# Patient Record
Sex: MALE | Race: BLACK OR AFRICAN AMERICAN | NOT HISPANIC OR LATINO | Employment: FULL TIME | URBAN - METROPOLITAN AREA
[De-identification: names, ages, dates, MRNs, and addresses within clinical notes are randomized per-mention and may not be internally consistent; named-entity substitution may affect disease eponyms.]

---

## 2017-01-25 DIAGNOSIS — E78.5 HLD (HYPERLIPIDEMIA): ICD-10-CM

## 2017-01-25 DIAGNOSIS — M54.9 LEFT-SIDED BACK PAIN: ICD-10-CM

## 2017-01-25 RX ORDER — PREGABALIN 100 MG/1
CAPSULE ORAL
Qty: 90 CAPSULE | Refills: 5 | Status: SHIPPED | OUTPATIENT
Start: 2017-01-25 | End: 2017-08-01 | Stop reason: SDUPTHER

## 2017-01-25 RX ORDER — ROSUVASTATIN CALCIUM 10 MG/1
TABLET, FILM COATED ORAL
Qty: 90 TABLET | Refills: 1 | Status: SHIPPED | OUTPATIENT
Start: 2017-01-25 | End: 2018-03-31 | Stop reason: SDUPTHER

## 2017-03-02 RX ORDER — ETODOLAC 400 MG/1
TABLET, FILM COATED ORAL
Qty: 180 TABLET | Refills: 0 | Status: SHIPPED | OUTPATIENT
Start: 2017-03-02 | End: 2017-06-19 | Stop reason: SDUPTHER

## 2017-03-31 ENCOUNTER — OFFICE VISIT (OUTPATIENT)
Dept: FAMILY MEDICINE | Facility: CLINIC | Age: 67
End: 2017-03-31
Payer: COMMERCIAL

## 2017-03-31 VITALS
TEMPERATURE: 98 F | HEART RATE: 66 BPM | SYSTOLIC BLOOD PRESSURE: 122 MMHG | HEIGHT: 69 IN | WEIGHT: 192.56 LBS | OXYGEN SATURATION: 98 % | BODY MASS INDEX: 28.52 KG/M2 | DIASTOLIC BLOOD PRESSURE: 78 MMHG

## 2017-03-31 DIAGNOSIS — B96.89 ACUTE BACTERIAL SINUSITIS: Primary | ICD-10-CM

## 2017-03-31 DIAGNOSIS — J01.90 ACUTE BACTERIAL SINUSITIS: Primary | ICD-10-CM

## 2017-03-31 PROCEDURE — 99999 PR PBB SHADOW E&M-EST. PATIENT-LVL III: CPT | Mod: PBBFAC,,, | Performed by: INTERNAL MEDICINE

## 2017-03-31 PROCEDURE — 99213 OFFICE O/P EST LOW 20 MIN: CPT | Mod: S$GLB,,, | Performed by: INTERNAL MEDICINE

## 2017-03-31 PROCEDURE — 1160F RVW MEDS BY RX/DR IN RCRD: CPT | Mod: S$GLB,,, | Performed by: INTERNAL MEDICINE

## 2017-03-31 RX ORDER — IPRATROPIUM BROMIDE 42 UG/1
2 SPRAY, METERED NASAL 3 TIMES DAILY
Qty: 30 ML | Refills: 0 | Status: SHIPPED | OUTPATIENT
Start: 2017-03-31 | End: 2017-04-07

## 2017-03-31 RX ORDER — AMOXICILLIN AND CLAVULANATE POTASSIUM 875; 125 MG/1; MG/1
1 TABLET, FILM COATED ORAL 2 TIMES DAILY
Qty: 20 TABLET | Refills: 0 | Status: SHIPPED | OUTPATIENT
Start: 2017-03-31 | End: 2017-04-10

## 2017-03-31 NOTE — MR AVS SNAPSHOT
[facility]  [address]  [place] [address]  Phone: [phone]  Fax: [phone]                  John Lemos   3/31/2017 3:00 PM   Office Visit    Description:  Male : 1950   Provider:  Rahul Rebollar MD   Department:  [facility]           Reason for Visit     Sinus Problem           Diagnoses this Visit        Comments    Acute bacterial sinusitis    -  Primary            To Do List           Future Appointments        Provider Department Dept Phone    2017 7:00 AM Xiomy Ventura MD [facility] [phone]      Goals (5 Years of Data)     None       These Medications        Disp Refills Start End    amoxicillin-clavulanate 875-125mg (AUGMENTIN) 875-125 mg per tablet 20 tablet 0 3/31/2017 4/10/2017    Take 1 tablet by mouth 2 (two) times daily. - Oral    Pharmacy: [facility] ([place], [place] - [address] [facility] Ph #: [phone]       ipratropium (ATROVENT) 0.06 % nasal spray 30 mL 0 3/31/2017 2017    2 sprays by Nasal route 3 (three) times daily. AS NEEDED FOR NASAL CONGESTION AND DRIP - Nasal    Pharmacy: [facility] [address] [facility] ([place], [place] - [address] [facility] Ph #: [phone]         [facility]     [facility] Care [facility] - 24/ Assistance  Unless otherwise directed by your provider, please contact [facility], our nurse care line that is available for 24/ assistance.     Registered nurses in the [facility] provide: appointment scheduling, clinical advisement, health education, and other advisory services.  Call: [phone] (toll free)               Medications           Message regarding Medications     Verify the changes and/or additions to your medication regime listed below are the same as discussed with your clinician today.  If any of these changes or additions are incorrect, please notify your healthcare provider.        START taking these NEW  "medications        Refills    ipratropium (ATROVENT) 0.06 % nasal spray 0    Si sprays by Nasal route 3 (three) times daily. AS NEEDED FOR NASAL CONGESTION AND DRIP    Class: Normal    Route: Nasal      STOP taking these medications     fluticasone (FLONASE) 50 mcg/actuation nasal spray 1 spray by Each Nare route once daily.           Verify that the below list of medications is an accurate representation of the medications you are currently taking.  If none reported, the list may be blank. If incorrect, please contact your healthcare provider. Carry this list with you in case of emergency.           Current Medications     CRESTOR 10 mg tablet TAKE ONE TABLET BY MOUTH ONCE DAILY IN THE EVENING    LYRICA 100 mg capsule TAKE ONE CAPSULE BY MOUTH THREE TIMES DAILY    metformin (GLUCOPHAGE) 500 MG tablet Take 2 tablets with breakfast and 1 tablet with dinner    omeprazole (PRILOSEC) 20 MG capsule Take 20 mg by mouth. 1 Capsule, Delayed Release(E.C.) Oral Every morning    amoxicillin-clavulanate 875-125mg (AUGMENTIN) 875-125 mg per tablet Take 1 tablet by mouth 2 (two) times daily.    blood sugar diagnostic (FREESTYLE LITE STRIPS) Strp Inject 1 each as directed once daily.    clotrimazole-betamethasone 1-0.05% (LOTRISONE) cream Apply topically 2 (two) times daily.    etodolac (LODINE) 400 MG tablet TAKE ONE TABLET BY MOUTH TWICE DAILY    ipratropium (ATROVENT) 0.06 % nasal spray 2 sprays by Nasal route 3 (three) times daily. AS NEEDED FOR NASAL CONGESTION AND DRIP    lancets (FREESTYLE LANCETS) 28 gauge Misc Inject 1 lancet as directed once daily.    loratadine (CLARITIN) 10 mg tablet Take 1 tablet (10 mg total) by mouth once daily.           Clinical Reference Information           Your Vitals Were     BP Pulse Temp Height Weight SpO2    122/78 (BP Location: Right arm, Patient Position: Sitting) 66 97.7 °F (36.5 °C) 5' 9" (1.753 m) 87.4 kg (192 lb 9.2 oz) 98%    BMI                28.44 kg/m2          Blood " Pressure          Most Recent Value    BP  122/78      Allergies as of 3/31/2017     Sulfa (Sulfonamide Antibiotics)      Immunizations Administered on Date of Encounter - 3/31/2017     None      Language Assistance Services     ATTENTION: Language assistance services are available, free of charge. Please call [phone].      ATENCIÓN: Si habla ernestina, tiene a mcbride disposición servicios gratuitos de asistencia lingüística. Llame al [phone].     CHÚ Ý: N?u b?n nói Ti?ng Vi?t, có các d?ch v? h? tr? ngôn ng? mi?n phí dành cho b?n. G?i s? [phone].         [facility] complies with applicable Federal civil rights laws and does not discriminate on the basis of race, color, national origin, age, disability, or sex.

## 2017-03-31 NOTE — PROGRESS NOTES
Assessment & Plan  Acute bacterial sinusitis - x 2 weeks, failing conservative mgmt.  augmentin x 10 days; atrovent nasal spray.  -     amoxicillin-clavulanate 875-125mg (AUGMENTIN) 875-125 mg per tablet; Take 1 tablet by mouth 2 (two) times daily.  Dispense: 20 tablet; Refill: 0  -     ipratropium (ATROVENT) 0.06 % nasal spray; 2 sprays by Nasal route 3 (three) times daily. AS NEEDED FOR NASAL CONGESTION AND DRIP  Dispense: 30 mL; Refill: 0      There are no discontinued medications.    Follow-up: No Follow-up on file.follow up June fasting with Dr. 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      =================================================================      Chief Complaint   Patient presents with    Sinus Problem       ALEXANDR Lopez is a 66 y.o. male, last appointment with this clinic was 10/24/2016.    Diabetes with peripheral neuropathy.  11/2016, increased metformin dose.  Lyrica.  Last A1c in November, 7.4. Last lipid profile LDL at 71.  Lumbar degenerative disc disease.  Lyrica.  Hyperlipidemia on crestor.    Notes frequent sinus infection; maybe yearly.  Around this time of year.  Denies allergies.  No issues with cutting grass.  This time it's been 2 weeks of sinus pain and pressure, no drainage.  Dizziness. No ear congestion but maybe some pressure.  No fever no chills.  No chest congestion.  Allegra D OTC with modest improvement.  Hx of Flonase to take PRN infection; did not use it for this episode.  Sock contacts none.      Patient Care Team:  Xiomy Ventura MD as PCP - General (Family Medicine)    Patient Active Problem List    Diagnosis Date Noted    ED (erectile dysfunction) 01/08/2013    HLD (hyperlipidemia) 01/08/2013    Neuropathy 01/08/2013    Diabetes mellitus type 2, controlled 09/08/2012    GERD (gastroesophageal reflux disease) 09/08/2012    DDD (degenerative disc disease), lumbar 09/08/2012       PAST MEDICAL HISTORY:  Past Medical History:   Diagnosis Date     Arthritis     Cataract     ou    Diabetes mellitus 7 yrs    lbs: 118 1 week ago    Eye injury     fb    Hyperlipidemia        PAST SURGICAL HISTORY:  History reviewed. No pertinent surgical history.    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  Review of patient's allergies indicates:   Allergen Reactions    Sulfa (sulfonamide antibiotics) Swelling     Current Outpatient Prescriptions   Medication Sig Dispense Refill    CRESTOR 10 mg tablet TAKE ONE TABLET BY MOUTH ONCE DAILY IN THE EVENING 90 tablet 1    fluticasone (FLONASE) 50 mcg/actuation nasal spray 1 spray by Each Nare route once daily. 1 Bottle 3    LYRICA 100 mg capsule TAKE ONE CAPSULE BY MOUTH THREE TIMES DAILY 90 capsule 5    metformin (GLUCOPHAGE) 500 MG tablet Take 2 tablets with breakfast and 1 tablet with dinner 270 tablet 0    omeprazole (PRILOSEC) 20 MG capsule Take 20 mg by mouth. 1 Capsule, Delayed Release(E.C.) Oral Every morning      blood sugar diagnostic (FREESTYLE LITE STRIPS) Strp Inject 1 each as directed once daily. 100 each 3    clotrimazole-betamethasone 1-0.05% (LOTRISONE) cream Apply topically 2 (two) times daily. 45 g 2    etodolac (LODINE) 400 MG tablet TAKE ONE TABLET BY MOUTH TWICE DAILY 180 tablet 0    lancets (FREESTYLE LANCETS) 28 gauge Misc Inject 1 lancet as directed once daily. 100 each 3    loratadine (CLARITIN) 10 mg tablet Take 1 tablet (10 mg total) by mouth once daily. 30 tablet 11     No  "current facility-administered medications for this visit.        Review of Systems   Constitutional: Negative for chills, fever and malaise/fatigue.   HENT: Positive for congestion and ear pain. Negative for hearing loss and sore throat.    Respiratory: Negative for cough, sputum production and shortness of breath.    Cardiovascular: Negative for chest pain.   Musculoskeletal: Negative for myalgias and neck pain.   Skin: Negative for rash.   Neurological: Negative for headaches.       Physical Exam   Vitals:    03/31/17 1454 03/31/17 1507   BP: (!) 146/86 122/78   BP Location:  Right arm   Patient Position:  Sitting   Pulse: 66    Temp: 97.7 °F (36.5 °C)    SpO2: 98%    Weight: 87.4 kg (192 lb 9.2 oz)    Height: 5' 9" (1.753 m)     Body mass index is 28.44 kg/(m^2).  Weight: 87.4 kg (192 lb 9.2 oz)   Height: 5' 9" (175.3 cm)     Physical Exam   Constitutional: He is oriented to person, place, and time. He appears well-developed and well-nourished.   HENT:   Right Ear: Tympanic membrane and ear canal normal.   Left Ear: Tympanic membrane and ear canal normal.   Mouth/Throat: No oral lesions. No oropharyngeal exudate or posterior oropharyngeal erythema.   Eyes: EOM are normal.   Neck: Neck supple.   Cardiovascular: Normal rate, regular rhythm and normal heart sounds.    Pulmonary/Chest: Effort normal and breath sounds normal. He has no wheezes.   Lymphadenopathy:     He has no cervical adenopathy.   Neurological: He is alert and oriented to person, place, and time.   Skin: Skin is warm and dry.   Psychiatric: He has a normal mood and affect. His behavior is normal.     "

## 2017-04-08 ENCOUNTER — OFFICE VISIT (OUTPATIENT)
Dept: FAMILY MEDICINE | Facility: CLINIC | Age: 67
End: 2017-04-08
Payer: COMMERCIAL

## 2017-04-08 VITALS
WEIGHT: 185.75 LBS | OXYGEN SATURATION: 98 % | BODY MASS INDEX: 28.15 KG/M2 | HEIGHT: 68 IN | TEMPERATURE: 98 F | DIASTOLIC BLOOD PRESSURE: 73 MMHG | SYSTOLIC BLOOD PRESSURE: 132 MMHG | HEART RATE: 63 BPM

## 2017-04-08 DIAGNOSIS — J06.9 VIRAL URI: Primary | ICD-10-CM

## 2017-04-08 DIAGNOSIS — H69.92 ETD (EUSTACHIAN TUBE DYSFUNCTION), LEFT: ICD-10-CM

## 2017-04-08 PROCEDURE — 99999 PR PBB SHADOW E&M-EST. PATIENT-LVL IV: CPT | Mod: PBBFAC,,, | Performed by: NURSE PRACTITIONER

## 2017-04-08 PROCEDURE — 99213 OFFICE O/P EST LOW 20 MIN: CPT | Mod: S$GLB,,, | Performed by: NURSE PRACTITIONER

## 2017-04-08 PROCEDURE — 1160F RVW MEDS BY RX/DR IN RCRD: CPT | Mod: S$GLB,,, | Performed by: NURSE PRACTITIONER

## 2017-04-08 RX ORDER — MONTELUKAST SODIUM 10 MG/1
10 TABLET ORAL NIGHTLY
Qty: 30 TABLET | Refills: 0 | Status: SHIPPED | OUTPATIENT
Start: 2017-04-08 | End: 2017-05-08

## 2017-04-08 RX ORDER — FLUTICASONE PROPIONATE 50 MCG
2 SPRAY, SUSPENSION (ML) NASAL DAILY
Qty: 1 BOTTLE | Refills: 0 | Status: SHIPPED | OUTPATIENT
Start: 2017-04-08 | End: 2017-05-08

## 2017-04-08 NOTE — MR AVS SNAPSHOT
[facility]  [address]  [place] [address]  Phone: [phone]  Fax: [phone]                  John Lemos   2017 12:45 PM   Office Visit    Description:  Male : 1950   Provider:  LAPAL[facility], WALK IN   Department:  [facility] Medicine           Reason for Visit     Sinus Problem           Diagnoses this Visit        Comments    Viral URI    -  Primary     ETD (eustachian tube dysfunction), left                To Do List           Future Appointments        Provider Department Dept Phone    2017 12:45 PM TEO LIVE IN [facility] [phone]    2017 7:00 AM Xiomy Ventura MD [facility] [phone]      Goals (5 Years of Data)     None       These Medications        Disp Refills Start End    montelukast (SINGULAIR) 10 mg tablet 30 tablet 0 2017    Take 1 tablet (10 mg total) by mouth every evening. - Oral    Pharmacy: [facility] [address] [place] [address] [address] [facility] Ph #: [phone]       fluticasone (FLONASE) 50 mcg/actuation nasal spray 1 Bottle 0 2017    2 sprays by Each Nare route once daily. - Each Nare    Pharmacy: [facility] [address] [place] ([place], [place] - [address] [facility] Ph #: [phone]         [facility]     [facility] Nurse Care Line -  Assistance  Unless otherwise directed by your provider, please contact [facility], our nurse care line that is available for 24/ assistance.     Registered nurses in the [facility] provide: appointment scheduling, clinical advisement, health education, and other advisory services.  Call: [phone] (toll free)               Medications           Message regarding Medications     Verify the changes and/or additions to your medication regime listed below are the same as discussed with your clinician today.  If any of these changes or additions are incorrect, please notify your  "healthcare provider.        START taking these NEW medications        Refills    montelukast (SINGULAIR) 10 mg tablet 0    Sig: Take 1 tablet (10 mg total) by mouth every evening.    Class: Normal    Route: Oral    fluticasone (FLONASE) 50 mcg/actuation nasal spray 0    Si sprays by Each Nare route once daily.    Class: Normal    Route: Each Nare           Verify that the below list of medications is an accurate representation of the medications you are currently taking.  If none reported, the list may be blank. If incorrect, please contact your healthcare provider. Carry this list with you in case of emergency.           Current Medications     amoxicillin-clavulanate 875-125mg (AUGMENTIN) 875-125 mg per tablet Take 1 tablet by mouth 2 (two) times daily.    blood sugar diagnostic (FREESTYLE LITE STRIPS) Strp Inject 1 each as directed once daily.    clotrimazole-betamethasone 1-0.05% (LOTRISONE) cream Apply topically 2 (two) times daily.    CRESTOR 10 mg tablet TAKE ONE TABLET BY MOUTH ONCE DAILY IN THE EVENING    etodolac (LODINE) 400 MG tablet TAKE ONE TABLET BY MOUTH TWICE DAILY    lancets (FREESTYLE LANCETS) 28 gauge Misc Inject 1 lancet as directed once daily.    LYRICA 100 mg capsule TAKE ONE CAPSULE BY MOUTH THREE TIMES DAILY    metformin (GLUCOPHAGE) 500 MG tablet Take 2 tablets with breakfast and 1 tablet with dinner    omeprazole (PRILOSEC) 20 MG capsule Take 20 mg by mouth. 1 Capsule, Delayed Release(E.C.) Oral Every morning    fluticasone (FLONASE) 50 mcg/actuation nasal spray 2 sprays by Each Nare route once daily.    loratadine (CLARITIN) 10 mg tablet Take 1 tablet (10 mg total) by mouth once daily.    montelukast (SINGULAIR) 10 mg tablet Take 1 tablet (10 mg total) by mouth every evening.           Clinical Reference Information           Your Vitals Were     BP Pulse Temp Height Weight SpO2    132/73 (BP Location: Right arm, Patient Position: Sitting) 63 98.1 °F (36.7 °C) (Oral) 5' 8" (1.727 m) " 84.2 kg (185 lb 11.8 oz) 98%    BMI                28.24 kg/m2          Blood Pressure          Most Recent Value    BP  132/73      Allergies as of 4/8/2017     Sulfa (Sulfonamide Antibiotics)      Immunizations Administered on Date of Encounter - 4/8/2017     None      Instructions    Follow up if not improved  Go to ER for new worse or concerning symptoms    Viral Upper Respiratory Illness (Adult)  You have a viral upper respiratory illness (URI), which is another term for the common cold. This illness is contagious during the first few days. It is spread through the air by coughing and sneezing. It may also be spread by direct contact (touching the sick person and then touching your own eyes, nose, or mouth). Frequent handwashing will decrease risk of spread. Most viral illnesses go away within 7 to 10 days with rest and simple home remedies. Sometimes the illness may last for several weeks. Antibiotics will not kill a virus, and they are generally not prescribed for this condition.    Home care  · If symptoms are severe, rest at home for the first 2 to 3 days. When you resume activity, don't let yourself get too tired.  · Avoid being exposed to cigarette smoke (yours or others).  · You may use acetaminophen or ibuprofen to control pain and fever, unless another medicine was prescribed. (Note: If you have chronic liver or kidney disease, have ever had a stomach ulcer or gastrointestinal bleeding, or are taking blood-thinning medicines, talk with your healthcare provider before using these medicines.) Aspirin should never be given to anyone under 18 years of age who is ill with a viral infection or fever. It may cause severe liver or brain damage.  · Your appetite may be poor, so a light diet is fine. Avoid dehydration by drinking 6 to 8 glasses of fluids per day (water, soft drinks, juices, tea, or soup). Extra fluids will help loosen secretions in the nose and lungs.  · Over-the-counter cold medicines will not  shorten the length of time youre sick, but they may be helpful for the following symptoms: cough, sore throat, and nasal and sinus congestion. (Note: Do not use decongestants if you have high blood pressure.)  Follow-up care  Follow up with your healthcare provider, or as advised.  When to seek medical advice  Call your healthcare provider right away if any of these occur:  · Cough with lots of colored sputum (mucus)  · Severe headache; face, neck, or ear pain  · Difficulty swallowing due to throat pain  · Fever of 100.4°F (38°C)  Call 911, or get immediate medical care  Call emergency services right away if any of these occur:  · Chest pain, shortness of breath, wheezing, or difficulty breathing  · Coughing up blood  · Inability to swallow due to throat pain  Date Last Reviewed: 9/13/2015  © 9401-7160 [employer]. [address] [place] [address]. All rights reserved. This information is not intended as a substitute for professional medical care. Always follow your healthcare professional's instructions.             Language Assistance Services     ATTENTION: Language assistance services are available, free of charge. Please call [phone].      ATENCIÓN: Si habla español, tiene a mcbride disposición servicios gratuitos de asistencia lingüística. Llame al [phone].     CHÚ Ý: N?u b?n nói Ti?ng Vi?t, có các d?ch v? h? tr? ngôn ng? mi?n phí dành cho b?n. G?i s? [phone].         [facility] complies with applicable Federal civil rights laws and does not discriminate on the basis of race, color, national origin, age, disability, or sex.

## 2017-04-08 NOTE — PATIENT INSTRUCTIONS
Follow up if not improved  Go to ER for new worse or concerning symptoms    Viral Upper Respiratory Illness (Adult)  You have a viral upper respiratory illness (URI), which is another term for the common cold. This illness is contagious during the first few days. It is spread through the air by coughing and sneezing. It may also be spread by direct contact (touching the sick person and then touching your own eyes, nose, or mouth). Frequent handwashing will decrease risk of spread. Most viral illnesses go away within 7 to 10 days with rest and simple home remedies. Sometimes the illness may last for several weeks. Antibiotics will not kill a virus, and they are generally not prescribed for this condition.    Home care  · If symptoms are severe, rest at home for the first 2 to 3 days. When you resume activity, don't let yourself get too tired.  · Avoid being exposed to cigarette smoke (yours or others).  · You may use acetaminophen or ibuprofen to control pain and fever, unless another medicine was prescribed. (Note: If you have chronic liver or kidney disease, have ever had a stomach ulcer or gastrointestinal bleeding, or are taking blood-thinning medicines, talk with your healthcare provider before using these medicines.) Aspirin should never be given to anyone under 18 years of age who is ill with a viral infection or fever. It may cause severe liver or brain damage.  · Your appetite may be poor, so a light diet is fine. Avoid dehydration by drinking 6 to 8 glasses of fluids per day (water, soft drinks, juices, tea, or soup). Extra fluids will help loosen secretions in the nose and lungs.  · Over-the-counter cold medicines will not shorten the length of time youre sick, but they may be helpful for the following symptoms: cough, sore throat, and nasal and sinus congestion. (Note: Do not use decongestants if you have high blood pressure.)  Follow-up care  Follow up with your healthcare provider, or as  advised.  When to seek medical advice  Call your healthcare provider right away if any of these occur:  · Cough with lots of colored sputum (mucus)  · Severe headache; face, neck, or ear pain  · Difficulty swallowing due to throat pain  · Fever of 100.4°F (38°C)  Call 911, or get immediate medical care  Call emergency services right away if any of these occur:  · Chest pain, shortness of breath, wheezing, or difficulty breathing  · Coughing up blood  · Inability to swallow due to throat pain  Date Last Reviewed: 9/13/2015  © 0166-4824 [employer]. [address] [place] [address]. All rights reserved. This information is not intended as a substitute for professional medical care. Always follow your healthcare professional's instructions.

## 2017-04-08 NOTE — PROGRESS NOTES
Subjective:       Patient ID: John Lemos is a 66 y.o. male.    Chief Complaint: Sinus Problem    HPI Ms Lemos is here fo rsome L ear pressure and facial pain.  He was seen and treated 2 weeks ago, he is still on abx.    Review of Systems   Constitutional: Negative for fever.   HENT: Positive for sinus pressure.    Respiratory: Negative.    Cardiovascular: Negative.        Objective:      Physical Exam   Constitutional: He is oriented to person, place, and time. He appears well-developed and well-nourished. He does not appear ill. No distress.   HENT:   Head: Normocephalic and atraumatic.   Right Ear: A middle ear effusion is present.   Left Ear: A middle ear effusion is present.   Nose: Mucosal edema and rhinorrhea present. Right sinus exhibits no maxillary sinus tenderness and no frontal sinus tenderness. Left sinus exhibits no maxillary sinus tenderness and no frontal sinus tenderness.   Mouth/Throat: Uvula is midline, oropharynx is clear and moist and mucous membranes are normal.   Cardiovascular: Normal rate, regular rhythm and normal heart sounds.  Exam reveals no friction rub.    No murmur heard.  Pulmonary/Chest: Effort normal and breath sounds normal. No respiratory distress. He has no decreased breath sounds. He has no wheezes. He has no rhonchi. He has no rales.   Musculoskeletal: Normal range of motion. He exhibits no edema.   Lymphadenopathy:        Head (right side): No submental, no submandibular and no tonsillar adenopathy present.        Head (left side): No submental, no submandibular and no tonsillar adenopathy present.     He has no cervical adenopathy.   Neurological: He is alert and oriented to person, place, and time.   Skin: Skin is warm and dry. No erythema.   Psychiatric: He has a normal mood and affect. His behavior is normal.   Vitals reviewed.      Assessment:       1. Viral URI    2. ETD (eustachian tube dysfunction), left        Plan:       Viral URI  -     montelukast  (SINGULAIR) 10 mg tablet; Take 1 tablet (10 mg total) by mouth every evening.  Dispense: 30 tablet; Refill: 0  -     fluticasone (FLONASE) 50 mcg/actuation nasal spray; 2 sprays by Each Nare route once daily.  Dispense: 1 Bottle; Refill: 0    ETD (eustachian tube dysfunction), left    Continue abx, add singulair and flonase.   F/u if not improved, ER for new worse or concerning symptoms    Verbalized understanding.

## 2017-06-07 ENCOUNTER — HOSPITAL ENCOUNTER (OUTPATIENT)
Dept: RADIOLOGY | Facility: HOSPITAL | Age: 67
Discharge: HOME | End: 2017-06-07
Attending: FAMILY MEDICINE
Payer: COMMERCIAL

## 2017-06-07 ENCOUNTER — OFFICE VISIT (OUTPATIENT)
Dept: FAMILY MEDICINE | Facility: CLINIC | Age: 67
End: 2017-06-07
Payer: COMMERCIAL

## 2017-06-07 VITALS
WEIGHT: 187.63 LBS | RESPIRATION RATE: 16 BRPM | OXYGEN SATURATION: 98 % | DIASTOLIC BLOOD PRESSURE: 68 MMHG | HEART RATE: 68 BPM | BODY MASS INDEX: 28.44 KG/M2 | SYSTOLIC BLOOD PRESSURE: 136 MMHG | TEMPERATURE: 99 F | HEIGHT: 68 IN

## 2017-06-07 DIAGNOSIS — M25.561 CHRONIC PAIN OF RIGHT KNEE: ICD-10-CM

## 2017-06-07 DIAGNOSIS — R30.0 DYSURIA: Primary | ICD-10-CM

## 2017-06-07 DIAGNOSIS — G89.29 CHRONIC PAIN OF RIGHT KNEE: ICD-10-CM

## 2017-06-07 LAB
BILIRUB SERPL-MCNC: NORMAL MG/DL
BLOOD URINE, POC: NORMAL
COLOR, POC UA: YELLOW
CREAT UR-MCNC: 127 MG/DL
GLUCOSE UR QL STRIP: NORMAL
KETONES UR QL STRIP: NORMAL
LEUKOCYTE ESTERASE URINE, POC: NORMAL
MICROALBUMIN UR DL<=1MG/L-MCNC: 6 UG/ML
MICROALBUMIN/CREATININE RATIO: 4.7 UG/MG
NITRITE, POC UA: NORMAL
PH, POC UA: 5
PROTEIN, POC: NORMAL
SPECIFIC GRAVITY, POC UA: 1.01
UROBILINOGEN, POC UA: NORMAL

## 2017-06-07 PROCEDURE — 99999 PR PBB SHADOW E&M-EST. PATIENT-LVL IV: CPT | Mod: PBBFAC,,, | Performed by: FAMILY MEDICINE

## 2017-06-07 PROCEDURE — 87086 URINE CULTURE/COLONY COUNT: CPT

## 2017-06-07 PROCEDURE — 99214 OFFICE O/P EST MOD 30 MIN: CPT | Mod: 25,S$GLB,, | Performed by: FAMILY MEDICINE

## 2017-06-07 PROCEDURE — 82570 ASSAY OF URINE CREATININE: CPT

## 2017-06-07 PROCEDURE — 81002 URINALYSIS NONAUTO W/O SCOPE: CPT | Mod: S$GLB,,, | Performed by: FAMILY MEDICINE

## 2017-06-07 PROCEDURE — 3045F PR MOST RECENT HEMOGLOBIN A1C LEVEL 7.0-9.0%: CPT | Mod: S$GLB,,, | Performed by: FAMILY MEDICINE

## 2017-06-07 PROCEDURE — 73560 X-RAY EXAM OF KNEE 1 OR 2: CPT | Mod: 26,RT,, | Performed by: RADIOLOGY

## 2017-06-07 PROCEDURE — 73560 X-RAY EXAM OF KNEE 1 OR 2: CPT | Mod: TC,PO,RT

## 2017-06-07 PROCEDURE — 4010F ACE/ARB THERAPY RXD/TAKEN: CPT | Mod: S$GLB,,, | Performed by: FAMILY MEDICINE

## 2017-06-07 RX ORDER — METFORMIN HYDROCHLORIDE 500 MG/1
TABLET ORAL
Qty: 270 TABLET | Refills: 0 | Status: SHIPPED | OUTPATIENT
Start: 2017-06-07 | End: 2017-10-02 | Stop reason: SDUPTHER

## 2017-06-07 RX ORDER — LOSARTAN POTASSIUM 50 MG/1
50 TABLET ORAL DAILY
Qty: 30 TABLET | Refills: 11 | Status: SHIPPED | OUTPATIENT
Start: 2017-06-07 | End: 2017-12-19 | Stop reason: SDUPTHER

## 2017-06-07 NOTE — PATIENT INSTRUCTIONS
Quad Set for Leg and Knee    This exercise is designed to stretch and strengthen your knee. Before beginning, read through all the instructions. While exercising, breathe normally and use smooth movements. If you feel any pain, stop the exercise. If pain persists, call your healthcare provider.  1.  Sit on the floor with one leg straight, the other bent.  2.  Flex the foot of your straight leg by pointing your toes toward you. Press the back of your knee into the floor while tightening the muscle on the top of your thigh. Hold for ______ seconds. Then relax.  3.  Repeat ______ times. Do ______ sets a day.  Caution  · Dont arch your back.  · Dont hunch your shoulders.  Date Last Reviewed: 9/20/2015  © 8851-1178 [employer]. [address] [place] [address]. All rights reserved. This information is not intended as a substitute for professional medical care. Always follow your healthcare professional's instructions.        Leg and Knee Exercises: Leg Lunge     This exercise is designed to stretch and strengthen your knee. Before beginning, read through all the instructions. Start with a warm-up, which can help prevent muscle soreness and improve coordination so you do not fall. While exercising, breathe normally and use smooth movements. If you feel any pain, stop the exercise. If pain persists, call your healthcare provider.  1. After a brief warm-up, such as brisk walking for a few minutes, stand with your feet shoulder-width apart.  2. With your _______ foot, step out and lower yourself into a comfortable position. Keep your back straight and your feet pointing straight ahead. As you step, the heel of the other foot lifts off the floor.  3. Return smoothly to your starting position.  4. Repeat ______ times. Do ______ sets a day.  Caution  · Dont let your forward knee go past your toes.  · Dont lunge so far that your rear knee touches the floor.  · Keep knees in line with the second toe.    Date Last Reviewed: 9/3/2015  © 4341-5729 [employer]. [address] [place] [address], [place] [address]. All rights reserved. This information is not intended as a substitute for professional medical care. Always follow your healthcare professional's instructions.

## 2017-06-07 NOTE — PROGRESS NOTES
Subjective:       Patient ID: John Lemos is a 66 y.o. male.    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  Patient with chronic back pain aggravated with prolonged sitting for > 30 minutes.  Has chronic burning sensation in the feet.  Diabetes fairly stable.  Also reports dysuria x 3 weeks.  Reports severe pain and clicking in the right kneeover past 2 months.  Review of Systems   Constitutional: Negative for chills.   Gastrointestinal: Negative for constipation, nausea and vomiting.   Genitourinary: Positive for dysuria and flank pain. Negative for frequency, hematuria and urgency.   Skin: Negative for rash.       Objective:      Physical Exam   Constitutional: He is oriented to person, place, and time. He appears well-developed and well-nourished.   Neck: Normal range of motion. Neck supple. No thyromegaly present.   Cardiovascular: Normal rate, regular rhythm and normal heart sounds.    No murmur heard.  Pulmonary/Chest: Effort normal and breath sounds normal. No respiratory distress. He has no wheezes.   Abdominal: Soft. Bowel sounds are normal. He exhibits no mass. There is no tenderness.   Musculoskeletal: He exhibits no edema.        Right knee: He exhibits deformity and bony tenderness. He exhibits normal range of motion, no swelling and no effusion.        Legs:  Neurological: He is alert and oriented to person, place, and time.   Skin: Skin is warm and dry.   Psychiatric: He has a normal mood and affect.       X-ray left knee:There is loss of joint space in the medial compartment and spurring of the medial intra-articular eminence plus mild spurring at the medial margin of the tibial plateau    Results for orders placed or performed in visit on 06/07/17   POCT URINE DIPSTICK WITHOUT MICROSCOPE    Result Value Ref Range    Color, UA yellow     Spec Grav UA 1.015     pH, UA 5     WBC, UA neg     Nitrite, UA neg     Protein neg     Glucose, UA normal     Ketones, UA neg     Urobilinogen, UA normal     Bilirubin +++     Blood, UA trace      Assessment:       1. Dysuria    2. Uncontrolled type 2 diabetes mellitus with stage 2 chronic kidney disease, without long-term current use of insulin    3. Chronic pain of right knee        Plan:       Dysuria  -     POCT URINE DIPSTICK WITHOUT MICROSCOPE  -     Urine culture    Uncontrolled type 2 diabetes mellitus with stage 2 chronic kidney disease, without long-term current use of insulin  -     Hemoglobin A1c; Future; Expected date: 06/07/2017  -     Microalbumin/creatinine urine ratio  -     metformin (GLUCOPHAGE) 500 MG tablet; Take 2 tablets with breakfast and 1 tablet with dinner  Dispense: 270 tablet; Refill: 0  -     losartan (COZAAR) 50 MG tablet; Take 1 tablet (50 mg total) by mouth once daily.  Dispense: 30 tablet; Refill: 11    Chronic pain of right knee  Continue NSAIDS.  Recommend knee strengthening exercises      Return in about 6 months (around 12/7/2017).

## 2017-06-08 LAB — BACTERIA UR CULT: NORMAL

## 2017-06-19 RX ORDER — ETODOLAC 400 MG/1
TABLET, FILM COATED ORAL
Qty: 180 TABLET | Refills: 0 | Status: SHIPPED | OUTPATIENT
Start: 2017-06-19 | End: 2017-10-02 | Stop reason: SDUPTHER

## 2017-07-25 ENCOUNTER — OFFICE VISIT (OUTPATIENT)
Dept: FAMILY MEDICINE | Facility: CLINIC | Age: 67
End: 2017-07-25
Payer: COMMERCIAL

## 2017-07-25 VITALS
WEIGHT: 188.19 LBS | OXYGEN SATURATION: 98 % | HEART RATE: 72 BPM | BODY MASS INDEX: 28.52 KG/M2 | DIASTOLIC BLOOD PRESSURE: 62 MMHG | TEMPERATURE: 98 F | HEIGHT: 68 IN | SYSTOLIC BLOOD PRESSURE: 116 MMHG

## 2017-07-25 DIAGNOSIS — N39.0 URINARY TRACT INFECTION WITHOUT HEMATURIA, SITE UNSPECIFIED: Primary | ICD-10-CM

## 2017-07-25 LAB
BILIRUB SERPL-MCNC: ABNORMAL MG/DL
BLOOD URINE, POC: 250
COLOR, POC UA: YELLOW
GLUCOSE UR QL STRIP: 500
KETONES UR QL STRIP: ABNORMAL
LEUKOCYTE ESTERASE URINE, POC: ABNORMAL
NITRITE, POC UA: ABNORMAL
PH, POC UA: 5
PROTEIN, POC: ABNORMAL
SPECIFIC GRAVITY, POC UA: 1.02
UROBILINOGEN, POC UA: NORMAL

## 2017-07-25 PROCEDURE — 99214 OFFICE O/P EST MOD 30 MIN: CPT | Mod: 25,S$GLB,, | Performed by: FAMILY MEDICINE

## 2017-07-25 PROCEDURE — 81001 URINALYSIS AUTO W/SCOPE: CPT | Mod: S$GLB,,, | Performed by: FAMILY MEDICINE

## 2017-07-25 PROCEDURE — 99999 PR PBB SHADOW E&M-EST. PATIENT-LVL III: CPT | Mod: PBBFAC,,, | Performed by: FAMILY MEDICINE

## 2017-07-25 RX ORDER — CIPROFLOXACIN 500 MG/1
500 TABLET ORAL 2 TIMES DAILY
Qty: 6 TABLET | Refills: 0 | Status: SHIPPED | OUTPATIENT
Start: 2017-07-25 | End: 2017-07-28 | Stop reason: SDUPTHER

## 2017-07-25 NOTE — PROGRESS NOTES
[facility]  Progress Note    SUBJECTIVE:     Chief Complaint   Patient presents with    Urinary Tract Infection       HPI   John Lemos  is a 66 y.o. male here for c/o dysuria, increased urinary frequency, and incomplete emptying of bladder for the past few days. Denies any fevers, chills. It is very discomforting. Getting worst.     Review of patient's allergies indicates:   Allergen Reactions    Sulfa (sulfonamide antibiotics) Swelling       Past Medical History:   Diagnosis Date    Arthritis     Cataract     ou    Diabetes mellitus 7 yrs    lbs: 118 1 week ago    Eye injury     fb    Hyperlipidemia      History reviewed. No pertinent surgical history.  Family History   Problem Relation Age of Onset    Diabetes Mother     Cancer Mother     Hypertension Mother     Diabetes Father     Cancer Father     No Known Problems Sister     No Known Problems Brother     No Known Problems Maternal Aunt     No Known Problems Maternal Uncle     No Known Problems Paternal Aunt     No Known Problems Paternal Uncle     No Known Problems Maternal Grandmother     No Known Problems Maternal Grandfather     No Known Problems Paternal Grandmother     No Known Problems Paternal Grandfather     Amblyopia Neg Hx     Blindness Neg Hx     Cataracts Neg Hx     Glaucoma Neg Hx     Macular degeneration Neg Hx     Retinal detachment Neg Hx     Strabismus Neg Hx     Stroke Neg Hx     Thyroid disease Neg Hx      Social History   Substance Use Topics    Smoking status: Never Smoker    Smokeless tobacco: Never Used    Alcohol use No        Review of Systems   Constitutional: Negative for chills and weight loss.   Gastrointestinal: Negative for constipation, nausea and vomiting.   Genitourinary: Positive for dysuria, flank pain, frequency and urgency. Negative for hematuria.   Skin: Negative for rash.     OBJECTIVE:     Vitals:    07/25/17 1025   BP: 116/62   Pulse: 72   Temp: 98 °F (36.7 °C)     Body  mass index is 28.61 kg/m².    Physical Exam   Constitutional: He is oriented to person, place, and time. He appears distressed (mild).   HENT:   Head: Normocephalic and atraumatic.   Eyes: Conjunctivae and EOM are normal.   Cardiovascular: Normal rate, regular rhythm and normal heart sounds.  Exam reveals no gallop and no friction rub.    No murmur heard.  Pulmonary/Chest: Effort normal and breath sounds normal. No respiratory distress. He has no wheezes. He has no rales.   Abdominal: Soft. Bowel sounds are normal. He exhibits no distension. There is no tenderness.   Neurological: He is alert and oriented to person, place, and time.   Skin: Skin is warm. He is not diaphoretic.       Old records were reviewed. Labs and/or images were independently reviewed.    ASSESSMENT     1. Urinary tract infection without hematuria, site unspecified        PLAN:     Urinary tract infection without hematuria, site unspecified  -     POCT urinalysis, dipstick or tablet reag  -     Start ciprofloxacin HCl (CIPRO) 500 MG tablet; Take 1 tablet (500 mg total) by mouth 2 (two) times daily.  Dispense: 6 tablet; Refill: 0  -     Advised patient to drink plenty of fluids and to take medications as prescribed. Instructed patient to call back or RTC if symptoms persist or worsen.      RTC PRCARLOS ALBERTO Limon MD  07/25/2017 10:44 AM

## 2017-07-28 DIAGNOSIS — N39.0 URINARY TRACT INFECTION WITHOUT HEMATURIA, SITE UNSPECIFIED: ICD-10-CM

## 2017-07-28 RX ORDER — CIPROFLOXACIN 500 MG/1
500 TABLET ORAL 2 TIMES DAILY
Qty: 6 TABLET | Refills: 0 | Status: SHIPPED | OUTPATIENT
Start: 2017-07-28 | End: 2017-07-31

## 2017-07-28 NOTE — TELEPHONE ENCOUNTER
Refill request. Patient states he is feeling better, but continues to experience burning upon urination.

## 2017-08-01 ENCOUNTER — TELEPHONE (OUTPATIENT)
Dept: FAMILY MEDICINE | Facility: CLINIC | Age: 67
End: 2017-08-01

## 2017-08-01 DIAGNOSIS — M54.9 LEFT-SIDED BACK PAIN: ICD-10-CM

## 2017-08-01 RX ORDER — PREGABALIN 100 MG/1
CAPSULE ORAL
Qty: 90 CAPSULE | Refills: 2 | Status: SHIPPED | OUTPATIENT
Start: 2017-08-01 | End: 2017-08-01 | Stop reason: SDUPTHER

## 2017-08-01 NOTE — TELEPHONE ENCOUNTER
----- Message from Priya Guerrero sent at 8/1/2017 11:00 AM CDT -----  Contact: [facility]  Pharmacist calling regarding med below. Please call [phone].        LYRICA 100 mg capsule

## 2017-08-02 ENCOUNTER — OFFICE VISIT (OUTPATIENT)
Dept: OPTOMETRY | Facility: CLINIC | Age: 67
End: 2017-08-02
Payer: COMMERCIAL

## 2017-08-02 DIAGNOSIS — E11.9 DIABETES MELLITUS TYPE 2 WITHOUT RETINOPATHY: Primary | ICD-10-CM

## 2017-08-02 DIAGNOSIS — H25.13 NUCLEAR SCLEROSIS, BILATERAL: ICD-10-CM

## 2017-08-02 DIAGNOSIS — H52.13 MYOPIA OF BOTH EYES WITH ASTIGMATISM AND PRESBYOPIA: ICD-10-CM

## 2017-08-02 DIAGNOSIS — Z13.5 GLAUCOMA SCREENING: ICD-10-CM

## 2017-08-02 DIAGNOSIS — H52.4 MYOPIA OF BOTH EYES WITH ASTIGMATISM AND PRESBYOPIA: ICD-10-CM

## 2017-08-02 DIAGNOSIS — H52.203 MYOPIA OF BOTH EYES WITH ASTIGMATISM AND PRESBYOPIA: ICD-10-CM

## 2017-08-02 PROCEDURE — 92015 DETERMINE REFRACTIVE STATE: CPT | Mod: S$GLB,,, | Performed by: OPTOMETRIST

## 2017-08-02 PROCEDURE — 99999 PR PBB SHADOW E&M-EST. PATIENT-LVL II: CPT | Mod: PBBFAC,,, | Performed by: OPTOMETRIST

## 2017-08-02 PROCEDURE — 92014 COMPRE OPH EXAM EST PT 1/>: CPT | Mod: S$GLB,,, | Performed by: OPTOMETRIST

## 2017-08-02 NOTE — PROGRESS NOTES
HPI      is here for annual eye exam. Pt sts no vision or ocular   complaints   Blood Sugar- doesn't check regularly   Hemoglobin A1C       Date                     Value               Ref Range             Status                06/07/2017               7.1 (H)             4.5 - 6.2 %           Final                 11/09/2016               7.4 (H)             4.5 - 6.2 %           Final                 07/16/2016               7.1 (H)             4.5 - 6.2 %           Final              (-)Flashes (-)Floaters  (+)Itch, (+)tear, (-)burn, (-)Dryness. (-) OTC Drops   (-)Photophobia  (-)Glare (-)diplopia (-) headaches          Last edited by BISI Ny on 8/2/2017  8:53 AM. (History)            Assessment /Plan     For exam results, see Encounter Report.    Diabetes mellitus type 2 without retinopathy  -No retinopathy noted today.  Continued control with primary care physician and annual comprehensive eye exam.    Nuclear sclerosis, bilateral  -Educated patient on presence of cataracts at today's exam, monitor at annual dilated fundus exam. 5+ years surgical estimate.    Glaucoma screening  -Monitor with annual eye exam and IOP check    Myopia of both eyes with astigmatism and presbyopia  Eyeglass Final Rx     Eyeglass Final Rx       Sphere Cylinder Axis Dist VA Add    Right -2.00 +0.50 020 20/20 +2.50    Left -2.00 +0.50 180 20/20 +2.50    Type:  PAL    Expiration Date:  8/3/2018                  RTC 1 yr

## 2017-08-03 RX ORDER — PREGABALIN 100 MG/1
CAPSULE ORAL
Qty: 90 CAPSULE | Refills: 0 | Status: SHIPPED | OUTPATIENT
Start: 2017-08-03 | End: 2017-12-08 | Stop reason: SDUPTHER

## 2017-10-02 RX ORDER — METFORMIN HYDROCHLORIDE 500 MG/1
TABLET ORAL
Qty: 270 TABLET | Refills: 0 | Status: SHIPPED | OUTPATIENT
Start: 2017-10-02 | End: 2017-12-22 | Stop reason: SDUPTHER

## 2017-10-02 RX ORDER — ETODOLAC 400 MG/1
TABLET, FILM COATED ORAL
Qty: 180 TABLET | Refills: 0 | Status: SHIPPED | OUTPATIENT
Start: 2017-10-02 | End: 2018-01-16 | Stop reason: SDUPTHER

## 2017-12-08 DIAGNOSIS — M54.9 LEFT-SIDED BACK PAIN: ICD-10-CM

## 2017-12-08 RX ORDER — PREGABALIN 100 MG/1
CAPSULE ORAL
Qty: 90 CAPSULE | Refills: 1 | Status: SHIPPED | OUTPATIENT
Start: 2017-12-08 | End: 2018-02-25 | Stop reason: SDUPTHER

## 2017-12-19 ENCOUNTER — OFFICE VISIT (OUTPATIENT)
Dept: FAMILY MEDICINE | Facility: CLINIC | Age: 67
End: 2017-12-19
Payer: COMMERCIAL

## 2017-12-19 ENCOUNTER — LAB VISIT (OUTPATIENT)
Dept: LAB | Facility: HOSPITAL | Age: 67
End: 2017-12-19
Attending: FAMILY MEDICINE
Payer: COMMERCIAL

## 2017-12-19 VITALS
WEIGHT: 197.56 LBS | BODY MASS INDEX: 29.94 KG/M2 | SYSTOLIC BLOOD PRESSURE: 126 MMHG | HEART RATE: 80 BPM | OXYGEN SATURATION: 97 % | HEIGHT: 68 IN | TEMPERATURE: 98 F | DIASTOLIC BLOOD PRESSURE: 66 MMHG | RESPIRATION RATE: 18 BRPM

## 2017-12-19 DIAGNOSIS — E11.9 CONTROLLED TYPE 2 DIABETES MELLITUS WITHOUT COMPLICATION, WITHOUT LONG-TERM CURRENT USE OF INSULIN: Chronic | ICD-10-CM

## 2017-12-19 DIAGNOSIS — R03.0 ELEVATED BLOOD PRESSURE READING: ICD-10-CM

## 2017-12-19 DIAGNOSIS — Z23 NEED FOR IMMUNIZATION AGAINST INFLUENZA: ICD-10-CM

## 2017-12-19 DIAGNOSIS — N30.00 ACUTE CYSTITIS WITHOUT HEMATURIA: Primary | ICD-10-CM

## 2017-12-19 DIAGNOSIS — Z23 NEED FOR PROPHYLACTIC VACCINATION WITH STREPTOCOCCUS PNEUMONIAE (PNEUMOCOCCUS) AND INFLUENZA VACCINES: ICD-10-CM

## 2017-12-19 LAB
BILIRUB SERPL-MCNC: NORMAL MG/DL
BLOOD URINE, POC: 250
COLOR, POC UA: NORMAL
ESTIMATED AVG GLUCOSE: 183 MG/DL
GLUCOSE UR QL STRIP: 1000
HBA1C MFR BLD HPLC: 8 %
KETONES UR QL STRIP: NORMAL
LEUKOCYTE ESTERASE URINE, POC: NORMAL
NITRITE, POC UA: NORMAL
PH, POC UA: 5
PROTEIN, POC: NORMAL
SPECIFIC GRAVITY, POC UA: 1.01
UROBILINOGEN, POC UA: NORMAL

## 2017-12-19 PROCEDURE — 90662 IIV NO PRSV INCREASED AG IM: CPT | Mod: S$GLB,,, | Performed by: FAMILY MEDICINE

## 2017-12-19 PROCEDURE — 83036 HEMOGLOBIN GLYCOSYLATED A1C: CPT

## 2017-12-19 PROCEDURE — 99214 OFFICE O/P EST MOD 30 MIN: CPT | Mod: 25,S$GLB,, | Performed by: FAMILY MEDICINE

## 2017-12-19 PROCEDURE — 81001 URINALYSIS AUTO W/SCOPE: CPT | Mod: S$GLB,,, | Performed by: FAMILY MEDICINE

## 2017-12-19 PROCEDURE — 36415 COLL VENOUS BLD VENIPUNCTURE: CPT | Mod: PO

## 2017-12-19 PROCEDURE — 90732 PPSV23 VACC 2 YRS+ SUBQ/IM: CPT | Mod: S$GLB,,, | Performed by: FAMILY MEDICINE

## 2017-12-19 PROCEDURE — 90472 IMMUNIZATION ADMIN EACH ADD: CPT | Mod: S$GLB,,, | Performed by: FAMILY MEDICINE

## 2017-12-19 PROCEDURE — 99999 PR PBB SHADOW E&M-EST. PATIENT-LVL IV: CPT | Mod: PBBFAC,,, | Performed by: FAMILY MEDICINE

## 2017-12-19 PROCEDURE — 90471 IMMUNIZATION ADMIN: CPT | Mod: S$GLB,,, | Performed by: FAMILY MEDICINE

## 2017-12-19 RX ORDER — LOSARTAN POTASSIUM 50 MG/1
50 TABLET ORAL DAILY
Qty: 30 TABLET | Refills: 5 | Status: SHIPPED | OUTPATIENT
Start: 2017-12-19 | End: 2018-12-26 | Stop reason: SDUPTHER

## 2017-12-19 RX ORDER — CIPROFLOXACIN 500 MG/1
500 TABLET ORAL 2 TIMES DAILY
Qty: 6 TABLET | Refills: 0 | Status: SHIPPED | OUTPATIENT
Start: 2017-12-19 | End: 2017-12-21 | Stop reason: SDUPTHER

## 2017-12-19 NOTE — PROGRESS NOTES
Chief Complaint   Patient presents with    Urinary Tract Infection    Urinary Frequency    burning sensation       HPI  John Lemos is a 67 y.o. male with multiple medical diagnoses as listed in the medical history and problem list that presents for evaluation for burning with urination one week ago. No fever. He is having some frequency and normally he uses the bathroom once a day. No penile discharge. He is diabetic and has been eating honeybuns and other foods with sugar.     PAST MEDICAL HISTORY:  Past Medical History:   Diagnosis Date    Arthritis     Cataract     ou    Diabetes mellitus 7 yrs    lbs: 118 1 week ago    Eye injury     fb    Hyperlipidemia        PAST SURGICAL HISTORY:  No past surgical history on file.    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 [employer]     Social History Main Topics    Smoking status: Never Smoker    Smokeless tobacco: Never Used    Alcohol use No    Drug use: No    Sexual activity: Not Currently     Other Topics Concern    Not on file     Social History Narrative    No narrative on file       FAMILY HISTORY:  Family History   Problem Relation Age of Onset    Diabetes Mother     Cancer Mother     Hypertension Mother     Diabetes Father     Cancer Father     No Known Problems Sister     No Known Problems Brother     No Known Problems Maternal Aunt     No Known Problems Maternal Uncle     No Known Problems Paternal Aunt     No Known Problems Paternal Uncle     No Known Problems Maternal Grandmother     No Known Problems Maternal Grandfather     No Known Problems Paternal Grandmother     No Known Problems Paternal Grandfather     Amblyopia Neg Hx     Blindness Neg Hx     Cataracts Neg Hx     Glaucoma Neg Hx     Macular  degeneration Neg Hx     Retinal detachment Neg Hx     Strabismus Neg Hx     Stroke Neg Hx     Thyroid disease Neg Hx        ALLERGIES AND MEDICATIONS: updated and reviewed.  Review of patient's allergies indicates:   Allergen Reactions    Sulfa (sulfonamide antibiotics) Swelling     Current Outpatient Prescriptions   Medication Sig Dispense Refill    blood sugar diagnostic (FREESTYLE LITE STRIPS) Strp Inject 1 each as directed once daily. 100 each 3    CRESTOR 10 mg tablet TAKE ONE TABLET BY MOUTH ONCE DAILY IN THE EVENING 90 tablet 1    etodolac (LODINE) 400 MG tablet TAKE ONE TABLET BY MOUTH TWICE DAILY 180 tablet 0    lancets (FREESTYLE LANCETS) 28 gauge Misc Inject 1 lancet as directed once daily. 100 each 3    loratadine (CLARITIN) 10 mg tablet Take 1 tablet (10 mg total) by mouth once daily. 30 tablet 11    losartan (COZAAR) 50 MG tablet Take 1 tablet (50 mg total) by mouth once daily. 30 tablet 5    LYRICA 100 mg capsule TAKE ONE CAPSULE BY MOUTH THREE TIMES DAILY 90 capsule 1    metformin (GLUCOPHAGE) 500 MG tablet TAKE TWO TABLETS BY MOUTH WITH BREAKFAST AND TAKE ONE TABLET WITH DINNER 270 tablet 0    omeprazole (PRILOSEC) 20 MG capsule Take 20 mg by mouth. 1 Capsule, Delayed Release(E.C.) Oral Every morning      ciprofloxacin HCl (CIPRO) 500 MG tablet Take 1 tablet (500 mg total) by mouth 2 (two) times daily. 6 tablet 0     No current facility-administered medications for this visit.        ROS  Review of Systems   Constitutional: Negative for chills, fatigue, fever and unexpected weight change.   HENT: Negative for ear pain, postnasal drip, rhinorrhea, sinus pressure and sore throat.    Eyes: Negative for photophobia and visual disturbance.   Respiratory: Negative for apnea, cough, chest tightness, shortness of breath and wheezing.    Cardiovascular: Negative for chest pain and palpitations.   Gastrointestinal: Negative for abdominal pain, blood in stool, constipation, diarrhea, nausea and  "vomiting.   Genitourinary: Positive for frequency. Negative for difficulty urinating and discharge.   Musculoskeletal: Negative for arthralgias and joint swelling.   Skin: Negative for rash.   Neurological: Negative for facial asymmetry, speech difficulty, weakness, numbness and headaches.   Psychiatric/Behavioral: Negative for dysphoric mood.       Physical Exam  Vitals:    12/19/17 1349 12/19/17 1423   BP: (!) 144/68 126/66   Pulse: 80    Resp: 18    Temp: 98.1 °F (36.7 °C)    TempSrc: Oral    SpO2: 97%    Weight: 89.6 kg (197 lb 8.5 oz)    Height: 5' 8" (1.727 m)     Body mass index is 30.03 kg/m².  Weight: 89.6 kg (197 lb 8.5 oz)   Height: 5' 8" (172.7 cm)     Physical Exam   Constitutional: He is oriented to person, place, and time. He appears well-developed and well-nourished.   HENT:   Head: Normocephalic and atraumatic.   Eyes: EOM are normal.   Abdominal: Soft. There is no tenderness.   No suprapubic tenderness, no flank pain   Neurological: He is alert and oriented to person, place, and time.   Skin: Skin is warm and dry. No rash noted.   Psychiatric: He has a normal mood and affect. His behavior is normal.   Nursing note and vitals reviewed.      Health Maintenance       Date Due Completion Date    Influenza Vaccine 08/01/2017 10/24/2016    Override on 11/25/2015: Done    Pneumococcal (65+) (2 of 2 - PPSV23) 11/21/2017 11/21/2016    Foot Exam 11/21/2017 11/21/2016    Override on 11/25/2015: Done    Hemoglobin A1c 12/07/2017 6/7/2017    Override on 11/25/2015: Done    Lipid Panel 06/07/2018 6/7/2017    Eye Exam 08/02/2018 8/2/2017    Override on 8/2/2017: Done    Colonoscopy 04/23/2019 4/23/2009    TETANUS VACCINE 02/19/2026 2/19/2016            ASSESSMENT     1. Acute cystitis without hematuria    2. Controlled type 2 diabetes mellitus without complication, without long-term current use of insulin    3. Need for immunization against influenza    4. Need for prophylactic vaccination with Streptococcus " pneumoniae (Pneumococcus) and Influenza vaccines    5. Elevated blood pressure reading        PLAN:     Problem List Items Addressed This Visit        Endocrine    Diabetes mellitus type 2, controlled (Chronic)    Relevant Medications    losartan (COZAAR) 50 MG tablet    Other Relevant Orders    Hemoglobin A1c    Ambulatory referral to Podiatry      Other Visit Diagnoses     Acute cystitis without hematuria    -  Primary  -urine dip positive for LE and blood, likely due to elevated sugars    Relevant Medications    ciprofloxacin HCl (CIPRO) 500 MG tablet    Other Relevant Orders    POCT urinalysis, dipstick or tablet reag    Need for immunization against influenza        Relevant Orders    Influenza - High Dose (65+) (PF) (IM)    Need for prophylactic vaccination with Streptococcus pneumoniae (Pneumococcus) and Influenza vaccines        Relevant Orders    Pneumococcal Polysaccharide Vaccine (23 Valent) (SQ/IM)    Elevated blood pressure reading      -Bp recheck WNL            Amelia Fisher MD  12/19/2017 2:13 PM        Return if symptoms worsen or fail to improve.

## 2017-12-21 DIAGNOSIS — N30.00 ACUTE CYSTITIS WITHOUT HEMATURIA: ICD-10-CM

## 2017-12-21 RX ORDER — CIPROFLOXACIN 500 MG/1
500 TABLET ORAL 2 TIMES DAILY
Qty: 6 TABLET | Refills: 0 | Status: SHIPPED | OUTPATIENT
Start: 2017-12-21 | End: 2017-12-24

## 2017-12-21 NOTE — TELEPHONE ENCOUNTER
----- Message from Gumaro Bellamy sent at 12/21/2017  2:09 PM CST -----  Contact: Self  REFILL; ciprofloxacin HCl (CIPRO) 500 MG tablet    Pt can be reached @ [phone].        Please advise.

## 2017-12-22 ENCOUNTER — TELEPHONE (OUTPATIENT)
Dept: FAMILY MEDICINE | Facility: CLINIC | Age: 67
End: 2017-12-22

## 2017-12-22 RX ORDER — METFORMIN HYDROCHLORIDE 500 MG/1
TABLET ORAL
Qty: 360 TABLET | Refills: 2 | Status: SHIPPED | OUTPATIENT
Start: 2017-12-22 | End: 2018-09-20 | Stop reason: SDUPTHER

## 2017-12-22 NOTE — TELEPHONE ENCOUNTER
Pt informed of results and recommendatoin's. Voiced understanding. Also states that he needs another script for the metformin sent to the pharmacy.

## 2017-12-22 NOTE — TELEPHONE ENCOUNTER
Please let him know his diabetes has worsened, I recommend he increase his metformin to two tablets with breakfast and dinner. I can send a new script if needed. I recommend he follow up with Dr. Ventura for this also.

## 2018-01-16 ENCOUNTER — OFFICE VISIT (OUTPATIENT)
Dept: PODIATRY | Facility: CLINIC | Age: 68
End: 2018-01-16
Payer: COMMERCIAL

## 2018-01-16 VITALS
HEART RATE: 68 BPM | DIASTOLIC BLOOD PRESSURE: 60 MMHG | BODY MASS INDEX: 29.86 KG/M2 | HEIGHT: 68 IN | SYSTOLIC BLOOD PRESSURE: 114 MMHG | WEIGHT: 197 LBS

## 2018-01-16 DIAGNOSIS — M20.42 HAMMER TOES OF BOTH FEET: ICD-10-CM

## 2018-01-16 DIAGNOSIS — M20.41 HAMMER TOES OF BOTH FEET: ICD-10-CM

## 2018-01-16 DIAGNOSIS — E11.9 COMPREHENSIVE DIABETIC FOOT EXAMINATION, TYPE 2 DM, ENCOUNTER FOR: Primary | ICD-10-CM

## 2018-01-16 DIAGNOSIS — M20.12 HALLUX ABDUCTO VALGUS, LEFT: ICD-10-CM

## 2018-01-16 DIAGNOSIS — M20.11 HALLUX ABDUCTO VALGUS, RIGHT: ICD-10-CM

## 2018-01-16 PROCEDURE — 99999 PR PBB SHADOW E&M-EST. PATIENT-LVL III: CPT | Mod: PBBFAC,,, | Performed by: PODIATRIST

## 2018-01-16 PROCEDURE — 99203 OFFICE O/P NEW LOW 30 MIN: CPT | Mod: S$GLB,,, | Performed by: PODIATRIST

## 2018-01-16 RX ORDER — ETODOLAC 400 MG/1
TABLET, FILM COATED ORAL
Qty: 180 TABLET | Refills: 0 | Status: SHIPPED | OUTPATIENT
Start: 2018-01-16 | End: 2018-04-15 | Stop reason: SDUPTHER

## 2018-01-16 NOTE — LETTER
January 16, 2018      Amelia Fisher MD  [address]  [place] [address]           [facility]  [address] [facility]  [place] [address]  Phone: [phone]          Patient: John Lemos   MR Number: [id number]   YOB: 1950   Date of Visit: 1/16/2018       Dear Dr. Amelia Fisher:    Thank you for referring John Lemos to me for evaluation. Attached you will find relevant portions of my assessment and plan of care.    If you have questions, please do not hesitate to call me. I look forward to following John Lemos along with you.    Sincerely,    Stormy Baxter DPM    Enclosure  CC:  No Recipients    If you would like to receive this communication electronically, please contact [email or web address] or [phone] to request more information on [employer] access.    For providers and/or their staff who would like to refer a patient to [facility], please contact us through our one-stop-shop provider referral line, [facility], at [phone].    If you feel you have received this communication in error or would no longer like to receive these types of communications, please e-mail [email or web address]

## 2018-01-16 NOTE — PROGRESS NOTES
Subjective:      Patient ID: John Lemos is a 67 y.o. male.    Chief Complaint: Diabetes Mellitus (tangling in feet pcp Dr. Ventura 12/19/17); Diabetic Foot Exam; and Nail Care    John is a 67 y.o. male who presents to the clinic upon referral from Dr. Fisher  for evaluation and treatment of diabetic feet. John has a past medical history of Arthritis; Cataract; Diabetes mellitus (7 yrs); Eye injury; and Hyperlipidemia.  The patient has no major complaints with feet. Chief concern is how to care for feet as a diabetic.    PCP: Xiomy Ventura MD    Date Last Seen by PCP:   Chief Complaint   Patient presents with    Diabetes Mellitus     tangling in feet pcp Dr. Ventura 12/19/17    Diabetic Foot Exam    Nail Care       Current shoe gear: Tennis shoes    Hemoglobin A1C   Date Value Ref Range Status   12/19/2017 8.0 (H) 4.0 - 5.6 % Final     Comment:     According to ADA guidelines, hemoglobin A1c <7.0% represents  optimal control in non-pregnant diabetic patients. Different  metrics may apply to specific patient populations.   Standards of Medical Care in Diabetes-2016.  For the purpose of screening for the presence of diabetes:  <5.7%     Consistent with the absence of diabetes  5.7-6.4%  Consistent with increasing risk for diabetes   (prediabetes)  >or=6.5%  Consistent with diabetes  Currently, no consensus exists for use of hemoglobin A1c  for diagnosis of diabetes for children.  This Hemoglobin A1c assay has significant interference with fetal   hemoglobin   (HbF). The results are invalid for patients with abnormal amounts of   HbF,   including those with known Hereditary Persistence   of Fetal Hemoglobin. Heterozygous hemoglobin variants (HbAS, HbAC,   HbAD, HbAE, HbA2) do not significantly interfere with this assay;   however, presence of multiple variants in a sample may impact the %   interference.     06/07/2017 7.1 (H) 4.5 - 6.2 % Final     Comment:     According to ADA guidelines, hemoglobin A1C  <7.0% represents  optimal control in non-pregnant diabetic patients.  Different  metrics may apply to specific populations.   Standards of Medical Care in Diabetes - 2016.  For the purpose of screening for the presence of diabetes:  <5.7%     Consistent with the absence of diabetes  5.7-6.4%  Consistent with increasing risk for diabetes   (prediabetes)  >or=6.5%  Consistent with diabetes  Currently no consensus exists for use of hemoglobin A1C  for diagnosis of diabetes for children.     11/09/2016 7.4 (H) 4.5 - 6.2 % Final     Comment:     According to ADA guidelines, hemoglobin A1C <7.0% represents  optimal control in non-pregnant diabetic patients.  Different  metrics may apply to specific populations.   Standards of Medical Care in Diabetes - 2016.  For the purpose of screening for the presence of diabetes:  <5.7%     Consistent with the absence of diabetes  5.7-6.4%  Consistent with increasing risk for diabetes   (prediabetes)  >or=6.5%  Consistent with diabetes  Currently no consensus exists for use of hemoglobin A1C  for diagnosis of diabetes for children.                 Patient Active Problem List   Diagnosis    Diabetes mellitus type 2, controlled    GERD (gastroesophageal reflux disease)    DDD (degenerative disc disease), lumbar    ED (erectile dysfunction)    HLD (hyperlipidemia)    Neuropathy       Current Outpatient Prescriptions on File Prior to Visit   Medication Sig Dispense Refill    blood sugar diagnostic (FREESTYLE LITE STRIPS) Strp Inject 1 each as directed once daily. 100 each 3    CRESTOR 10 mg tablet TAKE ONE TABLET BY MOUTH ONCE DAILY IN THE EVENING 90 tablet 1    lancets (FREESTYLE LANCETS) 28 gauge Misc Inject 1 lancet as directed once daily. 100 each 3    losartan (COZAAR) 50 MG tablet Take 1 tablet (50 mg total) by mouth once daily. 30 tablet 5    LYRICA 100 mg capsule TAKE ONE CAPSULE BY MOUTH THREE TIMES DAILY 90 capsule 1    metFORMIN (GLUCOPHAGE) 500 MG tablet TAKE TWO  TABLETS BY MOUTH WITH BREAKFAST AND TAKE TWO TABLETS WITH DINNER 360 tablet 2    omeprazole (PRILOSEC) 20 MG capsule Take 20 mg by mouth. 1 Capsule, Delayed Release(E.C.) Oral Every morning      loratadine (CLARITIN) 10 mg tablet Take 1 tablet (10 mg total) by mouth once daily. 30 tablet 11    [DISCONTINUED] etodolac (LODINE) 400 MG tablet TAKE ONE TABLET BY MOUTH TWICE DAILY 180 tablet 0     No current facility-administered medications on file prior to visit.        Review of patient's allergies indicates:   Allergen Reactions    Sulfa (sulfonamide antibiotics) Swelling       History reviewed. No pertinent surgical history.    Family History   Problem Relation Age of Onset    Diabetes Mother     Cancer Mother     Hypertension Mother     Diabetes Father     Cancer Father     No Known Problems Sister     No Known Problems Brother     No Known Problems Maternal Aunt     No Known Problems Maternal Uncle     No Known Problems Paternal Aunt     No Known Problems Paternal Uncle     No Known Problems Maternal Grandmother     No Known Problems Maternal Grandfather     No Known Problems Paternal Grandmother     No Known Problems Paternal Grandfather     Amblyopia Neg Hx     Blindness Neg Hx     Cataracts Neg Hx     Glaucoma Neg Hx     Macular degeneration Neg Hx     Retinal detachment Neg Hx     Strabismus Neg Hx     Stroke Neg Hx     Thyroid disease Neg Hx        Social History     Social History    Marital status:      Spouse name: N/A    Number of children: N/A    Years of education: N/A     Occupational History    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   Cardiovascular: Negative for claudication and leg swelling.   Respiratory: Negative for cough and shortness of breath.    Skin: Positive for dry skin. Negative for itching, nail changes and rash.   Musculoskeletal: Positive for arthritis, back pain, joint pain and myalgias. Negative for falls, joint swelling and muscle weakness.   Gastrointestinal: Negative for diarrhea, nausea and vomiting.   Neurological: Positive for paresthesias. Negative for numbness and tremors.   Psychiatric/Behavioral: Negative for altered mental status and hallucinations.           Objective:      Vitals:    01/16/18 1613   BP: 114/60   Pulse: 68   Weight: 89.4 kg (197 lb)   Height: 5' 8" (1.727 m)   PainSc: 0-No pain       Physical Exam   Constitutional:  Non-toxic appearance. He does not have a sickly appearance. No distress.   Pt. is well-developed, well-nourished, appears stated age, in no acute distress, alert and oriented x 3. No evidence of depression, anxiety, or agitation. Calm, cooperative, and communicative. Appropriate interactions and affect.   Cardiovascular:   Pulses:       Dorsalis pedis pulses are 2+ on the right side, and 2+ on the left side.        Posterior tibial pulses are 2+ on the right side, and 2+ on the left side.   dorsalis pedis, posterior tibial pulses, and perforating peroneal pulses are palpable bilaterally. Capillary refill time is within normal limits. Digital hair present.    Pulmonary/Chest: No respiratory distress.   Musculoskeletal:        Right ankle: No tenderness. No lateral malleolus, no medial malleolus, no AITFL, no CF ligament and no posterior TFL tenderness found. Achilles tendon exhibits no pain, no defect and normal Alva's test results.        Left ankle: No tenderness. No lateral malleolus, no medial malleolus, no AITFL, no CF ligament and no posterior TFL tenderness found. Achilles tendon exhibits no pain, no defect and normal " Alva's test results.        Right foot: There is no tenderness and no bony tenderness.        Left foot: There is no tenderness and no bony tenderness.    Decreased stride, station of gait.  apropulsive toe off.  Increased angle and base of gait.      Patient has hammertoes of digits 2-5 bilateral partially reducible without symptom today.    Visible and palpable bunion without pain at dorsomedial 1st metatarsal head right and left.  Hallux abducted right and left partially reducible, tracks laterally without being track bound.  No ecchymosis, erythema, edema, or cardinal signs infection or signs of trauma same foot.   Lymphadenopathy:   No lymphatic streaking    Negative lymphadenopathy bilateral popliteal fossa and tarsal tunnel.     Neurological: He displays no atrophy and no tremor. No sensory deficit.   Cushing-Bashir 5.07 monofilament is intact bilateral feet. Sharp/dull sensation is also intact Bilateral feet. Proprioception is grossly intact. Vibratory sensation intact (pt able to sense vibration stop within 3-5 seconds)     Skin: Skin is warm, dry and intact. No bruising, no burn, no lesion and no rash noted. He is not diaphoretic. No cyanosis or erythema. No pallor. Nails show no clubbing.   Skin is of normal turgor. Normal temperature gradient. Examination of the skin reveals no evidence of significant rashes, open lesions, suspicious appearing nevi or other concerning lesions.      Toenails 1-5 bilaterally are neatly trimmed; of normal color and thickness     Psychiatric: His mood appears not anxious. His affect is not inappropriate. His speech is not slurred. He is not combative. He is communicative. He is attentive.   Nursing note reviewed.            Assessment:       Encounter Diagnoses   Name Primary?    Comprehensive diabetic foot examination, type 2 DM, encounter for Yes    Hammer toes of both feet     Hallux abducto valgus, left     Hallux abducto valgus, right          Plan:        John was seen today for diabetes mellitus, diabetic foot exam and nail care.    Diagnoses and all orders for this visit:    Comprehensive diabetic foot examination, type 2 DM, encounter for    Hammer toes of both feet    Hallux abducto valgus, left    Hallux abducto valgus, right      I counseled the patient on his conditions, their implications and medical management.      Greater than 50% of this visit spent on counseling and coordination of care.    Education about the diabetic foot, neuropathy, and prevention of limb loss.    Shoe inspection. Diabetic Foot Education. Patient reminded of the importance of good nutrition and blood sugar control to help prevent podiatric complications of diabetes. Patient instructed on proper foot hygeine. We discussed wearing proper shoe gear, daily foot inspections, never walking without protective shoe gear, never putting sharp instruments to feet.      he will continue to monitor the areas daily, inspect his  feet, wear protective shoe gear when ambulatory, moisturizer to maintain skin integrity and follow in this office in approximately 12 months, sooner p.r.n.

## 2018-01-16 NOTE — PATIENT INSTRUCTIONS
Recommend lotions: eucerin, aquaphor, A&D ointment, gold bond for diabetics, sween; urea 40 with aloe (found on amazon.com)    Shoe recommendations: (try 6pm.com, zappos.com , nordstromrack.com, or shoes.com for discounted prices) you can visit DSW shoes in [place] as well    Asics (GT 2000 or gel foundations), new balance, saucony (stabil c3),  Stearns (GTS or Beast or transcend), vionic, propet (tennis shoe)    sofft brand, clarks, crocs, aerosoles, naturalizers, SAS, ecco, flip, tran dixon, rockports (dress shoes)    Vionic, burkenstocks, fitflops, propet (sandals)    Nike comfort thong sandals, crocs, propet (house shoes)    Nail Home remedy:  Vicks Vapor rub to nails for easier managability    Occasional soaks for 15-20 mins in luke warm water with 1 cup of listerine and 1 cup of apple cider vinegar are ok You may add several drops of oil of oregano or tea tree oil as well      Diabetes: Inspecting Your Feet  Diabetes increases your chances of developing foot problems. So inspect your feet every day. This helps you find small skin irritations before they become serious infections. If you have trouble seeing the bottoms of your feet, use a mirror or ask a family member or friend to help.     Pressure spots on the bottom of the foot are common areas where problems develop.   How to check your feet  Below are tips to help you look for foot problems. Try to check your feet at the same time each day, such as when you get out of bed in the morning:  · Check the top of each foot. The tops of toes, back of the heel, and outer edge of the foot can get a lot of rubbing from poor-fitting shoes.  · Check the bottom of each foot. Daily wear and tear often leads to problems at pressure spots.  · Check the toes and nails. Fungal infections often occur between toes. Toenail problems can also be a sign of fungal infections or lead to breaks in the skin.  · Check your shoes, too. Loose objects inside a shoe can injure the foot.  Use your hand to feel inside your shoes for things like andree, loose stitching, or rough areas that could irritate your skin.  Warning signs  Look for any color changes in the foot. Redness with streaks can signal a severe infection, which needs immediate medical attention. Tell your doctor right away if you have any of these problems:  · Swelling, sometimes with color changes, may be a sign of poor blood flow or infection. Symptoms include tenderness and an increase in the size of your foot.  · Warm or hot areas on your feet may be signs of infection. A foot that is cold may not be getting enough blood.  · Sensations such as burning, tingling, or pins and needles can be signs of a problem. Also check for areas that may be numb.  · Hot spots are caused by friction or pressure. Look for hot spots in areas that get a lot of rubbing. Hot spots can turn into blisters, calluses, or sores.  · Cracks and sores are caused by dry or irritated skin. They are a sign that the skin is breaking down, which can lead to infection.  · Toenail problems to watch for include nails growing into the skin (ingrown toenail) and causing redness or pain. Thick, yellow, or discolored nails can signal a fungal infection.  · Drainage and odor can develop from untreated sores and ulcers. Call your doctor right away if you notice white or yellow drainage, bleeding, or unpleasant odor.   © 5460-7686 [employer] [address] [place] [address]. All rights reserved. This information is not intended as a substitute for professional medical care. Always follow your healthcare professional's instructions.        Step-by-Step:  Inspecting Your Feet (Diabetes)    Date Last Reviewed: 10/1/2016  © 4543-3069 [employer] [address] [place] [address]. All rights reserved. This information is not intended as a substitute for professional medical care. Always follow your healthcare professional's  instructions.

## 2018-02-14 ENCOUNTER — TELEPHONE (OUTPATIENT)
Dept: OPTOMETRY | Facility: CLINIC | Age: 68
End: 2018-02-14

## 2018-02-25 DIAGNOSIS — M54.9 LEFT-SIDED BACK PAIN: ICD-10-CM

## 2018-02-26 RX ORDER — PREGABALIN 100 MG/1
CAPSULE ORAL
Qty: 90 CAPSULE | Refills: 0 | Status: SHIPPED | OUTPATIENT
Start: 2018-02-26 | End: 2018-03-18 | Stop reason: SDUPTHER

## 2018-03-18 DIAGNOSIS — M54.9 LEFT-SIDED BACK PAIN: ICD-10-CM

## 2018-03-18 RX ORDER — PREGABALIN 100 MG/1
CAPSULE ORAL
Qty: 90 CAPSULE | Refills: 2 | Status: SHIPPED | OUTPATIENT
Start: 2018-03-18 | End: 2018-08-06 | Stop reason: SDUPTHER

## 2018-03-31 DIAGNOSIS — E78.5 HLD (HYPERLIPIDEMIA): ICD-10-CM

## 2018-04-02 RX ORDER — ROSUVASTATIN CALCIUM 10 MG/1
TABLET, FILM COATED ORAL
Qty: 90 TABLET | Refills: 0 | Status: SHIPPED | OUTPATIENT
Start: 2018-04-02 | End: 2018-06-06

## 2018-04-12 DIAGNOSIS — E11.9 DIABETES MELLITUS WITHOUT COMPLICATION: ICD-10-CM

## 2018-04-16 RX ORDER — ETODOLAC 400 MG/1
TABLET, FILM COATED ORAL
Qty: 180 TABLET | Refills: 0 | Status: SHIPPED | OUTPATIENT
Start: 2018-04-16 | End: 2018-07-17 | Stop reason: SDUPTHER

## 2018-06-06 ENCOUNTER — TELEPHONE (OUTPATIENT)
Dept: FAMILY MEDICINE | Facility: CLINIC | Age: 68
End: 2018-06-06

## 2018-06-06 ENCOUNTER — LAB VISIT (OUTPATIENT)
Dept: LAB | Facility: HOSPITAL | Age: 68
End: 2018-06-06
Attending: FAMILY MEDICINE
Payer: COMMERCIAL

## 2018-06-06 ENCOUNTER — OFFICE VISIT (OUTPATIENT)
Dept: FAMILY MEDICINE | Facility: CLINIC | Age: 68
End: 2018-06-06
Payer: COMMERCIAL

## 2018-06-06 VITALS
WEIGHT: 189.81 LBS | OXYGEN SATURATION: 98 % | SYSTOLIC BLOOD PRESSURE: 120 MMHG | TEMPERATURE: 99 F | HEART RATE: 66 BPM | DIASTOLIC BLOOD PRESSURE: 60 MMHG | BODY MASS INDEX: 28.77 KG/M2 | HEIGHT: 68 IN

## 2018-06-06 DIAGNOSIS — R53.83 FATIGUE, UNSPECIFIED TYPE: ICD-10-CM

## 2018-06-06 DIAGNOSIS — E78.5 HYPERLIPIDEMIA, UNSPECIFIED HYPERLIPIDEMIA TYPE: Primary | ICD-10-CM

## 2018-06-06 DIAGNOSIS — R30.0 DYSURIA: ICD-10-CM

## 2018-06-06 LAB
ALBUMIN SERPL BCP-MCNC: 3.7 G/DL
ALP SERPL-CCNC: 74 U/L
ALT SERPL W/O P-5'-P-CCNC: 22 U/L
ANION GAP SERPL CALC-SCNC: 6 MMOL/L
AST SERPL-CCNC: 22 U/L
BASOPHILS # BLD AUTO: 0.03 K/UL
BASOPHILS NFR BLD: 0.5 %
BILIRUB SERPL-MCNC: 0.4 MG/DL
BILIRUB SERPL-MCNC: NEGATIVE MG/DL
BLOOD URINE, POC: 50
BUN SERPL-MCNC: 15 MG/DL
CALCIUM SERPL-MCNC: 9.1 MG/DL
CHLORIDE SERPL-SCNC: 106 MMOL/L
CHOLEST SERPL-MCNC: 125 MG/DL
CHOLEST/HDLC SERPL: 2.5 {RATIO}
CO2 SERPL-SCNC: 26 MMOL/L
COLOR, POC UA: YELLOW
CREAT SERPL-MCNC: 0.8 MG/DL
CREAT UR-MCNC: 225 MG/DL
DIFFERENTIAL METHOD: ABNORMAL
EOSINOPHIL # BLD AUTO: 0.3 K/UL
EOSINOPHIL NFR BLD: 4.5 %
ERYTHROCYTE [DISTWIDTH] IN BLOOD BY AUTOMATED COUNT: 12.8 %
EST. GFR  (AFRICAN AMERICAN): >60 ML/MIN/1.73 M^2
EST. GFR  (NON AFRICAN AMERICAN): >60 ML/MIN/1.73 M^2
ESTIMATED AVG GLUCOSE: 148 MG/DL
GLUCOSE SERPL-MCNC: 81 MG/DL
GLUCOSE UR QL STRIP: NEGATIVE
HBA1C MFR BLD HPLC: 6.8 %
HCT VFR BLD AUTO: 41.7 %
HDLC SERPL-MCNC: 50 MG/DL
HDLC SERPL: 40 %
HGB BLD-MCNC: 13.3 G/DL
IMM GRANULOCYTES # BLD AUTO: 0.01 K/UL
IMM GRANULOCYTES NFR BLD AUTO: 0.2 %
KETONES UR QL STRIP: NEGATIVE
LDLC SERPL CALC-MCNC: 61.4 MG/DL
LEUKOCYTE ESTERASE URINE, POC: NEGATIVE
LYMPHOCYTES # BLD AUTO: 2.3 K/UL
LYMPHOCYTES NFR BLD: 39.1 %
MCH RBC QN AUTO: 29.6 PG
MCHC RBC AUTO-ENTMCNC: 31.9 G/DL
MCV RBC AUTO: 93 FL
MICROALBUMIN UR DL<=1MG/L-MCNC: 14 UG/ML
MICROALBUMIN/CREATININE RATIO: 6.2 UG/MG
MONOCYTES # BLD AUTO: 0.6 K/UL
MONOCYTES NFR BLD: 10.3 %
NEUTROPHILS # BLD AUTO: 2.7 K/UL
NEUTROPHILS NFR BLD: 45.4 %
NITRITE, POC UA: NEGATIVE
NONHDLC SERPL-MCNC: 75 MG/DL
NRBC BLD-RTO: 0 /100 WBC
PH, POC UA: 5
PLATELET # BLD AUTO: 287 K/UL
PMV BLD AUTO: 10.6 FL
POTASSIUM SERPL-SCNC: 4.3 MMOL/L
PROT SERPL-MCNC: 7 G/DL
PROTEIN, POC: NORMAL
RBC # BLD AUTO: 4.49 M/UL
SODIUM SERPL-SCNC: 138 MMOL/L
SPECIFIC GRAVITY, POC UA: 1.02
TRIGL SERPL-MCNC: 68 MG/DL
TSH SERPL DL<=0.005 MIU/L-ACNC: 0.5 UIU/ML
UROBILINOGEN, POC UA: 8
WBC # BLD AUTO: 5.94 K/UL

## 2018-06-06 PROCEDURE — 83036 HEMOGLOBIN GLYCOSYLATED A1C: CPT

## 2018-06-06 PROCEDURE — 87147 CULTURE TYPE IMMUNOLOGIC: CPT

## 2018-06-06 PROCEDURE — 80061 LIPID PANEL: CPT

## 2018-06-06 PROCEDURE — 80053 COMPREHEN METABOLIC PANEL: CPT

## 2018-06-06 PROCEDURE — 85025 COMPLETE CBC W/AUTO DIFF WBC: CPT

## 2018-06-06 PROCEDURE — 36415 COLL VENOUS BLD VENIPUNCTURE: CPT | Mod: PO

## 2018-06-06 PROCEDURE — 84443 ASSAY THYROID STIM HORMONE: CPT

## 2018-06-06 PROCEDURE — 82043 UR ALBUMIN QUANTITATIVE: CPT

## 2018-06-06 PROCEDURE — 99999 PR PBB SHADOW E&M-EST. PATIENT-LVL III: CPT | Mod: PBBFAC,,, | Performed by: FAMILY MEDICINE

## 2018-06-06 PROCEDURE — 99214 OFFICE O/P EST MOD 30 MIN: CPT | Mod: 25,S$GLB,, | Performed by: FAMILY MEDICINE

## 2018-06-06 PROCEDURE — 87086 URINE CULTURE/COLONY COUNT: CPT

## 2018-06-06 PROCEDURE — 3045F PR MOST RECENT HEMOGLOBIN A1C LEVEL 7.0-9.0%: CPT | Mod: CPTII,S$GLB,, | Performed by: FAMILY MEDICINE

## 2018-06-06 PROCEDURE — 81001 URINALYSIS AUTO W/SCOPE: CPT | Mod: S$GLB,,, | Performed by: FAMILY MEDICINE

## 2018-06-06 PROCEDURE — 87088 URINE BACTERIA CULTURE: CPT

## 2018-06-06 RX ORDER — ATORVASTATIN CALCIUM 40 MG/1
40 TABLET, FILM COATED ORAL DAILY
Qty: 90 TABLET | Refills: 1 | Status: SHIPPED | OUTPATIENT
Start: 2018-06-06 | End: 2018-12-26 | Stop reason: SDUPTHER

## 2018-06-06 NOTE — TELEPHONE ENCOUNTER
Pt seen today, states he told you one of his medications too expensive, price went up to $200, wanted to let you know it is the crestor

## 2018-06-06 NOTE — PROGRESS NOTES
Subjective:       Patient ID: John Lemos     Chief Complaint: Urinary Tract Infection; Low-back Pain; and Tingling (in both feet)      Rosenda Lemos is a 67 y.o. male.presents with mild dysuria x 2 weeks.  Denies urine odor.  Has chronic urinary frequency.  Has chronic burning sensation in the lower back, relieved with Lyrica.  Metformin increased due to elevated glucose.  Reports increased fatigue.    Review of patient's allergies indicates:   Allergen Reactions    Sulfa (sulfonamide antibiotics) Swelling       Current Outpatient Prescriptions:     blood sugar diagnostic (FREESTYLE LITE STRIPS) Strp, Inject 1 each as directed once daily., Disp: 100 each, Rfl: 3    CRESTOR 10 mg tablet, TAKE ONE TABLET BY MOUTH ONCE DAILY IN THE EVENING, Disp: 90 tablet, Rfl: 0    etodolac (LODINE) 400 MG tablet, TAKE ONE TABLET BY MOUTH TWICE DAILY, Disp: 180 tablet, Rfl: 0    lancets (FREESTYLE LANCETS) 28 gauge Misc, Inject 1 lancet as directed once daily., Disp: 100 each, Rfl: 3    LYRICA 100 mg capsule, TAKE 1 CAPSULE BY MOUTH THREE TIMES DAILY, Disp: 90 capsule, Rfl: 2    metFORMIN (GLUCOPHAGE) 500 MG tablet, TAKE TWO TABLETS BY MOUTH WITH BREAKFAST AND TAKE TWO TABLETS WITH DINNER, Disp: 360 tablet, Rfl: 2    omeprazole (PRILOSEC) 20 MG capsule, Take 20 mg by mouth. 1 Capsule, Delayed Release(E.C.) Oral Every morning, Disp: , Rfl:     loratadine (CLARITIN) 10 mg tablet, Take 1 tablet (10 mg total) by mouth once daily., Disp: 30 tablet, Rfl: 11    losartan (COZAAR) 50 MG tablet, Take 1 tablet (50 mg total) by mouth once daily., Disp: 30 tablet, Rfl: 5    Past Medical History:   Diagnosis Date    Arthritis     Cataract     ou    Diabetes mellitus 7 yrs    lbs: 118 1 week ago    Eye injury     fb    Hyperlipidemia      Review of Systems   Constitutional: Positive for fatigue. Negative for chills.   Gastrointestinal: Negative for constipation, nausea and vomiting.   Genitourinary: Positive for dysuria and  flank pain. Negative for frequency, hematuria and urgency.   Skin: Negative for rash.       Objective:      Physical Exam   Constitutional: He appears well-developed and well-nourished.   HENT:   Head: Normocephalic.   Cardiovascular: Normal rate and regular rhythm.    Pulmonary/Chest: Effort normal and breath sounds normal.   Abdominal: Soft. He exhibits no mass. There is no tenderness.   Musculoskeletal: He exhibits no edema.        Lumbar back: He exhibits no tenderness and no bony tenderness.   Neurological: He is alert.       Assessment:       1. Uncontrolled type 2 diabetes mellitus with stage 2 chronic kidney disease, without long-term current use of insulin    2. Dysuria    3. Fatigue, unspecified type        Plan:       John was seen today for urinary tract infection, low-back pain and tingling.    Diagnoses and all orders for this visit:    Uncontrolled type 2 diabetes mellitus with stage 2 chronic kidney disease, without long-term current use of insulin  -     Hemoglobin A1c; Future  -     Microalbumin/creatinine urine ratio  -     Lipid panel; Future  -     Comprehensive metabolic panel; Future    Dysuria  -     Urine culture  -     POCT urinalysis, dipstick or tablet reag    Fatigue, unspecified type  -     CBC auto differential; Future  -     TSH; Future

## 2018-06-07 LAB — BACTERIA UR CULT: NORMAL

## 2018-06-19 ENCOUNTER — OFFICE VISIT (OUTPATIENT)
Dept: FAMILY MEDICINE | Facility: CLINIC | Age: 68
End: 2018-06-19
Payer: COMMERCIAL

## 2018-06-19 VITALS
HEART RATE: 86 BPM | HEIGHT: 68 IN | BODY MASS INDEX: 28.63 KG/M2 | OXYGEN SATURATION: 96 % | TEMPERATURE: 98 F | RESPIRATION RATE: 16 BRPM | DIASTOLIC BLOOD PRESSURE: 66 MMHG | WEIGHT: 188.94 LBS | SYSTOLIC BLOOD PRESSURE: 124 MMHG

## 2018-06-19 DIAGNOSIS — M54.12 RIGHT CERVICAL RADICULOPATHY: Primary | ICD-10-CM

## 2018-06-19 PROCEDURE — 99214 OFFICE O/P EST MOD 30 MIN: CPT | Mod: S$GLB,,, | Performed by: PHYSICIAN ASSISTANT

## 2018-06-19 PROCEDURE — 99999 PR PBB SHADOW E&M-EST. PATIENT-LVL IV: CPT | Mod: PBBFAC,,, | Performed by: PHYSICIAN ASSISTANT

## 2018-06-19 RX ORDER — TIZANIDINE 4 MG/1
4 TABLET ORAL EVERY 8 HOURS
Qty: 30 TABLET | Refills: 0 | Status: SHIPPED | OUTPATIENT
Start: 2018-06-19 | End: 2018-06-29

## 2018-06-19 NOTE — PROGRESS NOTES
Subjective:       Patient ID: John Lemos is a 67 y.o. male.    Chief Complaint: Arm Pain (right arm pain that goes to chest when moving arm for weeks) and Tingling (in hands)    HPI: 66 yo male presents for right arm, chest and neck pain. Began over 1 month ago. Gradually worsening. Pain occurs when moving shoulder or neck. He currently takes NSAIDs and lyrica daily for other problems which has had no effect on this problem. He denies SOB, palpitations.   Social History     Social History    Marital status:      Spouse name: N/A    Number of children: N/A    Years of education: N/A     Occupational History    teacher - middle school - [employer]     Social History Main Topics    Smoking status: Never Smoker    Smokeless tobacco: Never Used    Alcohol use No    Drug use: No    Sexual activity: Not Currently     Other Topics Concern    Not on file     Social History Narrative    No narrative on file       Review of Systems   Constitutional: Negative for activity change and unexpected weight change.   HENT: Negative for hearing loss, rhinorrhea and trouble swallowing.    Eyes: Negative for discharge and visual disturbance.   Respiratory: Positive for chest tightness. Negative for wheezing.    Cardiovascular: Negative for palpitations.   Gastrointestinal: Negative for blood in stool, constipation, diarrhea and vomiting.   Endocrine: Negative for polydipsia and polyuria.   Genitourinary: Negative for difficulty urinating, hematuria and urgency.   Musculoskeletal: Positive for arthralgias. Negative for joint swelling and neck pain.   Neurological: Negative for headaches.   Psychiatric/Behavioral: Negative for confusion and dysphoric mood.       Objective:      Physical Exam   Constitutional: He appears well-developed and well-nourished. No distress.   HENT:   Head: Normocephalic and atraumatic.   Neck: Muscular tenderness (right) present. Decreased range  of motion present.   Cardiovascular: Normal rate and regular rhythm.    Pulmonary/Chest: Effort normal and breath sounds normal.   Musculoskeletal:        Right shoulder: He exhibits decreased range of motion and tenderness. He exhibits no bony tenderness and normal strength.        Left shoulder: He exhibits normal strength.   Skin: Skin is warm and dry. He is not diaphoretic.   Vitals reviewed.      Assessment:       1. Right cervical radiculopathy        Plan:       *  John was seen today for arm pain and tingling.    Diagnoses and all orders for this visit:    Right cervical radiculopathy  -     tiZANidine (ZANAFLEX) 4 MG tablet; Take 1 tablet (4 mg total) by mouth every 8 (eight) hours.  -     continue other meds, advised can cause drowsiness   -     ROM exercises given, if no relief will refer to PT

## 2018-07-17 RX ORDER — ETODOLAC 400 MG/1
TABLET, FILM COATED ORAL
Qty: 180 TABLET | Refills: 0 | Status: SHIPPED | OUTPATIENT
Start: 2018-07-17 | End: 2018-11-08 | Stop reason: SDUPTHER

## 2018-08-06 DIAGNOSIS — M54.9 LEFT-SIDED BACK PAIN: ICD-10-CM

## 2018-08-07 ENCOUNTER — OFFICE VISIT (OUTPATIENT)
Dept: OPTOMETRY | Facility: CLINIC | Age: 68
End: 2018-08-07
Payer: COMMERCIAL

## 2018-08-07 DIAGNOSIS — H52.4 MYOPIA OF BOTH EYES WITH ASTIGMATISM AND PRESBYOPIA: ICD-10-CM

## 2018-08-07 DIAGNOSIS — Z01.00 EYE EXAM, ROUTINE: Primary | ICD-10-CM

## 2018-08-07 DIAGNOSIS — H52.203 MYOPIA OF BOTH EYES WITH ASTIGMATISM AND PRESBYOPIA: ICD-10-CM

## 2018-08-07 DIAGNOSIS — H52.13 MYOPIA OF BOTH EYES WITH ASTIGMATISM AND PRESBYOPIA: ICD-10-CM

## 2018-08-07 PROCEDURE — 92014 COMPRE OPH EXAM EST PT 1/>: CPT | Mod: S$GLB,,, | Performed by: OPTOMETRIST

## 2018-08-07 PROCEDURE — 92015 DETERMINE REFRACTIVE STATE: CPT | Mod: S$GLB,,, | Performed by: OPTOMETRIST

## 2018-08-07 PROCEDURE — 99999 PR PBB SHADOW E&M-EST. PATIENT-LVL I: CPT | Mod: PBBFAC,,, | Performed by: OPTOMETRIST

## 2018-08-07 RX ORDER — PREGABALIN 100 MG/1
CAPSULE ORAL
Qty: 90 CAPSULE | Refills: 5 | Status: SHIPPED | OUTPATIENT
Start: 2018-08-07 | End: 2019-02-27 | Stop reason: SDUPTHER

## 2018-08-07 NOTE — PROGRESS NOTES
HPI     Last Eye Exam: 8/2/2017 w/ Dr. Sylvester Lloyd.  [facility] Exam  Pt here for diabetic eye exam.  Hemoglobin A1C       Date                     Value               Ref Range             Status                06/06/2018               6.8 (H)             4.0 - 5.6 %           Final                  12/19/2017               8.0 (H)             4.0 - 5.6 %           Final                  06/07/2017               7.1 (H)             4.5 - 6.2 %           Final              SYMPTOMS:  (--) Eye pain/discomfort  (--) Blurry Vision  (--) Double Vision  (--) Itchy Eyes  (--) Watery Eyes  (--) Dry Eyes  (+) Floaters/Black Spots: floaters for several years  (--) Headaches  (--) Photophobia  ---------------------------------------------------------------------------    EYE MEDS:  none  ---------------------------------------------------------------------------    LENSES:  Glasses: PALs  Contacts: none      Last edited by Sylvester Lloyd, OD on 8/7/2018  3:28 PM. (History)            Assessment /Plan     For exam results, see Encounter Report.    Eye exam, routine  -No diabetic retinopathy noted today.  Continued control with primary care physician and annual comprehensive eye exam.    Myopia of both eyes with astigmatism and presbyopia  Eyeglass Final Rx     Eyeglass Final Rx       Sphere Cylinder Axis Dist VA Add    Right -2.00 +0.75 025 20/20 +2.50    Left -2.00 +0.75 005 20/20 +2.50    Type:  PAL    Expiration Date:  8/8/2019                  RTC 1 yr

## 2018-09-21 RX ORDER — METFORMIN HYDROCHLORIDE 500 MG/1
TABLET ORAL
Qty: 360 TABLET | Refills: 0 | Status: SHIPPED | OUTPATIENT
Start: 2018-09-21 | End: 2019-04-01 | Stop reason: SDUPTHER

## 2018-10-12 ENCOUNTER — LAB VISIT (OUTPATIENT)
Dept: LAB | Facility: HOSPITAL | Age: 68
End: 2018-10-12
Attending: FAMILY MEDICINE
Payer: COMMERCIAL

## 2018-10-12 ENCOUNTER — OFFICE VISIT (OUTPATIENT)
Dept: FAMILY MEDICINE | Facility: CLINIC | Age: 68
End: 2018-10-12
Payer: COMMERCIAL

## 2018-10-12 VITALS
SYSTOLIC BLOOD PRESSURE: 134 MMHG | DIASTOLIC BLOOD PRESSURE: 78 MMHG | HEIGHT: 68 IN | HEART RATE: 66 BPM | RESPIRATION RATE: 16 BRPM | BODY MASS INDEX: 28.8 KG/M2 | TEMPERATURE: 98 F | WEIGHT: 190.06 LBS | OXYGEN SATURATION: 97 %

## 2018-10-12 DIAGNOSIS — N18.2 CONTROLLED TYPE 2 DIABETES MELLITUS WITH STAGE 2 CHRONIC KIDNEY DISEASE, WITHOUT LONG-TERM CURRENT USE OF INSULIN: ICD-10-CM

## 2018-10-12 DIAGNOSIS — M51.36 DDD (DEGENERATIVE DISC DISEASE), LUMBAR: ICD-10-CM

## 2018-10-12 DIAGNOSIS — Z23 NEED FOR INFLUENZA VACCINATION: Primary | ICD-10-CM

## 2018-10-12 DIAGNOSIS — Z00.00 WELL ADULT EXAM: ICD-10-CM

## 2018-10-12 DIAGNOSIS — E11.22 CONTROLLED TYPE 2 DIABETES MELLITUS WITH STAGE 2 CHRONIC KIDNEY DISEASE, WITHOUT LONG-TERM CURRENT USE OF INSULIN: ICD-10-CM

## 2018-10-12 LAB
ESTIMATED AVG GLUCOSE: 146 MG/DL
HBA1C MFR BLD HPLC: 6.7 %

## 2018-10-12 PROCEDURE — 90471 IMMUNIZATION ADMIN: CPT | Mod: S$GLB,,, | Performed by: FAMILY MEDICINE

## 2018-10-12 PROCEDURE — 1101F PT FALLS ASSESS-DOCD LE1/YR: CPT | Mod: CPTII,S$GLB,, | Performed by: FAMILY MEDICINE

## 2018-10-12 PROCEDURE — 99214 OFFICE O/P EST MOD 30 MIN: CPT | Mod: 25,S$GLB,, | Performed by: FAMILY MEDICINE

## 2018-10-12 PROCEDURE — 3044F HG A1C LEVEL LT 7.0%: CPT | Mod: CPTII,S$GLB,, | Performed by: FAMILY MEDICINE

## 2018-10-12 PROCEDURE — 90662 IIV NO PRSV INCREASED AG IM: CPT | Mod: S$GLB,,, | Performed by: FAMILY MEDICINE

## 2018-10-12 PROCEDURE — 99999 PR PBB SHADOW E&M-EST. PATIENT-LVL III: CPT | Mod: PBBFAC,,, | Performed by: FAMILY MEDICINE

## 2018-10-12 PROCEDURE — 83036 HEMOGLOBIN GLYCOSYLATED A1C: CPT

## 2018-10-12 PROCEDURE — 36415 COLL VENOUS BLD VENIPUNCTURE: CPT | Mod: PO

## 2018-10-12 RX ORDER — DULOXETIN HYDROCHLORIDE 60 MG/1
60 CAPSULE, DELAYED RELEASE ORAL DAILY
Qty: 90 CAPSULE | Refills: 3 | Status: SHIPPED | OUTPATIENT
Start: 2018-10-12 | End: 2019-12-13 | Stop reason: SDUPTHER

## 2018-10-12 NOTE — PROGRESS NOTES
Subjective:       Patient ID: John Lemos     Chief Complaint: Back Pain (1 month )      Rosenda Lemos is a 68 y.o. male. Hx DDD, now with worsening of symptoms.  Presents with constant burning sensation in the back and sharp pain radiating down the RLE.  Never had an CHRIS.    Review of patient's allergies indicates:   Allergen Reactions    Sulfa (sulfonamide antibiotics) Swelling       Current Outpatient Medications:     atorvastatin (LIPITOR) 40 MG tablet, Take 1 tablet (40 mg total) by mouth once daily., Disp: 90 tablet, Rfl: 1    etodolac (LODINE) 400 MG tablet, TAKE 1 TABLET BY MOUTH TWICE DAILY, Disp: 180 tablet, Rfl: 0    lancets (FREESTYLE LANCETS) 28 gauge Misc, Inject 1 lancet as directed once daily., Disp: 100 each, Rfl: 3    loratadine (CLARITIN) 10 mg tablet, Take 1 tablet (10 mg total) by mouth once daily., Disp: 30 tablet, Rfl: 11    losartan (COZAAR) 50 MG tablet, Take 1 tablet (50 mg total) by mouth once daily., Disp: 30 tablet, Rfl: 5    LYRICA 100 mg capsule, TAKE 1 CAPSULE BY MOUTH THREE TIMES DAILY, Disp: 90 capsule, Rfl: 5    metFORMIN (GLUCOPHAGE) 500 MG tablet, TAKE TWO TABLETS BY MOUTH WITH BREAKFAST AND TWO TABLETS WITH DINNER, Disp: 360 tablet, Rfl: 0    blood sugar diagnostic (FREESTYLE LITE STRIPS) Strp, Inject 1 each as directed once daily., Disp: 100 each, Rfl: 3    DULoxetine (CYMBALTA) 60 MG capsule, Take 1 capsule (60 mg total) by mouth once daily., Disp: 90 capsule, Rfl: 3    omeprazole (PRILOSEC) 20 MG capsule, Take 20 mg by mouth. 1 Capsule, Delayed Release(E.C.) Oral Every morning, Disp: , Rfl:     Past Medical History:   Diagnosis Date    Arthritis     Cataract     ou    Diabetes mellitus 7 yrs    lbs: 118 1 week ago    Eye injury     fb    Hyperlipidemia      Review of Systems   Constitutional: Negative for fever.   Cardiovascular: Negative for chest pain.   Gastrointestinal: Negative for abdominal pain.   Genitourinary: Negative for dysuria and  hematuria.        No incontinence   Musculoskeletal: Positive for back pain.   Neurological: Positive for numbness. Negative for weakness and headaches.       Objective:      Physical Exam   Musculoskeletal:        Lumbar back: He exhibits no tenderness and no bony tenderness.   Neurological: He has normal strength. He exhibits normal muscle tone.       Assessment:       1. Need for influenza vaccination    2. DDD (degenerative disc disease), lumbar    3. Controlled type 2 diabetes mellitus with stage 2 chronic kidney disease, without long-term current use of insulin        Plan:       John was seen today for back pain.    Diagnoses and all orders for this visit:    Need for influenza vaccination  -     Influenza - High Dose (65+) (PF) (IM)    DDD (degenerative disc disease), lumbar  -     DULoxetine (CYMBALTA) 60 MG capsule; Take 1 capsule (60 mg total) by mouth once daily.  Will consider PT or CHRIS in the future    Controlled type 2 diabetes mellitus with stage 2 chronic kidney disease, without long-term current use of insulin  -     Hemoglobin A1c; Future          Answers for HPI/ROS submitted by the patient on 10/12/2018   Back pain  genital pain: No

## 2018-11-09 RX ORDER — ETODOLAC 400 MG/1
TABLET, FILM COATED ORAL
Qty: 180 TABLET | Refills: 0 | Status: SHIPPED | OUTPATIENT
Start: 2018-11-09 | End: 2019-02-27 | Stop reason: SDUPTHER

## 2018-12-26 DIAGNOSIS — E11.9 CONTROLLED TYPE 2 DIABETES MELLITUS WITHOUT COMPLICATION, WITHOUT LONG-TERM CURRENT USE OF INSULIN: Chronic | ICD-10-CM

## 2018-12-26 DIAGNOSIS — E78.5 HYPERLIPIDEMIA, UNSPECIFIED HYPERLIPIDEMIA TYPE: ICD-10-CM

## 2018-12-26 RX ORDER — LOSARTAN POTASSIUM 50 MG/1
TABLET ORAL
Qty: 30 TABLET | Refills: 0 | Status: SHIPPED | OUTPATIENT
Start: 2018-12-26 | End: 2019-01-30 | Stop reason: SDUPTHER

## 2018-12-26 RX ORDER — ATORVASTATIN CALCIUM 40 MG/1
TABLET, FILM COATED ORAL
Qty: 90 TABLET | Refills: 1 | Status: SHIPPED | OUTPATIENT
Start: 2018-12-26 | End: 2019-06-22 | Stop reason: SDUPTHER

## 2019-01-30 DIAGNOSIS — E11.9 CONTROLLED TYPE 2 DIABETES MELLITUS WITHOUT COMPLICATION, WITHOUT LONG-TERM CURRENT USE OF INSULIN: Chronic | ICD-10-CM

## 2019-01-31 RX ORDER — LOSARTAN POTASSIUM 50 MG/1
TABLET ORAL
Qty: 30 TABLET | Refills: 0 | Status: SHIPPED | OUTPATIENT
Start: 2019-01-31 | End: 2019-03-03 | Stop reason: SDUPTHER

## 2019-02-01 RX ORDER — METFORMIN HYDROCHLORIDE 500 MG/1
TABLET ORAL
Qty: 360 TABLET | Refills: 0 | OUTPATIENT
Start: 2019-02-01

## 2019-02-27 DIAGNOSIS — M54.9 LEFT-SIDED BACK PAIN: ICD-10-CM

## 2019-02-27 RX ORDER — ETODOLAC 400 MG/1
TABLET, FILM COATED ORAL
Qty: 180 TABLET | Refills: 0 | Status: SHIPPED | OUTPATIENT
Start: 2019-02-27 | End: 2019-07-11 | Stop reason: SDUPTHER

## 2019-02-28 RX ORDER — PREGABALIN 100 MG/1
CAPSULE ORAL
Qty: 90 CAPSULE | Refills: 0 | Status: SHIPPED | OUTPATIENT
Start: 2019-02-28 | End: 2020-09-18

## 2019-03-02 DIAGNOSIS — E11.9 CONTROLLED TYPE 2 DIABETES MELLITUS WITHOUT COMPLICATION, WITHOUT LONG-TERM CURRENT USE OF INSULIN: Chronic | ICD-10-CM

## 2019-03-03 RX ORDER — LOSARTAN POTASSIUM 50 MG/1
TABLET ORAL
Qty: 30 TABLET | Refills: 0 | Status: SHIPPED | OUTPATIENT
Start: 2019-03-03 | End: 2019-04-29 | Stop reason: SDUPTHER

## 2019-03-07 ENCOUNTER — LAB VISIT (OUTPATIENT)
Dept: LAB | Facility: HOSPITAL | Age: 69
End: 2019-03-07
Attending: INTERNAL MEDICINE
Payer: COMMERCIAL

## 2019-03-07 ENCOUNTER — OFFICE VISIT (OUTPATIENT)
Dept: FAMILY MEDICINE | Facility: CLINIC | Age: 69
End: 2019-03-07
Payer: COMMERCIAL

## 2019-03-07 VITALS
TEMPERATURE: 98 F | WEIGHT: 188.19 LBS | HEIGHT: 68 IN | HEART RATE: 81 BPM | DIASTOLIC BLOOD PRESSURE: 72 MMHG | OXYGEN SATURATION: 97 % | BODY MASS INDEX: 28.52 KG/M2 | SYSTOLIC BLOOD PRESSURE: 130 MMHG

## 2019-03-07 DIAGNOSIS — E11.9 CONTROLLED TYPE 2 DIABETES MELLITUS WITHOUT COMPLICATION, WITHOUT LONG-TERM CURRENT USE OF INSULIN: ICD-10-CM

## 2019-03-07 DIAGNOSIS — M51.36 DDD (DEGENERATIVE DISC DISEASE), LUMBAR: ICD-10-CM

## 2019-03-07 DIAGNOSIS — J30.9 CHRONIC ALLERGIC RHINITIS: ICD-10-CM

## 2019-03-07 DIAGNOSIS — E11.9 CONTROLLED TYPE 2 DIABETES MELLITUS WITHOUT COMPLICATION, WITHOUT LONG-TERM CURRENT USE OF INSULIN: Primary | ICD-10-CM

## 2019-03-07 DIAGNOSIS — E78.5 HYPERLIPIDEMIA, UNSPECIFIED HYPERLIPIDEMIA TYPE: ICD-10-CM

## 2019-03-07 DIAGNOSIS — H61.22 IMPACTED CERUMEN OF LEFT EAR: ICD-10-CM

## 2019-03-07 LAB
ALBUMIN SERPL BCP-MCNC: 3.9 G/DL
ALP SERPL-CCNC: 101 U/L
ALT SERPL W/O P-5'-P-CCNC: 35 U/L
ANION GAP SERPL CALC-SCNC: 10 MMOL/L
AST SERPL-CCNC: 24 U/L
BILIRUB SERPL-MCNC: 0.4 MG/DL
BUN SERPL-MCNC: 10 MG/DL
CALCIUM SERPL-MCNC: 10.3 MG/DL
CHLORIDE SERPL-SCNC: 103 MMOL/L
CO2 SERPL-SCNC: 27 MMOL/L
CREAT SERPL-MCNC: 1 MG/DL
EST. GFR  (AFRICAN AMERICAN): >60 ML/MIN/1.73 M^2
EST. GFR  (NON AFRICAN AMERICAN): >60 ML/MIN/1.73 M^2
ESTIMATED AVG GLUCOSE: 166 MG/DL
GLUCOSE SERPL-MCNC: 115 MG/DL
HBA1C MFR BLD HPLC: 7.4 %
POTASSIUM SERPL-SCNC: 3.8 MMOL/L
PROT SERPL-MCNC: 7.6 G/DL
SODIUM SERPL-SCNC: 140 MMOL/L

## 2019-03-07 PROCEDURE — 99214 OFFICE O/P EST MOD 30 MIN: CPT | Mod: S$GLB,,, | Performed by: INTERNAL MEDICINE

## 2019-03-07 PROCEDURE — 80053 COMPREHEN METABOLIC PANEL: CPT

## 2019-03-07 PROCEDURE — 36415 COLL VENOUS BLD VENIPUNCTURE: CPT | Mod: PO

## 2019-03-07 PROCEDURE — 99214 PR OFFICE/OUTPT VISIT, EST, LEVL IV, 30-39 MIN: ICD-10-PCS | Mod: S$GLB,,, | Performed by: INTERNAL MEDICINE

## 2019-03-07 PROCEDURE — 83036 HEMOGLOBIN GLYCOSYLATED A1C: CPT

## 2019-03-07 PROCEDURE — 99999 PR PBB SHADOW E&M-EST. PATIENT-LVL III: ICD-10-PCS | Mod: PBBFAC,,, | Performed by: INTERNAL MEDICINE

## 2019-03-07 PROCEDURE — 1101F PR PT FALLS ASSESS DOC 0-1 FALLS W/OUT INJ PAST YR: ICD-10-PCS | Mod: CPTII,S$GLB,, | Performed by: INTERNAL MEDICINE

## 2019-03-07 PROCEDURE — 1101F PT FALLS ASSESS-DOCD LE1/YR: CPT | Mod: CPTII,S$GLB,, | Performed by: INTERNAL MEDICINE

## 2019-03-07 PROCEDURE — 3045F PR MOST RECENT HEMOGLOBIN A1C LEVEL 7.0-9.0%: CPT | Mod: CPTII,S$GLB,, | Performed by: INTERNAL MEDICINE

## 2019-03-07 PROCEDURE — 99999 PR PBB SHADOW E&M-EST. PATIENT-LVL III: CPT | Mod: PBBFAC,,, | Performed by: INTERNAL MEDICINE

## 2019-03-07 PROCEDURE — 3045F PR MOST RECENT HEMOGLOBIN A1C LEVEL 7.0-9.0%: ICD-10-PCS | Mod: CPTII,S$GLB,, | Performed by: INTERNAL MEDICINE

## 2019-03-07 RX ORDER — FLUTICASONE PROPIONATE 50 MCG
1 SPRAY, SUSPENSION (ML) NASAL DAILY
Qty: 16 G | Refills: 2 | Status: SHIPPED | OUTPATIENT
Start: 2019-03-07

## 2019-03-07 NOTE — PATIENT INSTRUCTIONS
Back Exercise to Keep Fit for Low Back Pain  To help in your recovery and to prevent further back problems, keep your back, abdominal muscles and legs strong. Walk daily as soon as you can. Gradually add other physical activities such as swimming and biking, which can help improve lower back strength. Begin as soon as you can do them comfortably. Do not do any exercises that make your pain a lot worse. The following are some back exercises that can help relieve low back pain.     Pelvic tilt   Repeat 5-10 times, twice per day.  Lie flat on your back (or stand with your back to a wall), knees bent, feet flat on the floor, body relaxed. Tighten your abdominal and buttock muscles, and tilt your pelvis. The curve of the small of your back should flatten towards the floor (or wall). Hold 10 seconds and then relax.       Knee raise     Repeat 5-10 times, twice per day.    Lie flat on your back, knees bent. Bring one knee slowly to your chest. Hug your knee gently. Then lower your leg toward the floor, keeping your knee bent. Do not straighten your legs. Repeat exercise with your other leg.              Partial press-up     Lie face down on a soft, firm surface. Do not turn your head to either side. Rest your arms bent at the elbows alongside your body. Relax for a few minutes. Then raise your upper body enough to lean on your elbows. Relax your lower back and legs as much as possible. Hold this position for 30 seconds at first. Gradually work up to five minutes. Or try slow press-ups. Hold each for five seconds, and repeat five to six times.              Copyright © 2012 by [employer]   Krish GANDARA, Lawrence RENO, Cj RODRIGUEZ, Moni LAKE, Cruz R, Russ B, Oscar K, Demario C, Kesha B, Josh S, Ila ESTRADA, Cristy R. [employer]. Adult Acute and Subacute Low Back Pain. Updated November 2012.

## 2019-03-07 NOTE — PROGRESS NOTES
Subjective:       Chief Complaint  Chief Complaint   Patient presents with    left ear clogged    numbness in rt thigh    Heel Pain       HPI  John Lemos is a 68 y.o. male with multiple medical diagnoses as listed in the medical history and problem list that presents for multiple complaints.     Left ear clogged for 4 days  Had a URI a few weeks prior with dry cough  Decreased hearing noted currently  Occasional cough - dry with post nasal drip  Using OTC meds without relief    Also with intermittent back pain with radiation to the right lower extremity - mild in nature and accompanied by occasional numbness in the right thigh      Answers for HPI/ROS submitted by the patient on 3/7/2019   Cough  Onset: 1 to 4 weeks ago  Progression since onset: unchanged  Cough characteristics: non-productive  ear congestion: Yes  heartburn: Yes  hemoptysis: No  weight loss: No  asthma: No  bronchiectasis: No  bronchitis: No  COPD: No  emphysema: No  pneumonia: No  Treatments tried: OTC cough suppressant      Patient Care Team:  Chan Estrada MD as PCP - General (Internal Medicine)      PAST MEDICAL HISTORY:  Past Medical History:   Diagnosis Date    Arthritis     Cataract     ou    Diabetes mellitus 7 yrs    lbs: 118 1 week ago    Eye injury     fb    Hyperlipidemia        PAST SURGICAL HISTORY:  History reviewed. No pertinent surgical history.    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      Employer: [employer]   Tobacco Use    Smoking status: Never Smoker    Smokeless tobacco: Never Used   Substance and Sexual Activity    Alcohol use: No    Drug use: No    Sexual activity: Not Currently   Other Topics Concern    Not on file   Social History Narrative    Not on file       FAMILY HISTORY:  Family History   Problem Relation Age of Onset    Diabetes Mother     Cancer Mother     Hypertension Mother     Diabetes Father     Cancer Father     No Known Problems Sister     No Known Problems Brother     No Known Problems Maternal Aunt     No Known Problems Maternal Uncle     No Known Problems Paternal Aunt     No Known Problems Paternal Uncle     No Known Problems Maternal Grandmother     No Known Problems Maternal Grandfather     No Known Problems Paternal Grandmother     No Known Problems Paternal Grandfather     Amblyopia Neg Hx     Blindness Neg Hx     Cataracts Neg Hx     Glaucoma Neg Hx     Macular degeneration Neg Hx     Retinal detachment Neg Hx     Strabismus Neg Hx     Stroke Neg Hx     Thyroid disease Neg Hx        ALLERGIES AND MEDICATIONS: updated and reviewed.  Review of patient's allergies indicates:   Allergen Reactions    Sulfa (sulfonamide antibiotics) Swelling     Current Outpatient Medications   Medication Sig Dispense Refill    atorvastatin (LIPITOR) 40 MG tablet TAKE 1 TABLET BY MOUTH ONCE DAILY 90 tablet 1    blood sugar diagnostic (FREESTYLE LITE STRIPS) Strp Inject 1 each as directed once daily. 100 each 3    DULoxetine (CYMBALTA) 60 MG capsule Take 1 capsule (60 mg total) by mouth once daily. 90 capsule 3    etodolac (LODINE) 400 MG tablet TAKE 1 TABLET BY MOUTH TWICE DAILY 180 tablet 0    lancets (FREESTYLE LANCETS) 28 gauge Misc Inject 1 lancet as directed once daily. 100 each 3    losartan (COZAAR) 50 MG tablet TAKE 1 TABLET BY MOUTH ONCE DAILY 30 tablet 0    LYRICA 100 mg capsule TAKE 1 CAPSULE BY MOUTH THREE TIMES DAILY 90  "capsule 0    metFORMIN (GLUCOPHAGE) 500 MG tablet TAKE TWO TABLETS BY MOUTH WITH BREAKFAST AND TWO TABLETS WITH DINNER 360 tablet 0    omeprazole (PRILOSEC) 20 MG capsule Take 20 mg by mouth. 1 Capsule, Delayed Release(E.C.) Oral Every morning      fluticasone (FLONASE) 50 mcg/actuation nasal spray 1 spray (50 mcg total) by Each Nare route once daily. 16 g 2    loratadine (CLARITIN) 10 mg tablet Take 1 tablet (10 mg total) by mouth once daily. 30 tablet 11     No current facility-administered medications for this visit.          ROS  Review of Systems   Constitutional: Positive for chills. Negative for fever.   HENT: Positive for postnasal drip. Negative for ear pain and rhinorrhea.    Respiratory: Positive for cough. Negative for shortness of breath.    Cardiovascular: Negative for chest pain.   Musculoskeletal: Negative for myalgias.   Skin: Negative for rash.   Neurological: Negative for headaches.         Objective:       Physical Exam  Vitals:    03/07/19 1052   BP: 130/72   BP Location: Right arm   Patient Position: Sitting   BP Method: Medium (Manual)   Pulse: 81   Temp: 97.8 °F (36.6 °C)   TempSrc: Oral   SpO2: 97%   Weight: 85.3 kg (188 lb 2.6 oz)   Height: 5' 8" (1.727 m)    Body mass index is 28.61 kg/m².  Weight: 85.3 kg (188 lb 2.6 oz)   Height: 5' 8" (172.7 cm)   Physical Exam   Constitutional: He appears well-developed and well-nourished.   HENT:   Head: Normocephalic and atraumatic.   Ceruminosis of left ear canal   Eyes: Conjunctivae and EOM are normal.   Neck: Normal range of motion. Neck supple.   Cardiovascular: Normal rate.   Pulmonary/Chest: Effort normal.   Musculoskeletal: He exhibits no edema.   Negative SLR bilaterally   Lymphadenopathy:     He has no cervical adenopathy.   Neurological: He is alert. No cranial nerve deficit.   Skin: Skin is warm and dry. No rash noted.   Vitals reviewed.          Assessment:     1. Controlled type 2 diabetes mellitus without complication, without " long-term current use of insulin    2. Chronic allergic rhinitis    3. DDD (degenerative disc disease), lumbar    4. Impacted cerumen of left ear    5. Hyperlipidemia, unspecified hyperlipidemia type      Plan:     John was seen today for left ear clogged, numbness in rt thigh and heel pain.    Diagnoses and all orders for this visit:    Controlled type 2 diabetes mellitus without complication, without long-term current use of insulin  Discussed control presently with most recent A1c <7%  Continue Metformin - obtain A1c today  -     Hemoglobin A1c; Future  -     Comprehensive metabolic panel; Future    Chronic allergic rhinitis  Discussed symptoms of allergic rhinitis and reviewed treatment modalities, including antihistamines, nasal steroids and advantages/limitations of OTC products (i.e., OTC decongestants, sinus rinse kits). Therapy as noted/per orders.   -     fluticasone (FLONASE) 50 mcg/actuation nasal spray; 1 spray (50 mcg total) by Each Nare route once daily.    DDD (degenerative disc disease), lumbar  Mild symptoms - discussed management of acute and chronic back pain   No imaging indicated presently    Impacted cerumen of left ear  Ceruminosis of left ear noted.  Wax removed by manual debridement. Visualization of clear external canal thereafter. Instructions for home care to prevent wax buildup are given.    Hyperlipidemia, unspecified hyperlipidemia type  Lipids controlled presently - reviewed anti-hyperlipidemic regimen - continue current therapy      Health Maintenance       Date Due Completion Date    Hemoglobin A1c 01/12/2019 10/12/2018    Override on 11/25/2015: Done    Foot Exam 01/16/2019 1/16/2018 (Done)    Override on 1/16/2018: Done    Override on 11/25/2015: Done    Colonoscopy 04/23/2019 4/23/2009    Lipid Panel 06/06/2019 6/6/2018    Eye Exam 08/07/2019 8/7/2018    Override on 8/7/2018: Done    Override on 8/2/2017: Done    Low Dose Statin 12/26/2019 12/26/2018    TETANUS VACCINE  02/19/2026 2/19/2016            Health Maintenance reviewed, addressed as per orders    Follow-up in about 6 months (around 9/7/2019) for DM.    The patient expressed understanding and no barriers to adherence were identified.     1. The patient indicates understanding of these issues and agrees with the plan. Brief care plan is updated and reviewed with the patient as applicable.     2. The patient is given an After Visit Summary that lists all medications with directions, allergies, orders placed during this encounter and follow-up instructions.     3. I have reviewed the patient's medical information including past medical, family, and social history sections including the medications and allergies.     4. We discussed the patient's current medications. I reconciled the patient's medication list and prepared and supplied needed refills.       Chan Estrada MD  Internal Medicine-Pediatrics

## 2019-03-18 RX ORDER — METFORMIN HYDROCHLORIDE 500 MG/1
TABLET ORAL
Qty: 360 TABLET | Refills: 0 | OUTPATIENT
Start: 2019-03-18

## 2019-03-27 RX ORDER — METFORMIN HYDROCHLORIDE 500 MG/1
TABLET ORAL
Qty: 360 TABLET | Refills: 0 | OUTPATIENT
Start: 2019-03-27

## 2019-04-01 RX ORDER — METFORMIN HYDROCHLORIDE 500 MG/1
TABLET ORAL
Qty: 360 TABLET | Refills: 0 | OUTPATIENT
Start: 2019-04-01

## 2019-04-01 RX ORDER — METFORMIN HYDROCHLORIDE 500 MG/1
TABLET ORAL
Qty: 360 TABLET | Refills: 0 | Status: SHIPPED | OUTPATIENT
Start: 2019-04-01 | End: 2019-06-27 | Stop reason: SDUPTHER

## 2019-04-29 DIAGNOSIS — E11.9 CONTROLLED TYPE 2 DIABETES MELLITUS WITHOUT COMPLICATION, WITHOUT LONG-TERM CURRENT USE OF INSULIN: Chronic | ICD-10-CM

## 2019-04-30 RX ORDER — LOSARTAN POTASSIUM 50 MG/1
TABLET ORAL
Qty: 90 TABLET | Refills: 1 | Status: SHIPPED | OUTPATIENT
Start: 2019-04-30 | End: 2019-11-07 | Stop reason: SDUPTHER

## 2019-05-23 ENCOUNTER — TELEPHONE (OUTPATIENT)
Dept: FAMILY MEDICINE | Facility: CLINIC | Age: 69
End: 2019-05-23

## 2019-06-02 DIAGNOSIS — M54.9 LEFT-SIDED BACK PAIN: ICD-10-CM

## 2019-06-03 ENCOUNTER — LAB VISIT (OUTPATIENT)
Dept: LAB | Facility: HOSPITAL | Age: 69
End: 2019-06-03
Attending: INTERNAL MEDICINE
Payer: COMMERCIAL

## 2019-06-03 ENCOUNTER — PATIENT MESSAGE (OUTPATIENT)
Dept: ADMINISTRATIVE | Facility: OTHER | Age: 69
End: 2019-06-03

## 2019-06-03 ENCOUNTER — OFFICE VISIT (OUTPATIENT)
Dept: FAMILY MEDICINE | Facility: CLINIC | Age: 69
End: 2019-06-03
Payer: COMMERCIAL

## 2019-06-03 VITALS
HEART RATE: 64 BPM | DIASTOLIC BLOOD PRESSURE: 62 MMHG | SYSTOLIC BLOOD PRESSURE: 132 MMHG | TEMPERATURE: 98 F | BODY MASS INDEX: 27.8 KG/M2 | HEIGHT: 68 IN | WEIGHT: 183.44 LBS | OXYGEN SATURATION: 97 %

## 2019-06-03 DIAGNOSIS — E78.5 HYPERLIPIDEMIA, UNSPECIFIED HYPERLIPIDEMIA TYPE: ICD-10-CM

## 2019-06-03 DIAGNOSIS — E11.9 CONTROLLED TYPE 2 DIABETES MELLITUS WITHOUT COMPLICATION, WITHOUT LONG-TERM CURRENT USE OF INSULIN: Chronic | ICD-10-CM

## 2019-06-03 DIAGNOSIS — E11.9 CONTROLLED TYPE 2 DIABETES MELLITUS WITHOUT COMPLICATION, WITHOUT LONG-TERM CURRENT USE OF INSULIN: Primary | Chronic | ICD-10-CM

## 2019-06-03 DIAGNOSIS — R42 DIZZINESS: ICD-10-CM

## 2019-06-03 LAB
ANION GAP SERPL CALC-SCNC: 7 MMOL/L (ref 8–16)
BASOPHILS # BLD AUTO: 0.05 K/UL (ref 0–0.2)
BASOPHILS NFR BLD: 0.8 % (ref 0–1.9)
BUN SERPL-MCNC: 18 MG/DL (ref 8–23)
CALCIUM SERPL-MCNC: 9.9 MG/DL (ref 8.7–10.5)
CHLORIDE SERPL-SCNC: 102 MMOL/L (ref 95–110)
CHOLEST SERPL-MCNC: 124 MG/DL (ref 120–199)
CHOLEST/HDLC SERPL: 2.4 {RATIO} (ref 2–5)
CO2 SERPL-SCNC: 25 MMOL/L (ref 23–29)
CREAT SERPL-MCNC: 0.9 MG/DL (ref 0.5–1.4)
DIFFERENTIAL METHOD: ABNORMAL
EOSINOPHIL # BLD AUTO: 0.2 K/UL (ref 0–0.5)
EOSINOPHIL NFR BLD: 3 % (ref 0–8)
ERYTHROCYTE [DISTWIDTH] IN BLOOD BY AUTOMATED COUNT: 12.4 % (ref 11.5–14.5)
EST. GFR  (AFRICAN AMERICAN): >60 ML/MIN/1.73 M^2
EST. GFR  (NON AFRICAN AMERICAN): >60 ML/MIN/1.73 M^2
ESTIMATED AVG GLUCOSE: 209 MG/DL (ref 68–131)
GLUCOSE SERPL-MCNC: 125 MG/DL (ref 70–110)
HBA1C MFR BLD HPLC: 8.9 % (ref 4–5.6)
HCT VFR BLD AUTO: 41.7 % (ref 40–54)
HDLC SERPL-MCNC: 51 MG/DL (ref 40–75)
HDLC SERPL: 41.1 % (ref 20–50)
HGB BLD-MCNC: 13.4 G/DL (ref 14–18)
IMM GRANULOCYTES # BLD AUTO: 0.01 K/UL (ref 0–0.04)
IMM GRANULOCYTES NFR BLD AUTO: 0.2 % (ref 0–0.5)
LDLC SERPL CALC-MCNC: 59.2 MG/DL (ref 63–159)
LYMPHOCYTES # BLD AUTO: 2.6 K/UL (ref 1–4.8)
LYMPHOCYTES NFR BLD: 43.2 % (ref 18–48)
MCH RBC QN AUTO: 29.5 PG (ref 27–31)
MCHC RBC AUTO-ENTMCNC: 32.1 G/DL (ref 32–36)
MCV RBC AUTO: 92 FL (ref 82–98)
MONOCYTES # BLD AUTO: 0.7 K/UL (ref 0.3–1)
MONOCYTES NFR BLD: 11.9 % (ref 4–15)
NEUTROPHILS # BLD AUTO: 2.5 K/UL (ref 1.8–7.7)
NEUTROPHILS NFR BLD: 40.9 % (ref 38–73)
NONHDLC SERPL-MCNC: 73 MG/DL
NRBC BLD-RTO: 0 /100 WBC
PLATELET # BLD AUTO: 343 K/UL (ref 150–350)
PMV BLD AUTO: 10.2 FL (ref 9.2–12.9)
POTASSIUM SERPL-SCNC: 4.4 MMOL/L (ref 3.5–5.1)
RBC # BLD AUTO: 4.55 M/UL (ref 4.6–6.2)
SODIUM SERPL-SCNC: 134 MMOL/L (ref 136–145)
TRIGL SERPL-MCNC: 69 MG/DL (ref 30–150)
VIT B12 SERPL-MCNC: 196 PG/ML (ref 210–950)
WBC # BLD AUTO: 5.99 K/UL (ref 3.9–12.7)

## 2019-06-03 PROCEDURE — 82607 VITAMIN B-12: CPT

## 2019-06-03 PROCEDURE — 83036 HEMOGLOBIN GLYCOSYLATED A1C: CPT

## 2019-06-03 PROCEDURE — 3045F PR MOST RECENT HEMOGLOBIN A1C LEVEL 7.0-9.0%: ICD-10-PCS | Mod: CPTII,S$GLB,, | Performed by: INTERNAL MEDICINE

## 2019-06-03 PROCEDURE — 99999 PR PBB SHADOW E&M-EST. PATIENT-LVL IV: ICD-10-PCS | Mod: PBBFAC,,, | Performed by: INTERNAL MEDICINE

## 2019-06-03 PROCEDURE — 3045F PR MOST RECENT HEMOGLOBIN A1C LEVEL 7.0-9.0%: CPT | Mod: CPTII,S$GLB,, | Performed by: INTERNAL MEDICINE

## 2019-06-03 PROCEDURE — 80048 BASIC METABOLIC PNL TOTAL CA: CPT

## 2019-06-03 PROCEDURE — 1101F PT FALLS ASSESS-DOCD LE1/YR: CPT | Mod: CPTII,S$GLB,, | Performed by: INTERNAL MEDICINE

## 2019-06-03 PROCEDURE — 99999 PR PBB SHADOW E&M-EST. PATIENT-LVL IV: CPT | Mod: PBBFAC,,, | Performed by: INTERNAL MEDICINE

## 2019-06-03 PROCEDURE — 1101F PR PT FALLS ASSESS DOC 0-1 FALLS W/OUT INJ PAST YR: ICD-10-PCS | Mod: CPTII,S$GLB,, | Performed by: INTERNAL MEDICINE

## 2019-06-03 PROCEDURE — 99214 PR OFFICE/OUTPT VISIT, EST, LEVL IV, 30-39 MIN: ICD-10-PCS | Mod: S$GLB,,, | Performed by: INTERNAL MEDICINE

## 2019-06-03 PROCEDURE — 80061 LIPID PANEL: CPT

## 2019-06-03 PROCEDURE — 85025 COMPLETE CBC W/AUTO DIFF WBC: CPT

## 2019-06-03 PROCEDURE — 99214 OFFICE O/P EST MOD 30 MIN: CPT | Mod: S$GLB,,, | Performed by: INTERNAL MEDICINE

## 2019-06-03 PROCEDURE — 36415 COLL VENOUS BLD VENIPUNCTURE: CPT | Mod: PO

## 2019-06-03 RX ORDER — PREGABALIN 100 MG/1
CAPSULE ORAL
Qty: 90 CAPSULE | Refills: 0 | OUTPATIENT
Start: 2019-06-03

## 2019-06-03 NOTE — PATIENT INSTRUCTIONS
"Dear John Lemos,    [facility] is now offering a convenient program to help patients manage their diabetes, Diabetes Digital Medicine. I think you could benefit from enrolling in this innovative service. The goal of the program is to help you effectively manage your blood sugar through an appropriate balance of medication(s) and lifestyle changes, all from the comfort of your home.    As part of the program, you will be asked to use a smart glucometer when measuring your blood sugar. The results will be sent directly to your electronic medical record where your [facility] Care Team can review the readings and contact you when additional interventions are necessary and, many times, handle your care over the phone.    As you know, effective management of your diabetes reduces your risk of having a heart attack or stroke in the future, so you can enjoy a long, healthy life.    <a href="[email or web address]/watch?[email or web address]&feature=youtu.be" target="_blank">Click here to watch a brief video to learn more about [facility] from participating patients.</a>    What is Diabetes Digital Medicine?  Finding the right balance in managing blood sugar is different for every person, and we will tailor our treatment approach based on your individual needs using the most current evidence-based guidelines. I, along with a team of pharmacists, physician assistants, and health coaches will monitor your condition using electronic tools, help you adjust your medication(s), keep you on track with your routine preventive testing, and/or make lifestyle recommendations to better manage your diabetes.     What do I need to get started?   Participating in the program is as easy as 1 - 2 - 3.   1. Smartphone - You must have your own smartphone to participate (either an iPhone or an Android-based phone such as "[employer], [employer], LG, [employer], or Fantoo).    2. [employer] account - [facility] offers a great way " "to connect through the online patient portal, [facility], which is free and provides you access to your [facility] medical record!  Digital glucometer - [facility] uses the [employer] Smart glucometer that connects to smartphone apps to send your home blood sugar readings directly to your [facility] medical record.    How do I sign up for the program?  Complete the Diabetes Digital Medicine Patient Consent questionnaire already available in your [facility] account to sign up.     To access and complete this questionnaire click [email or web address]], or either:   - Use the [facility] website on a computer and select Health, then Questionnaires.  - Use the [employer] stephanie on your smartphone and select "Questionnaires."     How do I get my digital glucometer and testing supplies?  Diabetes Digital Medicine will help you manage your glucometer and test strip inventory without ever leaving your home. Patients participating in Diabetes Digital Medicine have the choice of obtaining diabetic supplies using medical or durable medical equipment (DME) insurance through [facility] ([facility]) or paying out-of-pocket.    - Insurance reimbursement through [facility] - E diabetic testing equipment specialists make managing your supplies painless by assisting you in maintaining your testing supplies and working directly with your medical insurance provider. Your insurance provider, in accordance with your specific medical or DME plan, will determine the costs of your supplies including any deductible and/or co-payment charges for which you are responsible. You will receive an invoice for your glucometer and testing supplies from [facility] after all insurance communications are complete.     - Out-of-Pocket/Self-Pay - You can purchase the recommended digital glucometer and testing strips, lancets, and control solution out-of-pocket. Testing supply self-management is easy with the " "glucometers smartphone stephanie that keeps track of test strip quantity, expiration dates, and even prompts you to reorder test strips when you are running low.  -   Please specify your device setup preference when completing the Diabetes Digital Medicine consent questionnaire (see above):     Full-Service Device Setup (Preferred) - Visit one of the conveniently located [facility] and have your Digital Medicine devices set up for you by a .  If making a purchase, you will need a credit card. Click here for [facility] locations.    Self-Service Device Setup - Have your Digital Medicine device and testing supplies shipped to your home or other location for you to set up on your own. An [facility] team member will contact you to coordinate delivery and payment over the phone.     Once you have completed the consent questionnaire, additional onboarding questionnaires will be assigned to you to complete. These required questionnaires will help your Digital Medicine Care Team create a customized treatment plan for you. The questionnaires do not need to be completed before setting up your blood pressure cuff.     If you have any questions regarding this program or would like more information, please visit our <a href="[email or web address]" target="_blank">Diabetes Digital Medicine website</a> or call Digital Medicine Patient Support Line at [phone].    Sincerely,  Chan Estrada MD    "

## 2019-06-03 NOTE — PROGRESS NOTES
Subjective:       Chief Complaint  Chief Complaint   Patient presents with    Dizziness     times one week    Cold Feet     both        HPI  John Lemos is a 68 y.o. male with multiple medical diagnoses as listed in the medical history and problem list that presents for dizziness.     T2DM  On Metformin   Salads everyday and exercising once daily  Symptoms of dizziness/lightheadedness every morning before taking Metformin  Drinks plenty of water daily - at least 40 oz or more daily    Foot numbness/soles at times  Will see Podiatry/Dr Baxter in Summer 2019    Patient Care Team:  Chan Estrada MD as PCP - General (Internal Medicine)      PAST MEDICAL HISTORY:  Past Medical History:   Diagnosis Date    Arthritis     Cataract     ou    Diabetes mellitus 7 yrs    lbs: 118 1 week ago    Eye injury     fb    Hyperlipidemia        PAST SURGICAL HISTORY:  History reviewed. No pertinent surgical history.    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     Employer: [employer]   Social Needs    Financial resource strain: Not on file    Food insecurity:     Worry: Not on file     Inability: Not on file    Transportation needs:     Medical: Not on file     Non-medical: Not on file   Tobacco Use    Smoking status: Never Smoker    Smokeless tobacco: Never Used   Substance and Sexual Activity    Alcohol use: No    Drug use: No    Sexual activity: Not Currently   Lifestyle    Physical activity:     Days per week: Not on file     Minutes per session: Not on file    Stress: Not on file   Relationships    Social connections:     Talks on phone: Not on file     Gets together: Not on file     Attends Synagogue service: Not  on file     Active member of club or organization: Not on file     Attends meetings of clubs or organizations: Not on file     Relationship status: Not on file   Other Topics Concern    Not on file   Social History Narrative    Not on file       FAMILY HISTORY:  Family History   Problem Relation Age of Onset    Diabetes Mother     Cancer Mother     Hypertension Mother     Diabetes Father     Cancer Father     No Known Problems Sister     No Known Problems Brother     No Known Problems Maternal Aunt     No Known Problems Maternal Uncle     No Known Problems Paternal Aunt     No Known Problems Paternal Uncle     No Known Problems Maternal Grandmother     No Known Problems Maternal Grandfather     No Known Problems Paternal Grandmother     No Known Problems Paternal Grandfather     Amblyopia Neg Hx     Blindness Neg Hx     Cataracts Neg Hx     Glaucoma Neg Hx     Macular degeneration Neg Hx     Retinal detachment Neg Hx     Strabismus Neg Hx     Stroke Neg Hx     Thyroid disease Neg Hx        ALLERGIES AND MEDICATIONS: updated and reviewed.  Review of patient's allergies indicates:   Allergen Reactions    Sulfa (sulfonamide antibiotics) Swelling     Current Outpatient Medications   Medication Sig Dispense Refill    atorvastatin (LIPITOR) 40 MG tablet TAKE 1 TABLET BY MOUTH ONCE DAILY 90 tablet 1    blood sugar diagnostic (FREESTYLE LITE STRIPS) Strp Inject 1 each as directed once daily. 100 each 3    DULoxetine (CYMBALTA) 60 MG capsule Take 1 capsule (60 mg total) by mouth once daily. 90 capsule 3    etodolac (LODINE) 400 MG tablet TAKE 1 TABLET BY MOUTH TWICE DAILY 180 tablet 0    fluticasone (FLONASE) 50 mcg/actuation nasal spray 1 spray (50 mcg total) by Each Nare route once daily. 16 g 2    lancets (FREESTYLE LANCETS) 28 gauge Misc Inject 1 lancet as directed once daily. 100 each 3    losartan (COZAAR) 50 MG tablet TAKE 1 TABLET BY MOUTH ONCE DAILY 90 tablet 1    LYRICA 100 mg  "capsule TAKE 1 CAPSULE BY MOUTH THREE TIMES DAILY 90 capsule 0    metFORMIN (GLUCOPHAGE) 500 MG tablet TAKE TWO TABLETS BY MOUTH WITH BREAKFAST AND TWO TABLETS WITH DINNER 360 tablet 0    loratadine (CLARITIN) 10 mg tablet Take 1 tablet (10 mg total) by mouth once daily. 30 tablet 11    omeprazole (PRILOSEC) 20 MG capsule Take 20 mg by mouth. 1 Capsule, Delayed Release(E.C.) Oral Every morning       No current facility-administered medications for this visit.          ROS  Review of Systems   Constitutional: Negative.    HENT:        Ear discomfort/pressure   Respiratory: Negative for shortness of breath.    Cardiovascular: Negative for chest pain.   Musculoskeletal:        Foot pain   Neurological: Positive for dizziness, light-headedness and numbness (feet). Negative for headaches.         Objective:       Physical Exam  Vitals:    06/03/19 0818   BP: 132/62   BP Location: Left arm   Patient Position: Sitting   BP Method: Medium (Manual)   Pulse: 64   Temp: 97.8 °F (36.6 °C)   TempSrc: Oral   SpO2: 97%   Weight: 83.2 kg (183 lb 6.8 oz)   Height: 5' 8" (1.727 m)    Body mass index is 27.89 kg/m².  Weight: 83.2 kg (183 lb 6.8 oz)   Height: 5' 8" (172.7 cm)   Physical Exam   Constitutional: He appears well-developed and well-nourished. No distress.   HENT:   Head: Normocephalic and atraumatic.   Right Ear: External ear normal.   Left Ear: External ear normal.   Nose: Nose normal.   Mouth/Throat: Oropharynx is clear and moist.   Eyes: Pupils are equal, round, and reactive to light. EOM are normal.   Neck: Normal range of motion. Neck supple. No thyromegaly present.   Cardiovascular: Normal rate, normal heart sounds and intact distal pulses.   No murmur heard.  Pulmonary/Chest: Effort normal and breath sounds normal. No stridor. No respiratory distress.   Abdominal: Soft. Bowel sounds are normal. He exhibits no distension and no mass. There is no tenderness. There is no guarding. No hernia.   Musculoskeletal: Normal " range of motion. He exhibits no edema or tenderness.   Neurological: He is alert. No cranial nerve deficit.   Skin: Skin is warm and dry. No rash noted. No erythema.   Psychiatric: He has a normal mood and affect. His behavior is normal.   Vitals reviewed.          Assessment:     1. Controlled type 2 diabetes mellitus without complication, without long-term current use of insulin    2. Hyperlipidemia, unspecified hyperlipidemia type      Plan:     John was seen today for dizziness and cold feet.    Diagnoses and all orders for this visit:    Controlled type 2 diabetes mellitus without complication, without long-term current use of insulin  Last A1c 7.5% - on Metformin monotherapy  Repeat lipids, A1c, BMP  Ordered for Digital Diabetes program given eligibility  -     Hemoglobin A1c; Future  -     Lipid panel; Future  -     Diabetes Digital Medicine (DDMP) Enrollment Order  -     Diabetes Digital Medicine (DDMP): Assign Onboarding Questionnaires  -     Basic metabolic panel; Future  -     CBC auto differential; Future  -     Vitamin B12; Future    Hyperlipidemia, unspecified hyperlipidemia type  Lipids ordered presently - reviewed anti-hyperlipidemic regimen - continue current therapy  -     Lipid panel; Future    Dizziness  Obtain labs as noted         Health Maintenance       Date Due Completion Date    Foot Exam 01/16/2019 1/16/2018 (Done)    Override on 1/16/2018: Done    Override on 11/25/2015: Done    Colonoscopy 04/23/2019 4/23/2009    Hemoglobin A1c 06/07/2019 3/7/2019    Override on 11/25/2015: Done    Lipid Panel 06/06/2019 6/6/2018    Influenza Vaccine 08/01/2019 10/12/2018    Override on 11/25/2015: Done    Eye Exam 08/07/2019 8/7/2018    Override on 8/7/2018: Done    Override on 8/2/2017: Done    Low Dose Statin 03/07/2020 3/7/2019    TETANUS VACCINE 02/19/2026 2/19/2016            Health Maintenance reviewed, addressed as per orders    Follow up in about 3 months (around 9/3/2019) for DM.    The  patient expressed understanding and no barriers to adherence were identified.     1. The patient indicates understanding of these issues and agrees with the plan. Brief care plan is updated and reviewed with the patient as applicable.     2. The patient is given an After Visit Summary that lists all medications with directions, allergies, orders placed during this encounter and follow-up instructions.     3. I have reviewed the patient's medical information including past medical, family, and social history sections including the medications and allergies.     4. We discussed the patient's current medications. I reconciled the patient's medication list and prepared and supplied needed refills.       Chan Estrada MD  Internal Medicine-Pediatrics

## 2019-06-04 ENCOUNTER — TELEPHONE (OUTPATIENT)
Dept: FAMILY MEDICINE | Facility: CLINIC | Age: 69
End: 2019-06-04

## 2019-06-04 DIAGNOSIS — E11.49 TYPE 2 DIABETES MELLITUS WITH NEUROLOGICAL COMPLICATIONS: ICD-10-CM

## 2019-06-04 DIAGNOSIS — E11.9 TYPE 2 DIABETES MELLITUS: ICD-10-CM

## 2019-06-04 DIAGNOSIS — E11.9 TYPE 2 DIABETES MELLITUS WITHOUT COMPLICATION, WITHOUT LONG-TERM CURRENT USE OF INSULIN: Primary | ICD-10-CM

## 2019-06-04 NOTE — TELEPHONE ENCOUNTER
Spoke with patient and inform him that I will forward the message to Dr. Estrada and call him back once I get orders for a lipid.

## 2019-06-04 NOTE — TELEPHONE ENCOUNTER
Spoke with patient and informed him that he need not to come in and have lipids repeated, it will not change the readings, However his A1c has increased and Dr. Estrada is adding januvia 100mg daily to the metformin. Patient verbalized understanding

## 2019-06-04 NOTE — TELEPHONE ENCOUNTER
Patient does not need fasting bloodwork (will not change his results since lipids were controlled)  His A1c is elevated (8.9%) therefore I would recommend start of additional medication since diabetes has progressed  I have ordered JANUVIA 100 mg once daily to be taken with Meformin - Sent medication to patient's preferred pharmacy on file.

## 2019-06-04 NOTE — TELEPHONE ENCOUNTER
----- Message from Naomie Tobar sent at 6/4/2019  7:16 AM CDT -----  Contact: self  Please call pt back at [phone] pt wants to do a Fasting Lab per Dr Estrada (Lipid ) . He did do lab work on 6/3/2019 but he states he was not fasting at that time . Please call Pt to let him know if Dr Estrada needs he to do fasting lab work.

## 2019-06-05 ENCOUNTER — PATIENT OUTREACH (OUTPATIENT)
Dept: OTHER | Facility: OTHER | Age: 69
End: 2019-06-05

## 2019-06-05 NOTE — PROGRESS NOTES
Digital Medicine Enrollment Call    Introduced Mr. John Lemos to Digital Medicine.     Discussed program expectations and requirements.    Introduced digital medicine care team.     Reviewed the importance of self-monitoring for digital medicine participation.     Reviewed that the Digital Medicine team is not available for emergencies and instructed the patient to call 911 or [facility] ([phone] or [phone]) if one arises.          Last 6 Patient Entered Readings                                          Most Recent A1c: 8.9% on 6/3/2019  (Goal: 7%)     Recent Readings 6/5/2019 6/4/2019    Blood Glucose (mg/dL) 141 197

## 2019-06-05 NOTE — LETTER
June 5, 2019     John Lemos  [address]  [place] [address]       Dear John,    Welcome to [facility] Our goal is to make care effective, proactive and convenient by using data you send us from home to better treat your chronic conditions.          My name is Alessandra Sarabia, and I am your dedicated Digital Medicine clinician. As an expert in medication management, I will help ensure that the medications you are taking continue to provide the intended benefits and help you reach your goals. You can reach me directly at [phone] or by sending me a message directly through your [facility] account.      I am Mag Márquez and I will be your health . My job is to help you identify lifestyle changes to improve your disease control. We will talk about nutrition, exercise, and other ways you may be able to adjust your current habits to better your health. Additionally, we will help ensure you are completing the tests and screenings that are necessary to help manage your conditions. You can reach me directly at [phone] or by sending me a message directly through your [facility] account.    Most importantly, YOU are at the center of this team. Together, we will work to improve your overall health and encourage you to meet your goals for a healthier lifestyle.     What we expect from YOU:  · Please take frequent home blood sugar measurements according to the frequency your physician and Digital Medicine care team specify. It is important that your team see both fasting and after meal readings.      Be available to receive phone calls or [facility] messages, when appropriate, from your care team. Please let us know if there are any specific days or times that work best for us to reach you via phone.     Complete routine tests and screenings. Dont worry, we will help keep you on track!           What you should expect from your Digital Medicine Care Team:   We will work with you to create a  personalized plan of care and provide you with encouragement and education, including regarding lifestyle changes, that could help you manage your disease states.     We will adjust your current medications, if needed, and continue to monitor your long-term progress.     We will provide you and your physician with monthly progress reports after you have been in the program for more than 30 days.     We will send you reminders through [facility] and text messages to help ensure you do not miss any testing deadlines to help manage your disease states.    You will be able to reach us by phone or through your [facility] account by clicking our names under Care Team on the right side of the home screen.    I look forward to working with you to achieve your blood pressure goals!    We look forward to working with you to help manage your health,    Sincerely,    Your Digital Medicine Team    Please visit our websites to learn more:   · Diabetes: [email or web address]      Remember, we are not available for emergencies. If you have an emergency, please contact your doctors office directly or call [facility] on-call ([phone] or [phone]) or 911.    Diabetes: We want help you get important tests and screenings done regularly to assure that your health needs are met. We have put a new system in place, called CareTouch that will help us improve how we monitor and reach out to you about the following lab tests that you will need to help manage your diabetes.  · Hemoglobin A1c testing (Frequency: Every 3 to 6 months, dependent on A1c goal)  · Nephropathy Assessment, generally urine micro albumin testing (Frequency: Yearly)  · Eye exam through a quick 30-minute Eye Photo Exam (Frequency: 1-2 Years, depending on result)    When necessary you can come in to one of the labs on the attached page any weekday between 10:30 am and 4:00 pm to have your tests done prior to their due date. Tell the   agent you received a CareTouch letter, or just look for the CareTouch sign.

## 2019-06-11 ENCOUNTER — PATIENT OUTREACH (OUTPATIENT)
Dept: OTHER | Facility: OTHER | Age: 69
End: 2019-06-11

## 2019-06-11 NOTE — PROGRESS NOTES
Last 6 Patient Entered Readings                                          Most Recent A1c: 8.9% on 6/3/2019  (Goal: 7%)     Recent Readings 6/11/2019 6/11/2019 6/10/2019 6/10/2019 6/9/2019    Blood Glucose (mg/dL) 115 104 230 135 136          Digital Medicine: Health  Introduction    Introduced Mr. John Lemos to Digital Medicine. Discussed health  role and recommended lifestyle modifications.    Lifestyle Assessment:  Current Dietary Habits(i.e. low sodium, food labels, dining out):Patient stated that he use to eat a lot of peanuts, but he thinks that was spiking his BG. Is now eating honey nut cheerios.   Exercise:deferred  Alcohol/Tobacco:none  Medication Adherence: has been compliant with the medicaiton regimen. He reported that before his last A1c is was not consistent, but after seeing the results he's been taking his medications more consistently.   Other goals: Patient would like to work on stress management.    Reviewed the importance of self-monitoring, medication adherence, and that the health  can be used as a resource for lifestyle modifications to help reduce or maintain a healthy lifestyle.  Reviewed that the Digital Medicine team is not available for emergencies and instructed the patient to call 911 or [facility] ([phone] or [phone]) if one arises.    Patient test BG about 3 times a day (bf breakfast, af breakfast, af dinner) according to doctor's orders.

## 2019-06-19 ENCOUNTER — PATIENT MESSAGE (OUTPATIENT)
Dept: ADMINISTRATIVE | Facility: OTHER | Age: 69
End: 2019-06-19

## 2019-06-22 DIAGNOSIS — E78.5 HYPERLIPIDEMIA, UNSPECIFIED HYPERLIPIDEMIA TYPE: ICD-10-CM

## 2019-06-23 RX ORDER — ATORVASTATIN CALCIUM 40 MG/1
TABLET, FILM COATED ORAL
Qty: 90 TABLET | Refills: 0 | Status: SHIPPED | OUTPATIENT
Start: 2019-06-23 | End: 2019-09-20 | Stop reason: SDUPTHER

## 2019-06-24 NOTE — TELEPHONE ENCOUNTER
Attempt to contact patient. No answer. Left message notifying or medication refill sent to the pharmacy.

## 2019-06-25 ENCOUNTER — PATIENT OUTREACH (OUTPATIENT)
Dept: OTHER | Facility: OTHER | Age: 69
End: 2019-06-25

## 2019-06-25 NOTE — PROGRESS NOTES
"Last 6 Patient Entered Readings                                          Most Recent A1c: 8.9% on 6/3/2019  (Goal: 7%)     Recent Readings 6/25/2019 6/25/2019 6/24/2019 6/23/2019 6/22/2019    Blood Glucose (mg/dL) 78 141 97 140 123          Digital Medicine: Health  Follow Up    Lifestyle Modifications:    1.Dietary Modifications (Sodium intake <2,000mg/day, food labels, dining out): Patient reports that he wants to start writing down what he eats in a food journal. In the mornings, sometimes his BG is higher than he would like. He thinks it could be what he's eating the night before. We discussed spreading carbs throughout the day and limiting every meal to about 45-60g of carbs. Patient reports that he normally eats 2-3 tuna sandwiches on whole wheat bread for lunch and will normally have a spinach salad with that. Recommended reading food labels for amount of carbs in food.     2.Physical Activity: Patient reports that he's had "a lot of honey-do chores around the house lately". He's hoping to get back to running on the treadmill. He use to do everynight before dinner for about 30min. Hoping to start that back up in the next few weeks.    3.Medication Therapy: Patient has been compliant with the medication regimen.    4.Patient has the following medication side effects/concerns: none  (Frequency/Alleviating factors/Precipitating factors, etc.)     Follow up with Mr. John Lemos completed. Mr. Lemos reports doing well. His goal is to have a lower BG reading in the mornings. He reports that he doesn't eat anything after 10p and will normally test BG around 5a. No further questions or concerns. Will continue to follow up to achieve health goals.    "

## 2019-06-27 RX ORDER — METFORMIN HYDROCHLORIDE 500 MG/1
TABLET ORAL
Qty: 360 TABLET | Refills: 0 | Status: SHIPPED | OUTPATIENT
Start: 2019-06-27 | End: 2019-10-11 | Stop reason: SDUPTHER

## 2019-07-05 ENCOUNTER — PATIENT OUTREACH (OUTPATIENT)
Dept: OTHER | Facility: OTHER | Age: 69
End: 2019-07-05

## 2019-07-05 DIAGNOSIS — E11.69 TYPE 2 DIABETES MELLITUS WITH OTHER SPECIFIED COMPLICATION, WITHOUT LONG-TERM CURRENT USE OF INSULIN: Primary | ICD-10-CM

## 2019-07-05 NOTE — PROGRESS NOTES
Called patient to introduce him to the Diabetes Digital Medicine Program. NA, LVM requesting a call back.   Per HC note, patient is making efforts to work on fasting BG. Readings look great overall - will discuss progress with patient.     Reviewed A1C goal of <7%. Current A1C is   Lab Results   Component Value Date    HGBA1C 8.9 (H) 06/03/2019       Last 6 Patient Entered Readings                                          Most Recent A1c: 8.9% on 6/3/2019  (Goal: 7%)     Recent Readings 7/5/2019 7/4/2019 7/3/2019 7/2/2019 7/1/2019    Blood Glucose (mg/dL) 131 89 142 102 133          Diabetes Medications             metFORMIN (GLUCOPHAGE) 500 MG tablet TAKE 2 TABLETS BY MOUTH TWICE DAILY WITH  BREAKFAST  AND  DINNER.    SITagliptin (JANUVIA) 100 MG Tab Take 1 tablet (100 mg total) by mouth once daily.          Will continue to monitor and follow up in 1-2 weeks, sooner if he submits any concerning blood glucose readings.     Asked patient to contact me with any questions or concerns.

## 2019-07-10 ENCOUNTER — PATIENT OUTREACH (OUTPATIENT)
Dept: ADMINISTRATIVE | Facility: OTHER | Age: 69
End: 2019-07-10

## 2019-07-10 DIAGNOSIS — Z12.11 SCREEN FOR COLON CANCER: Primary | ICD-10-CM

## 2019-07-12 RX ORDER — ETODOLAC 400 MG/1
TABLET, FILM COATED ORAL
Qty: 180 TABLET | Refills: 0 | Status: SHIPPED | OUTPATIENT
Start: 2019-07-12 | End: 2019-10-26 | Stop reason: SDUPTHER

## 2019-07-15 ENCOUNTER — OFFICE VISIT (OUTPATIENT)
Dept: PODIATRY | Facility: CLINIC | Age: 69
End: 2019-07-15
Payer: COMMERCIAL

## 2019-07-15 VITALS — WEIGHT: 183 LBS | HEIGHT: 68 IN | BODY MASS INDEX: 27.74 KG/M2

## 2019-07-15 DIAGNOSIS — E11.9 COMPREHENSIVE DIABETIC FOOT EXAMINATION, TYPE 2 DM, ENCOUNTER FOR: Primary | ICD-10-CM

## 2019-07-15 DIAGNOSIS — M20.42 HAMMER TOES OF BOTH FEET: ICD-10-CM

## 2019-07-15 DIAGNOSIS — M20.12 HALLUX ABDUCTO VALGUS, LEFT: ICD-10-CM

## 2019-07-15 DIAGNOSIS — M20.41 HAMMER TOES OF BOTH FEET: ICD-10-CM

## 2019-07-15 DIAGNOSIS — M20.11 HALLUX ABDUCTO VALGUS, RIGHT: ICD-10-CM

## 2019-07-15 PROCEDURE — 1101F PT FALLS ASSESS-DOCD LE1/YR: CPT | Mod: CPTII,S$GLB,, | Performed by: PODIATRIST

## 2019-07-15 PROCEDURE — 99213 PR OFFICE/OUTPT VISIT, EST, LEVL III, 20-29 MIN: ICD-10-PCS | Mod: S$GLB,,, | Performed by: PODIATRIST

## 2019-07-15 PROCEDURE — 99213 OFFICE O/P EST LOW 20 MIN: CPT | Mod: S$GLB,,, | Performed by: PODIATRIST

## 2019-07-15 PROCEDURE — 3045F PR MOST RECENT HEMOGLOBIN A1C LEVEL 7.0-9.0%: CPT | Mod: CPTII,S$GLB,, | Performed by: PODIATRIST

## 2019-07-15 PROCEDURE — 1101F PR PT FALLS ASSESS DOC 0-1 FALLS W/OUT INJ PAST YR: ICD-10-PCS | Mod: CPTII,S$GLB,, | Performed by: PODIATRIST

## 2019-07-15 PROCEDURE — 3045F PR MOST RECENT HEMOGLOBIN A1C LEVEL 7.0-9.0%: ICD-10-PCS | Mod: CPTII,S$GLB,, | Performed by: PODIATRIST

## 2019-07-15 PROCEDURE — 99999 PR PBB SHADOW E&M-EST. PATIENT-LVL III: ICD-10-PCS | Mod: PBBFAC,,, | Performed by: PODIATRIST

## 2019-07-15 PROCEDURE — 99999 PR PBB SHADOW E&M-EST. PATIENT-LVL III: CPT | Mod: PBBFAC,,, | Performed by: PODIATRIST

## 2019-07-15 NOTE — PROGRESS NOTES
Subjective:      Patient ID: John Lemos is a 68 y.o. male.    Chief Complaint: Diabetes Mellitus (PCP Dr Estrada 6/3/19); Diabetic Foot Exam; and Nail Care    John is a 68 y.o. male who presents to the clinic upon referral from Dr. Whit ugarte. provider found  for evaluation and treatment of diabetic feet. John has a past medical history of Arthritis, Cataract, Diabetes mellitus (7 yrs), Eye injury, and Hyperlipidemia.  The patient has no major complaints with feet. Chief concern is how to care for feet as a diabetic.    PCP: Chan Estrada MD    Date Last Seen by PCP:   Chief Complaint   Patient presents with    Diabetes Mellitus     PCP Dr Estrada 6/3/19    Diabetic Foot Exam    Nail Care       Current shoe gear: Tennis shoes    Hemoglobin A1C   Date Value Ref Range Status   06/03/2019 8.9 (H) 4.0 - 5.6 % Final     Comment:     ADA Screening Guidelines:  5.7-6.4%  Consistent with prediabetes  >or=6.5%  Consistent with diabetes  High levels of fetal hemoglobin interfere with the HbA1C  assay. Heterozygous hemoglobin variants (HbS, HgC, etc)do  not significantly interfere with this assay.   However, presence of multiple variants may affect accuracy.     03/07/2019 7.4 (H) 4.0 - 5.6 % Final     Comment:     ADA Screening Guidelines:  5.7-6.4%  Consistent with prediabetes  >or=6.5%  Consistent with diabetes  High levels of fetal hemoglobin interfere with the HbA1C  assay. Heterozygous hemoglobin variants (HbS, HgC, etc)do  not significantly interfere with this assay.   However, presence of multiple variants may affect accuracy.     10/12/2018 6.7 (H) 4.0 - 5.6 % Final     Comment:     ADA Screening Guidelines:  5.7-6.4%  Consistent with prediabetes  >or=6.5%  Consistent with diabetes  High levels of fetal hemoglobin interfere with the HbA1C  assay. Heterozygous hemoglobin variants (HbS, HgC, etc)do  not significantly interfere with this assay.   However, presence of multiple variants may affect accuracy.                  Patient Active Problem List   Diagnosis    Diabetes mellitus type 2, controlled    GERD (gastroesophageal reflux disease)    DDD (degenerative disc disease), lumbar    ED (erectile dysfunction)    HLD (hyperlipidemia)    Neuropathy       Current Outpatient Medications on File Prior to Visit   Medication Sig Dispense Refill    atorvastatin (LIPITOR) 40 MG tablet TAKE 1 TABLET BY MOUTH ONCE DAILY 90 tablet 0    blood sugar diagnostic (FREESTYLE LITE STRIPS) Strp Inject 1 each as directed once daily. 100 each 3    DULoxetine (CYMBALTA) 60 MG capsule Take 1 capsule (60 mg total) by mouth once daily. 90 capsule 3    etodolac (LODINE) 400 MG tablet TAKE 1 TABLET BY MOUTH TWICE DAILY 180 tablet 0    fluticasone (FLONASE) 50 mcg/actuation nasal spray 1 spray (50 mcg total) by Each Nare route once daily. 16 g 2    lancets (FREESTYLE LANCETS) 28 gauge Misc Inject 1 lancet as directed once daily. 100 each 3    losartan (COZAAR) 50 MG tablet TAKE 1 TABLET BY MOUTH ONCE DAILY 90 tablet 1    LYRICA 100 mg capsule TAKE 1 CAPSULE BY MOUTH THREE TIMES DAILY 90 capsule 0    metFORMIN (GLUCOPHAGE) 500 MG tablet TAKE 2 TABLETS BY MOUTH TWICE DAILY WITH  BREAKFAST  AND  DINNER. 360 tablet 0    omeprazole (PRILOSEC) 20 MG capsule Take 20 mg by mouth. 1 Capsule, Delayed Release(E.C.) Oral Every morning      SITagliptin (JANUVIA) 100 MG Tab Take 1 tablet (100 mg total) by mouth once daily. 90 tablet 3    loratadine (CLARITIN) 10 mg tablet Take 1 tablet (10 mg total) by mouth once daily. 30 tablet 11     No current facility-administered medications on file prior to visit.        Review of patient's allergies indicates:   Allergen Reactions    Sulfa (sulfonamide antibiotics) Swelling       History reviewed. No pertinent surgical history.    Family History   Problem Relation Age of Onset    Diabetes Mother     Cancer Mother     Hypertension Mother     Diabetes Father     Cancer Father     No Known  Problems Sister     No Known Problems Brother     No Known Problems Maternal Aunt     No Known Problems Maternal Uncle     No Known Problems Paternal Aunt     No Known Problems Paternal Uncle     No Known Problems Maternal Grandmother     No Known Problems Maternal Grandfather     No Known Problems Paternal Grandmother     No Known Problems Paternal Grandfather     Amblyopia Neg Hx     Blindness Neg Hx     Cataracts Neg Hx     Glaucoma Neg Hx     Macular degeneration Neg Hx     Retinal detachment Neg Hx     Strabismus Neg Hx     Stroke Neg Hx     Thyroid disease Neg Hx        Social History     Socioeconomic History    Marital status:      Spouse name: Not on file    Number of children: Not on file    Years of education: Not on file    Highest education level: Not on file   Occupational History    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     Employer: [employer]   Social Needs    Financial resource strain: Not on file    Food insecurity:     Worry: Not on file     Inability: Not on file    Transportation needs:     Medical: Not on file     Non-medical: Not on file   Tobacco Use    Smoking status: Never Smoker    Smokeless tobacco: Never Used   Substance and Sexual Activity    Alcohol use: No    Drug use: No    Sexual activity: Not Currently   Lifestyle    Physical activity:     Days per week: Not on file     Minutes per session: Not on file    Stress: Not on file   Relationships    Social connections:     Talks on phone: Not on file     Gets together: Not on file     Attends Taoist service: Not on file     Active member of club or organization: Not on file     Attends meetings of clubs or organizations: Not on file     Relationship status: Not on file   Other Topics Concern    Not on file   Social History Narrative    Not on file       Review of Systems   Constitution: Negative for chills and fever.   Cardiovascular: Negative  "for claudication and leg swelling.   Respiratory: Negative for cough and shortness of breath.    Skin: Positive for dry skin. Negative for itching, nail changes and rash.   Musculoskeletal: Positive for arthritis, back pain, joint pain and myalgias. Negative for falls, joint swelling and muscle weakness.   Gastrointestinal: Negative for diarrhea, nausea and vomiting.   Neurological: Positive for paresthesias. Negative for numbness, tremors and weakness.   Psychiatric/Behavioral: Negative for altered mental status and hallucinations.           Objective:      Vitals:    07/15/19 1718   Weight: 83 kg (183 lb)   Height: 5' 8" (1.727 m)   PainSc: 0-No pain       Physical Exam   Constitutional:  Non-toxic appearance. He does not have a sickly appearance. No distress.   Pt. is well-developed, well-nourished, appears stated age, in no acute distress, alert and oriented x 3. No evidence of depression, anxiety, or agitation. Calm, cooperative, and communicative. Appropriate interactions and affect.   Cardiovascular:   Pulses:       Dorsalis pedis pulses are 2+ on the right side, and 2+ on the left side.        Posterior tibial pulses are 2+ on the right side, and 2+ on the left side.   dorsalis pedis, posterior tibial pulses, and perforating peroneal pulses are palpable bilaterally. Capillary refill time is within normal limits. Digital hair present.    Pulmonary/Chest: No respiratory distress.   Musculoskeletal:        Right ankle: No tenderness. No lateral malleolus, no medial malleolus, no AITFL, no CF ligament and no posterior TFL tenderness found. Achilles tendon exhibits no pain, no defect and normal Alva's test results.        Left ankle: No tenderness. No lateral malleolus, no medial malleolus, no AITFL, no CF ligament and no posterior TFL tenderness found. Achilles tendon exhibits no pain, no defect and normal Alva's test results.        Right foot: There is no tenderness and no bony tenderness.        Left " foot: There is no tenderness and no bony tenderness.    Decreased stride, station of gait.  apropulsive toe off.  Increased angle and base of gait.      Patient has hammertoes of digits 2-5 bilateral partially reducible without symptom today.    Visible and palpable bunion without pain at dorsomedial 1st metatarsal head right and left.  Hallux abducted right and left partially reducible, tracks laterally without being track bound.  No ecchymosis, erythema, edema, or cardinal signs infection or signs of trauma same foot.   Lymphadenopathy:   No lymphatic streaking    Negative lymphadenopathy bilateral popliteal fossa and tarsal tunnel.     Neurological: He displays no atrophy and no tremor. No sensory deficit.   [place] 5.07 monofilament is intact bilateral feet. Sharp/dull sensation is also intact Bilateral feet. Proprioception is grossly intact. Vibratory sensation intact (pt able to sense vibration stop within 3-5 seconds)     Skin: Skin is warm, dry and intact. No bruising, no burn, no lesion and no rash noted. He is not diaphoretic. No cyanosis or erythema. No pallor. Nails show no clubbing.   Skin is of normal turgor. Normal temperature gradient. Examination of the skin reveals no evidence of significant rashes, open lesions, suspicious appearing nevi or other concerning lesions.      Toenails 1-5 bilaterally are neatly trimmed; of normal color and thickness     Psychiatric: His mood appears not anxious. His affect is not inappropriate. His speech is not slurred. He is not combative. He is communicative. He is attentive.   Nursing note reviewed.            Assessment:       Encounter Diagnoses   Name Primary?    Comprehensive diabetic foot examination, type 2 DM, encounter for Yes    Hammer toes of both feet     Hallux abducto valgus, left     Hallux abducto valgus, right          Plan:       John was seen today for diabetes mellitus, diabetic foot exam and nail care.    Diagnoses and all orders  for this visit:    Comprehensive diabetic foot examination, type 2 DM, encounter for    Hammer toes of both feet    Hallux abducto valgus, left    Hallux abducto valgus, right      I counseled the patient on his conditions, their implications and medical management.      Greater than 50% of this visit spent on counseling and coordination of care.    Education about the diabetic foot, neuropathy, and prevention of limb loss.    Shoe inspection. Diabetic Foot Education. Patient reminded of the importance of good nutrition and blood sugar control to help prevent podiatric complications of diabetes. Patient instructed on proper foot hygeine. We discussed wearing proper shoe gear, daily foot inspections, never walking without protective shoe gear, never putting sharp instruments to feet.      he will continue to monitor the areas daily, inspect his  feet, wear protective shoe gear when ambulatory, moisturizer to maintain skin integrity and follow in this office in approximately 12 months, sooner p.r.n.

## 2019-07-15 NOTE — PATIENT INSTRUCTIONS
Recommend lotions: eucerin, eucerin for diabetics, aquaphor, A&D ointment, gold bond for diabetics, sween, Nettleton's Bees all purpose baby ointment,  urea 40 with aloe (found on amazon.com)    Shoe recommendations: (try 6pm.com, zappos.com , nordstromrack.GeoPage, or shoes.GeoPage for discounted prices) you can visit [employer] shoes in [place]  or [employer] [facility] in the [facility] (there are also several shoe brand outlets in the [facility])    Asics (GT 2000 or gel foundations), new balance stability type shoes, saucony (stabil c3),  Stearns (GTS or Beast or transcend), propet (tennis shoe)    Sofamparo Devine (women) Fly&Anthony (men), clarks, crocs, aerosoles, naturalizers, SAS, ecco, born, tran dixon, rockports (dress shoes)    Vionic, burkenstocks, fitflops, propet (sandals)  Nike comfort thong sandals, crocs, propet (house shoes)    Nail Home remedy:  Vicks Vapor rub to nails for easier manageability      Diabetes: Inspecting Your Feet  Diabetes increases your chances of developing foot problems. So inspect your feet every day. This helps you find small skin irritations before they become serious infections. If you have trouble seeing the bottoms of your feet, use a mirror or ask a family member or friend to help.     Pressure spots on the bottom of the foot are common areas where problems develop.   How to check your feet  Below are tips to help you look for foot problems. Try to check your feet at the same time each day, such as when you get out of bed in the morning:  · Check the top of each foot. The tops of toes, back of the heel, and outer edge of the foot can get a lot of rubbing from poor-fitting shoes.  · Check the bottom of each foot. Daily wear and tear often leads to problems at pressure spots.  · Check the toes and nails. Fungal infections often occur between toes. Toenail problems can also be a sign of fungal infections or lead to breaks in the skin.  · Check your shoes, too. Loose objects inside a shoe can injure the  foot. Use your hand to feel inside your shoes for things like andree, loose stitching, or rough areas that could irritate your skin.  Warning signs  Look for any color changes in the foot. Redness with streaks can signal a severe infection, which needs immediate medical attention. Tell your doctor right away if you have any of these problems:  · Swelling, sometimes with color changes, may be a sign of poor blood flow or infection. Symptoms include tenderness and an increase in the size of your foot.  · Warm or hot areas on your feet may be signs of infection. A foot that is cold may not be getting enough blood.  · Sensations such as burning, tingling, or pins and needles can be signs of a problem. Also check for areas that may be numb.  · Hot spots are caused by friction or pressure. Look for hot spots in areas that get a lot of rubbing. Hot spots can turn into blisters, calluses, or sores.  · Cracks and sores are caused by dry or irritated skin. They are a sign that the skin is breaking down, which can lead to infection.  · Toenail problems to watch for include nails growing into the skin (ingrown toenail) and causing redness or pain. Thick, yellow, or discolored nails can signal a fungal infection.  · Drainage and odor can develop from untreated sores and ulcers. Call your doctor right away if you notice white or yellow drainage, bleeding, or unpleasant odor.   © 1573-6919 [employer]. [address] [place] [address]. All rights reserved. This information is not intended as a substitute for professional medical care. Always follow your healthcare professional's instructions.        Step-by-Step:  Inspecting Your Feet (Diabetes)    Date Last Reviewed: 10/1/2016  © 9785-3079 [employer]. [address] [place] [address]. All rights reserved. This information is not intended as a substitute for professional medical care. Always follow your healthcare professional's  instructions.

## 2019-07-19 NOTE — PROGRESS NOTES
Called patient to introduce him to the Diabetes Digital Medicine Program. NA, LVM requesting a call back.     Reviewed A1C goal of <7%. Current A1C is   Lab Results   Component Value Date    HGBA1C 8.9 (H) 06/03/2019     Last 6 Patient Entered Readings                                          Most Recent A1c: 8.9% on 6/3/2019  (Goal: 7%)     Recent Readings 7/19/2019 7/18/2019 7/18/2019 7/17/2019 7/16/2019    Blood Glucose (mg/dL) 116 146 113 130 113          Diabetes Medications             metFORMIN (GLUCOPHAGE) 500 MG tablet TAKE 2 TABLETS BY MOUTH TWICE DAILY WITH  BREAKFAST  AND  DINNER.    SITagliptin (JANUVIA) 100 MG Tab Take 1 tablet (100 mg total) by mouth once daily.          Will continue to monitor and follow up in 1-2 weeks, sooner if he submits any concerning blood glucose readings.     Asked patient to contact me with any questions or concerns.

## 2019-07-29 ENCOUNTER — LAB VISIT (OUTPATIENT)
Dept: LAB | Facility: HOSPITAL | Age: 69
End: 2019-07-29
Attending: INTERNAL MEDICINE
Payer: COMMERCIAL

## 2019-07-29 ENCOUNTER — OFFICE VISIT (OUTPATIENT)
Dept: FAMILY MEDICINE | Facility: CLINIC | Age: 69
End: 2019-07-29
Payer: COMMERCIAL

## 2019-07-29 VITALS
BODY MASS INDEX: 27.83 KG/M2 | TEMPERATURE: 98 F | DIASTOLIC BLOOD PRESSURE: 72 MMHG | OXYGEN SATURATION: 98 % | HEART RATE: 72 BPM | SYSTOLIC BLOOD PRESSURE: 126 MMHG | HEIGHT: 68 IN | WEIGHT: 183.63 LBS

## 2019-07-29 DIAGNOSIS — R30.0 DYSURIA: Primary | ICD-10-CM

## 2019-07-29 DIAGNOSIS — G62.9 NEUROPATHY: ICD-10-CM

## 2019-07-29 DIAGNOSIS — E11.69 DYSLIPIDEMIA ASSOCIATED WITH TYPE 2 DIABETES MELLITUS: ICD-10-CM

## 2019-07-29 DIAGNOSIS — R30.0 DYSURIA: ICD-10-CM

## 2019-07-29 DIAGNOSIS — E78.5 DYSLIPIDEMIA ASSOCIATED WITH TYPE 2 DIABETES MELLITUS: ICD-10-CM

## 2019-07-29 DIAGNOSIS — E11.42 TYPE 2 DIABETES MELLITUS WITH DIABETIC POLYNEUROPATHY, WITHOUT LONG-TERM CURRENT USE OF INSULIN: ICD-10-CM

## 2019-07-29 DIAGNOSIS — E53.8 VITAMIN B12 DEFICIENCY: ICD-10-CM

## 2019-07-29 DIAGNOSIS — Z23 NEED FOR SHINGLES VACCINE: ICD-10-CM

## 2019-07-29 LAB
BILIRUB UR QL STRIP: NEGATIVE
CLARITY UR REFRACT.AUTO: ABNORMAL
COLOR UR AUTO: YELLOW
GLUCOSE UR QL STRIP: NEGATIVE
HGB UR QL STRIP: NEGATIVE
KETONES UR QL STRIP: NEGATIVE
LEUKOCYTE ESTERASE UR QL STRIP: NEGATIVE
NITRITE UR QL STRIP: NEGATIVE
PH UR STRIP: 6 [PH] (ref 5–8)
PROT UR QL STRIP: NEGATIVE
SP GR UR STRIP: 1.01 (ref 1–1.03)
URN SPEC COLLECT METH UR: ABNORMAL

## 2019-07-29 PROCEDURE — 1101F PT FALLS ASSESS-DOCD LE1/YR: CPT | Mod: CPTII,S$GLB,, | Performed by: INTERNAL MEDICINE

## 2019-07-29 PROCEDURE — 3045F PR MOST RECENT HEMOGLOBIN A1C LEVEL 7.0-9.0%: ICD-10-PCS | Mod: CPTII,S$GLB,, | Performed by: INTERNAL MEDICINE

## 2019-07-29 PROCEDURE — 99999 PR PBB SHADOW E&M-EST. PATIENT-LVL III: CPT | Mod: PBBFAC,,, | Performed by: INTERNAL MEDICINE

## 2019-07-29 PROCEDURE — 99214 PR OFFICE/OUTPT VISIT, EST, LEVL IV, 30-39 MIN: ICD-10-PCS | Mod: S$GLB,,, | Performed by: INTERNAL MEDICINE

## 2019-07-29 PROCEDURE — 87086 URINE CULTURE/COLONY COUNT: CPT

## 2019-07-29 PROCEDURE — 3045F PR MOST RECENT HEMOGLOBIN A1C LEVEL 7.0-9.0%: CPT | Mod: CPTII,S$GLB,, | Performed by: INTERNAL MEDICINE

## 2019-07-29 PROCEDURE — 99999 PR PBB SHADOW E&M-EST. PATIENT-LVL III: ICD-10-PCS | Mod: PBBFAC,,, | Performed by: INTERNAL MEDICINE

## 2019-07-29 PROCEDURE — 1101F PR PT FALLS ASSESS DOC 0-1 FALLS W/OUT INJ PAST YR: ICD-10-PCS | Mod: CPTII,S$GLB,, | Performed by: INTERNAL MEDICINE

## 2019-07-29 PROCEDURE — 99214 OFFICE O/P EST MOD 30 MIN: CPT | Mod: S$GLB,,, | Performed by: INTERNAL MEDICINE

## 2019-07-29 PROCEDURE — 81003 URINALYSIS AUTO W/O SCOPE: CPT

## 2019-07-29 RX ORDER — LANOLIN ALCOHOL/MO/W.PET/CERES
1000 CREAM (GRAM) TOPICAL DAILY
Qty: 30 TABLET | Refills: 11 | Status: SHIPPED | OUTPATIENT
Start: 2019-07-29 | End: 2020-07-29

## 2019-07-29 NOTE — PROGRESS NOTES
Subjective:       Chief Complaint  Chief Complaint   Patient presents with    painful urination     x 1 week    left leg numbness       HPI  John Lemos is a 69 y.o. male with multiple medical diagnoses as listed in the medical history and problem list that presents for painful urination.     Dysuria    This is a recurrent problem. The current episode started 1 to 4 weeks ago. The problem occurs every urination. The problem has been unchanged. The quality of the pain is described as burning. The pain is at a severity of 5/10. The pain is moderate. There has been no fever. He is sexually active. There is no history of pyelonephritis. Associated symptoms include flank pain and frequency. Pertinent negatives include no behavior changes, chills, discharge, hematuria, hesitancy, nausea, possible pregnancy, sweats, urgency, vomiting, weight loss, constipation, rash or withholding. He has tried nothing for the symptoms. The treatment provided no relief. His past medical history is significant for recurrent UTIs. There is no history of catheterization, genitourinary reflux, hypertension, kidney stones, a single kidney, STD, urinary stasis or a urological procedure.     Has been drinking a galloon of water daily     Right leg numbness/tingling sensation - intermittently only  Unsure of cause  Occurs mostly upon wakening in the morning   Does walk on the treadmill - does 2-3 miles  No change   Hasn't taken Lyrica for several weeks    Patient Care Team:  Chan Estrada MD as PCP - General (Internal Medicine)  Chan Estrada MD as Diabetes Digital Medicine Responsible Provider (Internal Medicine)  Mag Márquez as Digital Medicine Health   Alessandra Sarabia PharmD as Diabetes Digital Medicine Clinician (Pharmacist)      PAST MEDICAL HISTORY:  Past Medical History:   Diagnosis Date    Arthritis     Cataract     ou    Diabetes mellitus 7 yrs    lbs: 118 1 week ago    Eye injury     fb    Hyperlipidemia         PAST SURGICAL HISTORY:  History reviewed. No pertinent surgical history.    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     Employer: [employer]   Social Needs    Financial resource strain: Not hard at all    Food insecurity:     Worry: Never true     Inability: Never true    Transportation needs:     Medical: No     Non-medical: No   Tobacco Use    Smoking status: Never Smoker    Smokeless tobacco: Never Used   Substance and Sexual Activity    Alcohol use: No     Frequency: Never    Drug use: No    Sexual activity: Not Currently   Lifestyle    Physical activity:     Days per week: 4 days     Minutes per session: 40 min    Stress: Not at all   Relationships    Social connections:     Talks on phone: More than three times a week     Gets together: More than three times a week     Attends Mormonism service: Not on file     Active member of club or organization: Yes     Attends meetings of clubs or organizations: More than 4 times per year     Relationship status:    Other Topics Concern    Not on file   Social History Narrative    Not on file       FAMILY HISTORY:  Family History   Problem Relation Age of Onset    Diabetes Mother     Cancer Mother     Hypertension Mother     Diabetes Father     Cancer Father     No Known Problems Sister     No Known Problems Brother     No Known Problems Maternal Aunt     No Known Problems Maternal Uncle     No Known Problems Paternal Aunt     No Known Problems Paternal Uncle     No Known Problems Maternal Grandmother     No Known Problems Maternal Grandfather     No Known Problems Paternal Grandmother     No Known Problems Paternal Grandfather      Amblyopia Neg Hx     Blindness Neg Hx     Cataracts Neg Hx     Glaucoma Neg Hx     Macular degeneration Neg Hx     Retinal detachment Neg Hx     Strabismus Neg Hx     Stroke Neg Hx     Thyroid disease Neg Hx        ALLERGIES AND MEDICATIONS: updated and reviewed.  Review of patient's allergies indicates:   Allergen Reactions    Sulfa (sulfonamide antibiotics) Swelling     Current Outpatient Medications   Medication Sig Dispense Refill    atorvastatin (LIPITOR) 40 MG tablet TAKE 1 TABLET BY MOUTH ONCE DAILY 90 tablet 0    blood sugar diagnostic (FREESTYLE LITE STRIPS) Strp Inject 1 each as directed once daily. 100 each 3    DULoxetine (CYMBALTA) 60 MG capsule Take 1 capsule (60 mg total) by mouth once daily. 90 capsule 3    etodolac (LODINE) 400 MG tablet TAKE 1 TABLET BY MOUTH TWICE DAILY 180 tablet 0    fluticasone (FLONASE) 50 mcg/actuation nasal spray 1 spray (50 mcg total) by Each Nare route once daily. 16 g 2    lancets (FREESTYLE LANCETS) 28 gauge Misc Inject 1 lancet as directed once daily. 100 each 3    losartan (COZAAR) 50 MG tablet TAKE 1 TABLET BY MOUTH ONCE DAILY 90 tablet 1    LYRICA 100 mg capsule TAKE 1 CAPSULE BY MOUTH THREE TIMES DAILY 90 capsule 0    metFORMIN (GLUCOPHAGE) 500 MG tablet TAKE 2 TABLETS BY MOUTH TWICE DAILY WITH  BREAKFAST  AND  DINNER. 360 tablet 0    omeprazole (PRILOSEC) 20 MG capsule Take 20 mg by mouth. 1 Capsule, Delayed Release(E.C.) Oral Every morning      SITagliptin (JANUVIA) 100 MG Tab Take 1 tablet (100 mg total) by mouth once daily. 90 tablet 3    loratadine (CLARITIN) 10 mg tablet Take 1 tablet (10 mg total) by mouth once daily. 30 tablet 11     No current facility-administered medications for this visit.          ROS  Review of Systems   Constitutional: Negative for chills and weight loss.   Gastrointestinal: Negative for constipation, nausea and vomiting.   Genitourinary: Positive for dysuria, flank pain and frequency. Negative for hematuria,  "hesitancy and urgency.   Skin: Negative for rash.         Objective:       Physical Exam  Vitals:    07/29/19 1529   BP: 126/72   BP Location: Left arm   Patient Position: Sitting   BP Method: Medium (Manual)   Pulse: 72   Temp: 98.2 °F (36.8 °C)   TempSrc: Oral   SpO2: 98%   Weight: 83.3 kg (183 lb 10.3 oz)   Height: 5' 8" (1.727 m)    Body mass index is 27.92 kg/m².  Weight: 83.3 kg (183 lb 10.3 oz)   Height: 5' 8" (172.7 cm)   Physical Exam   Constitutional: He appears well-developed and well-nourished.   HENT:   Head: Normocephalic and atraumatic.   Eyes: EOM are normal.   Cardiovascular: Normal rate.   Pulmonary/Chest: Effort normal.   Musculoskeletal: He exhibits no edema.   Neurological: He is alert. No cranial nerve deficit.   Skin: Skin is warm and dry. No rash noted.   Vitals reviewed.          Assessment:     1. Dysuria    2. Dyslipidemia    3. Controlled type 2 diabetes mellitus without complication, without long-term current use of insulin    4. Neuropathy    5. Type 2 diabetes mellitus with diabetic polyneuropathy, without long-term current use of insulin    6. Vitamin B12 deficiency    7. Need for shingles vaccine      Plan:     John was seen today for painful urination and left leg numbness.    Diagnoses and all orders for this visit:    Dysuria  Discussed possible considerations including acute cystitis  Obtain urine evaluation as noted  -     Urinalysis; Future  -     Urine culture; Future    Type 2 diabetes mellitus with diabetic polyneuropathy, without long-term current use of insulin  Dyslipidemia associated with type 2 diabetes mellitus  Discussed A1c of 8.9%  Continue current regimen  Repeat in September 2019    Neuropathy  Vitamin B12 deficiency  Patient with low B12 - may be contributing to symptoms  -     cyanocobalamin (VITAMIN B-12) 1000 MCG tablet; Take 1 tablet (1,000 mcg total) by mouth once daily.    Need for shingles vaccine  Counseled regarding shingles vaccine - ordered  -     " varicella-zoster [id number], PF, (SHINGRIX, PF,) 50 mcg/0.5 mL injection; Inject 0.5 mLs into the muscle once. for 1 dose          Health Maintenance       Date Due Completion Date    Shingles Vaccine (1 of 2) 07/27/2001 ---    Eye Exam 08/07/2019 8/7/2018    Colonoscopy 07/29/2020 (Originally 4/23/2019) 4/23/2009    Influenza Vaccine 08/01/2019 10/12/2018    Override on 11/25/2015: Done    Hemoglobin A1c 09/03/2019 6/3/2019    Override on 11/25/2015: Done    Lipid Panel 06/03/2020 6/3/2019    Foot Exam 07/15/2020 7/15/2019 (Done)    Override on 7/15/2019: Done    Override on 1/16/2018: Done    Override on 11/25/2015: Done    Low Dose Statin 07/29/2020 7/29/2019    TETANUS VACCINE 02/19/2026 2/19/2016            Health Maintenance reviewed, addressed as per orders    No follow-ups on file.    The patient expressed understanding and no barriers to adherence were identified.     1. The patient indicates understanding of these issues and agrees with the plan. Brief care plan is updated and reviewed with the patient as applicable.     2. The patient is given an After Visit Summary that lists all medications with directions, allergies, orders placed during this encounter and follow-up instructions.     3. I have reviewed the patient's medical information including past medical, family, and social history sections including the medications and allergies.     4. We discussed the patient's current medications. I reconciled the patient's medication list and prepared and supplied needed refills.       Chan Estrada MD  Internal Medicine-Pediatrics

## 2019-07-30 LAB — BACTERIA UR CULT: NORMAL

## 2019-08-01 ENCOUNTER — PATIENT OUTREACH (OUTPATIENT)
Dept: OTHER | Facility: OTHER | Age: 69
End: 2019-08-01

## 2019-08-01 NOTE — PROGRESS NOTES
Last 6 Patient Entered Readings                                          Most Recent A1c: 8.9% on 6/3/2019  (Goal: 7%)     Recent Readings 7/29/2019 7/29/2019 7/28/2019 7/26/2019 7/25/2019    Blood Glucose (mg/dL) 149 207 88 132 129            Digital Medicine: Health  Follow Up    Left voicemail to follow up with Mr. John Lemos.  .

## 2019-08-09 ENCOUNTER — TELEPHONE (OUTPATIENT)
Dept: OPTOMETRY | Facility: CLINIC | Age: 69
End: 2019-08-09

## 2019-08-09 ENCOUNTER — PATIENT OUTREACH (OUTPATIENT)
Dept: ADMINISTRATIVE | Facility: OTHER | Age: 69
End: 2019-08-09

## 2019-08-09 NOTE — TELEPHONE ENCOUNTER
Reggie va ins benefits as of 19:     Member Name : DIANE NGUYEN  ID : [id number]  Group : [facility] [employer]   Patient Name : DIANE NGUYEN  Relationship : MEMBER   : 1950     Authorization Issued       Authorization Number: [id number]    Issue Date: 2019    Expiration Date: 2019    Services: Eye Examination    Examination Copayment: $25.00

## 2019-08-13 ENCOUNTER — OFFICE VISIT (OUTPATIENT)
Dept: OPTOMETRY | Facility: CLINIC | Age: 69
End: 2019-08-13
Payer: COMMERCIAL

## 2019-08-13 DIAGNOSIS — H52.13 MYOPIA OF BOTH EYES WITH ASTIGMATISM AND PRESBYOPIA: ICD-10-CM

## 2019-08-13 DIAGNOSIS — H52.203 MYOPIA OF BOTH EYES WITH ASTIGMATISM AND PRESBYOPIA: ICD-10-CM

## 2019-08-13 DIAGNOSIS — H52.4 MYOPIA OF BOTH EYES WITH ASTIGMATISM AND PRESBYOPIA: ICD-10-CM

## 2019-08-13 DIAGNOSIS — Z01.00 EYE EXAM, ROUTINE: Primary | ICD-10-CM

## 2019-08-13 LAB
LEFT EYE DM RETINOPATHY: NEGATIVE
RIGHT EYE DM RETINOPATHY: NEGATIVE

## 2019-08-13 PROCEDURE — 92015 PR REFRACTION: ICD-10-PCS | Mod: S$GLB,,, | Performed by: OPTOMETRIST

## 2019-08-13 PROCEDURE — 99999 PR PBB SHADOW E&M-EST. PATIENT-LVL II: ICD-10-PCS | Mod: PBBFAC,,, | Performed by: OPTOMETRIST

## 2019-08-13 PROCEDURE — 92014 COMPRE OPH EXAM EST PT 1/>: CPT | Mod: S$GLB,,, | Performed by: OPTOMETRIST

## 2019-08-13 PROCEDURE — 99999 PR PBB SHADOW E&M-EST. PATIENT-LVL II: CPT | Mod: PBBFAC,,, | Performed by: OPTOMETRIST

## 2019-08-13 PROCEDURE — 92014 PR EYE EXAM, EST PATIENT,COMPREHESV: ICD-10-PCS | Mod: S$GLB,,, | Performed by: OPTOMETRIST

## 2019-08-13 PROCEDURE — 92015 DETERMINE REFRACTIVE STATE: CPT | Mod: S$GLB,,, | Performed by: OPTOMETRIST

## 2019-08-13 NOTE — PROGRESS NOTES
HPI     Last eye exam was 8/7/18 with Dr Lloyd.    Pt here for annual Reggie vision  No problems with va or eyes  Sometimes has a floater  Patient denies diplopia, headaches, flashes, pain, and   itching/burning/tearing.    Pt does not use any eye drops.    Hemoglobin A1C       Date                     Value               Ref Range             Status                06/03/2019               8.9 (H)             4.0 - 5.6 %           Final                 03/07/2019               7.4 (H)             4.0 - 5.6 %           Final                  10/12/2018               6.7 (H)             4.0 - 5.6 %           Final                Last edited by Sylvester Lloyd, OD on 8/13/2019  3:39 PM. (History)            Assessment /Plan     For exam results, see Encounter Report.    Eye exam, routine  -No retinopathy noted today.  Continued control with primary care physician and annual comprehensive eye exam.  -Reggie    Myopia of both eyes with astigmatism and presbyopia  Eyeglass Final Rx     Eyeglass Final Rx       Sphere Cylinder Axis Dist VA Add    Right -1.75 +0.75 025 20/20 +2.50    Left -1.75 +0.50 010 20/20 +2.50    Type:  PAL    Expiration Date:  8/13/2020                  RTC 1 yr

## 2019-08-16 ENCOUNTER — PATIENT OUTREACH (OUTPATIENT)
Dept: OTHER | Facility: OTHER | Age: 69
End: 2019-08-16

## 2019-08-16 NOTE — PROGRESS NOTES
Last 6 Patient Entered Readings                                          Most Recent A1c: 8.9% on 6/3/2019  (Goal: 8%)     Recent Readings 8/16/2019 8/15/2019 8/14/2019 8/12/2019 8/11/2019    Blood Glucose (mg/dL) 127 108 108 143 100          Digital Medicine: Health  Follow Up    Left voicemail to follow up with Mr. John Lemos.  Review low BG readings.

## 2019-08-30 NOTE — PROGRESS NOTES
Last 6 Patient Entered Readings                                          Most Recent A1c: 8.9% on 6/3/2019  (Goal: 8%)     Recent Readings 8/30/2019 8/29/2019 8/28/2019 8/27/2019 8/26/2019    Blood Glucose (mg/dL) 142 114 109 116 131          Digital Medicine: Health  Follow Up    Lifestyle Modifications:    1.Dietary Modifications (Sodium intake <2,000mg/day, food labels, dining out): deferred    2.Physical Activity: Patient reports that he'll like to get back in his exercise regimen. 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 He reports that once he's back in the routine, he plans to start exercising on his treadmill or bike about 3x/week. Patient reports that he also cuts his grass about 1x/week and he reports that it takes him about an hour to do.     3.Medication Therapy: Patient has not been compliant with the medication regimen. Patient reports that he's not been taking the Januvia since prescribed it. He's only taking the metformin    4.Patient has the following medication side effects/concerns: none  (Frequency/Alleviating factors/Precipitating factors, etc.)     Follow up with Mr. John Lemos completed. Mr. Lemos is doing well. Asked about a low BG reading on 8/10 (1). Patient reports that it was a false reading, and it will be deleted. No further questions or concerns. Will continue to follow up to achieve health goals.

## 2019-09-03 ENCOUNTER — LAB VISIT (OUTPATIENT)
Dept: LAB | Facility: HOSPITAL | Age: 69
End: 2019-09-03
Attending: INTERNAL MEDICINE
Payer: COMMERCIAL

## 2019-09-03 ENCOUNTER — OFFICE VISIT (OUTPATIENT)
Dept: FAMILY MEDICINE | Facility: CLINIC | Age: 69
End: 2019-09-03
Payer: COMMERCIAL

## 2019-09-03 VITALS
HEIGHT: 68 IN | OXYGEN SATURATION: 97 % | TEMPERATURE: 98 F | WEIGHT: 180.31 LBS | DIASTOLIC BLOOD PRESSURE: 70 MMHG | BODY MASS INDEX: 27.33 KG/M2 | SYSTOLIC BLOOD PRESSURE: 130 MMHG | HEART RATE: 84 BPM

## 2019-09-03 DIAGNOSIS — Z12.11 COLON CANCER SCREENING: ICD-10-CM

## 2019-09-03 DIAGNOSIS — M75.41 IMPINGEMENT SYNDROME OF RIGHT SHOULDER: Primary | ICD-10-CM

## 2019-09-03 DIAGNOSIS — E11.69 DYSLIPIDEMIA ASSOCIATED WITH TYPE 2 DIABETES MELLITUS: ICD-10-CM

## 2019-09-03 DIAGNOSIS — E11.42 TYPE 2 DIABETES MELLITUS WITH DIABETIC POLYNEUROPATHY, WITHOUT LONG-TERM CURRENT USE OF INSULIN: ICD-10-CM

## 2019-09-03 DIAGNOSIS — M77.8 TENDINITIS OF RIGHT ELBOW: ICD-10-CM

## 2019-09-03 DIAGNOSIS — E78.5 DYSLIPIDEMIA ASSOCIATED WITH TYPE 2 DIABETES MELLITUS: ICD-10-CM

## 2019-09-03 LAB
ESTIMATED AVG GLUCOSE: 134 MG/DL (ref 68–131)
HBA1C MFR BLD HPLC: 6.3 % (ref 4–5.6)

## 2019-09-03 PROCEDURE — 83036 HEMOGLOBIN GLYCOSYLATED A1C: CPT

## 2019-09-03 PROCEDURE — 99999 PR PBB SHADOW E&M-EST. PATIENT-LVL IV: CPT | Mod: PBBFAC,,, | Performed by: INTERNAL MEDICINE

## 2019-09-03 PROCEDURE — 99999 PR PBB SHADOW E&M-EST. PATIENT-LVL IV: ICD-10-PCS | Mod: PBBFAC,,, | Performed by: INTERNAL MEDICINE

## 2019-09-03 PROCEDURE — 3044F PR MOST RECENT HEMOGLOBIN A1C LEVEL <7.0%: ICD-10-PCS | Mod: CPTII,S$GLB,, | Performed by: INTERNAL MEDICINE

## 2019-09-03 PROCEDURE — 99214 OFFICE O/P EST MOD 30 MIN: CPT | Mod: S$GLB,,, | Performed by: INTERNAL MEDICINE

## 2019-09-03 PROCEDURE — 99214 PR OFFICE/OUTPT VISIT, EST, LEVL IV, 30-39 MIN: ICD-10-PCS | Mod: S$GLB,,, | Performed by: INTERNAL MEDICINE

## 2019-09-03 PROCEDURE — 1101F PT FALLS ASSESS-DOCD LE1/YR: CPT | Mod: CPTII,S$GLB,, | Performed by: INTERNAL MEDICINE

## 2019-09-03 PROCEDURE — 80048 BASIC METABOLIC PNL TOTAL CA: CPT

## 2019-09-03 PROCEDURE — 1101F PR PT FALLS ASSESS DOC 0-1 FALLS W/OUT INJ PAST YR: ICD-10-PCS | Mod: CPTII,S$GLB,, | Performed by: INTERNAL MEDICINE

## 2019-09-03 PROCEDURE — 36415 COLL VENOUS BLD VENIPUNCTURE: CPT | Mod: PO

## 2019-09-03 PROCEDURE — 3044F HG A1C LEVEL LT 7.0%: CPT | Mod: CPTII,S$GLB,, | Performed by: INTERNAL MEDICINE

## 2019-09-03 RX ORDER — TRAMADOL HYDROCHLORIDE 50 MG/1
50 TABLET ORAL DAILY PRN
Qty: 15 TABLET | Refills: 0 | Status: SHIPPED | OUTPATIENT
Start: 2019-09-03 | End: 2019-09-10

## 2019-09-03 NOTE — PROGRESS NOTES
Subjective:       Chief Complaint  Chief Complaint   Patient presents with    Follow-up    Arm Pain     x 2 - 3 weeks       HPI  Jhon Lemos is a 69 y.o. male with multiple medical diagnoses as listed in the medical history and problem list that presents for follow-up and arm pain     Arm Pain    The incident occurred more than 1 week ago (2 weeks ago). The incident occurred at home. There was no injury mechanism. The pain is present in the right shoulder and right elbow. The quality of the pain is described as aching. The pain is moderate. The pain has been fluctuating since the incident. Pertinent negatives include no chest pain, muscle weakness, numbness or tingling. He has tried acetaminophen and NSAIDs (etodolac) for the symptoms. The treatment provided mild relief.     Has been treated with etodolac for back pain previously     T2DM  On Digital Medicine program   Only m    Patient Care Team:  Chan Estrada MD as PCP - General (Internal Medicine)  Chan Estrada MD as Diabetes Digital Medicine Responsible Provider (Internal Medicine)  Mag Márquez as Digital Medicine Health   Flako HerediaD as Diabetes Digital Medicine Clinician (Pharmacist)  [facility] as Diabetes Digital Medicine Contract      PAST MEDICAL HISTORY:  Past Medical History:   Diagnosis Date    Arthritis     Cataract     ou    Diabetes mellitus 7 yrs    lbs: 118 1 week ago    Diabetes mellitus type 2, controlled 9/8/2012    Eye injury     fb    Hyperlipidemia        PAST SURGICAL HISTORY:  History reviewed. No pertinent surgical history.    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     Employer: [employer]    Social Needs    Financial resource strain: Not hard at all    Food insecurity:     Worry: Never true     Inability: Never true    Transportation needs:     Medical: No     Non-medical: No   Tobacco Use    Smoking status: Never Smoker    Smokeless tobacco: Never Used   Substance and Sexual Activity    Alcohol use: No     Frequency: Never    Drug use: No    Sexual activity: Not Currently   Lifestyle    Physical activity:     Days per week: 5 days     Minutes per session: 30 min    Stress: Not at all   Relationships    Social connections:     Talks on phone: More than three times a week     Gets together: Once a week     Attends Mormonism service: Not on file     Active member of club or organization: Yes     Attends meetings of clubs or organizations: More than 4 times per year     Relationship status:    Other Topics Concern    Not on file   Social History Narrative    Not on file       FAMILY HISTORY:  Family History   Problem Relation Age of Onset    Diabetes Mother     Cancer Mother     Hypertension Mother     Diabetes Father     Cancer Father     No Known Problems Sister     No Known Problems Brother     No Known Problems Maternal Aunt     No Known Problems Maternal Uncle     No Known Problems Paternal Aunt     No Known Problems Paternal Uncle     No Known Problems Maternal Grandmother     No Known Problems Maternal Grandfather     No Known Problems Paternal Grandmother     No Known Problems Paternal Grandfather     Amblyopia Neg Hx     Blindness Neg Hx     Cataracts Neg Hx     Glaucoma Neg Hx     Macular degeneration Neg Hx     Retinal detachment Neg Hx     Strabismus Neg Hx     Stroke Neg Hx     Thyroid disease Neg Hx        ALLERGIES AND MEDICATIONS: updated and reviewed.  Review of patient's allergies indicates:   Allergen Reactions    Sulfa (sulfonamide antibiotics) Swelling     Current Outpatient Medications   Medication Sig Dispense Refill    atorvastatin  (LIPITOR) 40 MG tablet TAKE 1 TABLET BY MOUTH ONCE DAILY 90 tablet 0    blood sugar diagnostic (FREESTYLE LITE STRIPS) Strp Inject 1 each as directed once daily. 100 each 3    cyanocobalamin (VITAMIN B-12) 1000 MCG tablet Take 1 tablet (1,000 mcg total) by mouth once daily. 30 tablet 11    DULoxetine (CYMBALTA) 60 MG capsule Take 1 capsule (60 mg total) by mouth once daily. 90 capsule 3    etodolac (LODINE) 400 MG tablet TAKE 1 TABLET BY MOUTH TWICE DAILY 180 tablet 0    fluticasone (FLONASE) 50 mcg/actuation nasal spray 1 spray (50 mcg total) by Each Nare route once daily. 16 g 2    lancets (FREESTYLE LANCETS) 28 gauge Misc Inject 1 lancet as directed once daily. 100 each 3    losartan (COZAAR) 50 MG tablet TAKE 1 TABLET BY MOUTH ONCE DAILY 90 tablet 1    LYRICA 100 mg capsule TAKE 1 CAPSULE BY MOUTH THREE TIMES DAILY 90 capsule 0    metFORMIN (GLUCOPHAGE) 500 MG tablet TAKE 2 TABLETS BY MOUTH TWICE DAILY WITH  BREAKFAST  AND  DINNER. 360 tablet 0    omeprazole (PRILOSEC) 20 MG capsule Take 20 mg by mouth. 1 Capsule, Delayed Release(E.C.) Oral Every morning      loratadine (CLARITIN) 10 mg tablet Take 1 tablet (10 mg total) by mouth once daily. 30 tablet 11    traMADol (ULTRAM) 50 mg tablet Take 1 tablet (50 mg total) by mouth daily as needed for Pain. 15 tablet 0     No current facility-administered medications for this visit.          ROS  Review of Systems   Constitutional: Negative for activity change and unexpected weight change.   HENT: Negative for hearing loss, rhinorrhea and trouble swallowing.    Eyes: Negative for discharge and visual disturbance.   Respiratory: Negative for chest tightness and wheezing.    Cardiovascular: Negative for chest pain and palpitations.   Gastrointestinal: Negative for blood in stool, constipation, diarrhea and vomiting.   Endocrine: Negative for polydipsia and polyuria.   Genitourinary: Negative for difficulty urinating, hematuria and urgency.   Musculoskeletal:  "Positive for arthralgias and neck pain. Negative for joint swelling.   Neurological: Negative for tingling, weakness, numbness and headaches.   Psychiatric/Behavioral: Negative for confusion and dysphoric mood.         Objective:       Physical Exam  Vitals:    09/03/19 1538   BP: 130/70   BP Location: Left arm   Patient Position: Sitting   BP Method: Medium (Manual)   Pulse: 84   Temp: 97.9 °F (36.6 °C)   TempSrc: Oral   SpO2: 97%   Weight: 81.8 kg (180 lb 5.4 oz)   Height: 5' 8" (1.727 m)    Body mass index is 27.42 kg/m².  Weight: 81.8 kg (180 lb 5.4 oz)   Height: 5' 8" (172.7 cm)   Physical Exam   Constitutional: He appears well-developed and well-nourished. No distress.   HENT:   Head: Normocephalic and atraumatic.   Eyes: Pupils are equal, round, and reactive to light. Conjunctivae and EOM are normal.   Cardiovascular: Normal rate.   Musculoskeletal: He exhibits tenderness.   Positive Neer/Vasquez sign of left upper extremity  Tenderness of right lateral elbow/epicondyle     Neurological: He is alert. He displays normal reflexes. No cranial nerve deficit or sensory deficit. He exhibits normal muscle tone.   Skin: Skin is warm and dry. No rash noted.   Psychiatric: He has a normal mood and affect. His behavior is normal.           Assessment:     1. Impingement syndrome of right shoulder    2. Tendinitis of right elbow    3. Type 2 diabetes mellitus with diabetic polyneuropathy, without long-term current use of insulin    4. Dyslipidemia associated with type 2 diabetes mellitus    5. Colon cancer screening      Plan:     John was seen today for follow-up and arm pain.    Diagnoses and all orders for this visit:    Impingement syndrome of right shoulder  Discussed etiology of rotator cuff tendinitis/impingement syndrome   Patient has tried anti-inflammatory medication as primary treatment modality  Limited opioid usage (less than 7 days) recommended for acute pain syndrome  Discussed mobilization, heat and " stretching  Return for re-evaluation in 4-6 weeks if symptoms not improved  -     traMADol (ULTRAM) 50 mg tablet; Take 1 tablet (50 mg total) by mouth daily as needed for Pain.    Tendinitis of right elbow  Discussed treatment of condition     Type 2 diabetes mellitus with diabetic polyneuropathy, without long-term current use of insulin  Dyslipidemia associated with type 2 diabetes mellitus  Obtain labs as noted - continue current pharmacotherapy   -     Hemoglobin A1c; Future  -     Basic metabolic panel; Future  -     Hemoglobin A1c; Future    Colon cancer screening  Due for repeat colon cancer screening - ordered   -     Case request GI: COLONOSCOPY          Health Maintenance       Date Due Completion Date    Influenza Vaccine (1) 09/01/2019 10/12/2018    Override on 11/25/2015: Done    Hemoglobin A1c 09/03/2019 6/3/2019    Override on 11/25/2015: Done    Colonoscopy 07/29/2020 (Originally 4/23/2019) 4/23/2009    Shingles Vaccine (2 of 2) 09/23/2019 7/29/2019    Lipid Panel 06/03/2020 6/3/2019    Foot Exam 07/15/2020 7/15/2019 (Done)    Override on 7/15/2019: Done    Override on 1/16/2018: Done    Override on 11/25/2015: Done    Low Dose Statin 08/13/2020 8/13/2019    Eye Exam 08/13/2020 8/13/2019    Override on 8/13/2019: Done    TETANUS VACCINE 02/19/2026 2/19/2016            Health Maintenance reviewed, addressed as per orders    Follow up in about 3 months (around 12/3/2019) for DM.    The patient expressed understanding and no barriers to adherence were identified.     1. The patient indicates understanding of these issues and agrees with the plan. Brief care plan is updated and reviewed with the patient as applicable.     2. The patient is given an After Visit Summary that lists all medications with directions, allergies, orders placed during this encounter and follow-up instructions.     3. I have reviewed the patient's medical information including past medical, family, and social history sections  including the medications and allergies.     4. We discussed the patient's current medications. I reconciled the patient's medication list and prepared and supplied needed refills.       Chan Estrada MD  Internal Medicine-Pediatrics

## 2019-09-03 NOTE — PATIENT INSTRUCTIONS
Nonsurgical Treatment Options for Shoulder Impingement      Rest is key to healing your shoulder. If an activity hurts, dont do it. Otherwise, you may prevent healing and increase pain. Your shoulder needs active rest. This means avoiding overhead movements and activities that cause pain. But DO NOT stop using your shoulder completely. This can cause it to stiffen or freeze. In addition to rest, impingement can be treated a number of ways. Your healthcare provider can help you find which of these is best for you.  A physical therapist can also help you with exercises specific for your condition.  ? Ice  Ice reduces inflammation and relieves pain. Apply an ice pack for about 15 minutes, 3 times a day. You can also use a bag of frozen peas instead of an ice pack. A pillow placed under your arm may help make you more comfortable.  Note: Dont put the cold item directly on your skin. Place it on top of your shirt, or wrap it in a thin towel or washcloth.  ? Heat  Heat may soothe aching muscles, but it wont reduce inflammation. Use a heating pad or take a warm shower or bath. Do this for 15 minutes at a time.  Note: Avoid heat when pain is constant. Heat is best when used for warming up before an activity. You can also alternate ice and heat.  ? Medicine  To relieve pain and inflammation, try over-the-counter pain relievers, such as acetaminophen or ibuprofen. Or, your healthcare provider may prescribe medicines. Ask how and when to take your medicine. Be sure to follow all instructions youre given.  ? Electrical stimulation  Electrical stimulation can help reduce pain and swelling. Your healthcare provider attaches small pads to your shoulder. A mild electric current then flows into your shoulder. You may feel tingling, but you should not feel pain.  ? Ultrasound  Ultrasound can help reduce pain. First a slick gel or medicated cream is applied to your shoulder. Then your healthcare provider places a small device  over the area. The device uses sound waves to loosen shoulder tightness. This treatment should be pain-free.  ? Injection therapy  Injection therapy may be used to help diagnose your problem. It may also be used to reduce pain and inflammation. The injection typically includes two medicines. One is an anesthetic to numb the shoulder. The other is a steroid, such as cortisone, to help reduce painful swelling. It can take from a few hours to a couple of days before the injection helps. Talk to your healthcare provider about the possible risks and benefits of this therapy.  Date Last Reviewed: 10/14/2015  © 4024-1241 [employer]. [address] [place] [address], [place] [address]. All rights reserved. This information is not intended as a substitute for professional medical care. Always follow your healthcare professional's instructions.

## 2019-09-03 NOTE — PROGRESS NOTES
Last 6 Patient Entered Readings                                          Most Recent A1c: 8.9% on 6/3/2019  (Goal: 8%)     Recent Readings 9/3/2019 9/3/2019 9/1/2019 8/30/2019 8/29/2019    Blood Glucose (mg/dL) 149 17 108 142 114          Another attempt to reach patient - IRA TORRES.   Concerned about reported 17 mg/dL glucose, repeated within 2 minutes with a normal value.

## 2019-09-04 ENCOUNTER — PATIENT OUTREACH (OUTPATIENT)
Dept: OTHER | Facility: OTHER | Age: 69
End: 2019-09-04

## 2019-09-04 ENCOUNTER — TELEPHONE (OUTPATIENT)
Dept: FAMILY MEDICINE | Facility: CLINIC | Age: 69
End: 2019-09-04

## 2019-09-04 LAB
ANION GAP SERPL CALC-SCNC: 7 MMOL/L (ref 8–16)
BUN SERPL-MCNC: 13 MG/DL (ref 8–23)
CALCIUM SERPL-MCNC: 9.6 MG/DL (ref 8.7–10.5)
CHLORIDE SERPL-SCNC: 106 MMOL/L (ref 95–110)
CO2 SERPL-SCNC: 27 MMOL/L (ref 23–29)
CREAT SERPL-MCNC: 1 MG/DL (ref 0.5–1.4)
EST. GFR  (AFRICAN AMERICAN): >60 ML/MIN/1.73 M^2
EST. GFR  (NON AFRICAN AMERICAN): >60 ML/MIN/1.73 M^2
GLUCOSE SERPL-MCNC: 114 MG/DL (ref 70–110)
POTASSIUM SERPL-SCNC: 3.9 MMOL/L (ref 3.5–5.1)
SODIUM SERPL-SCNC: 140 MMOL/L (ref 136–145)

## 2019-09-04 NOTE — TELEPHONE ENCOUNTER
----- Message from Chan Estrada MD sent at 9/4/2019  8:04 AM CDT -----  Please call the patient regarding results:    A1c is 6.3% - excellent control - please schedule patient for A1c in 6 months (labs ordered) followed by clinic appointment 2 days later approximately (March 2020)    Chan Estrada MD  Internal Medicine-Pediatrics

## 2019-09-04 NOTE — TELEPHONE ENCOUNTER
Spoke with pharmacist and informed her that the diagnosis is impingement syndrome of right shoulder. Pharmacist states that they will give a 7 day supply instead of 15 day supply.

## 2019-09-04 NOTE — TELEPHONE ENCOUNTER
He was diagnosed on 9/3/2019 with Impingement syndrome of right shoulder. Plan is for less than 7 days worth of use.

## 2019-09-04 NOTE — PROGRESS NOTES
Last 6 Patient Entered Readings                                          Most Recent A1c: 6.3% on 9/3/2019  (Goal: 8%)     Recent Readings 9/4/2019 9/4/2019 9/3/2019 9/3/2019 9/1/2019    Blood Glucose (mg/dL) 168 64 149 17 108          Digital Medicine: Health  Follow Up    Left voicemail to follow up with Mr. John Lemos.  Calling about low BG 9/3/19 (17) and 9/4/19 (64)

## 2019-09-04 NOTE — TELEPHONE ENCOUNTER
----- Message from Serenity Waller sent at 9/4/2019 12:16 PM CDT -----  Contact: Adri reid/[facility] ph [phone]  Type:  Pharmacy Calling to Clarify an RX    Name of Caller:Adri    Pharmacy Name: [facility]    Prescription Name: traMADol (ULTRAM) 50 mg tablet    What do they need to clarify? 1st time being prescribed the medication. Calling to get information on the type of pain the patient is having. Please call.    Best Call Back Number: [phone]

## 2019-09-09 NOTE — PROGRESS NOTES
Mr. Lemos is doing well. I called today concerned about 2 alerts I received for him last week on 9/3/19 (17) and 9/4/19 (64). Patient reports that both readings were machine errors. He reports that she did not experience any s/s like blurred vision, shakes, etc. He said that he retested and the numbers went up. Mr. Lemos is excited about lower A1c to 6.3%.

## 2019-09-17 ENCOUNTER — PATIENT OUTREACH (OUTPATIENT)
Dept: OTHER | Facility: OTHER | Age: 69
End: 2019-09-17

## 2019-09-20 ENCOUNTER — OFFICE VISIT (OUTPATIENT)
Dept: FAMILY MEDICINE | Facility: CLINIC | Age: 69
End: 2019-09-20
Payer: COMMERCIAL

## 2019-09-20 VITALS
BODY MASS INDEX: 27.46 KG/M2 | DIASTOLIC BLOOD PRESSURE: 60 MMHG | SYSTOLIC BLOOD PRESSURE: 130 MMHG | HEIGHT: 68 IN | WEIGHT: 181.19 LBS | HEART RATE: 72 BPM | OXYGEN SATURATION: 98 % | TEMPERATURE: 99 F

## 2019-09-20 DIAGNOSIS — E78.5 HYPERLIPIDEMIA, UNSPECIFIED HYPERLIPIDEMIA TYPE: ICD-10-CM

## 2019-09-20 DIAGNOSIS — R51.9 NONINTRACTABLE HEADACHE, UNSPECIFIED CHRONICITY PATTERN, UNSPECIFIED HEADACHE TYPE: ICD-10-CM

## 2019-09-20 DIAGNOSIS — H93.12 TINNITUS OF LEFT EAR: ICD-10-CM

## 2019-09-20 DIAGNOSIS — J30.9 ALLERGIC RHINITIS, UNSPECIFIED SEASONALITY, UNSPECIFIED TRIGGER: Primary | ICD-10-CM

## 2019-09-20 DIAGNOSIS — E11.42 TYPE 2 DIABETES MELLITUS WITH DIABETIC POLYNEUROPATHY, WITHOUT LONG-TERM CURRENT USE OF INSULIN: ICD-10-CM

## 2019-09-20 PROCEDURE — 99214 OFFICE O/P EST MOD 30 MIN: CPT | Mod: S$GLB,,, | Performed by: NURSE PRACTITIONER

## 2019-09-20 PROCEDURE — 99214 PR OFFICE/OUTPT VISIT, EST, LEVL IV, 30-39 MIN: ICD-10-PCS | Mod: S$GLB,,, | Performed by: NURSE PRACTITIONER

## 2019-09-20 PROCEDURE — 3044F HG A1C LEVEL LT 7.0%: CPT | Mod: CPTII,S$GLB,, | Performed by: NURSE PRACTITIONER

## 2019-09-20 PROCEDURE — 1101F PR PT FALLS ASSESS DOC 0-1 FALLS W/OUT INJ PAST YR: ICD-10-PCS | Mod: CPTII,S$GLB,, | Performed by: NURSE PRACTITIONER

## 2019-09-20 PROCEDURE — 99999 PR PBB SHADOW E&M-EST. PATIENT-LVL IV: CPT | Mod: PBBFAC,,, | Performed by: NURSE PRACTITIONER

## 2019-09-20 PROCEDURE — 3044F PR MOST RECENT HEMOGLOBIN A1C LEVEL <7.0%: ICD-10-PCS | Mod: CPTII,S$GLB,, | Performed by: NURSE PRACTITIONER

## 2019-09-20 PROCEDURE — 99999 PR PBB SHADOW E&M-EST. PATIENT-LVL IV: ICD-10-PCS | Mod: PBBFAC,,, | Performed by: NURSE PRACTITIONER

## 2019-09-20 PROCEDURE — 1101F PT FALLS ASSESS-DOCD LE1/YR: CPT | Mod: CPTII,S$GLB,, | Performed by: NURSE PRACTITIONER

## 2019-09-20 RX ORDER — NAPROXEN 500 MG/1
500 TABLET ORAL 2 TIMES DAILY WITH MEALS
Qty: 90 TABLET | Refills: 0 | Status: SHIPPED | OUTPATIENT
Start: 2019-09-20 | End: 2019-10-28 | Stop reason: ALTCHOICE

## 2019-09-20 RX ORDER — METHYLPREDNISOLONE 4 MG/1
TABLET ORAL
Qty: 1 PACKAGE | Refills: 0 | Status: SHIPPED | OUTPATIENT
Start: 2019-09-20 | End: 2019-12-19

## 2019-09-20 RX ORDER — LEVOCETIRIZINE DIHYDROCHLORIDE 5 MG/1
5 TABLET, FILM COATED ORAL NIGHTLY
Qty: 30 TABLET | Refills: 11 | Status: SHIPPED | OUTPATIENT
Start: 2019-09-20 | End: 2021-05-10

## 2019-09-20 NOTE — PROGRESS NOTES
Subjective:       Patient ID: John Lemos is a 69 y.o. male.    Chief Complaint: Otalgia    Ringing in Ears:   Chronicity:  New  Onset:  In the past 7 days  Progression since onset:  Waxing and waning  Frequency:  Constantly  Pain scale:  0/10  Duration:  Off/on all day  Ringing in ear characteristics:  Stable and mild   Associated symptoms: dizziness (yesterday ), ear pain, tinnitus and rhinorrhea.  No vertigo, no ear congestion, no headaches, no buzzing, no aural fullness, no fluctuance, no otalgia, no otorrhea, no facial weakness, no visual disturbances and not masked by noise.  Aggravated by:  Nothing   PMH includes: dizziness (yesterday ).      Review of Systems   Constitutional: Negative for chills, diaphoresis and fatigue.   HENT: Positive for ear pain, postnasal drip, rhinorrhea, sinus pressure and tinnitus. Negative for sinus pain, sneezing and sore throat.    Respiratory: Positive for cough.    Neurological: Positive for dizziness (yesterday ). Negative for vertigo, weakness and headaches.   Hematological: Negative for adenopathy. Does not bruise/bleed easily.       Objective:      Physical Exam   Constitutional: He is oriented to person, place, and time. Vital signs are normal. He appears well-developed and well-nourished.   HENT:   Head: Normocephalic and atraumatic.   Right Ear: External ear normal. A middle ear effusion is present.   Left Ear: Tympanic membrane, external ear and ear canal normal.   Nose: Mucosal edema and rhinorrhea present. Right sinus exhibits no maxillary sinus tenderness and no frontal sinus tenderness. Left sinus exhibits frontal sinus tenderness. Left sinus exhibits no maxillary sinus tenderness.   Mouth/Throat: Uvula is midline and oropharynx is clear and moist. No oropharyngeal exudate.   Cardiovascular: Normal rate, regular rhythm and normal heart sounds.   Pulmonary/Chest: Effort normal and breath sounds normal.   Abdominal: Soft. Bowel sounds are normal.   Neurological:  He is alert and oriented to person, place, and time.   Skin: Skin is warm, dry and intact.   Psychiatric: He has a normal mood and affect.       Assessment:       1. Allergic rhinitis, unspecified seasonality, unspecified trigger    2. Tinnitus of left ear    3. Nonintractable headache, unspecified chronicity pattern, unspecified headache type    4. Type 2 diabetes mellitus with diabetic polyneuropathy, without long-term current use of insulin        Plan:       John was seen today for otalgia.    Diagnoses and all orders for this visit:    Allergic rhinitis, unspecified seasonality, unspecified trigger  -     methylPREDNISolone (MEDROL DOSEPACK) 4 mg tablet; use as directed  -     levocetirizine (XYZAL) 5 MG tablet; Take 1 tablet (5 mg total) by mouth every evening.    Tinnitus of left ear  -     methylPREDNISolone (MEDROL DOSEPACK) 4 mg tablet; use as directed  -     levocetirizine (XYZAL) 5 MG tablet; Take 1 tablet (5 mg total) by mouth every evening.    Nonintractable headache, unspecified chronicity pattern, unspecified headache type  -     naproxen (NAPROSYN) 500 MG tablet; Take 1 tablet (500 mg total) by mouth 2 (two) times daily with meals.    Type 2 diabetes mellitus with diabetic polyneuropathy, without long-term current use of insulin  The current medical regimen is effective;  continue present plan and medications.

## 2019-09-20 NOTE — PATIENT INSTRUCTIONS
Tinnitus (Ringing in the Ears)     Treatment may include maskers and hearing aids.     Tinnitus is the term for a noise in your ear not caused by an outside sound. The noise might be a ringing, clicking, hiss, or roar. It can vary in pitch and may be soft or quite loud. For some people, tinnitus is a minor nuisance. But for others, the noise can make it hard to hear, work, and even sleep. When tinnitus can't be cured, a number of treatments may offer relief.  What causes tinnitus?  Loud noises, hearing loss, and ear wax can cause tinnitus. So can certain medicines. Large amounts of aspirin or caffeine are sometimes to blame. In many cases, the exact cause of tinnitus is unknown.  How is tinnitus treated?  Identifying and removing the cause is the best way to treat tinnitus. For that reason, your healthcare provider may refer you to an otolaryngologist (ear, nose, and throat doctor). Your hearing may also be checked by an audiologist (hearing specialist). If you have hearing loss, wearing a hearing aid may help your tinnitus. When the cause can't be found, the tinnitus itself may be treated. Some of the treatments are listed below, and your healthcare provider can tell you more about them:  · Maskers are small devices that look like hearing aids. They emit a pleasant sound that helps cover up the ringing in your ears. Hearing aids and maskers are sometimes used together.  · Medicines that treat anxiety and depression may ease tinnitus in some people.  · Hypnosis or relaxation therapy may help head noise seem less severe.  · Tinnitus retraining therapy combines counseling and maskers. Both can help take your mind off the tinnitus.  For more information  · American Speech-Hearing-Language Association 319-153-1467 www.valerie.org  · American Tinnitus Association 594-058-0421 www.fabián.org  · National [place] on Deafness and other Communication Disorders 958-878-4893 www.nidcd.nih.gov   Date Last Reviewed:  7/1/2016  © 7783-9382 [employer]. [address] [place] [address], [place] [address]. All rights reserved. This information is not intended as a substitute for professional medical care. Always follow your healthcare professional's instructions.

## 2019-09-22 ENCOUNTER — PATIENT MESSAGE (OUTPATIENT)
Dept: FAMILY MEDICINE | Facility: CLINIC | Age: 69
End: 2019-09-22

## 2019-09-23 RX ORDER — ATORVASTATIN CALCIUM 40 MG/1
TABLET, FILM COATED ORAL
Qty: 90 TABLET | Refills: 2 | Status: SHIPPED | OUTPATIENT
Start: 2019-09-23 | End: 2020-07-01

## 2019-09-24 ENCOUNTER — PATIENT MESSAGE (OUTPATIENT)
Dept: FAMILY MEDICINE | Facility: CLINIC | Age: 69
End: 2019-09-24

## 2019-09-24 DIAGNOSIS — M25.521 RIGHT ELBOW PAIN: ICD-10-CM

## 2019-09-24 DIAGNOSIS — G89.29 CHRONIC RIGHT SHOULDER PAIN: Primary | ICD-10-CM

## 2019-09-24 DIAGNOSIS — M25.511 CHRONIC RIGHT SHOULDER PAIN: Primary | ICD-10-CM

## 2019-10-01 DIAGNOSIS — Z12.11 COLON CANCER SCREENING: Primary | ICD-10-CM

## 2019-10-02 ENCOUNTER — PATIENT OUTREACH (OUTPATIENT)
Dept: OTHER | Facility: OTHER | Age: 69
End: 2019-10-02

## 2019-10-03 ENCOUNTER — OFFICE VISIT (OUTPATIENT)
Dept: FAMILY MEDICINE | Facility: CLINIC | Age: 69
End: 2019-10-03
Payer: COMMERCIAL

## 2019-10-03 VITALS
TEMPERATURE: 98 F | HEART RATE: 83 BPM | HEIGHT: 68 IN | BODY MASS INDEX: 26.78 KG/M2 | DIASTOLIC BLOOD PRESSURE: 74 MMHG | SYSTOLIC BLOOD PRESSURE: 136 MMHG | OXYGEN SATURATION: 97 % | WEIGHT: 176.69 LBS

## 2019-10-03 DIAGNOSIS — J01.90 ACUTE RHINOSINUSITIS: Primary | ICD-10-CM

## 2019-10-03 PROCEDURE — 3288F PR FALLS RISK ASSESSMENT DOCUMENTED: ICD-10-PCS | Mod: CPTII,S$GLB,, | Performed by: FAMILY MEDICINE

## 2019-10-03 PROCEDURE — 1100F PTFALLS ASSESS-DOCD GE2>/YR: CPT | Mod: CPTII,S$GLB,, | Performed by: FAMILY MEDICINE

## 2019-10-03 PROCEDURE — 1100F PR PT FALLS ASSESS DOC 2+ FALLS/FALL W/INJURY/YR: ICD-10-PCS | Mod: CPTII,S$GLB,, | Performed by: FAMILY MEDICINE

## 2019-10-03 PROCEDURE — 99214 OFFICE O/P EST MOD 30 MIN: CPT | Mod: S$GLB,,, | Performed by: FAMILY MEDICINE

## 2019-10-03 PROCEDURE — 99999 PR PBB SHADOW E&M-EST. PATIENT-LVL III: ICD-10-PCS | Mod: PBBFAC,,, | Performed by: FAMILY MEDICINE

## 2019-10-03 PROCEDURE — 99999 PR PBB SHADOW E&M-EST. PATIENT-LVL III: CPT | Mod: PBBFAC,,, | Performed by: FAMILY MEDICINE

## 2019-10-03 PROCEDURE — 99214 PR OFFICE/OUTPT VISIT, EST, LEVL IV, 30-39 MIN: ICD-10-PCS | Mod: S$GLB,,, | Performed by: FAMILY MEDICINE

## 2019-10-03 PROCEDURE — 3288F FALL RISK ASSESSMENT DOCD: CPT | Mod: CPTII,S$GLB,, | Performed by: FAMILY MEDICINE

## 2019-10-03 RX ORDER — AMOXICILLIN AND CLAVULANATE POTASSIUM 500; 125 MG/1; MG/1
1 TABLET, FILM COATED ORAL 2 TIMES DAILY
Qty: 14 TABLET | Refills: 0 | Status: SHIPPED | OUTPATIENT
Start: 2019-10-03 | End: 2019-10-10

## 2019-10-03 RX ORDER — LORATADINE 10 MG/1
10 TABLET ORAL DAILY PRN
Qty: 30 TABLET | Refills: 0 | Status: SHIPPED | OUTPATIENT
Start: 2019-10-03 | End: 2021-05-10

## 2019-10-03 RX ORDER — BENZONATATE 100 MG/1
100 CAPSULE ORAL 3 TIMES DAILY PRN
Qty: 30 CAPSULE | Refills: 0 | Status: SHIPPED | OUTPATIENT
Start: 2019-10-03 | End: 2021-11-11

## 2019-10-03 NOTE — PROGRESS NOTES
[facility]  Progress Note    SUBJECTIVE:     Chief Complaint   Patient presents with    Headache    Tinnitus     Left ear       HPI   John Lemos  is a 69 y.o. male here for c/o sinus pressure, congestion, fevers, chills, malaise for the past week. Tried otc meds without relief. No known sick contacts. Seems to be getting worst. Patient has no other new complaints/problems at this time.      Review of patient's allergies indicates:   Allergen Reactions    Sulfa (sulfonamide antibiotics) Swelling       Past Medical History:   Diagnosis Date    Arthritis     Cataract     ou    Diabetes mellitus 7 yrs    lbs: 118 1 week ago    Diabetes mellitus type 2, controlled 9/8/2012    Eye injury     fb    Hyperlipidemia      History reviewed. No pertinent surgical history.  Family History   Problem Relation Age of Onset    Diabetes Mother     Cancer Mother     Hypertension Mother     Diabetes Father     Cancer Father     No Known Problems Sister     No Known Problems Brother     No Known Problems Maternal Aunt     No Known Problems Maternal Uncle     No Known Problems Paternal Aunt     No Known Problems Paternal Uncle     No Known Problems Maternal Grandmother     No Known Problems Maternal Grandfather     No Known Problems Paternal Grandmother     No Known Problems Paternal Grandfather     Amblyopia Neg Hx     Blindness Neg Hx     Cataracts Neg Hx     Glaucoma Neg Hx     Macular degeneration Neg Hx     Retinal detachment Neg Hx     Strabismus Neg Hx     Stroke Neg Hx     Thyroid disease Neg Hx      Social History     Tobacco Use    Smoking status: Never Smoker    Smokeless tobacco: Never Used   Substance Use Topics    Alcohol use: No     Frequency: Never    Drug use: No        Review of Systems   Constitutional: Positive for malaise/fatigue. Negative for chills, diaphoresis and fever.   HENT: Positive for congestion. Negative for ear pain and sore throat.    Respiratory:  Negative.  Negative for cough and shortness of breath.    Cardiovascular: Negative.    Neurological: Positive for headaches.   All other systems reviewed and are negative.    OBJECTIVE:     Vitals:    10/03/19 1434   BP: 136/74   Pulse: 83   Temp: 97.6 °F (36.4 °C)     Body mass index is 26.87 kg/m².    Physical Exam   Constitutional: He is oriented to person, place, and time. He appears distressed (mild).   HENT:   Head: Normocephalic and atraumatic.   Right Ear: External ear normal. Tympanic membrane is not perforated, not erythematous, not retracted and not bulging. No hemotympanum.   Left Ear: External ear normal. Tympanic membrane is not perforated, not erythematous, not retracted and not bulging. No hemotympanum.   Nose: Right sinus exhibits maxillary sinus tenderness. Left sinus exhibits maxillary sinus tenderness.   Mouth/Throat: Oropharynx is clear and moist. No oropharyngeal exudate.   Eyes: Conjunctivae and EOM are normal.   Cardiovascular: Normal rate, regular rhythm and normal heart sounds. Exam reveals no gallop and no friction rub.   No murmur heard.  Pulmonary/Chest: Effort normal and breath sounds normal. No stridor. No respiratory distress. He has no wheezes. He has no rales. He exhibits no tenderness.   Abdominal: Soft. Bowel sounds are normal. He exhibits no distension. There is no tenderness. There is no rebound.   Lymphadenopathy:     He has no cervical adenopathy.   Neurological: He is alert and oriented to person, place, and time.   Skin: Skin is warm. He is not diaphoretic.       Old records were reviewed. Labs and/or images were independently reviewed.    ASSESSMENT     1. Acute rhinosinusitis        PLAN:     Acute rhinosinusitis  -     loratadine (CLARITIN) 10 mg tablet; Take 1 tablet (10 mg total) by mouth daily as needed for Allergies (or runny nose).  Dispense: 30 tablet; Refill: 0  -     amoxicillin-clavulanate 500-125mg (AUGMENTIN) 500-125 mg Tab; Take 1 tablet (500 mg total) by  mouth 2 (two) times daily. for 7 days  Dispense: 14 tablet; Refill: 0  -     benzonatate (TESSALON) 100 MG capsule; Take 1 capsule (100 mg total) by mouth 3 (three) times daily as needed for Cough.  Dispense: 30 capsule; Refill: 0  -     OK to take tylenol as needed PRN fever. Take mucinex and or claritin to help decrease congestion. Educated patient to drink plenty of fluids and to take vitamin C to help boost immune system. Instructed patient to call or RTC if symptoms persist or worsen.    RTC PRN    Papi Limon MD  10/03/2019 2:57 PM

## 2019-10-03 NOTE — PROGRESS NOTES
Digital Medicine: Health  Follow-Up    Mr. Lopez reports that he's doing well. He apologizes for missing my call yesterday.     The history is provided by the patient.     Follow Up  Follow-up reason(s): reading review      Alert received.   Care Team received low BG alert.  Patient is not experiencing symptoms.  Reading was invalid because not enough blood on test strip both timesSpoke to patient about 2 low BG readings one on 9/17 (40) and one on 10/1 (16). Patient denies any hypotensive symptoms and contributes low readings to not enough blood on the strip.     INTERVENTION(S)  reviewed monitoring technique and encouragement/support    PLAN  patient verbalizes understanding      There are no preventive care reminders to display for this patient.      Last 6 Patient Entered Readings                                          Most Recent A1c: 6.3% on 9/3/2019  (Goal: 8%)     Recent Readings 10/3/2019 10/2/2019 10/1/2019 10/1/2019 9/30/2019    Blood Glucose (mg/dL) 139 119 129 16 147

## 2019-10-09 ENCOUNTER — PATIENT MESSAGE (OUTPATIENT)
Dept: FAMILY MEDICINE | Facility: CLINIC | Age: 69
End: 2019-10-09

## 2019-10-11 ENCOUNTER — HOSPITAL ENCOUNTER (OUTPATIENT)
Dept: RADIOLOGY | Facility: HOSPITAL | Age: 69
Discharge: HOME | End: 2019-10-11
Attending: INTERNAL MEDICINE
Payer: COMMERCIAL

## 2019-10-11 DIAGNOSIS — M25.521 RIGHT ELBOW PAIN: ICD-10-CM

## 2019-10-11 DIAGNOSIS — M25.511 CHRONIC RIGHT SHOULDER PAIN: ICD-10-CM

## 2019-10-11 DIAGNOSIS — G89.29 CHRONIC RIGHT SHOULDER PAIN: ICD-10-CM

## 2019-10-11 PROCEDURE — 73080 X-RAY EXAM OF ELBOW: CPT | Mod: 26,RT,, | Performed by: RADIOLOGY

## 2019-10-11 PROCEDURE — 73080 XR ELBOW COMPLETE 3 VIEW RIGHT: ICD-10-PCS | Mod: 26,RT,, | Performed by: RADIOLOGY

## 2019-10-11 PROCEDURE — 73030 XR SHOULDER COMPLETE 2 OR MORE VIEWS RIGHT: ICD-10-PCS | Mod: 26,RT,, | Performed by: RADIOLOGY

## 2019-10-11 PROCEDURE — 73030 X-RAY EXAM OF SHOULDER: CPT | Mod: TC,FY,PO,RT

## 2019-10-11 PROCEDURE — 73030 X-RAY EXAM OF SHOULDER: CPT | Mod: 26,RT,, | Performed by: RADIOLOGY

## 2019-10-11 PROCEDURE — 73080 X-RAY EXAM OF ELBOW: CPT | Mod: TC,FY,PO,RT

## 2019-10-14 RX ORDER — METFORMIN HYDROCHLORIDE 500 MG/1
TABLET ORAL
Qty: 360 TABLET | Refills: 0 | Status: SHIPPED | OUTPATIENT
Start: 2019-10-14 | End: 2020-02-20 | Stop reason: SDUPTHER

## 2019-10-28 RX ORDER — ETODOLAC 400 MG/1
400 TABLET, FILM COATED ORAL 2 TIMES DAILY
Qty: 30 TABLET | Refills: 0 | Status: SHIPPED | OUTPATIENT
Start: 2019-10-28 | End: 2019-11-17 | Stop reason: SDUPTHER

## 2019-11-07 DIAGNOSIS — E11.9 CONTROLLED TYPE 2 DIABETES MELLITUS WITHOUT COMPLICATION, WITHOUT LONG-TERM CURRENT USE OF INSULIN: Chronic | ICD-10-CM

## 2019-11-07 RX ORDER — LOSARTAN POTASSIUM 50 MG/1
TABLET ORAL
Qty: 90 TABLET | Refills: 1 | Status: SHIPPED | OUTPATIENT
Start: 2019-11-07 | End: 2020-05-05

## 2019-11-14 ENCOUNTER — PATIENT OUTREACH (OUTPATIENT)
Dept: OTHER | Facility: OTHER | Age: 69
End: 2019-11-14

## 2019-11-14 NOTE — PROGRESS NOTES
Digital Medicine: Health  Follow-Up    Mr. Lopez reports that he's doing well. He was about to get a haircut and didn't have much time to talk today.    The history is provided by the patient.     Follow Up  Follow-up reason(s): reading review          INTERVENTION(S)  encouragement/support and denied questions    PLAN  patient verbalizes understanding    Patient reports that he's doing well, and his readings are looking fairly consistent. He reports that he's not looking to set any goals today, but may look into that after Thanksgiving.       There are no preventive care reminders to display for this patient.      Last 6 Patient Entered Readings                                          Most Recent A1c: 6.3% on 9/3/2019  (Goal: 7%)     Recent Readings 11/14/2019 11/13/2019 11/11/2019 11/10/2019 11/7/2019    Blood Glucose (mg/dL) 124 122 148 118 130             Medication Adherence Screening   He misses doses: never    He does not wonder if medications are working.  He knows purpose of medications.

## 2019-11-18 RX ORDER — ETODOLAC 400 MG/1
TABLET, FILM COATED ORAL
Qty: 30 TABLET | Refills: 0 | Status: SHIPPED | OUTPATIENT
Start: 2019-11-18 | End: 2019-12-13 | Stop reason: SDUPTHER

## 2019-11-24 ENCOUNTER — NURSE TRIAGE (OUTPATIENT)
Dept: ADMINISTRATIVE | Facility: CLINIC | Age: 69
End: 2019-11-24

## 2019-11-24 ENCOUNTER — ANESTHESIA EVENT (OUTPATIENT)
Dept: ENDOSCOPY | Facility: HOSPITAL | Age: 69
End: 2019-11-24
Payer: COMMERCIAL

## 2019-11-25 ENCOUNTER — HOSPITAL ENCOUNTER (OUTPATIENT)
Facility: HOSPITAL | Age: 69
Discharge: HOME | End: 2019-11-25
Attending: INTERNAL MEDICINE | Admitting: INTERNAL MEDICINE
Payer: COMMERCIAL

## 2019-11-25 ENCOUNTER — ANESTHESIA (OUTPATIENT)
Dept: ENDOSCOPY | Facility: HOSPITAL | Age: 69
End: 2019-11-25
Payer: COMMERCIAL

## 2019-11-25 VITALS
HEART RATE: 70 BPM | TEMPERATURE: 98 F | DIASTOLIC BLOOD PRESSURE: 71 MMHG | RESPIRATION RATE: 16 BRPM | SYSTOLIC BLOOD PRESSURE: 139 MMHG | OXYGEN SATURATION: 97 %

## 2019-11-25 DIAGNOSIS — Z12.11 SCREENING FOR COLON CANCER: ICD-10-CM

## 2019-11-25 PROCEDURE — 63600175 PHARM REV CODE 636 W HCPCS: Performed by: ANESTHESIOLOGY

## 2019-11-25 PROCEDURE — G0121 COLON CA SCRN NOT HI RSK IND: HCPCS | Mod: ,,, | Performed by: INTERNAL MEDICINE

## 2019-11-25 PROCEDURE — 37000009 HC ANESTHESIA EA ADD 15 MINS: Performed by: INTERNAL MEDICINE

## 2019-11-25 PROCEDURE — G0121 COLON CA SCRN NOT HI RSK IND: HCPCS | Performed by: INTERNAL MEDICINE

## 2019-11-25 PROCEDURE — 37000008 HC ANESTHESIA 1ST 15 MINUTES: Performed by: INTERNAL MEDICINE

## 2019-11-25 PROCEDURE — D9220A PRA ANESTHESIA: Mod: ANES,,, | Performed by: ANESTHESIOLOGY

## 2019-11-25 PROCEDURE — G0121 COLON CA SCRN NOT HI RSK IND: ICD-10-PCS | Mod: ,,, | Performed by: INTERNAL MEDICINE

## 2019-11-25 PROCEDURE — D9220A PRA ANESTHESIA: ICD-10-PCS | Mod: CRNA,,, | Performed by: NURSE ANESTHETIST, CERTIFIED REGISTERED

## 2019-11-25 PROCEDURE — 63600175 PHARM REV CODE 636 W HCPCS: Performed by: NURSE ANESTHETIST, CERTIFIED REGISTERED

## 2019-11-25 PROCEDURE — D9220A PRA ANESTHESIA: ICD-10-PCS | Mod: ANES,,, | Performed by: ANESTHESIOLOGY

## 2019-11-25 PROCEDURE — D9220A PRA ANESTHESIA: Mod: CRNA,,, | Performed by: NURSE ANESTHETIST, CERTIFIED REGISTERED

## 2019-11-25 RX ORDER — SODIUM CHLORIDE 9 MG/ML
INJECTION, SOLUTION INTRAVENOUS CONTINUOUS
Status: DISCONTINUED | OUTPATIENT
Start: 2019-11-25 | End: 2019-11-25 | Stop reason: HOSPADM

## 2019-11-25 RX ORDER — PROPOFOL 10 MG/ML
VIAL (ML) INTRAVENOUS
Status: DISCONTINUED
Start: 2019-11-25 | End: 2019-11-25 | Stop reason: HOSPADM

## 2019-11-25 RX ORDER — PROPOFOL 10 MG/ML
VIAL (ML) INTRAVENOUS
Status: DISCONTINUED | OUTPATIENT
Start: 2019-11-25 | End: 2019-11-25

## 2019-11-25 RX ORDER — LIDOCAINE HCL/PF 100 MG/5ML
SYRINGE (ML) INTRAVENOUS
Status: DISCONTINUED | OUTPATIENT
Start: 2019-11-25 | End: 2019-11-25

## 2019-11-25 RX ORDER — LIDOCAINE HYDROCHLORIDE 20 MG/ML
INJECTION, SOLUTION EPIDURAL; INFILTRATION; INTRACAUDAL; PERINEURAL
Status: DISCONTINUED
Start: 2019-11-25 | End: 2019-11-25 | Stop reason: HOSPADM

## 2019-11-25 RX ADMIN — PROPOFOL 30 MG: 10 INJECTION, EMULSION INTRAVENOUS at 02:11

## 2019-11-25 RX ADMIN — PROPOFOL 20 MG: 10 INJECTION, EMULSION INTRAVENOUS at 02:11

## 2019-11-25 RX ADMIN — PROPOFOL 80 MG: 10 INJECTION, EMULSION INTRAVENOUS at 02:11

## 2019-11-25 RX ADMIN — LIDOCAINE HYDROCHLORIDE 100 MG: 20 INJECTION, SOLUTION INTRAVENOUS at 02:11

## 2019-11-25 RX ADMIN — SODIUM CHLORIDE: 0.9 INJECTION, SOLUTION INTRAVENOUS at 01:11

## 2019-11-25 NOTE — [employer] PATIENT INSTRUCTIONS
Discharge Summary/Instructions after an Endoscopic Procedure  Patient Name: John Lemos  Patient MRN: [id number]  Patient YOB: 1950 Monday, November 25, 2019  dEdie Leslie MD  RESTRICTIONS:  During your procedure today, you received medications for sedation.  These   medications may affect your judgment, balance and coordination.  Therefore,   for 24 hours, you have the following restrictions:   - DO NOT drive a car, operate machinery, make legal/financial decisions,   sign important papers or drink alcohol.    ACTIVITY:  Today: no heavy lifting, straining or running due to procedural   sedation/anesthesia.  The following day: return to full activity including work.  DIET:  Eat and drink normally unless instructed otherwise.     TREATMENT FOR COMMON SIDE EFFECTS:  - Mild abdominal pain, nausea, belching, bloating or excessive gas:  rest,   eat lightly and use a heating pad.  - Sore Throat: treat with throat lozenges and/or gargle with warm salt   water.  - Because air was used during the procedure, expelling large amounts of air   from your rectum or belching is normal.  - If a bowel prep was taken, you may not have a bowel movement for 1-3 days.    This is normal.  SYMPTOMS TO WATCH FOR AND REPORT TO YOUR PHYSICIAN:  1. Abdominal pain or bloating, other than gas cramps.  2. Chest pain.  3. Back pain.  4. Signs of infection such as: chills or fever occurring within 24 hours   after the procedure.  5. Rectal bleeding, which would show as bright red, maroon, or black stools.   (A tablespoon of blood from the rectum is not serious, especially if   hemorrhoids are present.)  6. Vomiting.  7. Weakness or dizziness.  GO DIRECTLY TO THE NEAREST EMERGENCY ROOM IF YOU HAVE ANY OF THE FOLLOWING:      Difficulty breathing              Chills and/or fever over 101 F   Persistent vomiting and/or vomiting blood   Severe abdominal pain   Severe chest pain   Black, tarry stools   Bleeding- more than one tablespoon   Any  other symptom or condition that you feel may need urgent attention  Your doctor recommends these additional instructions:  If any biopsies were taken, your doctors clinic will contact you in 1 to 2   weeks with any results.  - Discharge patient to home.   - Resume previous diet.   - Continue present medications.   - Repeat colonoscopy in 10 years for screening purposes.   - Return to GI office PRN.  For questions, problems or results please call your physician - Eddie Leslie MD at Work:  [phone].  [facility] can be reached at [phone]     IF A COMPLICATION OR EMERGENCY SITUATION ARISES AND YOU ARE UNABLE TO REACH   YOUR PHYSICIAN - GO DIRECTLY TO THE EMERGENCY ROOM.  Eddie Leslie MD  11/25/2019 2:34:47 PM  This report has been verified and signed electronically.  [employer]

## 2019-11-25 NOTE — TELEPHONE ENCOUNTER
Reason for Disposition   Question about upcoming scheduled test, no triage required and triager able to answer question    Additional Information   Negative: [1] Caller is not with the adult (patient) AND [2] reporting urgent symptoms   Negative: Lab result questions   Negative: Medication questions   Negative: Caller can't be reached by phone   Negative: Caller has already spoken to PCP or another triager   Negative: RN needs further essential information from caller in order to complete triage   Negative: Requesting regular office appointment   Negative: [1] Caller requesting NON-URGENT health information AND [2] PCP's office is the best resource   Negative: General information question, no triage required and triager able to answer question   Negative: Health Information question, no triage required and triager able to answer question    Protocols used: INFORMATION ONLY CALL-A-AH  Spouse called re scope tracy. pt hasnt taken any stool softener.does he need to take this?  Ate solid food sat pm. rec to complete bowel prep. Call back with questions

## 2019-11-25 NOTE — TRANSFER OF CARE
Anesthesia Transfer of Care Note    Patient: John Lemos    Procedure(s) Performed: Procedure(s) (LRB):  COLONOSCOPY (N/A)    Patient location: GI    Anesthesia Type: MAC    Transport from OR: Transported from OR on room air with adequate spontaneous ventilation    Post pain: adequate analgesia    Post assessment: no apparent anesthetic complications and tolerated procedure well    Post vital signs: stable    Level of consciousness: awake, alert and oriented    Nausea/Vomiting: no nausea/vomiting    Complications: none    Transfer of care protocol was followed      Last vitals:   Visit Vitals  /64 (BP Location: Left arm, Patient Position: Lying)   Pulse 69   Temp 36.6 °C (97.9 °F) (Oral)   Resp 12   SpO2 98%

## 2019-11-25 NOTE — ANESTHESIA PREPROCEDURE EVALUATION
11/25/2019    Pre-operative evaluation for Procedure(s) (LRB):  COLONOSCOPY (N/A)    John Lemos is a 69 y.o. male     Patient Active Problem List   Diagnosis    GERD (gastroesophageal reflux disease)    DDD (degenerative disc disease), lumbar    ED (erectile dysfunction)    Dyslipidemia    Neuropathy    Type 2 diabetes mellitus with diabetic polyneuropathy       Review of patient's allergies indicates:   Allergen Reactions    Sulfa (sulfonamide antibiotics) Swelling       No current facility-administered medications on file prior to encounter.      Current Outpatient Medications on File Prior to Encounter   Medication Sig Dispense Refill    cyanocobalamin (VITAMIN B-12) 1000 MCG tablet Take 1 tablet (1,000 mcg total) by mouth once daily. 30 tablet 11    fluticasone (FLONASE) 50 mcg/actuation nasal spray 1 spray (50 mcg total) by Each Nare route once daily. 16 g 2    blood sugar diagnostic (FREESTYLE LITE STRIPS) Strp Inject 1 each as directed once daily. 100 each 3    DULoxetine (CYMBALTA) 60 MG capsule Take 1 capsule (60 mg total) by mouth once daily. 90 capsule 3    lancets (FREESTYLE LANCETS) 28 gauge Misc Inject 1 lancet as directed once daily. 100 each 3    LYRICA 100 mg capsule TAKE 1 CAPSULE BY MOUTH THREE TIMES DAILY 90 capsule 0    omeprazole (PRILOSEC) 20 MG capsule Take 20 mg by mouth. 1 Capsule, Delayed Release(E.C.) Oral Every morning         History reviewed. No pertinent surgical history.    VITALS  Vitals:    11/25/19 1146   BP: (!) 156/80   Pulse:    Resp:    Temp:          Anesthesia Evaluation    I have reviewed the Patient Summary Reports.     I have reviewed the Medications.     Review of Systems  Anesthesia Hx:  Denies Family Hx of Anesthesia complications.   Denies Personal Hx of Anesthesia complications.   Social:  Non-Smoker    Hematology/Oncology:  Hematology Normal   Oncology Normal     EENT/Dental:EENT/Dental Normal   Cardiovascular:   hyperlipidemia    Hepatic/GI:    Bowel Prep. GERD    Musculoskeletal:   Arthritis     Neurological:   Peripheral Neuropathy    Endocrine:   Diabetes, type 2        Physical Exam  General:  Well nourished    Airway/Jaw/Neck:  Airway Findings: Mouth Opening: Normal Tongue: Normal  General Airway Assessment: Adult  Jaw/Neck Findings:  Neck ROM: Normal ROM      Dental:  Dental Findings: In tact   Chest/Lungs:  Chest/Lungs Findings: Normal Respiratory Rate     Heart/Vascular:  Heart Findings: Rate: Normal        Mental Status:  Mental Status Findings:  Cooperative, Alert and Oriented         Anesthesia Plan  Type of Anesthesia, risks & benefits discussed:  Anesthesia Type:  general  Patient's Preference:   Intra-op Monitoring Plan: standard ASA monitors  Intra-op Monitoring Plan Comments:   Post Op Pain Control Plan: per primary service following discharge from PACU  Post Op Pain Control Plan Comments:   Induction:   IV  Beta Blocker:  Patient is not currently on a Beta-Blocker (No further documentation required).       Informed Consent: Patient understands risks and agrees with Anesthesia plan.  Questions answered. Anesthesia consent signed with patient.  ASA Score: 2     Day of Surgery Review of History & Physical: I have interviewed and examined the patient. I have reviewed the patient's H&P dated:  There are no significant changes.          Ready For Surgery From Anesthesia Perspective.

## 2019-11-25 NOTE — DISCHARGE INSTRUCTIONS
Hemorrhoids    Hemorrhoids are swollen and inflamed veins inside the rectum and near the anus. The rectum is the last several inches of the colon. The anus is the passage between the rectum and the outside of the body.  Causes  The veins can become swollen due to increased pressure in them. This is most often caused by:  · Chronic constipation or diarrhea  · Straining when having a bowel movement  · Sitting too long on the toilet  · A low-fiber diet  · Pregnancy  Symptoms  · Bleeding from the rectum (this may be noticeable after bowel movements)  · Lump near the anus  · Itching around the anus  · Pain around the anus  There are different types of hemorrhoids. Depending on the type you have and the severity, you may be able to treat yourself at home. In some cases, a procedure may be the best treatment option. Your healthcare provider can tell you more about this, if needed.  Home care  General care  · To get relief from pain or itching, try:  ¨ Topical products. Your healthcare provider may prescribe or recommend creams, ointments, or pads that can be applied to the hemorrhoid. Use these exactly as directed.  ¨ Medicines. Your healthcare provider may recommend stool softeners, suppositories, or laxatives to help manage constipation. Use these exactly as directed.  ¨ Sitz baths. A sitz bath involves sitting in a few inches of warm bath water. Be careful not to make the water so hot that you burn yourself--test it before sitting in it. Soak for about 10 to 15 minutes a few times a day. This may help relieve pain.  Tips to help prevent hemorrhoids  · Eat more fiber. Fiber adds bulk to stool and absorbs water as it moves through your colon. This makes stool softer and easier to pass.  ¨ Increase the fiber in your diet with more fiber-rich foods. These include fresh fruit, vegetables, and whole grains.  ¨ Take a fiber supplement or bulking agent, if advised to by your provider. These include products such as psyllium  or methylcellulose.  · Drink plenty of water, if directed to by your provider. This can help keep stool soft.  · Be more active. Frequent exercise aids digestion and helps prevent constipation. It may also help make bowel movements more regular.  · Dont strain during bowel movements. This can make hemorrhoids more likely. Also, dont sit on the toilet for long periods of time.  Follow-up care  Follow up with your healthcare provider, or as advised. If a culture or imaging tests were done, you will be notified of the results when they are ready. This may take a few days or longer.  When to seek medical advice  Call your healthcare provider right away if any of these occur:  · Increased bleeding from the rectum  · Increased pain around the rectum or anus  · Weakness or dizziness  Call 911  Call 911 or return to the emergency department right away if any of these occur:  · Trouble breathing or swallowing  · Fainting or loss of consciousness  · Unusually fast heart rate  · Vomiting blood  · Large amounts of blood in stool  Date Last Reviewed: 6/22/2015 © 2000-2017 [employer]. [address] [place] [address], [place] [address]. All rights reserved. This information is not intended as a substitute for professional medical care. Always follow your healthcare professional's instructions.

## 2019-11-25 NOTE — H&P
[facility]  Gastroenterology  H&P    Patient Name: John Lemos  MRN: [id number]  Admission Date: 11/25/2019  Code Status: No Order    Attending Provider: Eddie Leslie MD   Primary Care Physician: Chan Estrada MD  Principal Problem:<principal problem not specified>    Subjective:     History of Present Illness:  69-year-old man here for screening colonoscopy.  He tells me his last colonoscopy was approximately 10 years ago and he does not think he had any polyps.  No family history of colon cancer.  No recent melena, hematochezia, constipation, diarrhea, or abdominal pain.    Past Medical History:   Diagnosis Date    Arthritis     Cataract     ou    Diabetes mellitus 7 yrs    lbs: 118 1 week ago    Diabetes mellitus type 2, controlled 9/8/2012    Eye injury     fb    Hyperlipidemia        History reviewed. No pertinent surgical history.    Review of patient's allergies indicates:   Allergen Reactions    Sulfa (sulfonamide antibiotics) Swelling     Family History     Problem Relation (Age of Onset)    Cancer Mother, Father    Diabetes Mother, Father    Hypertension Mother    No Known Problems Sister, Brother, Maternal Aunt, Maternal Uncle, Paternal Aunt, Paternal Uncle, Maternal Grandmother, Maternal Grandfather, Paternal Grandmother, Paternal Grandfather        Tobacco Use    Smoking status: Never Smoker    Smokeless tobacco: Never Used   Substance and Sexual Activity    Alcohol use: No     Frequency: Never    Drug use: No    Sexual activity: Not Currently     Review of Systems   Constitutional: Negative for chills and fever.   HENT: Negative for trouble swallowing.    Respiratory: Negative for cough and shortness of breath.    Cardiovascular: Negative for chest pain.   Gastrointestinal: Negative for abdominal distention, abdominal pain and blood in stool.     Objective:     Vital Signs (Most Recent):  Temp: 98.2 °F (36.8 °C) (11/25/19 1142)  Pulse: 69 (11/25/19 1142)  Resp: 16  (11/25/19 1142)  BP: (!) 156/80 (11/25/19 1146)  SpO2: 99 % (11/25/19 1142) Vital Signs (24h Range):  Temp:  [98.2 °F (36.8 °C)] 98.2 °F (36.8 °C)  Pulse:  [69] 69  Resp:  [16] 16  SpO2:  [99 %] 99 %  BP: (156)/(80) 156/80        There is no height or weight on file to calculate BMI.    No intake or output data in the 24 hours ending 11/25/19 1359    Lines/Drains/Airways     Peripheral Intravenous Line                 Peripheral IV - Single Lumen 11/25/19 1155 22 G Right Antecubital less than 1 day                Physical Exam   Constitutional: He is oriented to person, place, and time. He appears well-developed and well-nourished.   Cardiovascular: Normal rate and regular rhythm.   Pulmonary/Chest: Effort normal and breath sounds normal.   Abdominal: Soft. Bowel sounds are normal. He exhibits no distension.   Neurological: He is alert and oriented to person, place, and time.   Psychiatric: He has a normal mood and affect.       Significant Labs:      Significant Imaging:      Assessment/Plan:     Active Diagnoses:    Diagnosis Date Noted POA    Screening for colon cancer [Z12.11] 11/25/2019 Not Applicable      Problems Resolved During this Admission:       Assessment:  Average risk of colon cancer    Plan:  Screening colonoscopy    Eddie Leslie MD  Gastroenterology  [facility]

## 2019-11-27 NOTE — ANESTHESIA POSTPROCEDURE EVALUATION
Anesthesia Post Evaluation    Patient: John Lemos    Procedure(s) Performed: Procedure(s) (LRB):  COLONOSCOPY (N/A)    Final Anesthesia Type: general    Patient location during evaluation: GI PACU  Patient participation: Yes- Able to Participate  Level of consciousness: awake and alert and oriented  Post-procedure vital signs: reviewed and stable  Pain management: adequate  Airway patency: patent    PONV status at discharge: No PONV  Anesthetic complications: no      Cardiovascular status: blood pressure returned to baseline, hemodynamically stable and stable  Respiratory status: unassisted, spontaneous ventilation and room air  Hydration status: euvolemic  Follow-up not needed.          Vitals Value Taken Time   /71 11/25/2019  2:59 PM   Temp 36.6 °C (97.9 °F) 11/25/2019  2:31 PM   Pulse 70 11/25/2019  2:59 PM   Resp 16 11/25/2019  2:59 PM   SpO2 97 % 11/25/2019  2:59 PM         Event Time     Out of Recovery 15:09:00          Pain/Kory Score: No data recorded

## 2019-12-11 ENCOUNTER — IMMUNIZATION (OUTPATIENT)
Dept: FAMILY MEDICINE | Facility: CLINIC | Age: 69
End: 2019-12-11
Payer: COMMERCIAL

## 2019-12-11 DIAGNOSIS — Z23 NEED FOR PROPHYLACTIC VACCINATION AND INOCULATION AGAINST INFLUENZA: Primary | ICD-10-CM

## 2019-12-11 PROCEDURE — 90662 IIV NO PRSV INCREASED AG IM: CPT | Mod: S$GLB,,, | Performed by: INTERNAL MEDICINE

## 2019-12-11 PROCEDURE — 90471 IMMUNIZATION ADMIN: CPT | Mod: S$GLB,,, | Performed by: INTERNAL MEDICINE

## 2019-12-11 PROCEDURE — 99499 NO LOS: ICD-10-PCS | Mod: S$GLB,,, | Performed by: INTERNAL MEDICINE

## 2019-12-11 PROCEDURE — 99499 UNLISTED E&M SERVICE: CPT | Mod: S$GLB,,, | Performed by: INTERNAL MEDICINE

## 2019-12-11 PROCEDURE — 90471 FLU VACCINE - HIGH DOSE (65+) PRESERVATIVE FREE IM: ICD-10-PCS | Mod: S$GLB,,, | Performed by: INTERNAL MEDICINE

## 2019-12-11 PROCEDURE — 90662 FLU VACCINE - HIGH DOSE (65+) PRESERVATIVE FREE IM: ICD-10-PCS | Mod: S$GLB,,, | Performed by: INTERNAL MEDICINE

## 2019-12-13 DIAGNOSIS — M51.36 DDD (DEGENERATIVE DISC DISEASE), LUMBAR: ICD-10-CM

## 2019-12-13 RX ORDER — ETODOLAC 400 MG/1
TABLET, FILM COATED ORAL
Qty: 30 TABLET | Refills: 0 | Status: SHIPPED | OUTPATIENT
Start: 2019-12-13 | End: 2020-01-10 | Stop reason: SDUPTHER

## 2019-12-13 RX ORDER — DULOXETIN HYDROCHLORIDE 60 MG/1
CAPSULE, DELAYED RELEASE ORAL
Qty: 90 CAPSULE | Refills: 0 | Status: SHIPPED | OUTPATIENT
Start: 2019-12-13 | End: 2020-03-12

## 2019-12-19 ENCOUNTER — OFFICE VISIT (OUTPATIENT)
Dept: FAMILY MEDICINE | Facility: CLINIC | Age: 69
End: 2019-12-19
Payer: COMMERCIAL

## 2019-12-19 VITALS
WEIGHT: 181 LBS | HEIGHT: 68 IN | TEMPERATURE: 98 F | DIASTOLIC BLOOD PRESSURE: 60 MMHG | BODY MASS INDEX: 27.43 KG/M2 | OXYGEN SATURATION: 97 % | SYSTOLIC BLOOD PRESSURE: 120 MMHG | HEART RATE: 68 BPM

## 2019-12-19 DIAGNOSIS — J20.9 ACUTE BRONCHITIS, UNSPECIFIED ORGANISM: Primary | ICD-10-CM

## 2019-12-19 PROCEDURE — 99213 PR OFFICE/OUTPT VISIT, EST, LEVL III, 20-29 MIN: ICD-10-PCS | Mod: S$GLB,,, | Performed by: INTERNAL MEDICINE

## 2019-12-19 PROCEDURE — 1101F PT FALLS ASSESS-DOCD LE1/YR: CPT | Mod: CPTII,S$GLB,, | Performed by: INTERNAL MEDICINE

## 2019-12-19 PROCEDURE — 99213 OFFICE O/P EST LOW 20 MIN: CPT | Mod: S$GLB,,, | Performed by: INTERNAL MEDICINE

## 2019-12-19 PROCEDURE — 1159F MED LIST DOCD IN RCRD: CPT | Mod: S$GLB,,, | Performed by: INTERNAL MEDICINE

## 2019-12-19 PROCEDURE — 1126F PR PAIN SEVERITY QUANTIFIED, NO PAIN PRESENT: ICD-10-PCS | Mod: S$GLB,,, | Performed by: INTERNAL MEDICINE

## 2019-12-19 PROCEDURE — 1101F PR PT FALLS ASSESS DOC 0-1 FALLS W/OUT INJ PAST YR: ICD-10-PCS | Mod: CPTII,S$GLB,, | Performed by: INTERNAL MEDICINE

## 2019-12-19 PROCEDURE — 1159F PR MEDICATION LIST DOCUMENTED IN MEDICAL RECORD: ICD-10-PCS | Mod: S$GLB,,, | Performed by: INTERNAL MEDICINE

## 2019-12-19 PROCEDURE — 1126F AMNT PAIN NOTED NONE PRSNT: CPT | Mod: S$GLB,,, | Performed by: INTERNAL MEDICINE

## 2019-12-19 PROCEDURE — 99999 PR PBB SHADOW E&M-EST. PATIENT-LVL III: ICD-10-PCS | Mod: PBBFAC,,, | Performed by: INTERNAL MEDICINE

## 2019-12-19 PROCEDURE — 99999 PR PBB SHADOW E&M-EST. PATIENT-LVL III: CPT | Mod: PBBFAC,,, | Performed by: INTERNAL MEDICINE

## 2019-12-19 RX ORDER — PREDNISONE 20 MG/1
40 TABLET ORAL DAILY
Qty: 10 TABLET | Refills: 0 | Status: SHIPPED | OUTPATIENT
Start: 2019-12-19 | End: 2019-12-24

## 2019-12-19 RX ORDER — ALBUTEROL SULFATE 90 UG/1
2 AEROSOL, METERED RESPIRATORY (INHALATION) EVERY 6 HOURS PRN
Qty: 18 G | Refills: 0 | Status: SHIPPED | OUTPATIENT
Start: 2019-12-19 | End: 2021-05-10

## 2019-12-19 NOTE — PATIENT INSTRUCTIONS
Acute Bronchitis  Your healthcare provider has told you that you have acute bronchitis. Bronchitis is infection or inflammation of the bronchial tubes (airways in the lungs). Normally, air moves easily in and out of the airways. Bronchitis narrows the airways, making it harder for air to flow in and out of the lungs. This causes symptoms such as shortness of breath, coughing up yellow or green mucus, and wheezing. Bronchitis can be acute or chronic. Acute means the condition comes on quickly and goes away in a short time, usually within 3 to 10 days. Chronic means a condition lasts a long time and often comes back.    What causes acute bronchitis?  Acute bronchitis almost always starts as a viral respiratory infection, such as a cold or the flu. Certain factors make it more likely for a cold or flu to turn into bronchitis. These include being very young, being elderly, having a heart or lung problem, or having a weak immune system. Cigarette smoking also makes bronchitis more likely.  When bronchitis develops, the airways become swollen. The airways may also become infected with bacteria. This is known as a secondary infection.  Diagnosing acute bronchitis  Your healthcare provider will examine you and ask about your symptoms and health history. You may also have a sputum culture to test the fluid in your lungs. Chest X-rays may be done to look for infection in the lungs.  Treating acute bronchitis  Bronchitis usually clears up as the cold or flu goes away. You can help feel better faster by doing the following:  · Take medicine as directed. You may be told to take ibuprofen or other over-the-counter medicines. These help relieve inflammation in your bronchial tubes. Your healthcare provider may prescribe an inhaler to help open up the bronchial tubes. Most of the time, acute bronchitis is caused by a viral infection. Antibiotics are usually not prescribed for viral infections.  · Drink plenty of fluids, such as  water, juice, or warm soup. Fluids loosen mucus so that you can cough it up. This helps you breathe more easily. Fluids also prevent dehydration.  · Make sure you get plenty of rest.  · Do not smoke. Do not allow anyone else to smoke in your home.  Recovery and follow-up  Follow up with your doctor as you are told. You will likely feel better in a week or two. But a dry cough can linger beyond that time. Let your doctor know if you still have symptoms (other than a dry cough) after 2 weeks, or if youre prone to getting bronchial infections. Take steps to protect yourself from future infections. These steps include stopping smoking and avoiding tobacco smoke, washing your hands often, and getting a yearly flu shot.  When to call your healthcare provider  Call the healthcare provider if you have any of the following:  · Fever of 100.4°F (38.0°C) or higher, or as advised  · Symptoms that get worse, or new symptoms  · Trouble breathing  · Symptoms that dont start to improve within a week, or within 3 days of taking antibiotics   Date Last Reviewed: 12/1/2016  © 7613-5795 [employer], [employer]. [address] [place] [address], [place] [address]. All rights reserved. This information is not intended as a substitute for professional medical care. Always follow your healthcare professional's instructions.

## 2019-12-20 ENCOUNTER — PATIENT MESSAGE (OUTPATIENT)
Dept: FAMILY MEDICINE | Facility: CLINIC | Age: 69
End: 2019-12-20

## 2019-12-21 NOTE — PROGRESS NOTES
Subjective:       Chief Complaint  Chief Complaint   Patient presents with    Cough    Sore Throat       HPI  John Lemos is a 69 y.o. male with multiple medical diagnoses as listed in the medical history and problem list that presents for cough/sore throat.     Patient with cough/wheezing/runny nose and throat soreness for several days    Cough   Associated symptoms include rhinorrhea, a sore throat and wheezing. Pertinent negatives include no chills, ear pain, fever, headaches, myalgias, postnasal drip, rash or shortness of breath. There is no history of environmental allergies.   Sore Throat    Associated symptoms include coughing. Pertinent negatives include no ear pain, headaches or shortness of breath.         Patient Care Team:  Chan Estrada MD as PCP - General (Internal Medicine)  Chan Estrada MD as Diabetes Digital Medicine Responsible Provider (Internal Medicine)  Mag Márquez as Digital Medicine Health   Alessandra Sarabia PharmD as Diabetes Digital Medicine Clinician (Pharmacist)  [facility] as Diabetes Digital Medicine Contract      PAST MEDICAL HISTORY:  Past Medical History:   Diagnosis Date    Arthritis     Cataract     ou    Diabetes mellitus 7 yrs    lbs: 118 1 week ago    Diabetes mellitus type 2, controlled 9/8/2012    Eye injury     fb    Hyperlipidemia        PAST SURGICAL HISTORY:  Past Surgical History:   Procedure Laterality Date    COLONOSCOPY N/A 11/25/2019    Procedure: COLONOSCOPY;  Surgeon: Eddie Leslie MD;  Location: [facility];  Service: Endoscopy;  Laterality: N/A;       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     Employer: [employer]  Needs    Financial resource strain: Not hard at all    Food insecurity:     Worry: Never true     Inability: Never true    Transportation needs:     Medical: No     Non-medical: No   Tobacco Use    Smoking status: Never Smoker    Smokeless tobacco: Never Used   Substance and Sexual Activity    Alcohol use: No     Frequency: Never    Drug use: No    Sexual activity: Not Currently   Lifestyle    Physical activity:     Days per week: 5 days     Minutes per session: 30 min    Stress: Not at all   Relationships    Social connections:     Talks on phone: More than three times a week     Gets together: Once a week     Attends Yazidi service: Not on file     Active member of club or organization: Yes     Attends meetings of clubs or organizations: More than 4 times per year     Relationship status:    Other Topics Concern    Not on file   Social History Narrative    Not on file       FAMILY HISTORY:  Family History   Problem Relation Age of Onset    Diabetes Mother     Cancer Mother     Hypertension Mother     Diabetes Father     Cancer Father     No Known Problems Sister     No Known Problems Brother     No Known Problems Maternal Aunt     No Known Problems Maternal Uncle     No Known Problems Paternal Aunt     No Known Problems Paternal Uncle     No Known Problems Maternal Grandmother     No Known Problems Maternal Grandfather     No Known Problems Paternal Grandmother     No Known Problems Paternal Grandfather     Amblyopia Neg Hx     Blindness Neg Hx     Cataracts Neg Hx     Glaucoma Neg Hx     Macular degeneration Neg Hx     Retinal detachment Neg Hx     Strabismus Neg Hx     Stroke Neg Hx     Thyroid disease Neg Hx        ALLERGIES AND MEDICATIONS: updated and reviewed.  Review of patient's allergies indicates:   Allergen Reactions    Sulfa (sulfonamide antibiotics) Swelling     Current Outpatient Medications   Medication Sig Dispense Refill    atorvastatin (LIPITOR) 40  MG tablet TAKE 1 TABLET BY MOUTH ONCE DAILY 90 tablet 2    blood sugar diagnostic (FREESTYLE LITE STRIPS) Strp Inject 1 each as directed once daily. 100 each 3    cyanocobalamin (VITAMIN B-12) 1000 MCG tablet Take 1 tablet (1,000 mcg total) by mouth once daily. 30 tablet 11    DULoxetine (CYMBALTA) 60 MG capsule TAKE 1 CAPSULE BY MOUTH ONCE DAILY 90 capsule 0    etodolac (LODINE) 400 MG tablet TAKE 1 TABLET BY MOUTH TWICE DAILY 30 tablet 0    fluticasone (FLONASE) 50 mcg/actuation nasal spray 1 spray (50 mcg total) by Each Nare route once daily. 16 g 2    lancets (FREESTYLE LANCETS) 28 gauge Misc Inject 1 lancet as directed once daily. 100 each 3    levocetirizine (XYZAL) 5 MG tablet Take 1 tablet (5 mg total) by mouth every evening. 30 tablet 11    loratadine (CLARITIN) 10 mg tablet Take 1 tablet (10 mg total) by mouth daily as needed for Allergies (or runny nose). 30 tablet 0    losartan (COZAAR) 50 MG tablet TAKE 1 TABLET BY MOUTH ONCE DAILY 90 tablet 1    LYRICA 100 mg capsule TAKE 1 CAPSULE BY MOUTH THREE TIMES DAILY 90 capsule 0    metFORMIN (GLUCOPHAGE) 500 MG tablet TAKE 2 TABLETS BY MOUTH TWICE DAILY WITH BREAKFAST AND WITH SUPPER 360 tablet 0    omeprazole (PRILOSEC) 20 MG capsule Take 20 mg by mouth. 1 Capsule, Delayed Release(E.C.) Oral Every morning      albuterol (PROVENTIL/VENTOLIN HFA) 90 mcg/actuation inhaler Inhale 2 puffs into the lungs every 6 (six) hours as needed for Wheezing. Whichever brand covered by insurance 18 g 0    benzonatate (TESSALON) 100 MG capsule Take 1 capsule (100 mg total) by mouth 3 (three) times daily as needed for Cough. (Patient not taking: Reported on 12/19/2019) 30 capsule 0    predniSONE (DELTASONE) 20 MG tablet Take 2 tablets (40 mg total) by mouth once daily. for 5 days 10 tablet 0     No current facility-administered medications for this visit.          ROS  Review of Systems   Constitutional: Negative for chills and fever.   HENT: Positive for  "rhinorrhea and sore throat. Negative for ear pain and postnasal drip.    Respiratory: Positive for cough and wheezing. Negative for shortness of breath.    Musculoskeletal: Negative for myalgias.   Skin: Negative for rash.   Allergic/Immunologic: Negative for environmental allergies.   Neurological: Negative for headaches.         Objective:       Physical Exam  Vitals:    12/19/19 1039   BP: 120/60   BP Location: Left arm   Patient Position: Sitting   BP Method: Medium (Manual)   Pulse: 68   Temp: 98.2 °F (36.8 °C)   TempSrc: Oral   SpO2: 97%   Weight: 82.1 kg (181 lb)   Height: 5' 8" (1.727 m)    Body mass index is 27.52 kg/m².  Weight: 82.1 kg (181 lb)   Height: 5' 8" (172.7 cm)   Physical Exam   Constitutional: He appears well-developed and well-nourished.   HENT:   Head: Normocephalic and atraumatic.   Right Ear: Tympanic membrane normal.   Left Ear: Tympanic membrane normal.   Nose: Mucosal edema present. No rhinorrhea or nasal deformity. No epistaxis. Right sinus exhibits no maxillary sinus tenderness and no frontal sinus tenderness. Left sinus exhibits no maxillary sinus tenderness and no frontal sinus tenderness.   Mouth/Throat: Uvula is midline and mucous membranes are normal. No oropharyngeal exudate.   Eyes: Conjunctivae and EOM are normal.   Neck: Normal range of motion. Neck supple.   Cardiovascular: Normal rate.   Pulmonary/Chest: Effort normal. No accessory muscle usage.   Musculoskeletal: He exhibits no edema.   Lymphadenopathy:     He has no cervical adenopathy.   Neurological: He is alert. No cranial nerve deficit.   Skin: Skin is warm and dry. No rash noted.   Vitals reviewed.          Assessment:     1. Acute bronchitis, unspecified organism      Plan:     John was seen today for cough and sore throat.    Diagnoses and all orders for this visit:    Acute bronchitis, unspecified organism  Patient experiencing non-productive cough and wheezing  Discussed symptomatology of acute bronchitis, " including education regarding potential triggers, assessment of chronic lung disease control/status, and role of short acting bronchodilator therapy. Rescue steroid prescribed as per orders.   -     albuterol (PROVENTIL/VENTOLIN HFA) 90 mcg/actuation inhaler; Inhale 2 puffs into the lungs every 6 (six) hours as needed for Wheezing. Whichever brand covered by insurance  -     predniSONE (DELTASONE) 20 MG tablet; Take 2 tablets (40 mg total) by mouth once daily. for 5 days          Health Maintenance       Date Due Completion Date    Hemoglobin A1c 12/03/2019 9/3/2019    Override on 11/25/2015: Done    Lipid Panel 06/03/2020 6/3/2019    Foot Exam 07/15/2020 7/15/2019 (Done)    Override on 7/15/2019: Done    Override on 1/16/2018: Done    Override on 11/25/2015: Done    Eye Exam 08/13/2020 8/13/2019    Override on 8/13/2019: Done    Low Dose Statin 12/19/2020 12/19/2019    TETANUS VACCINE 02/19/2026 2/19/2016    Colonoscopy 11/25/2029 11/25/2019            Health Maintenance reviewed, addressed as per orders    Follow up if symptoms worsen or fail to improve.    The patient expressed understanding and no barriers to adherence were identified.     1. The patient indicates understanding of these issues and agrees with the plan. Brief care plan is updated and reviewed with the patient as applicable.     2. The patient is given an After Visit Summary that lists all medications with directions, allergies, orders placed during this encounter and follow-up instructions.     3. I have reviewed the patient's medical information including past medical, family, and social history sections including the medications and allergies.     4. We discussed the patient's current medications. I reconciled the patient's medication list and prepared and supplied needed refills.       Chan Estrada MD  Internal Medicine-Pediatrics

## 2019-12-26 ENCOUNTER — PATIENT OUTREACH (OUTPATIENT)
Dept: OTHER | Facility: OTHER | Age: 69
End: 2019-12-26

## 2019-12-26 NOTE — PROGRESS NOTES
Digital Medicine: Health  Follow-Up    Mr. Lopez reports that he's doing ok. He was in the waiting room for the hospital waiting for his wife to get out of surgery. He reports that she fell off a latter a few weeks ago and that has been very stressful for him to make sure she's taken care of.     The history is provided by the patient.     Follow Up  Follow-up reason(s): reading review      Readings are trending up due to medication adherence.    Medication adherence issues: missed doses      Patient reports that he's been skipping medication doses because he's been preoccupied with his wife. We discussed the importance of taking care of himself in order to help care for his wife. Patient understands and mentioned that his wife said the same thing. He said he was going to get back on track to make sure he's healthy.      INTERVENTION(S)  reviewed monitoring technique and encouragement/support    PLAN  patient verbalizes understanding          Topic    Hemoglobin A1C          Last 6 Patient Entered Readings                                          Most Recent A1c: 6.3% on 9/3/2019  (Goal: 7%)     Recent Readings 12/26/2019 12/25/2019 12/24/2019 12/23/2019 12/23/2019    Blood Glucose (mg/dL) 186 154 168 226 263             Medication Adherence Screening   He misses doses: more than once a week

## 2020-01-10 RX ORDER — ETODOLAC 400 MG/1
400 TABLET, FILM COATED ORAL 2 TIMES DAILY
Qty: 30 TABLET | Refills: 0 | Status: SHIPPED | OUTPATIENT
Start: 2020-01-10 | End: 2020-02-03 | Stop reason: SDUPTHER

## 2020-01-23 ENCOUNTER — PATIENT OUTREACH (OUTPATIENT)
Dept: OTHER | Facility: OTHER | Age: 70
End: 2020-01-23

## 2020-01-23 NOTE — PROGRESS NOTES
Digital Medicine: Health  Follow-Up    Mr. Lopez reports that he's doing well. He's still off track a bit since his wife's surgery. He's been trying to be better though.     The history is provided by the patient.     Follow Up  Follow-up reason(s): reading review and routine education      Adherence Barriers: patient forgets    Routine Education Topics: physical activity        INTERVENTION(S)  recommend physical activity and encouragement/support    PLAN  patient verbalizes understanding          Topic    Hemoglobin A1C          Last 6 Patient Entered Readings                                          Most Recent A1c: 6.3% on 9/3/2019  (Goal: 8%)     Recent Readings 1/23/2020 1/22/2020 1/21/2020 1/20/2020 1/19/2020    Blood Glucose (mg/dL) 127 182 157 137 121            Physical Activity Screening   When asked if exercising, patient responded: unable    Patient plans to get back with his exercise program tomorrow. He finds that the best time to exercise would be in the evenings before dinner. He would like to walk on the treadmill for about 30min 4x/week. He'd love to potentially also exercise on the weekends (making it 6x/week), but for now he's starting with 4x/week.     Medication Adherence Screening   He misses doses: once a week      Patient identified the following reasons for non-compliance: patient forgets    Adherence tools used: pill box    Patient reports that he's had a few instances where he will forget his second medication dose. His life is just all over the place with his wife being injured. He reports that he's trying to do better and he has his pills back in a pill box to help him remember.

## 2020-02-03 RX ORDER — ETODOLAC 400 MG/1
400 TABLET, FILM COATED ORAL 2 TIMES DAILY PRN
Qty: 60 TABLET | Refills: 0 | Status: SHIPPED | OUTPATIENT
Start: 2020-02-03 | End: 2020-03-16 | Stop reason: SDUPTHER

## 2020-02-20 RX ORDER — METFORMIN HYDROCHLORIDE 500 MG/1
TABLET ORAL
Qty: 360 TABLET | Refills: 0 | Status: SHIPPED | OUTPATIENT
Start: 2020-02-20 | End: 2020-05-30

## 2020-02-28 ENCOUNTER — PATIENT OUTREACH (OUTPATIENT)
Dept: OTHER | Facility: OTHER | Age: 70
End: 2020-02-28

## 2020-03-06 ENCOUNTER — LAB VISIT (OUTPATIENT)
Dept: LAB | Facility: HOSPITAL | Age: 70
End: 2020-03-06
Attending: INTERNAL MEDICINE
Payer: COMMERCIAL

## 2020-03-06 DIAGNOSIS — E11.42 TYPE 2 DIABETES MELLITUS WITH DIABETIC POLYNEUROPATHY, WITHOUT LONG-TERM CURRENT USE OF INSULIN: ICD-10-CM

## 2020-03-06 LAB
ESTIMATED AVG GLUCOSE: 160 MG/DL (ref 68–131)
HBA1C MFR BLD HPLC: 7.2 % (ref 4–5.6)

## 2020-03-06 PROCEDURE — 36415 COLL VENOUS BLD VENIPUNCTURE: CPT | Mod: PO

## 2020-03-06 PROCEDURE — 83036 HEMOGLOBIN GLYCOSYLATED A1C: CPT

## 2020-03-09 NOTE — PROGRESS NOTES
Digital Medicine: Health  Follow-Up    Mr. Lopez reports that he's doing well.     The history is provided by the patient.     Follow Up  Follow-up reason(s): reading review and routine education      Alert received.   Care Team received low BG alert.  Patient is not experiencing symptoms.  Reading was invalid because not enough blood on the strip  Routine Education Topics: physical activity      Asked patient about 2 past low BG readings one on 12/29/19 at 1 and one on 2/19/20 at 9. Patient reports that those were times where he didn't have enough blood on the strip resulting in a low reading.     Asked patient about A1c increasing from 6.3% to 7.2%. He thinks this is from not exercising. He said that overall he thinks his BG average looks good.       INTERVENTION(S)  encouragement/support    PLAN  patient verbalizes understanding      There are no preventive care reminders to display for this patient.      Last 6 Patient Entered Readings                                          Most Recent A1c: 7.2% on 3/6/2020  (Goal: 8%)     Recent Readings 3/9/2020 3/8/2020 3/7/2020 3/6/2020 3/5/2020    Blood Glucose (mg/dL) 132 139 98 149 137            Physical Activity Screening   When asked if exercising, patient responded: no    Patient reports that he hasn't started his exercise program yet, but he's looking to start today as soon as his son was done with the treadmill. He'd like to get on the treadmill about 4x/week. Patient thinks this will help him get his BG back into range. He denies having any barriers to PA at this time.

## 2020-03-11 DIAGNOSIS — M51.36 DDD (DEGENERATIVE DISC DISEASE), LUMBAR: ICD-10-CM

## 2020-03-12 ENCOUNTER — PATIENT MESSAGE (OUTPATIENT)
Dept: ADMINISTRATIVE | Facility: OTHER | Age: 70
End: 2020-03-12

## 2020-03-12 ENCOUNTER — PATIENT MESSAGE (OUTPATIENT)
Dept: FAMILY MEDICINE | Facility: CLINIC | Age: 70
End: 2020-03-12

## 2020-03-12 RX ORDER — DULOXETIN HYDROCHLORIDE 60 MG/1
CAPSULE, DELAYED RELEASE ORAL
Qty: 90 CAPSULE | Refills: 0 | Status: SHIPPED | OUTPATIENT
Start: 2020-03-12 | End: 2020-03-16 | Stop reason: SDUPTHER

## 2020-03-16 DIAGNOSIS — M51.36 DDD (DEGENERATIVE DISC DISEASE), LUMBAR: ICD-10-CM

## 2020-03-16 RX ORDER — DULOXETIN HYDROCHLORIDE 60 MG/1
60 CAPSULE, DELAYED RELEASE ORAL DAILY
Qty: 90 CAPSULE | Refills: 0 | Status: SHIPPED | OUTPATIENT
Start: 2020-03-16 | End: 2020-08-07

## 2020-03-16 RX ORDER — ETODOLAC 400 MG/1
400 TABLET, FILM COATED ORAL 2 TIMES DAILY PRN
Qty: 60 TABLET | Refills: 0 | Status: SHIPPED | OUTPATIENT
Start: 2020-03-16 | End: 2020-04-22 | Stop reason: SDUPTHER

## 2020-03-19 RX ORDER — NAFTIFINE HYDROCHLORIDE 1 MG/G
CREAM TOPICAL DAILY
COMMUNITY
End: 2023-08-07

## 2020-03-19 NOTE — TELEPHONE ENCOUNTER
Received fax from pharmacist stating that the patient needs a prior authorization for Naftifine hcl

## 2020-03-22 DIAGNOSIS — B35.4 TINEA CORPORIS: Primary | ICD-10-CM

## 2020-03-22 RX ORDER — NAFTIFINE HYDROCHLORIDE 1 MG/G
CREAM TOPICAL DAILY
OUTPATIENT
Start: 2020-03-22

## 2020-03-22 RX ORDER — ECONAZOLE NITRATE 10 MG/G
CREAM TOPICAL DAILY
Qty: 85 G | Refills: 0 | Status: SHIPPED | OUTPATIENT
Start: 2020-03-22 | End: 2021-05-10

## 2020-04-07 ENCOUNTER — PATIENT OUTREACH (OUTPATIENT)
Dept: OTHER | Facility: OTHER | Age: 70
End: 2020-04-07

## 2020-04-14 NOTE — PROGRESS NOTES
Digital Medicine: Health  Follow-Up    Mr. John parra that he's doing well today.     The history is provided by the patient.     Follow Up  Follow-up reason(s): reading review      Readings are trending down due to lifestyle change and diet and PA changes.    Routine Education Topics: eating patterns and physical activity        INTERVENTION(S)  encouragement/support    PLAN  patient verbalizes understanding      There are no preventive care reminders to display for this patient.      Last 6 Patient Entered Readings                                          Most Recent A1c: 7.2% on 3/6/2020  (Goal: 8%)     Recent Readings 4/14/2020 4/13/2020 4/12/2020 4/11/2020 4/10/2020    Blood Glucose (mg/dL) 107 145 140 120 127             Diet Screening   Patient reports eating or drinking the following: desserts/sweetsHe has the following dietary restrictions: diabetic    Patient reports that he's been trying to cut back on soda and sweets. He's also trying to limit his snacking. When asked what types of snacks he's having, patient reports that he's eating things like brownies, but he's cut back to 1 small piece every day instead of 2-3 big pieces. Encouraged all things in moderation, and asked him to reach out if he wanted to discuss alternative snacking options. Patient denied at this time.     Intervention(s): portion control    Physical Activity Screening   When asked if exercising, patient responded: yes    Patient participates in the following activities: walking    Patient reports that he's been walking with his wife more. Patient thinks exercise makes a big difference in his BG readings. Right now he's going about 3x/week, sometimes more often. Patient is happy with his progress at this time.

## 2020-04-16 NOTE — PROGRESS NOTES
Digital Medicine: Clinician Follow-Up    Called patient to follow up regarding the DDMP (Diabetes Digital Medicine Program).  Patient endorses adherence to medication regimen. Patient denies hypoglycemic s/sx (dizziness, extreme hunger, headaches, confusion, trouble concentrating, sweating, shaking, blurred vision, personality changes); patient denies hyperglycemic s/sx (increased thirst, increased urination, headaches, trouble concentrating, blurred vision, fatigue).      Reviewed recent readings. Per ADA guidelines (goal A1C < 7%), DM is elevated above goal, previously controlled. Recent SMBG readings are at or near goal. Patient states he took the results to heart and made dietary and exercise changes to get back under goal.      The history is provided by the patient.       INTERVENTION(S)  reviewed appropriate dose schedule, recommended diet modifications, recommended physical activity, reviewed monitoring technique and encouragement/support    PLAN  patient verbalizes understanding, Health  follow up and continue monitoring    Continue current medication regimen. Repeat A1c at 3 month christine to ensure goal is achieved. Standing order placed through digital medicine order set.     I will continue to monitor regularly and will follow-up in 3-4 months, sooner if blood sugar begins to trend upward or downward.       Patient has my contact information and knows to call with any concerns or clinical changes.          There are no preventive care reminders to display for this patient.      Last 6 Patient Entered Readings                                          Most Recent A1c: 7.2% on 3/6/2020  (Goal: 8%)     Recent Readings 4/16/2020 4/15/2020 4/14/2020 4/13/2020 4/12/2020    Blood Glucose (mg/dL) 143 124 107 145 140             Diabetes Medications             metFORMIN (GLUCOPHAGE) 500 MG tablet TAKE 2 TABLETS BY MOUTH TWICE DAILY WITH BREAKFAST AND WITH SUPPER                   Sleep Apnea Screening  Patient  not previously diagnosed with RUPERTO and     Medication Affordability Screening  Patient is currently not having problems affording medications    Medication Adherence Screening   He did not miss a dose this month.  Patient knows purpose of medications.

## 2020-04-22 RX ORDER — ETODOLAC 400 MG/1
400 TABLET, FILM COATED ORAL 2 TIMES DAILY PRN
Qty: 60 TABLET | Refills: 0 | Status: SHIPPED | OUTPATIENT
Start: 2020-04-22 | End: 2020-05-27

## 2020-05-05 DIAGNOSIS — E11.9 CONTROLLED TYPE 2 DIABETES MELLITUS WITHOUT COMPLICATION, WITHOUT LONG-TERM CURRENT USE OF INSULIN: Chronic | ICD-10-CM

## 2020-05-05 RX ORDER — LOSARTAN POTASSIUM 50 MG/1
TABLET ORAL
Qty: 90 TABLET | Refills: 0 | Status: SHIPPED | OUTPATIENT
Start: 2020-05-05 | End: 2020-05-11

## 2020-05-11 DIAGNOSIS — E11.9 CONTROLLED TYPE 2 DIABETES MELLITUS WITHOUT COMPLICATION, WITHOUT LONG-TERM CURRENT USE OF INSULIN: Chronic | ICD-10-CM

## 2020-05-11 RX ORDER — LOSARTAN POTASSIUM 50 MG/1
TABLET ORAL
Qty: 90 TABLET | Refills: 0 | Status: SHIPPED | OUTPATIENT
Start: 2020-05-11 | End: 2020-08-10

## 2020-05-27 RX ORDER — ETODOLAC 400 MG/1
TABLET, FILM COATED ORAL
Qty: 60 TABLET | Refills: 0 | Status: SHIPPED | OUTPATIENT
Start: 2020-05-27 | End: 2020-07-01

## 2020-05-30 RX ORDER — METFORMIN HYDROCHLORIDE 500 MG/1
TABLET ORAL
Qty: 360 TABLET | Refills: 0 | Status: SHIPPED | OUTPATIENT
Start: 2020-05-30 | End: 2020-08-08 | Stop reason: SDUPTHER

## 2020-06-01 ENCOUNTER — PATIENT OUTREACH (OUTPATIENT)
Dept: ADMINISTRATIVE | Facility: HOSPITAL | Age: 70
End: 2020-06-01

## 2020-06-01 ENCOUNTER — PATIENT OUTREACH (OUTPATIENT)
Dept: OTHER | Facility: OTHER | Age: 70
End: 2020-06-01

## 2020-06-01 DIAGNOSIS — E78.5 DYSLIPIDEMIA: Primary | ICD-10-CM

## 2020-06-02 DIAGNOSIS — Z12.5 SCREENING FOR PROSTATE CANCER: ICD-10-CM

## 2020-06-02 DIAGNOSIS — E11.42 TYPE 2 DIABETES MELLITUS WITH DIABETIC POLYNEUROPATHY, WITHOUT LONG-TERM CURRENT USE OF INSULIN: Primary | ICD-10-CM

## 2020-06-05 ENCOUNTER — LAB VISIT (OUTPATIENT)
Dept: LAB | Facility: HOSPITAL | Age: 70
End: 2020-06-05
Attending: INTERNAL MEDICINE
Payer: COMMERCIAL

## 2020-06-05 ENCOUNTER — PATIENT MESSAGE (OUTPATIENT)
Dept: FAMILY MEDICINE | Facility: CLINIC | Age: 70
End: 2020-06-05

## 2020-06-05 DIAGNOSIS — E11.69 TYPE 2 DIABETES MELLITUS WITH OTHER SPECIFIED COMPLICATION, WITHOUT LONG-TERM CURRENT USE OF INSULIN: ICD-10-CM

## 2020-06-05 DIAGNOSIS — Z12.5 SCREENING FOR PROSTATE CANCER: ICD-10-CM

## 2020-06-05 DIAGNOSIS — E11.42 TYPE 2 DIABETES MELLITUS WITH DIABETIC POLYNEUROPATHY, WITHOUT LONG-TERM CURRENT USE OF INSULIN: ICD-10-CM

## 2020-06-05 DIAGNOSIS — E78.5 DYSLIPIDEMIA: ICD-10-CM

## 2020-06-05 DIAGNOSIS — M25.561 RIGHT KNEE PAIN, UNSPECIFIED CHRONICITY: Primary | ICD-10-CM

## 2020-06-05 LAB
ALBUMIN SERPL BCP-MCNC: 3.9 G/DL (ref 3.5–5.2)
ALP SERPL-CCNC: 87 U/L (ref 55–135)
ALT SERPL W/O P-5'-P-CCNC: 24 U/L (ref 10–44)
ANION GAP SERPL CALC-SCNC: 9 MMOL/L (ref 8–16)
AST SERPL-CCNC: 18 U/L (ref 10–40)
BASOPHILS # BLD AUTO: 0.03 K/UL (ref 0–0.2)
BASOPHILS NFR BLD: 0.6 % (ref 0–1.9)
BILIRUB SERPL-MCNC: 0.5 MG/DL (ref 0.1–1)
BUN SERPL-MCNC: 10 MG/DL (ref 8–23)
CALCIUM SERPL-MCNC: 9.4 MG/DL (ref 8.7–10.5)
CHLORIDE SERPL-SCNC: 103 MMOL/L (ref 95–110)
CHOLEST SERPL-MCNC: 131 MG/DL (ref 120–199)
CHOLEST/HDLC SERPL: 2.4 {RATIO} (ref 2–5)
CO2 SERPL-SCNC: 27 MMOL/L (ref 23–29)
COMPLEXED PSA SERPL-MCNC: 0.85 NG/ML (ref 0–4)
CREAT SERPL-MCNC: 0.9 MG/DL (ref 0.5–1.4)
DIFFERENTIAL METHOD: ABNORMAL
EOSINOPHIL # BLD AUTO: 0.2 K/UL (ref 0–0.5)
EOSINOPHIL NFR BLD: 3.9 % (ref 0–8)
ERYTHROCYTE [DISTWIDTH] IN BLOOD BY AUTOMATED COUNT: 13 % (ref 11.5–14.5)
EST. GFR  (AFRICAN AMERICAN): >60 ML/MIN/1.73 M^2
EST. GFR  (NON AFRICAN AMERICAN): >60 ML/MIN/1.73 M^2
GLUCOSE SERPL-MCNC: 104 MG/DL (ref 70–110)
HCT VFR BLD AUTO: 42.1 % (ref 40–54)
HDLC SERPL-MCNC: 55 MG/DL (ref 40–75)
HDLC SERPL: 42 % (ref 20–50)
HGB BLD-MCNC: 13.2 G/DL (ref 14–18)
IMM GRANULOCYTES # BLD AUTO: 0.02 K/UL (ref 0–0.04)
IMM GRANULOCYTES NFR BLD AUTO: 0.4 % (ref 0–0.5)
LDLC SERPL CALC-MCNC: 61.4 MG/DL (ref 63–159)
LYMPHOCYTES # BLD AUTO: 2.2 K/UL (ref 1–4.8)
LYMPHOCYTES NFR BLD: 39.9 % (ref 18–48)
MCH RBC QN AUTO: 29.7 PG (ref 27–31)
MCHC RBC AUTO-ENTMCNC: 31.4 G/DL (ref 32–36)
MCV RBC AUTO: 95 FL (ref 82–98)
MONOCYTES # BLD AUTO: 0.5 K/UL (ref 0.3–1)
MONOCYTES NFR BLD: 9.4 % (ref 4–15)
NEUTROPHILS # BLD AUTO: 2.5 K/UL (ref 1.8–7.7)
NEUTROPHILS NFR BLD: 45.8 % (ref 38–73)
NONHDLC SERPL-MCNC: 76 MG/DL
NRBC BLD-RTO: 0 /100 WBC
PLATELET # BLD AUTO: 343 K/UL (ref 150–350)
PMV BLD AUTO: 10.5 FL (ref 9.2–12.9)
POTASSIUM SERPL-SCNC: 4 MMOL/L (ref 3.5–5.1)
PROT SERPL-MCNC: 7.5 G/DL (ref 6–8.4)
RBC # BLD AUTO: 4.44 M/UL (ref 4.6–6.2)
SODIUM SERPL-SCNC: 139 MMOL/L (ref 136–145)
TRIGL SERPL-MCNC: 73 MG/DL (ref 30–150)
WBC # BLD AUTO: 5.44 K/UL (ref 3.9–12.7)

## 2020-06-05 PROCEDURE — 36415 COLL VENOUS BLD VENIPUNCTURE: CPT | Mod: PO

## 2020-06-05 PROCEDURE — 83036 HEMOGLOBIN GLYCOSYLATED A1C: CPT

## 2020-06-05 PROCEDURE — 80053 COMPREHEN METABOLIC PANEL: CPT

## 2020-06-05 PROCEDURE — 85025 COMPLETE CBC W/AUTO DIFF WBC: CPT

## 2020-06-05 PROCEDURE — 84153 ASSAY OF PSA TOTAL: CPT

## 2020-06-05 PROCEDURE — 80061 LIPID PANEL: CPT

## 2020-06-06 LAB
ESTIMATED AVG GLUCOSE: 148 MG/DL (ref 68–131)
HBA1C MFR BLD HPLC: 6.8 % (ref 4–5.6)

## 2020-06-15 ENCOUNTER — LAB VISIT (OUTPATIENT)
Dept: LAB | Facility: HOSPITAL | Age: 70
End: 2020-06-15
Attending: INTERNAL MEDICINE
Payer: COMMERCIAL

## 2020-06-15 ENCOUNTER — OFFICE VISIT (OUTPATIENT)
Dept: FAMILY MEDICINE | Facility: CLINIC | Age: 70
End: 2020-06-15
Payer: COMMERCIAL

## 2020-06-15 ENCOUNTER — HOSPITAL ENCOUNTER (OUTPATIENT)
Dept: RADIOLOGY | Facility: HOSPITAL | Age: 70
Discharge: HOME | End: 2020-06-15
Attending: INTERNAL MEDICINE
Payer: COMMERCIAL

## 2020-06-15 VITALS
OXYGEN SATURATION: 97 % | HEIGHT: 67 IN | BODY MASS INDEX: 28.68 KG/M2 | SYSTOLIC BLOOD PRESSURE: 120 MMHG | WEIGHT: 182.75 LBS | TEMPERATURE: 97 F | DIASTOLIC BLOOD PRESSURE: 70 MMHG | HEART RATE: 74 BPM

## 2020-06-15 DIAGNOSIS — E53.8 VITAMIN B12 DEFICIENCY: ICD-10-CM

## 2020-06-15 DIAGNOSIS — E11.42 TYPE 2 DIABETES MELLITUS WITH DIABETIC POLYNEUROPATHY, WITHOUT LONG-TERM CURRENT USE OF INSULIN: ICD-10-CM

## 2020-06-15 DIAGNOSIS — M25.561 RIGHT KNEE PAIN, UNSPECIFIED CHRONICITY: ICD-10-CM

## 2020-06-15 DIAGNOSIS — R30.0 BURNING WITH URINATION: ICD-10-CM

## 2020-06-15 DIAGNOSIS — N52.9 ERECTILE DYSFUNCTION, UNSPECIFIED ERECTILE DYSFUNCTION TYPE: ICD-10-CM

## 2020-06-15 DIAGNOSIS — M17.11 PRIMARY OSTEOARTHRITIS OF RIGHT KNEE: Primary | ICD-10-CM

## 2020-06-15 PROCEDURE — 1101F PR PT FALLS ASSESS DOC 0-1 FALLS W/OUT INJ PAST YR: ICD-10-PCS | Mod: CPTII,S$GLB,, | Performed by: INTERNAL MEDICINE

## 2020-06-15 PROCEDURE — 99999 PR PBB SHADOW E&M-EST. PATIENT-LVL III: ICD-10-PCS | Mod: PBBFAC,,, | Performed by: INTERNAL MEDICINE

## 2020-06-15 PROCEDURE — 73565 XR KNEE AP STANDING BILATERAL: ICD-10-PCS | Mod: 26,,, | Performed by: RADIOLOGY

## 2020-06-15 PROCEDURE — 1101F PT FALLS ASSESS-DOCD LE1/YR: CPT | Mod: CPTII,S$GLB,, | Performed by: INTERNAL MEDICINE

## 2020-06-15 PROCEDURE — 3044F HG A1C LEVEL LT 7.0%: CPT | Mod: CPTII,S$GLB,, | Performed by: INTERNAL MEDICINE

## 2020-06-15 PROCEDURE — 99214 OFFICE O/P EST MOD 30 MIN: CPT | Mod: S$GLB,,, | Performed by: INTERNAL MEDICINE

## 2020-06-15 PROCEDURE — 87086 URINE CULTURE/COLONY COUNT: CPT

## 2020-06-15 PROCEDURE — 87491 CHLMYD TRACH DNA AMP PROBE: CPT

## 2020-06-15 PROCEDURE — 99214 PR OFFICE/OUTPT VISIT, EST, LEVL IV, 30-39 MIN: ICD-10-PCS | Mod: S$GLB,,, | Performed by: INTERNAL MEDICINE

## 2020-06-15 PROCEDURE — 73560 X-RAY EXAM OF KNEE 1 OR 2: CPT | Mod: TC,FY,PO,RT

## 2020-06-15 PROCEDURE — 1159F PR MEDICATION LIST DOCUMENTED IN MEDICAL RECORD: ICD-10-PCS | Mod: S$GLB,,, | Performed by: INTERNAL MEDICINE

## 2020-06-15 PROCEDURE — 73565 X-RAY EXAM OF KNEES: CPT | Mod: TC,FY,PO

## 2020-06-15 PROCEDURE — 73560 XR KNEE 1 OR 2 VIEW RIGHT: ICD-10-PCS | Mod: 26,RT,, | Performed by: RADIOLOGY

## 2020-06-15 PROCEDURE — 73560 X-RAY EXAM OF KNEE 1 OR 2: CPT | Mod: 26,RT,, | Performed by: RADIOLOGY

## 2020-06-15 PROCEDURE — 99999 PR PBB SHADOW E&M-EST. PATIENT-LVL III: CPT | Mod: PBBFAC,,, | Performed by: INTERNAL MEDICINE

## 2020-06-15 PROCEDURE — 1126F AMNT PAIN NOTED NONE PRSNT: CPT | Mod: S$GLB,,, | Performed by: INTERNAL MEDICINE

## 2020-06-15 PROCEDURE — 81003 URINALYSIS AUTO W/O SCOPE: CPT

## 2020-06-15 PROCEDURE — 1126F PR PAIN SEVERITY QUANTIFIED, NO PAIN PRESENT: ICD-10-PCS | Mod: S$GLB,,, | Performed by: INTERNAL MEDICINE

## 2020-06-15 PROCEDURE — 3044F PR MOST RECENT HEMOGLOBIN A1C LEVEL <7.0%: ICD-10-PCS | Mod: CPTII,S$GLB,, | Performed by: INTERNAL MEDICINE

## 2020-06-15 PROCEDURE — 73565 X-RAY EXAM OF KNEES: CPT | Mod: 26,,, | Performed by: RADIOLOGY

## 2020-06-15 PROCEDURE — 1159F MED LIST DOCD IN RCRD: CPT | Mod: S$GLB,,, | Performed by: INTERNAL MEDICINE

## 2020-06-15 NOTE — PROGRESS NOTES
Subjective:       Chief Complaint  Chief Complaint   Patient presents with    Follow-up     6 mth follow up     Knee Pain     right knee pain       HPI  John Lemos is a 69 y.o. male with multiple medical diagnoses as listed in the medical history and problem list that presents for above complaint(s).     Diabetes  He has type 2 diabetes mellitus. No MedicAlert identification noted. The initial diagnosis of diabetes was made 10 years ago. Hypoglycemia symptoms include sleepiness. Pertinent negatives for hypoglycemia include no confusion, dizziness, headaches, hunger, mood changes, nervousness/anxiousness, pallor, seizures, speech difficulty, sweats or tremors. Associated symptoms include fatigue and foot paresthesias. Pertinent negatives for diabetes include no blurred vision, no chest pain, no foot ulcerations, no polydipsia, no polyphagia, no polyuria, no visual change, no weakness and no weight loss. Pertinent negatives for hypoglycemia complications include no blackouts, no hospitalization, no nocturnal hypoglycemia, no required assistance and no required glucagon injection. Symptoms are stable. Diabetic complications include impotence. Pertinent negatives for diabetic complications include no autonomic neuropathy, CVA, heart disease, nephropathy, peripheral neuropathy, PVD or retinopathy. There are no known risk factors for coronary artery disease. Current diabetic treatment includes diet and oral agent (monotherapy). He is compliant with treatment all of the time. His weight is stable. He is following a generally unhealthy diet. Meal planning includes avoidance of concentrated sweets. He has not had a previous visit with a dietitian. He participates in exercise three times a week. He monitors blood glucose at home 1-2 x per day. He monitors urine at home <1 x per month. Blood glucose monitoring compliance is good. His home blood glucose trend is decreasing steadily. He sees a podiatrist.Eye exam is  current.   Knee Pain   The incident occurred more than 1 week ago. There was no injury mechanism. The pain is present in the right knee. The quality of the pain is described as aching. The pain is at a severity of 8/10. The pain is moderate. The pain has been intermittent since onset. Pertinent negatives include no inability to bear weight, loss of motion, loss of sensation, muscle weakness, numbness or tingling. He has tried nothing for the symptoms.         Patient Care Team:  Chan Estrada MD as PCP - General (Internal Medicine)  Chan Estrada MD as PCP - [facility] Attributed  Chan Estrada MD as Diabetes Digital Medicine Responsible Provider (Internal Medicine)  Mag Márquez as Digital Medicine Health   Alessandra Sarabia PharmD as Diabetes Digital Medicine Clinician (Pharmacist)  Samantha Romero LPN as Care Coordinator  [facility] - Outcomes Based as Diabetes Digital Medicine Contract      PAST MEDICAL HISTORY:  Past Medical History:   Diagnosis Date    Arthritis     Cataract     ou    Diabetes mellitus 7 yrs    lbs: 118 1 week ago    Diabetes mellitus type 2, controlled 9/8/2012    Eye injury     fb    Hyperlipidemia        PAST SURGICAL HISTORY:  Past Surgical History:   Procedure Laterality Date    COLONOSCOPY N/A 11/25/2019    Procedure: COLONOSCOPY;  Surgeon: Eddie Leslie MD;  Location: [facility];  Service: Endoscopy;  Laterality: N/A;       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     Employer: [employer]   Social Needs    Financial resource strain: Not hard at all    Food insecurity     Worry: Never true     Inability: Never true    Transportation needs     Medical:  No     Non-medical: No   Tobacco Use    Smoking status: Never Smoker    Smokeless tobacco: Never Used   Substance and Sexual Activity    Alcohol use: No     Frequency: Never    Drug use: No    Sexual activity: Not Currently   Lifestyle    Physical activity     Days per week: 5 days     Minutes per session: 30 min    Stress: Not at all   Relationships    Social connections     Talks on phone: More than three times a week     Gets together: Once a week     Attends Religion service: Not on file     Active member of club or organization: Yes     Attends meetings of clubs or organizations: More than 4 times per year     Relationship status:    Other Topics Concern    Not on file   Social History Narrative    Not on file       FAMILY HISTORY:  Family History   Problem Relation Age of Onset    Diabetes Mother     Cancer Mother     Hypertension Mother     Diabetes Father     Cancer Father     No Known Problems Sister     No Known Problems Brother     No Known Problems Maternal Aunt     No Known Problems Maternal Uncle     No Known Problems Paternal Aunt     No Known Problems Paternal Uncle     No Known Problems Maternal Grandmother     No Known Problems Maternal Grandfather     No Known Problems Paternal Grandmother     No Known Problems Paternal Grandfather     Amblyopia Neg Hx     Blindness Neg Hx     Cataracts Neg Hx     Glaucoma Neg Hx     Macular degeneration Neg Hx     Retinal detachment Neg Hx     Strabismus Neg Hx     Stroke Neg Hx     Thyroid disease Neg Hx        ALLERGIES AND MEDICATIONS: updated and reviewed.  Review of patient's allergies indicates:   Allergen Reactions    Sulfa (sulfonamide antibiotics) Swelling     Current Outpatient Medications   Medication Sig Dispense Refill    albuterol (PROVENTIL/VENTOLIN HFA) 90 mcg/actuation inhaler Inhale 2 puffs into the lungs every 6 (six) hours as needed for Wheezing. Whichever brand covered by insurance 18 g 0     atorvastatin (LIPITOR) 40 MG tablet TAKE 1 TABLET BY MOUTH ONCE DAILY 90 tablet 2    benzonatate (TESSALON) 100 MG capsule Take 1 capsule (100 mg total) by mouth 3 (three) times daily as needed for Cough. (Patient not taking: Reported on 12/19/2019) 30 capsule 0    blood sugar diagnostic (FREESTYLE LITE STRIPS) Strp Inject 1 each as directed once daily. 100 each 3    cyanocobalamin (VITAMIN B-12) 1000 MCG tablet Take 1 tablet (1,000 mcg total) by mouth once daily. 30 tablet 11    DULoxetine (CYMBALTA) 60 MG capsule Take 1 capsule (60 mg total) by mouth once daily. 90 capsule 0    econazole nitrate 1 % cream Apply topically once daily. Apply to affected areas for 7 days 85 g 0    etodolac (LODINE) 400 MG tablet Take 1 tablet by mouth twice daily as needed 60 tablet 0    fluticasone (FLONASE) 50 mcg/actuation nasal spray 1 spray (50 mcg total) by Each Nare route once daily. 16 g 2    lancets (FREESTYLE LANCETS) 28 gauge Misc Inject 1 lancet as directed once daily. 100 each 3    levocetirizine (XYZAL) 5 MG tablet Take 1 tablet (5 mg total) by mouth every evening. 30 tablet 11    loratadine (CLARITIN) 10 mg tablet Take 1 tablet (10 mg total) by mouth daily as needed for Allergies (or runny nose). 30 tablet 0    losartan (COZAAR) 50 MG tablet Take 1 tablet by mouth once daily 90 tablet 0    LYRICA 100 mg capsule TAKE 1 CAPSULE BY MOUTH THREE TIMES DAILY 90 capsule 0    metFORMIN (GLUCOPHAGE) 500 MG tablet TAKE 2 TABLETS BY MOUTH TWICE DAILY WITH BREAKFAST AND WITH SUPPER 360 tablet 0    naftifine (NAFTIN) 1 % cream Apply topically once daily.      omeprazole (PRILOSEC) 20 MG capsule Take 20 mg by mouth. 1 Capsule, Delayed Release(E.C.) Oral Every morning       No current facility-administered medications for this visit.          ROS  Review of Systems   Constitutional: Positive for fatigue. Negative for weight loss.   Eyes: Negative for blurred vision.   Cardiovascular: Negative for chest pain.  "  Endocrine: Negative for polydipsia, polyphagia and polyuria.   Genitourinary: Positive for impotence.   Musculoskeletal: Positive for arthralgias (right knee pain).   Skin: Negative for pallor.   Neurological: Negative for dizziness, tingling, tremors, seizures, speech difficulty, weakness, numbness and headaches.   Psychiatric/Behavioral: Negative for confusion. The patient is not nervous/anxious.          Objective:       Physical Exam  Vitals:    06/15/20 1020   BP: 120/70   BP Location: Right arm   Patient Position: Sitting   BP Method: Medium (Manual)   Pulse: 74   Temp: 97.4 °F (36.3 °C)   TempSrc: Oral   SpO2: 97%   Weight: 82.9 kg (182 lb 12.2 oz)   Height: 5' 7" (1.702 m)    Body mass index is 28.62 kg/m².  Weight: 82.9 kg (182 lb 12.2 oz)   Height: 5' 7" (170.2 cm)   Physical Exam  Vitals signs reviewed.   Constitutional:       Appearance: He is well-developed.   HENT:      Head: Normocephalic and atraumatic.   Eyes:      Conjunctiva/sclera: Conjunctivae normal.   Neck:      Musculoskeletal: Normal range of motion and neck supple.   Cardiovascular:      Rate and Rhythm: Normal rate.   Pulmonary:      Effort: Pulmonary effort is normal.   Musculoskeletal:         General: Swelling (R knee) and tenderness (R knee) present.      Comments: Crepitus with R knee flexion   Lymphadenopathy:      Cervical: No cervical adenopathy.   Skin:     General: Skin is warm and dry.      Findings: No rash.   Neurological:      Mental Status: He is alert.      Cranial Nerves: No cranial nerve deficit.             Assessment:     1. Primary osteoarthritis of right knee    2. Burning with urination    3. Vitamin B12 deficiency    4. Type 2 diabetes mellitus with diabetic polyneuropathy, without long-term current use of insulin    5. Erectile dysfunction, unspecified erectile dysfunction type      Plan:     John was seen today for follow-up and knee pain.    Diagnoses and all orders for this visit:    Primary osteoarthritis of " right knee  Discussed Ortho evaluation given clinical symptomatology and findings on recent imaging  -     Ambulatory referral/consult to Orthopedics; Future    Burning with urination  Obtain urine evaluation  -     Urinalysis; Future  -     Urine culture; Future  -     C. trachomatis/N. gonorrhoeae by AMP DNA [facility]; Urine; Future    Vitamin B12 deficiency  Noted in 2019 - repeat in a few months  -     Vitamin B12; Future    Type 2 diabetes mellitus with diabetic polyneuropathy, without long-term current use of insulin  Well controlled  The current medical regimen is effective;  continue present plan and medications.  -     Hemoglobin A1C; Future    Erectile dysfunction, unspecified erectile dysfunction type  Obtain evaluation   -     Testosterone; Future          Health Maintenance       Date Due Completion Date    Foot Exam 07/15/2020 7/15/2019 (Done)    Override on 7/15/2019: Done    Override on 1/16/2018: Done    Override on 11/25/2015: Done    Eye Exam 08/13/2020 8/13/2019    Hemoglobin A1c 09/05/2020 6/5/2020    Override on 11/25/2015: Done    Low Dose Statin 12/20/2020 12/20/2019    Lipid Panel 06/05/2021 6/5/2020    TETANUS VACCINE 02/19/2026 2/19/2016    Colorectal Cancer Screening 11/25/2029 11/25/2019            Health Maintenance reviewed, addressed as per orders    No follow-ups on file.    The patient expressed understanding and no barriers to adherence were identified.     1. The patient indicates understanding of these issues and agrees with the plan. Brief care plan is updated and reviewed with the patient as applicable.     2. The patient is given an After Visit Summary that lists all medications with directions, allergies, orders placed during this encounter and follow-up instructions.     3. I have reviewed the patient's medical information including past medical, family, and social history sections including the medications and allergies.     4. We discussed the patient's current medications.  I reconciled the patient's medication list and prepared and supplied needed refills.       Chan Estrada MD  Internal Medicine-Pediatrics

## 2020-06-16 ENCOUNTER — TELEPHONE (OUTPATIENT)
Dept: FAMILY MEDICINE | Facility: CLINIC | Age: 70
End: 2020-06-16

## 2020-06-16 LAB
BILIRUB UR QL STRIP: ABNORMAL
CLARITY UR REFRACT.AUTO: ABNORMAL
COLOR UR AUTO: ABNORMAL
GLUCOSE UR QL STRIP: ABNORMAL
HGB UR QL STRIP: NEGATIVE
KETONES UR QL STRIP: NEGATIVE
LEUKOCYTE ESTERASE UR QL STRIP: NEGATIVE
NITRITE UR QL STRIP: NEGATIVE
PH UR STRIP: 5 [PH] (ref 5–8)
PROT UR QL STRIP: NEGATIVE
SP GR UR STRIP: 1.03 (ref 1–1.03)
URN SPEC COLLECT METH UR: ABNORMAL

## 2020-06-17 LAB
BACTERIA UR CULT: NORMAL
BACTERIA UR CULT: NORMAL
C TRACH DNA SPEC QL NAA+PROBE: NOT DETECTED
N GONORRHOEA DNA SPEC QL NAA+PROBE: NOT DETECTED

## 2020-07-01 NOTE — PROGRESS NOTES
Digital Medicine: Health  Follow-Up    Mr. Lopez reports that he's doing well. Congratulated patient on A1c coming down to 6.8%. Patient was very happy with his, and contributes this from his being consistent with medication, exercising more, and trying to eat better and stay away from sweets.     The history is provided by the patient.   Follow Up  Follow-up reason(s): reading review      Alert received.   Care Team received low BG alert.  Patient is not experiencing symptoms.    Spoke with patient about low BG on 6/30/30 at 35. Patient said that it was a false reading. He denies any symptoms related to hypoglycemia. He retested ~2minutes after and reading was at 123. Will delete false reading.      INTERVENTION(S)  encouragement/support    PLAN  patient verbalizes understanding      There are no preventive care reminders to display for this patient.      Last 6 Patient Entered Readings                                          Most Recent A1c: 6.8% on 6/5/2020  (Goal: 8%)     Recent Readings 7/1/2020 6/30/2020 6/29/2020 6/28/2020 6/27/2020    Blood Glucose (mg/dL) 144 123 144 151 152

## 2020-07-02 ENCOUNTER — OFFICE VISIT (OUTPATIENT)
Dept: ORTHOPEDICS | Facility: CLINIC | Age: 70
End: 2020-07-02
Payer: COMMERCIAL

## 2020-07-02 VITALS
RESPIRATION RATE: 18 BRPM | DIASTOLIC BLOOD PRESSURE: 76 MMHG | BODY MASS INDEX: 28.37 KG/M2 | HEIGHT: 67 IN | WEIGHT: 180.75 LBS | SYSTOLIC BLOOD PRESSURE: 145 MMHG | TEMPERATURE: 98 F | OXYGEN SATURATION: 97 % | HEART RATE: 68 BPM

## 2020-07-02 DIAGNOSIS — M17.11 PRIMARY OSTEOARTHRITIS OF RIGHT KNEE: Primary | ICD-10-CM

## 2020-07-02 PROCEDURE — 3008F PR BODY MASS INDEX (BMI) DOCUMENTED: ICD-10-PCS | Mod: CPTII,S$GLB,, | Performed by: PHYSICIAN ASSISTANT

## 2020-07-02 PROCEDURE — 99203 PR OFFICE/OUTPT VISIT, NEW, LEVL III, 30-44 MIN: ICD-10-PCS | Mod: S$GLB,,, | Performed by: PHYSICIAN ASSISTANT

## 2020-07-02 PROCEDURE — 1101F PT FALLS ASSESS-DOCD LE1/YR: CPT | Mod: CPTII,S$GLB,, | Performed by: PHYSICIAN ASSISTANT

## 2020-07-02 PROCEDURE — 1125F AMNT PAIN NOTED PAIN PRSNT: CPT | Mod: S$GLB,,, | Performed by: PHYSICIAN ASSISTANT

## 2020-07-02 PROCEDURE — 1159F MED LIST DOCD IN RCRD: CPT | Mod: S$GLB,,, | Performed by: PHYSICIAN ASSISTANT

## 2020-07-02 PROCEDURE — 1101F PR PT FALLS ASSESS DOC 0-1 FALLS W/OUT INJ PAST YR: ICD-10-PCS | Mod: CPTII,S$GLB,, | Performed by: PHYSICIAN ASSISTANT

## 2020-07-02 PROCEDURE — 3008F BODY MASS INDEX DOCD: CPT | Mod: CPTII,S$GLB,, | Performed by: PHYSICIAN ASSISTANT

## 2020-07-02 PROCEDURE — 1159F PR MEDICATION LIST DOCUMENTED IN MEDICAL RECORD: ICD-10-PCS | Mod: S$GLB,,, | Performed by: PHYSICIAN ASSISTANT

## 2020-07-02 PROCEDURE — 99999 PR PBB SHADOW E&M-EST. PATIENT-LVL V: ICD-10-PCS | Mod: PBBFAC,,, | Performed by: PHYSICIAN ASSISTANT

## 2020-07-02 PROCEDURE — 99999 PR PBB SHADOW E&M-EST. PATIENT-LVL V: CPT | Mod: PBBFAC,,, | Performed by: PHYSICIAN ASSISTANT

## 2020-07-02 PROCEDURE — 99203 OFFICE O/P NEW LOW 30 MIN: CPT | Mod: S$GLB,,, | Performed by: PHYSICIAN ASSISTANT

## 2020-07-02 PROCEDURE — 1125F PR PAIN SEVERITY QUANTIFIED, PAIN PRESENT: ICD-10-PCS | Mod: S$GLB,,, | Performed by: PHYSICIAN ASSISTANT

## 2020-07-02 NOTE — PROGRESS NOTES
SUBJECTIVE:     Chief Complaint   Patient presents with    Right Knee - Pain       History of Present Illness:  John Lemos is a 69 y.o. year old male here with a history of intermittent right knee pain which started about 6 months ago.  There is not a history of trauma.  The pain is located in the medial, lateral aspect of the knee.  The pain is described as sharp, 8/10.   Aggravating factors include walking and increased activity.  He repots the pain will be sharp and cause his knee to buckle occasionally.  Associated symptoms include effusion. Previous treatments include brace and rest which have provided adequate relief.  There is not a history of previous injury or surgery to the knee.  The patient does not use an assistive device.    Review of patient's allergies indicates:   Allergen Reactions    Sulfa (sulfonamide antibiotics) Swelling         Current Outpatient Medications   Medication Sig Dispense Refill    albuterol (PROVENTIL/VENTOLIN HFA) 90 mcg/actuation inhaler Inhale 2 puffs into the lungs every 6 (six) hours as needed for Wheezing. Whichever brand covered by insurance 18 g 0    atorvastatin (LIPITOR) 40 MG tablet Take 1 tablet by mouth once daily 90 tablet 0    blood sugar diagnostic (FREESTYLE LITE STRIPS) Strp Inject 1 each as directed once daily. 100 each 3    cyanocobalamin (VITAMIN B-12) 1000 MCG tablet Take 1 tablet (1,000 mcg total) by mouth once daily. 30 tablet 11    DULoxetine (CYMBALTA) 60 MG capsule Take 1 capsule (60 mg total) by mouth once daily. 90 capsule 0    etodolac (LODINE) 400 MG tablet Take 1 tablet by mouth twice daily as needed 60 tablet 0    fluticasone (FLONASE) 50 mcg/actuation nasal spray 1 spray (50 mcg total) by Each Nare route once daily. 16 g 2    lancets (FREESTYLE LANCETS) 28 gauge Misc Inject 1 lancet as directed once daily. 100 each 3    levocetirizine (XYZAL) 5 MG tablet Take 1 tablet (5 mg total) by mouth every evening. 30 tablet 11     loratadine (CLARITIN) 10 mg tablet Take 1 tablet (10 mg total) by mouth daily as needed for Allergies (or runny nose). 30 tablet 0    losartan (COZAAR) 50 MG tablet Take 1 tablet by mouth once daily 90 tablet 0    LYRICA 100 mg capsule TAKE 1 CAPSULE BY MOUTH THREE TIMES DAILY 90 capsule 0    metFORMIN (GLUCOPHAGE) 500 MG tablet TAKE 2 TABLETS BY MOUTH TWICE DAILY WITH BREAKFAST AND WITH SUPPER 360 tablet 0    naftifine (NAFTIN) 1 % cream Apply topically once daily.      benzonatate (TESSALON) 100 MG capsule Take 1 capsule (100 mg total) by mouth 3 (three) times daily as needed for Cough. (Patient not taking: Reported on 12/19/2019) 30 capsule 0    econazole nitrate 1 % cream Apply topically once daily. Apply to affected areas for 7 days 85 g 0    omeprazole (PRILOSEC) 20 MG capsule Take 20 mg by mouth. 1 Capsule, Delayed Release(E.C.) Oral Every morning       No current facility-administered medications for this visit.        Past Medical History:   Diagnosis Date    Arthritis     Cataract     ou    Diabetes mellitus 7 yrs    lbs: 118 1 week ago    Diabetes mellitus type 2, controlled 9/8/2012    Eye injury     fb    Hyperlipidemia        Past Surgical History:   Procedure Laterality Date    COLONOSCOPY N/A 11/25/2019    Procedure: COLONOSCOPY;  Surgeon: Eddie Leslie MD;  [facility];  Service: Endoscopy;  Laterality: N/A;       Vital Signs (Most Recent)  Vitals:    07/02/20 0958   BP: (!) 145/76   Pulse: 68   Resp: 18   Temp: 97.6 °F (36.4 °C)           Review of Systems:  ROS:  Constitutional: no fever or chills  Eyes: no visual changes  ENT: no nasal congestion or sore throat  Respiratory: no cough or shortness of breath  Cardiovascular: no chest pain or palpitations  Gastrointestinal: no nausea or vomiting, tolerating diet  Genitourinary: no hematuria or dysuria  Integument/Breast: no rash or pruritis  Hematologic/Lymphatic: no easy bruising or lymphadenopathy  Musculoskeletal: no  "arthralgias or myalgias  Neurological: no seizures or tremors  Behavioral/Psych: no auditory or visual hallucinations  Endocrine: no heat or cold intolerance      OBJECTIVE:     PHYSICAL EXAM:  Height: 5' 7" (170.2 cm) Weight: 82 kg (180 lb 12.4 oz)   General: Well developed, well nourished, in no acute distress.  Neurological: Mood & affect are normal.  HEENT: NCAT, sclera nonicteric   Lungs: Respirations are equal and unlabored.   CV: 2+ bilateral upper and lower extremity pulses.   Skin: Intact throughout with no rashes, erythema, or lesions  Extremities: No LE edema, no erythema or warmth of the skin in either lower extremity.    right  Knee Exam:  Knee Range of Motion: 0-120   Effusion: mild  Condition of skin: intact and no scars or erythema  Location of tenderness:None   Strength: 5 of 5 quadriceps strength and 5 of 5 hamstring strength  Stability:  stable to testing  Varus /Valgus stress:  normal    Hip Examination:  full painless range of motion, without tenderness    IMAGING:    X-rays of the right knee, personally reviewed by me, demonstrate severe DJD with medial tibiofemoral joint space narrowing, osteophyte formation and subchondral sclerosis.  No fracture or dislocation.    ASSESSMENT/PLAN:   69 y.o. year old male with right knee osteoarthritis    Plan: We discussed with the patient at length all the different treatment options available for the knee including anti-inflammatories, acetaminophen, rest, ice, knee strengthening exercise, occasional cortisone injections for temporary relief, and knee arthroplasty.   - He states his pain is only intermittent and he does have some relief with his brace and rest.  If his pain worsens, will consider steroid injection.  - Recommend OTC NSAIDS or topical voltaren as needed for pain  - Follow up if symptoms worsen or fail to improve.      "

## 2020-07-02 NOTE — LETTER
July 2, 2020      Chan Estrada MD  [address] [place] [address]           [facility]  [address] [facility]  [place] [address]  Phone: [phone]          Patient: John Lemos   MR Number: [id number]   YOB: 1950   Date of Visit: 7/2/2020       Dear Dr. Chan Estrada:    Thank you for referring John Lemos to me for evaluation. Attached you will find relevant portions of my assessment and plan of care.    If you have questions, please do not hesitate to call me. I look forward to following John Lemos along with you.    Sincerely,    WOO Keller  CC:  No Recipients    If you would like to receive this communication electronically, please contact [email or web address] or [phone] to request more information on [employer] Link access.    For providers and/or their staff who would like to refer a patient to [facility], please contact us through our one-stop-shop provider referral line, [facility] , at [phone].    If you feel you have received this communication in error or would no longer like to receive these types of communications, please e-mail [email or web address]

## 2020-07-14 DIAGNOSIS — E11.69 TYPE 2 DIABETES MELLITUS WITH OTHER SPECIFIED COMPLICATION, WITHOUT LONG-TERM CURRENT USE OF INSULIN: Primary | ICD-10-CM

## 2020-08-05 ENCOUNTER — PATIENT OUTREACH (OUTPATIENT)
Dept: OTHER | Facility: OTHER | Age: 70
End: 2020-08-05

## 2020-08-05 NOTE — PROGRESS NOTES
LVM to discuss BG readings and medications.   A1c at goal.     Last 6 Patient Entered Readings                                          Most Recent A1c: 6.8% on 6/5/2020  (Goal: 8%)     Recent Readings 8/5/2020 8/4/2020 8/3/2020 8/2/2020 8/1/2020    Blood Glucose (mg/dL) 131 145 123 135 140        Diabetes Medications             metFORMIN (GLUCOPHAGE) 500 MG tablet TAKE 2 TABLETS BY MOUTH TWICE DAILY WITH BREAKFAST AND WITH SUPPER

## 2020-08-19 ENCOUNTER — PATIENT OUTREACH (OUTPATIENT)
Dept: ADMINISTRATIVE | Facility: OTHER | Age: 70
End: 2020-08-19

## 2020-08-19 NOTE — PROGRESS NOTES
Health Maintenance Due   Topic Date Due    Foot Exam  07/15/2020     Updates were requested from care everywhere.  Chart was reviewed for overdue Proactive [facility] Encounters (MAURISIO) topics (CRS, Breast Cancer Screening, Eye exam)  Health Maintenance has been updated.  LINKS immunization registry triggered.  Immunizations were reconciled.

## 2020-08-20 ENCOUNTER — OFFICE VISIT (OUTPATIENT)
Dept: PODIATRY | Facility: CLINIC | Age: 70
End: 2020-08-20
Payer: COMMERCIAL

## 2020-08-20 VITALS
BODY MASS INDEX: 28.25 KG/M2 | WEIGHT: 180 LBS | SYSTOLIC BLOOD PRESSURE: 132 MMHG | DIASTOLIC BLOOD PRESSURE: 75 MMHG | HEIGHT: 67 IN

## 2020-08-20 DIAGNOSIS — M20.12 HALLUX ABDUCTO VALGUS, LEFT: ICD-10-CM

## 2020-08-20 DIAGNOSIS — M20.41 HAMMER TOES OF BOTH FEET: ICD-10-CM

## 2020-08-20 DIAGNOSIS — M20.42 HAMMER TOES OF BOTH FEET: ICD-10-CM

## 2020-08-20 DIAGNOSIS — M20.11 HALLUX ABDUCTO VALGUS, RIGHT: ICD-10-CM

## 2020-08-20 DIAGNOSIS — E11.9 COMPREHENSIVE DIABETIC FOOT EXAMINATION, TYPE 2 DM, ENCOUNTER FOR: Primary | ICD-10-CM

## 2020-08-20 PROCEDURE — 3044F HG A1C LEVEL LT 7.0%: CPT | Mod: CPTII,S$GLB,, | Performed by: PODIATRIST

## 2020-08-20 PROCEDURE — 99213 PR OFFICE/OUTPT VISIT, EST, LEVL III, 20-29 MIN: ICD-10-PCS | Mod: S$GLB,,, | Performed by: PODIATRIST

## 2020-08-20 PROCEDURE — 1101F PT FALLS ASSESS-DOCD LE1/YR: CPT | Mod: CPTII,S$GLB,, | Performed by: PODIATRIST

## 2020-08-20 PROCEDURE — 1159F MED LIST DOCD IN RCRD: CPT | Mod: S$GLB,,, | Performed by: PODIATRIST

## 2020-08-20 PROCEDURE — 1101F PR PT FALLS ASSESS DOC 0-1 FALLS W/OUT INJ PAST YR: ICD-10-PCS | Mod: CPTII,S$GLB,, | Performed by: PODIATRIST

## 2020-08-20 PROCEDURE — 99213 OFFICE O/P EST LOW 20 MIN: CPT | Mod: S$GLB,,, | Performed by: PODIATRIST

## 2020-08-20 PROCEDURE — 99999 PR PBB SHADOW E&M-EST. PATIENT-LVL IV: ICD-10-PCS | Mod: PBBFAC,,, | Performed by: PODIATRIST

## 2020-08-20 PROCEDURE — 3008F PR BODY MASS INDEX (BMI) DOCUMENTED: ICD-10-PCS | Mod: CPTII,S$GLB,, | Performed by: PODIATRIST

## 2020-08-20 PROCEDURE — 3044F PR MOST RECENT HEMOGLOBIN A1C LEVEL <7.0%: ICD-10-PCS | Mod: CPTII,S$GLB,, | Performed by: PODIATRIST

## 2020-08-20 PROCEDURE — 1126F PR PAIN SEVERITY QUANTIFIED, NO PAIN PRESENT: ICD-10-PCS | Mod: S$GLB,,, | Performed by: PODIATRIST

## 2020-08-20 PROCEDURE — 3008F BODY MASS INDEX DOCD: CPT | Mod: CPTII,S$GLB,, | Performed by: PODIATRIST

## 2020-08-20 PROCEDURE — 1159F PR MEDICATION LIST DOCUMENTED IN MEDICAL RECORD: ICD-10-PCS | Mod: S$GLB,,, | Performed by: PODIATRIST

## 2020-08-20 PROCEDURE — 99999 PR PBB SHADOW E&M-EST. PATIENT-LVL IV: CPT | Mod: PBBFAC,,, | Performed by: PODIATRIST

## 2020-08-20 PROCEDURE — 1126F AMNT PAIN NOTED NONE PRSNT: CPT | Mod: S$GLB,,, | Performed by: PODIATRIST

## 2020-08-20 NOTE — PATIENT INSTRUCTIONS
Recommend lotions: eucerin, eucerin for diabetics, aquaphor, A&D ointment, gold bond for diabetics, sween, Monon's Bees all purpose baby ointment,  urea 40 with aloe (found on amazon.com)    Shoe recommendations: (try 6pm.com, zappos.com , nordstromrack.Nanoleaf, or shoes.Nanoleaf for discounted prices) you can visit [employer] in [place]  or [employer] in the [facility] (there are also several shoe brand outlets in the [facility])    Asics (GT 2000 or gel foundations), new balance stability type shoes, saucony (stabil c3),  Stearns (GTS or Beast or transcend), propet (tennis shoe)    Sofft Brand or axxiom (women) Fly&Anthony (men), clarks, crocs, aerosoles, naturalizers, SAS, ecco, born, tran dixon, rockports (dress shoes)    Vionic, burkenstocks, fitflops, propet (sandals)  Nike comfort thong sandals, crocs, propet (house shoes)    Nail Home remedy:  Vicks Vapor rub to nails for easier manageability    Diabetes: Inspecting Your Feet  Diabetes increases your chances of developing foot problems. So inspect your feet every day. This helps you find small skin irritations before they become serious infections. If you have trouble seeing the bottoms of your feet, use a mirror or ask a family member or friend to help.     Pressure spots on the bottom of the foot are common areas where problems develop.   How to check your feet  Below are tips to help you look for foot problems. Try to check your feet at the same time each day, such as when you get out of bed in the morning:  · Check the top of each foot. The tops of toes, back of the heel, and outer edge of the foot can get a lot of rubbing from poor-fitting shoes.  · Check the bottom of each foot. Daily wear and tear often leads to problems at pressure spots.  · Check the toes and nails. Fungal infections often occur between toes. Toenail problems can also be a sign of fungal infections or lead to breaks in the skin.  · Check your shoes, too. Loose objects inside a shoe can  injure the foot. Use your hand to feel inside your shoes for things like andree, loose stitching, or rough areas that could irritate your skin.  Warning signs  Look for any color changes in the foot. Redness with streaks can signal a severe infection, which needs immediate medical attention. Tell your doctor right away if you have any of these problems:  · Swelling, sometimes with color changes, may be a sign of poor blood flow or infection. Symptoms include tenderness and an increase in the size of your foot.  · Warm or hot areas on your feet may be signs of infection. A foot that is cold may not be getting enough blood.  · Sensations such as burning, tingling, or pins and needles can be signs of a problem. Also check for areas that may be numb.  · Hot spots are caused by friction or pressure. Look for hot spots in areas that get a lot of rubbing. Hot spots can turn into blisters, calluses, or sores.  · Cracks and sores are caused by dry or irritated skin. They are a sign that the skin is breaking down, which can lead to infection.  · Toenail problems to watch for include nails growing into the skin (ingrown toenail) and causing redness or pain. Thick, yellow, or discolored nails can signal a fungal infection.  · Drainage and odor can develop from untreated sores and ulcers. Call your doctor right away if you notice white or yellow drainage, bleeding, or unpleasant odor.   © 4978-2276 [employer]. [address] [place] [address]. All rights reserved. This information is not intended as a substitute for professional medical care. Always follow your healthcare professional's instructions.        Step-by-Step:  Inspecting Your Feet (Diabetes)    Date Last Reviewed: 10/1/2016  © 7607-2638 [employer]. [address] [place] [address]. All rights reserved. This information is not intended as a substitute for professional medical care. Always follow your healthcare  professional's instructions.

## 2020-08-21 NOTE — PROGRESS NOTES
Subjective:      Patient ID: John Lemos is a 70 y.o. male.    Chief Complaint: Diabetes Mellitus, Diabetic Foot Exam, and Nail Care    John is a 70 y.o. male who presents to the clinic upon referral from Dr. Whit ugarte. provider found  for evaluation and treatment of diabetic feet. John has a past medical history of Arthritis, Cataract, Diabetes mellitus (7 yrs), Diabetes mellitus type 2, controlled (9/8/2012), Eye injury, and Hyperlipidemia.  The patient has no major complaints with feet. Chief concern is how to care for feet as a diabetic.    PCP: Chan Estrada MD    Date Last Seen by PCP:   Chief Complaint   Patient presents with    Diabetes Mellitus    Diabetic Foot Exam    Nail Care       Current shoe gear: Tennis shoes    Hemoglobin A1C   Date Value Ref Range Status   06/05/2020 6.8 (H) 4.0 - 5.6 % Final     Comment:     ADA Screening Guidelines:  5.7-6.4%  Consistent with prediabetes  >or=6.5%  Consistent with diabetes  High levels of fetal hemoglobin interfere with the HbA1C  assay. Heterozygous hemoglobin variants (HbS, HgC, etc)do  not significantly interfere with this assay.   However, presence of multiple variants may affect accuracy.     03/06/2020 7.2 (H) 4.0 - 5.6 % Final     Comment:     ADA Screening Guidelines:  5.7-6.4%  Consistent with prediabetes  >or=6.5%  Consistent with diabetes  High levels of fetal hemoglobin interfere with the HbA1C  assay. Heterozygous hemoglobin variants (HbS, HgC, etc)do  not significantly interfere with this assay.   However, presence of multiple variants may affect accuracy.     09/03/2019 6.3 (H) 4.0 - 5.6 % Final     Comment:     ADA Screening Guidelines:  5.7-6.4%  Consistent with prediabetes  >or=6.5%  Consistent with diabetes  High levels of fetal hemoglobin interfere with the HbA1C  assay. Heterozygous hemoglobin variants (HbS, HgC, etc)do  not significantly interfere with this assay.   However, presence of multiple variants may affect accuracy.                  Patient Active Problem List   Diagnosis    GERD (gastroesophageal reflux disease)    DDD (degenerative disc disease), lumbar    ED (erectile dysfunction)    Dyslipidemia    Neuropathy    Type 2 diabetes mellitus with diabetic polyneuropathy    Screening for colon cancer    Primary osteoarthritis of right knee       Current Outpatient Medications on File Prior to Visit   Medication Sig Dispense Refill    albuterol (PROVENTIL/VENTOLIN HFA) 90 mcg/actuation inhaler Inhale 2 puffs into the lungs every 6 (six) hours as needed for Wheezing. Whichever brand covered by insurance 18 g 0    atorvastatin (LIPITOR) 40 MG tablet Take 1 tablet by mouth once daily 90 tablet 0    benzonatate (TESSALON) 100 MG capsule Take 1 capsule (100 mg total) by mouth 3 (three) times daily as needed for Cough. (Patient not taking: Reported on 12/19/2019) 30 capsule 0    blood sugar diagnostic (FREESTYLE LITE STRIPS) Strp Inject 1 each as directed once daily. 100 each 3    DULoxetine (CYMBALTA) 60 MG capsule Take 1 capsule by mouth once daily 90 capsule 0    econazole nitrate 1 % cream Apply topically once daily. Apply to affected areas for 7 days 85 g 0    etodolac (LODINE) 400 MG tablet Take 1 tablet by mouth twice daily as needed 60 tablet 0    fluticasone (FLONASE) 50 mcg/actuation nasal spray 1 spray (50 mcg total) by Each Nare route once daily. 16 g 2    lancets (FREESTYLE LANCETS) 28 gauge Misc Inject 1 lancet as directed once daily. 100 each 3    levocetirizine (XYZAL) 5 MG tablet Take 1 tablet (5 mg total) by mouth every evening. 30 tablet 11    loratadine (CLARITIN) 10 mg tablet Take 1 tablet (10 mg total) by mouth daily as needed for Allergies (or runny nose). 30 tablet 0    losartan (COZAAR) 50 MG tablet Take 1 tablet by mouth once daily 90 tablet 0    LYRICA 100 mg capsule TAKE 1 CAPSULE BY MOUTH THREE TIMES DAILY 90 capsule 0    metFORMIN (GLUCOPHAGE) 500 MG tablet TAKE 2 TABLETS BY MOUTH TWICE  DAILY WITH BREAKFAST AND WITH SUPPER 360 tablet 0    naftifine (NAFTIN) 1 % cream Apply topically once daily.      omeprazole (PRILOSEC) 20 MG capsule Take 20 mg by mouth. 1 Capsule, Delayed Release(E.C.) Oral Every morning      [DISCONTINUED] metFORMIN (GLUCOPHAGE) 500 MG tablet TAKE 2 TABLETS BY MOUTH TWICE DAILY WITH BREAKFAST AND WITH SUPPER 360 tablet 0     No current facility-administered medications on file prior to visit.        Review of patient's allergies indicates:   Allergen Reactions    Sulfa (sulfonamide antibiotics) Swelling       Past Surgical History:   Procedure Laterality Date    COLONOSCOPY N/A 11/25/2019    Procedure: COLONOSCOPY;  Surgeon: Eddie Leslie MD;  [facility]: [facility];  Service: Endoscopy;  Laterality: N/A;       Family History   Problem Relation Age of Onset    Diabetes Mother     Cancer Mother     Hypertension Mother     Diabetes Father     Cancer Father     No Known Problems Sister     No Known Problems Brother     No Known Problems Maternal Aunt     No Known Problems Maternal Uncle     No Known Problems Paternal Aunt     No Known Problems Paternal Uncle     No Known Problems Maternal Grandmother     No Known Problems Maternal Grandfather     No Known Problems Paternal Grandmother     No Known Problems Paternal Grandfather     Amblyopia Neg Hx     Blindness Neg Hx     Cataracts Neg Hx     Glaucoma Neg Hx     Macular degeneration Neg Hx     Retinal detachment Neg Hx     Strabismus Neg Hx     Stroke Neg Hx     Thyroid disease Neg Hx        Social History     Socioeconomic History    Marital status:      Spouse name: Not on file    Number of children: Not on file    Years of education: Not on file    Highest education level: Not on file   Occupational History    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     Employer: [employer]   Social Needs    Financial resource strain: Not hard at all    Food  "insecurity     Worry: Never true     Inability: Never true    Transportation needs     Medical: No     Non-medical: No   Tobacco Use    Smoking status: Never Smoker    Smokeless tobacco: Never Used   Substance and Sexual Activity    Alcohol use: No     Frequency: Never    Drug use: No    Sexual activity: Not Currently   Lifestyle    Physical activity     Days per week: 5 days     Minutes per session: 30 min    Stress: Not at all   Relationships    Social connections     Talks on phone: More than three times a week     Gets together: Once a week     Attends Taoist service: Not on file     Active member of club or organization: Yes     Attends meetings of clubs or organizations: More than 4 times per year     Relationship status:    Other Topics Concern    Not on file   Social History Narrative    Not on file       Review of Systems   Constitution: Negative for chills and fever.   Cardiovascular: Negative for claudication and leg swelling.   Respiratory: Negative for cough and shortness of breath.    Skin: Positive for dry skin. Negative for itching, nail changes and rash.   Musculoskeletal: Positive for arthritis, back pain, joint pain and myalgias. Negative for falls, joint swelling and muscle weakness.   Gastrointestinal: Negative for diarrhea, nausea and vomiting.   Neurological: Positive for paresthesias. Negative for numbness, tremors and weakness.   Psychiatric/Behavioral: Negative for altered mental status and hallucinations.           Objective:      Vitals:    08/20/20 1514   BP: 132/75   Weight: 81.6 kg (180 lb)   Height: 5' 7" (1.702 m)   PainSc: 0-No pain       Physical Exam  Nursing note reviewed.   Constitutional:       General: He is not in acute distress.     Appearance: He is not toxic-appearing or diaphoretic.      Comments: Pt. is well-developed, well-nourished, appears stated age, in no acute distress, alert and oriented x 3. No evidence of depression, anxiety, or agitation. " Calm, cooperative, and communicative. Appropriate interactions and affect.   Cardiovascular:      Pulses:           Dorsalis pedis pulses are 2+ on the right side and 2+ on the left side.        Posterior tibial pulses are 2+ on the right side and 2+ on the left side.      Comments: dorsalis pedis, posterior tibial pulses, and perforating peroneal pulses are palpable bilaterally. Capillary refill time is within normal limits. Digital hair present.   Pulmonary:      Effort: No respiratory distress.   Musculoskeletal:      Right ankle: No tenderness. No lateral malleolus, no medial malleolus, no AITFL, no CF ligament and no posterior TFL tenderness found. Achilles tendon exhibits no pain, no defect and normal Alva's test results.      Left ankle: No tenderness. No lateral malleolus, no medial malleolus, no AITFL, no CF ligament and no posterior TFL tenderness found. Achilles tendon exhibits no pain, no defect and normal Alva's test results.      Right foot: No tenderness or bony tenderness.      Left foot: No tenderness or bony tenderness.      Comments:  Decreased stride, station of gait.  apropulsive toe off.  Increased angle and base of gait.      Patient has hammertoes of digits 2-5 bilateral partially reducible without symptom today.    Visible and palpable bunion without pain at dorsomedial 1st metatarsal head right and left.  Hallux abducted right and left partially reducible, tracks laterally without being track bound.  No ecchymosis, erythema, edema, or cardinal signs infection or signs of trauma same foot.   Lymphadenopathy:      Comments: No lymphatic streaking    Negative lymphadenopathy bilateral popliteal fossa and tarsal tunnel.     Skin:     General: Skin is warm and dry.      Coloration: Skin is not pale.      Findings: No bruising, burn, erythema, lesion or rash.      Nails: There is no clubbing.        Comments: Skin is of normal turgor. Normal temperature gradient. Examination of the skin  reveals no evidence of significant rashes, open lesions, suspicious appearing nevi or other concerning lesions.      Toenails 1-5 bilaterally are neatly trimmed; of normal color and thickness     Neurological:      Sensory: No sensory deficit.      Motor: No tremor or atrophy.      Comments: [place]-Bashir 5.07 monofilament is intact bilateral feet. Sharp/dull sensation is also intact Bilateral feet. Proprioception is grossly intact. Vibratory sensation intact (pt able to sense vibration stop within 3-5 seconds)     Psychiatric:         Attention and Perception: He is attentive.         Mood and Affect: Mood is not anxious. Affect is not inappropriate.         Speech: He is communicative. Speech is not slurred.         Behavior: Behavior is not combative.               Assessment:       Encounter Diagnoses   Name Primary?    Comprehensive diabetic foot examination, type 2 DM, encounter for Yes    Hammer toes of both feet     Hallux abducto valgus, left     Hallux abducto valgus, right          Plan:       John was seen today for diabetes mellitus, diabetic foot exam and nail care.    Diagnoses and all orders for this visit:    Comprehensive diabetic foot examination, type 2 DM, encounter for    Hammer toes of both feet    Hallux abducto valgus, left    Hallux abducto valgus, right      I counseled the patient on his conditions, their implications and medical management.      Greater than 50% of this visit spent on counseling and coordination of care.    Education about the diabetic foot, neuropathy, and prevention of limb loss.    Shoe inspection. Diabetic Foot Education. Patient reminded of the importance of good nutrition/healthy diet/weight management and blood sugar control to help prevent podiatric complications of diabetes. Patient instructed on proper foot hygeine. Wear comfortable, proper fitting shoes. Wash feet daily. Dry well. After drying, apply moisturizer to feet (no lotion to webspaces).  Inspect feet daily for skin breaks, blisters, swelling, or redness. Wear cotton socks (preferably white)  Change socks every day. Do NOT walk barefoot. Do NOT use heating pads or hot water soaks. We discussed wearing proper shoe gear, daily foot inspections, never walking without protective shoe gear.     Discussed edema control and the importance of daily moisturizer to the feet such as Gold bonds diabetic foot cream    Recommend applying vicks vaporub to thick abnormal toenails daily x 6 months to treat fungal nail infection.    He will continue to monitor the areas daily, inspect his feet, wear protective shoe gear when ambulatory, moisturizer to maintain skin integrity and follow in this office in approximately 12 months, sooner p.r.n.

## 2020-08-26 ENCOUNTER — PATIENT OUTREACH (OUTPATIENT)
Dept: OTHER | Facility: OTHER | Age: 70
End: 2020-08-26

## 2020-08-31 ENCOUNTER — LAB VISIT (OUTPATIENT)
Dept: LAB | Facility: HOSPITAL | Age: 70
End: 2020-08-31
Attending: INTERNAL MEDICINE
Payer: COMMERCIAL

## 2020-08-31 DIAGNOSIS — N52.9 ERECTILE DYSFUNCTION, UNSPECIFIED ERECTILE DYSFUNCTION TYPE: ICD-10-CM

## 2020-08-31 DIAGNOSIS — E53.8 VITAMIN B12 DEFICIENCY: ICD-10-CM

## 2020-08-31 DIAGNOSIS — E11.42 TYPE 2 DIABETES MELLITUS WITH DIABETIC POLYNEUROPATHY, WITHOUT LONG-TERM CURRENT USE OF INSULIN: ICD-10-CM

## 2020-08-31 LAB
ESTIMATED AVG GLUCOSE: 148 MG/DL (ref 68–131)
HBA1C MFR BLD HPLC: 6.8 % (ref 4–5.6)
TESTOST SERPL-MCNC: 580 NG/DL (ref 304–1227)
VIT B12 SERPL-MCNC: 436 PG/ML (ref 210–950)

## 2020-08-31 PROCEDURE — 84403 ASSAY OF TOTAL TESTOSTERONE: CPT

## 2020-08-31 PROCEDURE — 36415 COLL VENOUS BLD VENIPUNCTURE: CPT | Mod: PO

## 2020-08-31 PROCEDURE — 82607 VITAMIN B-12: CPT

## 2020-08-31 PROCEDURE — 83036 HEMOGLOBIN GLYCOSYLATED A1C: CPT

## 2020-09-08 ENCOUNTER — TELEPHONE (OUTPATIENT)
Dept: OPTOMETRY | Facility: CLINIC | Age: 70
End: 2020-09-08

## 2020-09-08 ENCOUNTER — OFFICE VISIT (OUTPATIENT)
Dept: OPTOMETRY | Facility: CLINIC | Age: 70
End: 2020-09-08
Payer: COMMERCIAL

## 2020-09-08 DIAGNOSIS — H52.13 MYOPIA OF BOTH EYES WITH ASTIGMATISM AND PRESBYOPIA: ICD-10-CM

## 2020-09-08 DIAGNOSIS — H52.4 MYOPIA OF BOTH EYES WITH ASTIGMATISM AND PRESBYOPIA: ICD-10-CM

## 2020-09-08 DIAGNOSIS — Z01.00 EYE EXAM, ROUTINE: Primary | ICD-10-CM

## 2020-09-08 DIAGNOSIS — H52.203 MYOPIA OF BOTH EYES WITH ASTIGMATISM AND PRESBYOPIA: ICD-10-CM

## 2020-09-08 PROCEDURE — 92015 PR REFRACTION: ICD-10-PCS | Mod: S$GLB,,, | Performed by: OPTOMETRIST

## 2020-09-08 PROCEDURE — 92015 DETERMINE REFRACTIVE STATE: CPT | Mod: S$GLB,,, | Performed by: OPTOMETRIST

## 2020-09-08 PROCEDURE — 99999 PR PBB SHADOW E&M-EST. PATIENT-LVL III: ICD-10-PCS | Mod: PBBFAC,,, | Performed by: OPTOMETRIST

## 2020-09-08 PROCEDURE — 99999 PR PBB SHADOW E&M-EST. PATIENT-LVL III: CPT | Mod: PBBFAC,,, | Performed by: OPTOMETRIST

## 2020-09-08 PROCEDURE — 92014 PR EYE EXAM, EST PATIENT,COMPREHESV: ICD-10-PCS | Mod: S$GLB,,, | Performed by: OPTOMETRIST

## 2020-09-08 PROCEDURE — 92014 COMPRE OPH EXAM EST PT 1/>: CPT | Mod: S$GLB,,, | Performed by: OPTOMETRIST

## 2020-09-08 NOTE — TELEPHONE ENCOUNTER
Reggie Va ins benefits as of 20:    Provider Information  Provider NPI  [id number]  Provider Name  [facility]  Provider Tax ID  [id number]  Office Address: [address] [place] [address], [address]  Shipping Address: [address] [place] [address], [address]  Select Practitioner  Member Information  Member Name  DIANE NGUYEN  Member   1950  Member ID  [id number]  Relationship  Self  Group/Sub Group  [facility] [id number]  Plan Name/Plan Prefix  [employer] Benefit/YCW  Service Record Form  Select Services Below  (*Note: For Exam or any other Services, Please select a Practitioner.)  Frames  Contact Lens  Spectacle Lens  Benefit Details  Service Date: 2020  Print Benefit Details  printer  BENEFIT MESSAGES     DRESS BENEFIT: ELIGIBLE FOR 1 EYE EXAM, 1 PAIR OF FRAMES, 1 PAIR OF SPECTACLE LENSES, OR 1 PAIR OF CONTACT LENSES.  Plan Covered Benefits  Category Description Frequency Copay Amount Allowed Per Period Available Next Available Date Allowance Remaining  Exam Routine Exam Every  $25 -- Yes

## 2020-09-08 NOTE — PROGRESS NOTES
HPI     DLS: 8/13/2019  [facility]    Pt here for diabetic exam  Pt states no noticeable va changes. Occasional floaters. Denies flashes.     No gtts     LBS= 140 this morning   Hemoglobin A1C       Date                     Value               Ref Range             Status                08/31/2020               6.8 (H)             4.0 - 5.6 %           Final                  06/05/2020               6.8 (H)             4.0 - 5.6 %           Final                  03/06/2020               7.2 (H)             4.0 - 5.6 %           Final              ----------      Last edited by Sylvester Lloyd, OD on 9/8/2020  3:48 PM. (History)            Assessment /Plan     For exam results, see Encounter Report.    Eye exam, routine  -No retinopathy noted today.  Continued control with primary care physician and annual comprehensive eye exam.  -Reggie    Myopia of both eyes with astigmatism and presbyopia  Eyeglass Final Rx     Eyeglass Final Rx       Sphere Cylinder Axis Add    Right -1.75 +0.75 025 +2.50    Left -1.75 +0.50 010 +2.50    Type: PAL    Expiration Date: 9/9/2021                  RTC 1 yr

## 2020-09-16 NOTE — PROGRESS NOTES
Digital Medicine: Health  Follow-Up    The history is provided by the patient.             Reason for review: Blood glucose not at goal  Care Team received low BG alert.          Topics Covered on Call: physical activity    Additional Follow-up details: Mr. Lopez reports that he's doing well. He was in the hospital with his wife who was recovering from surgery.     Called to address low BG from patient on 9/4/20 at 1. Patient reports that he just didn't get enough blood on the strip.    Patient did mention that he's running out of lancets. Instructed him to call HME to order more.           Diet-Not assessed          Physical Activity-Change      He added cutting grass to His physical activity routine.        He identified the following barriers to physical activity: wife's surgery        Additional physical activity details: Patient reports that he was doing well with exercise, except for the last few days in preparation for his wife's surgery. He knows he'll get back to normal soon.      Medication Adherence-Medication Adherence not addressed.      Substance, Sleep, Stress-Not assessed      Continue current diet/physical activity routine.       Addressed patient questions and patient has my contact information if needed prior to next outreach. Patient verbalizes understanding.      Explained the importance of self-monitoring and medication adherence. Encouraged the patient to communicate with their health  for lifestyle modifications to help improve or maintain a healthy lifestyle.            There are no preventive care reminders to display for this patient.      Last 6 Patient Entered Readings                                          Most Recent A1c: 6.8% on 8/31/2020  (Goal: 8%)     Recent Readings 9/16/2020 9/15/2020 9/13/2020 9/12/2020 9/11/2020    Blood Glucose (mg/dL) 139 115 127 131 145

## 2020-09-17 ENCOUNTER — PATIENT MESSAGE (OUTPATIENT)
Dept: FAMILY MEDICINE | Facility: CLINIC | Age: 70
End: 2020-09-17

## 2020-09-17 NOTE — PROGRESS NOTES
Subjective:       Chief Complaint  Chief Complaint   Patient presents with    Knee Pain       HPI  John Lemos is a 70 y.o. male with multiple medical diagnoses as listed in the medical history and problem list that presents for above complaint(s).    The patient location is: [place]   The chief complaint leading to consultation is: knee pain    Visit type: audiovisual    Face to Face time with patient: 8 minutes  15 minutes of total time spent on the encounter, which includes face to face time and non-face to face time preparing to see the patient (eg, review of tests), Obtaining and/or reviewing separately obtained history, Documenting clinical information in the electronic or other health record, Independently interpreting results (not separately reported) and communicating results to the patient/family/caregiver, or Care coordination (not separately reported).     Each patient to whom he or she provides medical services by telemedicine is:  (1) informed of the relationship between the physician and patient and the respective role of any other health care provider with respect to management of the patient; and (2) notified that he or she may decline to receive medical services by telemedicine and may withdraw from such care at any time.    Right knee pain is worsening he states   Left knee is aggravating him as well  Experiencing some swelling and discomfort  Saw Ortho in July 2020 was offered  Using over the counter medications for pain    Patient Care Team:  Chan Estrada MD as PCP - General (Internal Medicine)  Chan Estrada MD as Diabetes Digital Medicine Responsible Provider (Internal Medicine)  Mag Márquez as Digital Medicine Health   Alessandra Sarabia PharmD as Diabetes Digital Medicine Clinician (Pharmacist)  Samantha Romero LPN as Care Coordinator      PAST MEDICAL HISTORY:  Past Medical History:   Diagnosis Date    Arthritis     Cataract     ou    Diabetes mellitus 7 yrs    lbs:  118 1 week ago    Diabetes mellitus type 2, controlled 9/8/2012    Eye injury     fb    Hyperlipidemia        PAST SURGICAL HISTORY:  Past Surgical History:   Procedure Laterality Date    COLONOSCOPY N/A 11/25/2019    Procedure: COLONOSCOPY;  Surgeon: Eddie Leslie MD;  Location: [facility];  Service: Endoscopy;  Laterality: N/A;       SOCIAL HISTORY:  Social History     Socioeconomic History    Marital status:      Spouse name: Not on file    Number of children: Not on file    Years of education: Not on file    Highest education level: Not on file   Occupational History    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 [employer]     Employer: [employer]   Social Needs    Financial resource strain: Not hard at all    Food insecurity     Worry: Never true     Inability: Never true    Transportation needs     Medical: No     Non-medical: No   Tobacco Use    Smoking status: Never Smoker    Smokeless tobacco: Never Used   Substance and Sexual Activity    Alcohol use: No     Frequency: Never    Drug use: No    Sexual activity: Not Currently   Lifestyle    Physical activity     Days per week: 5 days     Minutes per session: 30 min    Stress: Not at all   Relationships    Social connections     Talks on phone: More than three times a week     Gets together: Once a week     Attends [employer] service: Not on file     Active member of club or organization: Yes     Attends meetings of clubs or organizations: More than 4 times per year     Relationship status:    Other Topics Concern    Not on file   Social History Narrative    Not on file       FAMILY HISTORY:  Family History   Problem Relation Age of Onset    Diabetes Mother     Cancer Mother     Hypertension Mother     Diabetes Father     Cancer Father     No Known Problems Sister     No Known Problems Brother     No Known Problems Maternal Aunt     No Known Problems Maternal Uncle     No Known Problems  Paternal Aunt     No Known Problems Paternal Uncle     No Known Problems Maternal Grandmother     No Known Problems Maternal Grandfather     No Known Problems Paternal Grandmother     No Known Problems Paternal Grandfather     Amblyopia Neg Hx     Blindness Neg Hx     Cataracts Neg Hx     Glaucoma Neg Hx     Macular degeneration Neg Hx     Retinal detachment Neg Hx     Strabismus Neg Hx     Stroke Neg Hx     Thyroid disease Neg Hx        ALLERGIES AND MEDICATIONS: updated and reviewed.  Review of patient's allergies indicates:   Allergen Reactions    Sulfa (sulfonamide antibiotics) Swelling     Current Outpatient Medications   Medication Sig Dispense Refill    albuterol (PROVENTIL/VENTOLIN HFA) 90 mcg/actuation inhaler Inhale 2 puffs into the lungs every 6 (six) hours as needed for Wheezing. Whichever brand covered by insurance 18 g 0    DULoxetine (CYMBALTA) 60 MG capsule Take 1 capsule (60 mg total) by mouth once daily. 90 capsule 0    losartan (COZAAR) 50 MG tablet Take 1 tablet by mouth once daily 90 tablet 0    metFORMIN (GLUCOPHAGE) 500 MG tablet TAKE 2 TABLETS BY MOUTH TWICE DAILY WITH BREAKFAST AND WITH SUPPER 360 tablet 0    atorvastatin (LIPITOR) 40 MG tablet Take 1 tablet by mouth once daily 90 tablet 0    benzonatate (TESSALON) 100 MG capsule Take 1 capsule (100 mg total) by mouth 3 (three) times daily as needed for Cough. 30 capsule 0    blood sugar diagnostic (FREESTYLE LITE STRIPS) Strp Inject 1 each as directed once daily. 100 each 3    econazole nitrate 1 % cream Apply topically once daily. Apply to affected areas for 7 days 85 g 0    etodolac (LODINE) 400 MG tablet Take 1 tablet by mouth twice daily as needed 60 tablet 0    fluticasone (FLONASE) 50 mcg/actuation nasal spray 1 spray (50 mcg total) by Each Nare route once daily. 16 g 2    lancets (FREESTYLE LANCETS) 28 gauge Misc Inject 1 lancet as directed once daily. 100 each 3    levocetirizine (XYZAL) 5 MG tablet Take 1  tablet (5 mg total) by mouth every evening. 30 tablet 11    loratadine (CLARITIN) 10 mg tablet Take 1 tablet (10 mg total) by mouth daily as needed for Allergies (or runny nose). 30 tablet 0    naftifine (NAFTIN) 1 % cream Apply topically once daily.      omeprazole (PRILOSEC) 20 MG capsule Take 20 mg by mouth. 1 Capsule, Delayed Release(E.C.) Oral Every morning       No current facility-administered medications for this visit.          ROS  Review of Systems   Constitutional: Negative for activity change and unexpected weight change.   HENT: Negative for hearing loss, rhinorrhea and trouble swallowing.    Eyes: Negative for discharge and visual disturbance.   Respiratory: Negative for chest tightness and wheezing.    Cardiovascular: Negative for chest pain and palpitations.   Gastrointestinal: Negative for blood in stool, constipation, diarrhea and vomiting.   Endocrine: Negative for polydipsia and polyuria.   Genitourinary: Negative for difficulty urinating, hematuria and urgency.   Musculoskeletal: Positive for arthralgias and joint swelling. Negative for neck pain.   Neurological: Negative for weakness and headaches.   Psychiatric/Behavioral: Negative for confusion and dysphoric mood.         Objective:       Physical Exam  There were no vitals filed for this visit. There is no height or weight on file to calculate BMI.          Physical Exam  Vitals signs reviewed.   Constitutional:       General: He is not in acute distress.     Appearance: He is well-developed.   HENT:      Head: Normocephalic and atraumatic.   Pulmonary:      Effort: Pulmonary effort is normal.   Psychiatric:         Behavior: Behavior normal.             Assessment:     1. Type 2 diabetes mellitus with diabetic polyneuropathy, without long-term current use of insulin    2. Dyslipidemia associated with type 2 diabetes mellitus    3. Primary osteoarthritis of right knee    4. Essential hypertension      Plan:     Diagnoses and all orders  for this visit:    Type 2 diabetes mellitus with diabetic polyneuropathy, without long-term current use of insulin  Dyslipidemia associated with type 2 diabetes mellitus  A1c 6.8% - well controlled  The current medical regimen is effective;  continue present plan and medications.      Primary osteoarthritis of right knee  Discussed - continue etodolac, Cymbalta  Will consider corticosteroid injection  Counseled to follow-up with Ortho to reconsider injections    Essential hypertension  BP controlled presently - reviewed anti-hypertensive regimen - continue current therapy        Health Maintenance       Date Due Completion Date    Influenza Vaccine (1) 08/01/2020 12/11/2019    Override on 11/25/2015: Done    Hemoglobin A1c 11/30/2020 8/31/2020    Override on 11/25/2015: Done    Lipid Panel 06/05/2021 6/5/2020    Foot Exam 08/20/2021 8/20/2020 (Done)    Override on 8/20/2020: Done    Override on 7/15/2019: Done    Override on 1/16/2018: Done    Override on 11/25/2015: Done    Low Dose Statin 09/08/2021 9/8/2020    Eye Exam 09/08/2021 9/8/2020    Override on 9/8/2020: Done    TETANUS VACCINE 02/19/2026 2/19/2016    Colorectal Cancer Screening 11/25/2029 11/25/2019          No follow-ups on file.    The patient expressed understanding and no barriers to adherence were identified.     1. The patient indicates understanding of these issues and agrees with the plan. Brief care plan is updated and reviewed with the patient as applicable.     2. The patient is given an After Visit Summary that lists all medications with directions, allergies, orders placed during this encounter and follow-up instructions.     3. I have reviewed the patient's medical information including past medical, family, and social history sections including the medications and allergies.     4. We discussed the patient's current medications. I reconciled the patient's medication list and prepared and supplied needed refills.       Chan Estrada,  MD    Internal Medicine-Pediatrics

## 2020-09-18 ENCOUNTER — TELEPHONE (OUTPATIENT)
Dept: FAMILY MEDICINE | Facility: CLINIC | Age: 70
End: 2020-09-18

## 2020-09-18 ENCOUNTER — OFFICE VISIT (OUTPATIENT)
Dept: FAMILY MEDICINE | Facility: CLINIC | Age: 70
End: 2020-09-18
Payer: COMMERCIAL

## 2020-09-18 DIAGNOSIS — E78.5 DYSLIPIDEMIA ASSOCIATED WITH TYPE 2 DIABETES MELLITUS: ICD-10-CM

## 2020-09-18 DIAGNOSIS — M17.11 PRIMARY OSTEOARTHRITIS OF RIGHT KNEE: ICD-10-CM

## 2020-09-18 DIAGNOSIS — E11.69 DYSLIPIDEMIA ASSOCIATED WITH TYPE 2 DIABETES MELLITUS: ICD-10-CM

## 2020-09-18 DIAGNOSIS — I10 ESSENTIAL HYPERTENSION: ICD-10-CM

## 2020-09-18 DIAGNOSIS — E11.42 TYPE 2 DIABETES MELLITUS WITH DIABETIC POLYNEUROPATHY, WITHOUT LONG-TERM CURRENT USE OF INSULIN: Primary | ICD-10-CM

## 2020-09-18 DIAGNOSIS — Z23 NEED FOR INFLUENZA VACCINATION: ICD-10-CM

## 2020-09-18 PROCEDURE — 1101F PT FALLS ASSESS-DOCD LE1/YR: CPT | Mod: CPTII,,, | Performed by: INTERNAL MEDICINE

## 2020-09-18 PROCEDURE — 3044F PR MOST RECENT HEMOGLOBIN A1C LEVEL <7.0%: ICD-10-PCS | Mod: CPTII,,, | Performed by: INTERNAL MEDICINE

## 2020-09-18 PROCEDURE — 99213 OFFICE O/P EST LOW 20 MIN: CPT | Mod: 95,,, | Performed by: INTERNAL MEDICINE

## 2020-09-18 PROCEDURE — 3044F HG A1C LEVEL LT 7.0%: CPT | Mod: CPTII,,, | Performed by: INTERNAL MEDICINE

## 2020-09-18 PROCEDURE — 1101F PR PT FALLS ASSESS DOC 0-1 FALLS W/OUT INJ PAST YR: ICD-10-PCS | Mod: CPTII,,, | Performed by: INTERNAL MEDICINE

## 2020-09-18 PROCEDURE — 1159F PR MEDICATION LIST DOCUMENTED IN MEDICAL RECORD: ICD-10-PCS | Mod: ,,, | Performed by: INTERNAL MEDICINE

## 2020-09-18 PROCEDURE — 1159F MED LIST DOCD IN RCRD: CPT | Mod: ,,, | Performed by: INTERNAL MEDICINE

## 2020-09-18 PROCEDURE — 99213 PR OFFICE/OUTPT VISIT, EST, LEVL III, 20-29 MIN: ICD-10-PCS | Mod: 95,,, | Performed by: INTERNAL MEDICINE

## 2020-09-22 ENCOUNTER — CLINICAL SUPPORT (OUTPATIENT)
Dept: FAMILY MEDICINE | Facility: CLINIC | Age: 70
End: 2020-09-22
Payer: COMMERCIAL

## 2020-09-22 DIAGNOSIS — Z23 NEED FOR INFLUENZA VACCINATION: Primary | ICD-10-CM

## 2020-09-22 PROCEDURE — 90694 VACC AIIV4 NO PRSRV 0.5ML IM: CPT | Mod: S$GLB,,, | Performed by: INTERNAL MEDICINE

## 2020-09-22 PROCEDURE — 90694 FLU VACCINE - QUADRIVALENT - ADJUVANTED: ICD-10-PCS | Mod: S$GLB,,, | Performed by: INTERNAL MEDICINE

## 2020-09-22 PROCEDURE — 90471 FLU VACCINE - QUADRIVALENT - ADJUVANTED: ICD-10-PCS | Mod: S$GLB,,, | Performed by: INTERNAL MEDICINE

## 2020-09-22 PROCEDURE — 90471 IMMUNIZATION ADMIN: CPT | Mod: S$GLB,,, | Performed by: INTERNAL MEDICINE

## 2020-09-22 PROCEDURE — 99499 UNLISTED E&M SERVICE: CPT | Mod: S$GLB,,, | Performed by: INTERNAL MEDICINE

## 2020-09-22 PROCEDURE — 99499 NO LOS: ICD-10-PCS | Mod: S$GLB,,, | Performed by: INTERNAL MEDICINE

## 2020-09-22 NOTE — PROGRESS NOTES
Flu injection given &.VIS given. Tolerated injection well.Patient instructed to wait 15 minutes for monitoring.

## 2020-09-30 NOTE — PROGRESS NOTES
Digital Medicine: Clinician Follow-Up    Called patient for follow up.     A1c is controlled - due for repeat in November.     Discussed elevated fasting readings lately, patient states he notices this with dietary excursions. Lifestyle modifications encouraged.     The history is provided by the patient.      Review of patient's allergies indicates:   -- Sulfa (sulfonamide antibiotics) -- Swelling  Follow-up reason(s): routine follow up.     Diabetes    Patient readings are stable Patient is not experiencing signs/symptoms of hypoglycemia.  Patient is not experiencing signs/symptoms of hyperglycemia.          Last 6 Patient Entered Readings                                          Most Recent A1c: 6.8% on 8/31/2020  (Goal: 8%)     Recent Readings 9/28/2020 9/27/2020 9/26/2020 9/25/2020 9/24/2020    Blood Glucose (mg/dL) 132 132 150 131 144               Depression Screening  Did not address depression screening.    Sleep Apnea Screening    Did not address sleep apnea screening.     Medication Affordability Screening  Did not address medication affordability screening.     Medication Adherence-Medication adherence was assessed.          ASSESSMENT(S)  Patient's A1C goal is less than or equal to 8 per 2020 ADA guidelines. Patient's most recent A1C result is at goal. Lab Results    Component                Value               Date                     HGBA1C                   6.8 (H)             08/31/2020          .       A1c remains at goal.   SMBG are at goal 100% of the time per goal of <180, but fasting readings are not <130.   No hypoglycemia episodes or symptoms.       Diabetes Plan  Labs ordered. Standing order for A1c and urine microalbumin in place Expected Lab Date:  Continue current therapy.       Addressed patient questions and patient has my contact information if needed prior to next outreach. Patient verbalizes understanding.      Explained the importance of self-monitoring and medication adherence.  Encouraged the patient to communicate with their health  for lifestyle modifications to help improve or maintain a healthy lifestyle.              There are no preventive care reminders to display for this patient.  There are no preventive care reminders to display for this patient.      Diabetes Medications             metFORMIN (GLUCOPHAGE) 500 MG tablet TAKE 2 TABLETS BY MOUTH TWICE DAILY WITH BREAKFAST AND WITH SUPPER

## 2020-11-11 ENCOUNTER — PATIENT OUTREACH (OUTPATIENT)
Dept: OTHER | Facility: OTHER | Age: 70
End: 2020-11-11

## 2020-11-11 NOTE — PROGRESS NOTES
Digital Medicine: Health  Follow-Up    The history is provided by the patient.             Reason for review: Blood glucose not at goal        Topics Covered on Call: device accuracy and A1c    Additional Follow-up details: Mr. Lopez reports that he's doing well. He was feeding a baby, so I didn't keep him long today.     Reminded patient that his new A1c is due at the end of November. Patient reports that he has an appt scheduled.     Patient also reports that he did get a 160 for his fasting reading a few days ago, but he didn't think it was accurate. Patient has been keeping his device charged and keeps the battery >90%. Advised patient to use control solution to verify accuracy of device. He will do that, and let me know if he has any issues.             Diet-Not assessed          Physical Activity-Not assessed    Medication Adherence-Medication Adherence not addressed.      Substance, Sleep, Stress-Not assessed      Instructed to charge device.  Reviewed Device Techniques. Utilize control soultion     Addressed patient questions and patient has my contact information if needed prior to next outreach. Patient verbalizes understanding.      Explained the importance of self-monitoring and medication adherence. Encouraged the patient to communicate with their health  for lifestyle modifications to help improve or maintain a healthy lifestyle.               There are no preventive care reminders to display for this patient.        Last 6 Patient Entered Readings                                          Most Recent A1c: 6.8% on 8/31/2020  (Goal: 8%)     Recent Readings 11/9/2020 11/8/2020 11/6/2020 11/4/2020 11/3/2020    Blood Glucose (mg/dL) 160 138 132 123 135

## 2020-11-18 DIAGNOSIS — E11.9 CONTROLLED TYPE 2 DIABETES MELLITUS WITHOUT COMPLICATION, WITHOUT LONG-TERM CURRENT USE OF INSULIN: Chronic | ICD-10-CM

## 2020-11-18 RX ORDER — LOSARTAN POTASSIUM 50 MG/1
50 TABLET ORAL DAILY
Qty: 90 TABLET | Refills: 3 | Status: SHIPPED | OUTPATIENT
Start: 2020-11-18 | End: 2021-09-17

## 2020-11-18 RX ORDER — ETODOLAC 400 MG/1
400 TABLET, FILM COATED ORAL 2 TIMES DAILY PRN
Qty: 60 TABLET | Refills: 0 | Status: SHIPPED | OUTPATIENT
Start: 2020-11-18 | End: 2021-01-12 | Stop reason: SDUPTHER

## 2020-12-17 ENCOUNTER — TELEPHONE (OUTPATIENT)
Dept: FAMILY MEDICINE | Facility: CLINIC | Age: 70
End: 2020-12-17

## 2020-12-17 DIAGNOSIS — Z20.822 CLOSE EXPOSURE TO COVID-19 VIRUS: Primary | ICD-10-CM

## 2020-12-17 NOTE — TELEPHONE ENCOUNTER
----- Message from Melissa Fitzgerald LPN sent at 12/17/2020  3:13 PM CST -----  Regarding: FW: COVID-19 concern  Contact: pt    ----- Message -----  From: Mitchell Pena  Sent: 12/17/2020   3:06 PM CST  To: [facility] Staff  Subject: COVID-19 concern                                 Pt is requesting a call back from this provider to discuss best care options. 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 He is currently asymptomatic. Please contact the pt to advise at [phone].

## 2020-12-17 NOTE — TELEPHONE ENCOUNTER
We are testing folks with symptoms with or without exposure but have limited testing available for asymptomatic patients with exposure but could consider scheduling for drive thru testing at [facility] vs community testing - I placed the order for him. There is a caveat that any asymptomatic patient might test negative initially if symptoms have not yet developed so therefore it is recommended patient consider monitoring temp twice daily/looking for symptoms 2-5 days after confirmed exposure

## 2020-12-23 ENCOUNTER — PATIENT OUTREACH (OUTPATIENT)
Dept: OTHER | Facility: OTHER | Age: 70
End: 2020-12-23

## 2020-12-30 NOTE — PROGRESS NOTES
"Digital Medicine: Health  Follow-Up    The history is provided by the patient.             Reason for review: Blood glucose not at goal  Care Team received low BG alert.          Topics Covered on Call: physical activity and Diet    Additional Follow-up details: Mr. Lopez reports that he's doing well.     Called to address low BG on 12/23/20 at 3. Patient reports that it was a false reading. He was able to retest and the reading came up to 122.    Overall patient is happy with his readings. He said that it may pop up every once and a while, but not anything he's concerned about.            Diet-Change      Additional diet details: Patient reports that his readings are sometimes high due to his wife's good cooking. Asked him to let me know if he ever needs and diabetic friendly recipes.     Physical Activity-Change      He added treadmill to His physical activity routine.    He removed bike from His physical activity.    He identified the following barriers to physical activity: knee pain        Intervention(s): created new goal         Additional physical activity details: Patient reports that he's been having a lot of knee pain which is limiting his exercise. He thinks he'll eventually need a knee replacement, but is hoping to put it off as long as he can.     Right now, walking on the treadmill is comfortable for him. He use to go much faster or get on the bike, but those exercises now cause his knee to hurt more. Patient is planning on walking on the treadmill ~3x/week at this time.       Medication Adherence-Medication adherence was asssessed.  Patient continue taking medication as prescribed.          Patient report that he hasn't skipped his medication, but sometimes he will "not take it on time". He thinks this is what causes the readings to be up some days.  Substance, Sleep, Stress-Not assessed      Continue current diet/physical activity routine.       Addressed patient questions and patient has my " contact information if needed prior to next outreach. Patient verbalizes understanding.      Explained the importance of self-monitoring and medication adherence. Encouraged the patient to communicate with their health  for lifestyle modifications to help improve or maintain a healthy lifestyle.                   Topic    Hemoglobin A1C            Last 6 Patient Entered Readings                                          Most Recent A1c: 6.8% on 8/31/2020  (Goal: 8%)     Recent Readings 12/29/2020 12/27/2020 12/25/2020 12/23/2020 12/23/2020    Blood Glucose (mg/dL) 158 151 120 122 3

## 2021-01-25 DIAGNOSIS — M51.36 DDD (DEGENERATIVE DISC DISEASE), LUMBAR: ICD-10-CM

## 2021-01-25 RX ORDER — DULOXETIN HYDROCHLORIDE 60 MG/1
60 CAPSULE, DELAYED RELEASE ORAL DAILY
Qty: 90 CAPSULE | Refills: 0 | Status: SHIPPED | OUTPATIENT
Start: 2021-01-25 | End: 2021-05-06 | Stop reason: SDUPTHER

## 2021-02-05 ENCOUNTER — LAB VISIT (OUTPATIENT)
Dept: LAB | Facility: HOSPITAL | Age: 71
End: 2021-02-05
Attending: INTERNAL MEDICINE
Payer: COMMERCIAL

## 2021-02-05 DIAGNOSIS — E11.69 TYPE 2 DIABETES MELLITUS WITH OTHER SPECIFIED COMPLICATION, WITHOUT LONG-TERM CURRENT USE OF INSULIN: ICD-10-CM

## 2021-02-05 LAB
ESTIMATED AVG GLUCOSE: 160 MG/DL (ref 68–131)
HBA1C MFR BLD: 7.2 % (ref 4–5.6)

## 2021-02-05 PROCEDURE — 83036 HEMOGLOBIN GLYCOSYLATED A1C: CPT

## 2021-02-05 PROCEDURE — 36415 COLL VENOUS BLD VENIPUNCTURE: CPT | Mod: PO

## 2021-02-11 ENCOUNTER — TELEPHONE (OUTPATIENT)
Dept: FAMILY MEDICINE | Facility: CLINIC | Age: 71
End: 2021-02-11

## 2021-02-11 DIAGNOSIS — E11.42 TYPE 2 DIABETES MELLITUS WITH DIABETIC POLYNEUROPATHY, WITHOUT LONG-TERM CURRENT USE OF INSULIN: Primary | ICD-10-CM

## 2021-02-25 ENCOUNTER — PATIENT MESSAGE (OUTPATIENT)
Dept: FAMILY MEDICINE | Facility: CLINIC | Age: 71
End: 2021-02-25

## 2021-04-26 ENCOUNTER — PATIENT MESSAGE (OUTPATIENT)
Dept: OTHER | Facility: OTHER | Age: 71
End: 2021-04-26

## 2021-05-03 ENCOUNTER — LAB VISIT (OUTPATIENT)
Dept: LAB | Facility: HOSPITAL | Age: 71
End: 2021-05-03
Attending: INTERNAL MEDICINE
Payer: COMMERCIAL

## 2021-05-03 DIAGNOSIS — E11.42 TYPE 2 DIABETES MELLITUS WITH DIABETIC POLYNEUROPATHY, WITHOUT LONG-TERM CURRENT USE OF INSULIN: ICD-10-CM

## 2021-05-03 LAB
ESTIMATED AVG GLUCOSE: 154 MG/DL (ref 68–131)
HBA1C MFR BLD: 7 % (ref 4–5.6)

## 2021-05-03 PROCEDURE — 83036 HEMOGLOBIN GLYCOSYLATED A1C: CPT | Performed by: INTERNAL MEDICINE

## 2021-05-03 PROCEDURE — 36415 COLL VENOUS BLD VENIPUNCTURE: CPT | Mod: PO | Performed by: INTERNAL MEDICINE

## 2021-05-10 ENCOUNTER — OFFICE VISIT (OUTPATIENT)
Dept: FAMILY MEDICINE | Facility: CLINIC | Age: 71
End: 2021-05-10
Payer: COMMERCIAL

## 2021-05-10 VITALS
HEIGHT: 68 IN | SYSTOLIC BLOOD PRESSURE: 116 MMHG | OXYGEN SATURATION: 97 % | HEART RATE: 82 BPM | DIASTOLIC BLOOD PRESSURE: 66 MMHG | TEMPERATURE: 98 F | BODY MASS INDEX: 27.32 KG/M2 | WEIGHT: 180.25 LBS

## 2021-05-10 DIAGNOSIS — I10 ESSENTIAL HYPERTENSION: ICD-10-CM

## 2021-05-10 DIAGNOSIS — H93.12 TINNITUS OF LEFT EAR: ICD-10-CM

## 2021-05-10 DIAGNOSIS — J30.9 ALLERGIC RHINITIS, UNSPECIFIED SEASONALITY, UNSPECIFIED TRIGGER: ICD-10-CM

## 2021-05-10 DIAGNOSIS — E11.42 TYPE 2 DIABETES MELLITUS WITH DIABETIC POLYNEUROPATHY, WITHOUT LONG-TERM CURRENT USE OF INSULIN: Primary | ICD-10-CM

## 2021-05-10 PROCEDURE — 1159F MED LIST DOCD IN RCRD: CPT | Mod: S$GLB,,, | Performed by: INTERNAL MEDICINE

## 2021-05-10 PROCEDURE — 3008F PR BODY MASS INDEX (BMI) DOCUMENTED: ICD-10-PCS | Mod: CPTII,S$GLB,, | Performed by: INTERNAL MEDICINE

## 2021-05-10 PROCEDURE — 1126F AMNT PAIN NOTED NONE PRSNT: CPT | Mod: S$GLB,,, | Performed by: INTERNAL MEDICINE

## 2021-05-10 PROCEDURE — 99214 OFFICE O/P EST MOD 30 MIN: CPT | Mod: S$GLB,,, | Performed by: INTERNAL MEDICINE

## 2021-05-10 PROCEDURE — 3051F PR MOST RECENT HEMOGLOBIN A1C LEVEL 7.0 - < 8.0%: ICD-10-PCS | Mod: CPTII,S$GLB,, | Performed by: INTERNAL MEDICINE

## 2021-05-10 PROCEDURE — 3288F FALL RISK ASSESSMENT DOCD: CPT | Mod: CPTII,S$GLB,, | Performed by: INTERNAL MEDICINE

## 2021-05-10 PROCEDURE — 99999 PR PBB SHADOW E&M-EST. PATIENT-LVL III: ICD-10-PCS | Mod: PBBFAC,,, | Performed by: INTERNAL MEDICINE

## 2021-05-10 PROCEDURE — 1159F PR MEDICATION LIST DOCUMENTED IN MEDICAL RECORD: ICD-10-PCS | Mod: S$GLB,,, | Performed by: INTERNAL MEDICINE

## 2021-05-10 PROCEDURE — 99999 PR PBB SHADOW E&M-EST. PATIENT-LVL III: CPT | Mod: PBBFAC,,, | Performed by: INTERNAL MEDICINE

## 2021-05-10 PROCEDURE — 1101F PR PT FALLS ASSESS DOC 0-1 FALLS W/OUT INJ PAST YR: ICD-10-PCS | Mod: CPTII,S$GLB,, | Performed by: INTERNAL MEDICINE

## 2021-05-10 PROCEDURE — 1101F PT FALLS ASSESS-DOCD LE1/YR: CPT | Mod: CPTII,S$GLB,, | Performed by: INTERNAL MEDICINE

## 2021-05-10 PROCEDURE — 3072F PR LOW RISK FOR RETINOPATHY: ICD-10-PCS | Mod: S$GLB,,, | Performed by: INTERNAL MEDICINE

## 2021-05-10 PROCEDURE — 3051F HG A1C>EQUAL 7.0%<8.0%: CPT | Mod: CPTII,S$GLB,, | Performed by: INTERNAL MEDICINE

## 2021-05-10 PROCEDURE — 3008F BODY MASS INDEX DOCD: CPT | Mod: CPTII,S$GLB,, | Performed by: INTERNAL MEDICINE

## 2021-05-10 PROCEDURE — 3288F PR FALLS RISK ASSESSMENT DOCUMENTED: ICD-10-PCS | Mod: CPTII,S$GLB,, | Performed by: INTERNAL MEDICINE

## 2021-05-10 PROCEDURE — 99214 PR OFFICE/OUTPT VISIT, EST, LEVL IV, 30-39 MIN: ICD-10-PCS | Mod: S$GLB,,, | Performed by: INTERNAL MEDICINE

## 2021-05-10 PROCEDURE — 1126F PR PAIN SEVERITY QUANTIFIED, NO PAIN PRESENT: ICD-10-PCS | Mod: S$GLB,,, | Performed by: INTERNAL MEDICINE

## 2021-05-10 PROCEDURE — 3072F LOW RISK FOR RETINOPATHY: CPT | Mod: S$GLB,,, | Performed by: INTERNAL MEDICINE

## 2021-05-10 RX ORDER — LEVOCETIRIZINE DIHYDROCHLORIDE 5 MG/1
5 TABLET, FILM COATED ORAL NIGHTLY
Qty: 90 TABLET | Refills: 1 | Status: SHIPPED | OUTPATIENT
Start: 2021-05-10 | End: 2021-05-26 | Stop reason: SDUPTHER

## 2021-07-13 ENCOUNTER — PATIENT MESSAGE (OUTPATIENT)
Dept: FAMILY MEDICINE | Facility: CLINIC | Age: 71
End: 2021-07-13

## 2021-07-13 DIAGNOSIS — J01.90 ACUTE RHINOSINUSITIS: Primary | ICD-10-CM

## 2021-07-13 RX ORDER — IPRATROPIUM BROMIDE 21 UG/1
2 SPRAY, METERED NASAL 3 TIMES DAILY
Qty: 30 ML | Refills: 0 | Status: SHIPPED | OUTPATIENT
Start: 2021-07-13 | End: 2022-09-24 | Stop reason: SDUPTHER

## 2021-07-14 ENCOUNTER — OFFICE VISIT (OUTPATIENT)
Dept: URGENT CARE | Facility: CLINIC | Age: 71
End: 2021-07-14
Payer: COMMERCIAL

## 2021-07-14 VITALS
OXYGEN SATURATION: 97 % | DIASTOLIC BLOOD PRESSURE: 78 MMHG | RESPIRATION RATE: 16 BRPM | SYSTOLIC BLOOD PRESSURE: 146 MMHG | WEIGHT: 180 LBS | HEART RATE: 68 BPM | BODY MASS INDEX: 27.28 KG/M2 | HEIGHT: 68 IN | TEMPERATURE: 98 F

## 2021-07-14 DIAGNOSIS — J32.9 SINUSITIS, UNSPECIFIED CHRONICITY, UNSPECIFIED LOCATION: Primary | ICD-10-CM

## 2021-07-14 DIAGNOSIS — H92.03 EAR PAIN, BILATERAL: ICD-10-CM

## 2021-07-14 LAB
CTP QC/QA: YES
SARS-COV-2 RDRP RESP QL NAA+PROBE: NEGATIVE

## 2021-07-14 PROCEDURE — 3008F BODY MASS INDEX DOCD: CPT | Mod: CPTII,S$GLB,, | Performed by: PHYSICIAN ASSISTANT

## 2021-07-14 PROCEDURE — 3288F FALL RISK ASSESSMENT DOCD: CPT | Mod: CPTII,S$GLB,, | Performed by: PHYSICIAN ASSISTANT

## 2021-07-14 PROCEDURE — U0002 COVID-19 LAB TEST NON-CDC: HCPCS | Mod: QW,S$GLB,, | Performed by: PHYSICIAN ASSISTANT

## 2021-07-14 PROCEDURE — U0002: ICD-10-PCS | Mod: QW,S$GLB,, | Performed by: PHYSICIAN ASSISTANT

## 2021-07-14 PROCEDURE — 1101F PR PT FALLS ASSESS DOC 0-1 FALLS W/OUT INJ PAST YR: ICD-10-PCS | Mod: CPTII,S$GLB,, | Performed by: PHYSICIAN ASSISTANT

## 2021-07-14 PROCEDURE — 3288F PR FALLS RISK ASSESSMENT DOCUMENTED: ICD-10-PCS | Mod: CPTII,S$GLB,, | Performed by: PHYSICIAN ASSISTANT

## 2021-07-14 PROCEDURE — 1125F AMNT PAIN NOTED PAIN PRSNT: CPT | Mod: S$GLB,,, | Performed by: PHYSICIAN ASSISTANT

## 2021-07-14 PROCEDURE — 3008F PR BODY MASS INDEX (BMI) DOCUMENTED: ICD-10-PCS | Mod: CPTII,S$GLB,, | Performed by: PHYSICIAN ASSISTANT

## 2021-07-14 PROCEDURE — 3072F PR LOW RISK FOR RETINOPATHY: ICD-10-PCS | Mod: S$GLB,,, | Performed by: PHYSICIAN ASSISTANT

## 2021-07-14 PROCEDURE — 1101F PT FALLS ASSESS-DOCD LE1/YR: CPT | Mod: CPTII,S$GLB,, | Performed by: PHYSICIAN ASSISTANT

## 2021-07-14 PROCEDURE — 1125F PR PAIN SEVERITY QUANTIFIED, PAIN PRESENT: ICD-10-PCS | Mod: S$GLB,,, | Performed by: PHYSICIAN ASSISTANT

## 2021-07-14 PROCEDURE — 99213 OFFICE O/P EST LOW 20 MIN: CPT | Mod: S$GLB,,, | Performed by: PHYSICIAN ASSISTANT

## 2021-07-14 PROCEDURE — 3072F LOW RISK FOR RETINOPATHY: CPT | Mod: S$GLB,,, | Performed by: PHYSICIAN ASSISTANT

## 2021-07-14 PROCEDURE — 99213 PR OFFICE/OUTPT VISIT, EST, LEVL III, 20-29 MIN: ICD-10-PCS | Mod: S$GLB,,, | Performed by: PHYSICIAN ASSISTANT

## 2021-07-14 RX ORDER — LEVOCETIRIZINE DIHYDROCHLORIDE 5 MG/1
5 TABLET, FILM COATED ORAL NIGHTLY
Qty: 30 TABLET | Refills: 0 | Status: SHIPPED | OUTPATIENT
Start: 2021-07-14 | End: 2022-07-06

## 2021-07-14 RX ORDER — AMOXICILLIN AND CLAVULANATE POTASSIUM 875; 125 MG/1; MG/1
1 TABLET, FILM COATED ORAL 2 TIMES DAILY
Qty: 14 TABLET | Refills: 0 | Status: SHIPPED | OUTPATIENT
Start: 2021-07-14 | End: 2021-11-11

## 2021-07-21 ENCOUNTER — CLINICAL SUPPORT (OUTPATIENT)
Dept: URGENT CARE | Facility: CLINIC | Age: 71
End: 2021-07-21
Payer: COMMERCIAL

## 2021-07-21 ENCOUNTER — PATIENT MESSAGE (OUTPATIENT)
Dept: FAMILY MEDICINE | Facility: CLINIC | Age: 71
End: 2021-07-21

## 2021-07-21 VITALS
HEART RATE: 79 BPM | DIASTOLIC BLOOD PRESSURE: 65 MMHG | BODY MASS INDEX: 27.28 KG/M2 | OXYGEN SATURATION: 97 % | HEIGHT: 68 IN | TEMPERATURE: 98 F | SYSTOLIC BLOOD PRESSURE: 149 MMHG | RESPIRATION RATE: 18 BRPM | WEIGHT: 180 LBS

## 2021-07-21 DIAGNOSIS — H65.93 BILATERAL SEROUS OTITIS MEDIA, UNSPECIFIED CHRONICITY: Primary | ICD-10-CM

## 2021-07-21 PROCEDURE — 99213 PR OFFICE/OUTPT VISIT, EST, LEVL III, 20-29 MIN: ICD-10-PCS | Mod: S$GLB,,, | Performed by: PHYSICIAN ASSISTANT

## 2021-07-21 PROCEDURE — 99213 OFFICE O/P EST LOW 20 MIN: CPT | Mod: S$GLB,,, | Performed by: PHYSICIAN ASSISTANT

## 2021-07-23 DIAGNOSIS — E11.42 TYPE 2 DIABETES MELLITUS WITH DIABETIC POLYNEUROPATHY, WITHOUT LONG-TERM CURRENT USE OF INSULIN: Primary | ICD-10-CM

## 2021-07-23 RX ORDER — ETODOLAC 400 MG/1
400 TABLET, FILM COATED ORAL 2 TIMES DAILY PRN
Qty: 60 TABLET | Refills: 0 | Status: SHIPPED | OUTPATIENT
Start: 2021-07-23 | End: 2021-08-27 | Stop reason: SDUPTHER

## 2021-08-19 NOTE — TELEPHONE ENCOUNTER
----- Message from Chan Estrada MD sent at 9/18/2020  7:15 AM CDT -----  Please schedule pt for flu shot (nurse visit)Thanks!   Fluconazole Counseling:  Patient counseled regarding adverse effects of fluconazole including but not limited to headache, diarrhea, nausea, upset stomach, liver function test abnormalities, taste disturbance, and stomach pain.  There is a rare possibility of liver failure that can occur when taking fluconazole.  The patient understands that monitoring of LFTs and kidney function test may be required, especially at baseline. The patient verbalized understanding of the proper use and possible adverse effects of fluconazole.  All of the patient's questions and concerns were addressed.

## 2021-08-27 RX ORDER — ETODOLAC 400 MG/1
400 TABLET, FILM COATED ORAL 2 TIMES DAILY PRN
Qty: 60 TABLET | Refills: 0 | Status: SHIPPED | OUTPATIENT
Start: 2021-08-27 | End: 2021-10-10

## 2021-09-15 ENCOUNTER — OFFICE VISIT (OUTPATIENT)
Dept: OPTOMETRY | Facility: CLINIC | Age: 71
End: 2021-09-15
Payer: COMMERCIAL

## 2021-09-15 DIAGNOSIS — E11.9 DIABETIC EYE EXAM: Primary | ICD-10-CM

## 2021-09-15 DIAGNOSIS — H52.13 MYOPIA OF BOTH EYES WITH ASTIGMATISM AND PRESBYOPIA: ICD-10-CM

## 2021-09-15 DIAGNOSIS — H25.13 NUCLEAR SCLEROSIS OF BOTH EYES: ICD-10-CM

## 2021-09-15 DIAGNOSIS — H52.203 MYOPIA OF BOTH EYES WITH ASTIGMATISM AND PRESBYOPIA: ICD-10-CM

## 2021-09-15 DIAGNOSIS — Z01.00 DIABETIC EYE EXAM: Primary | ICD-10-CM

## 2021-09-15 DIAGNOSIS — H52.4 MYOPIA OF BOTH EYES WITH ASTIGMATISM AND PRESBYOPIA: ICD-10-CM

## 2021-09-15 PROCEDURE — 3288F FALL RISK ASSESSMENT DOCD: CPT | Mod: CPTII,S$GLB,, | Performed by: OPTOMETRIST

## 2021-09-15 PROCEDURE — 3288F PR FALLS RISK ASSESSMENT DOCUMENTED: ICD-10-PCS | Mod: CPTII,S$GLB,, | Performed by: OPTOMETRIST

## 2021-09-15 PROCEDURE — 1101F PR PT FALLS ASSESS DOC 0-1 FALLS W/OUT INJ PAST YR: ICD-10-PCS | Mod: CPTII,S$GLB,, | Performed by: OPTOMETRIST

## 2021-09-15 PROCEDURE — 2023F PR DILATED RETINAL EXAM W/O EVID OF RETINOPATHY: ICD-10-PCS | Mod: CPTII,S$GLB,, | Performed by: OPTOMETRIST

## 2021-09-15 PROCEDURE — 3051F HG A1C>EQUAL 7.0%<8.0%: CPT | Mod: CPTII,S$GLB,, | Performed by: OPTOMETRIST

## 2021-09-15 PROCEDURE — 92015 DETERMINE REFRACTIVE STATE: CPT | Mod: S$GLB,,, | Performed by: OPTOMETRIST

## 2021-09-15 PROCEDURE — 1159F MED LIST DOCD IN RCRD: CPT | Mod: CPTII,S$GLB,, | Performed by: OPTOMETRIST

## 2021-09-15 PROCEDURE — 99999 PR PBB SHADOW E&M-EST. PATIENT-LVL III: CPT | Mod: PBBFAC,,, | Performed by: OPTOMETRIST

## 2021-09-15 PROCEDURE — 4010F ACE/ARB THERAPY RXD/TAKEN: CPT | Mod: CPTII,S$GLB,, | Performed by: OPTOMETRIST

## 2021-09-15 PROCEDURE — 2023F DILAT RTA XM W/O RTNOPTHY: CPT | Mod: CPTII,S$GLB,, | Performed by: OPTOMETRIST

## 2021-09-15 PROCEDURE — 92014 PR EYE EXAM, EST PATIENT,COMPREHESV: ICD-10-PCS | Mod: S$GLB,,, | Performed by: OPTOMETRIST

## 2021-09-15 PROCEDURE — 92015 PR REFRACTION: ICD-10-PCS | Mod: S$GLB,,, | Performed by: OPTOMETRIST

## 2021-09-15 PROCEDURE — 1101F PT FALLS ASSESS-DOCD LE1/YR: CPT | Mod: CPTII,S$GLB,, | Performed by: OPTOMETRIST

## 2021-09-15 PROCEDURE — 99999 PR PBB SHADOW E&M-EST. PATIENT-LVL III: ICD-10-PCS | Mod: PBBFAC,,, | Performed by: OPTOMETRIST

## 2021-09-15 PROCEDURE — 1160F PR REVIEW ALL MEDS BY PRESCRIBER/CLIN PHARMACIST DOCUMENTED: ICD-10-PCS | Mod: CPTII,S$GLB,, | Performed by: OPTOMETRIST

## 2021-09-15 PROCEDURE — 92014 COMPRE OPH EXAM EST PT 1/>: CPT | Mod: S$GLB,,, | Performed by: OPTOMETRIST

## 2021-09-15 PROCEDURE — 4010F PR ACE/ARB THEARPY RXD/TAKEN: ICD-10-PCS | Mod: CPTII,S$GLB,, | Performed by: OPTOMETRIST

## 2021-09-15 PROCEDURE — 1160F RVW MEDS BY RX/DR IN RCRD: CPT | Mod: CPTII,S$GLB,, | Performed by: OPTOMETRIST

## 2021-09-15 PROCEDURE — 3051F PR MOST RECENT HEMOGLOBIN A1C LEVEL 7.0 - < 8.0%: ICD-10-PCS | Mod: CPTII,S$GLB,, | Performed by: OPTOMETRIST

## 2021-09-15 PROCEDURE — 1159F PR MEDICATION LIST DOCUMENTED IN MEDICAL RECORD: ICD-10-PCS | Mod: CPTII,S$GLB,, | Performed by: OPTOMETRIST

## 2021-11-05 ENCOUNTER — LAB VISIT (OUTPATIENT)
Dept: LAB | Facility: HOSPITAL | Age: 71
End: 2021-11-05
Attending: INTERNAL MEDICINE
Payer: COMMERCIAL

## 2021-11-05 DIAGNOSIS — E11.42 TYPE 2 DIABETES MELLITUS WITH DIABETIC POLYNEUROPATHY, WITHOUT LONG-TERM CURRENT USE OF INSULIN: ICD-10-CM

## 2021-11-05 LAB
CHOLEST SERPL-MCNC: 251 MG/DL (ref 120–199)
CHOLEST/HDLC SERPL: 4 {RATIO} (ref 2–5)
ESTIMATED AVG GLUCOSE: 148 MG/DL (ref 68–131)
HBA1C MFR BLD: 6.8 % (ref 4–5.6)
HDLC SERPL-MCNC: 62 MG/DL (ref 40–75)
HDLC SERPL: 24.7 % (ref 20–50)
LDLC SERPL CALC-MCNC: 161.6 MG/DL (ref 63–159)
NONHDLC SERPL-MCNC: 189 MG/DL
TRIGL SERPL-MCNC: 137 MG/DL (ref 30–150)

## 2021-11-05 PROCEDURE — 80061 LIPID PANEL: CPT | Performed by: INTERNAL MEDICINE

## 2021-11-05 PROCEDURE — 83036 HEMOGLOBIN GLYCOSYLATED A1C: CPT | Performed by: INTERNAL MEDICINE

## 2021-11-05 PROCEDURE — 36415 COLL VENOUS BLD VENIPUNCTURE: CPT | Mod: PO | Performed by: INTERNAL MEDICINE

## 2021-11-10 ENCOUNTER — PATIENT MESSAGE (OUTPATIENT)
Dept: FAMILY MEDICINE | Facility: CLINIC | Age: 71
End: 2021-11-10
Payer: COMMERCIAL

## 2021-11-10 ENCOUNTER — HOSPITAL ENCOUNTER (OUTPATIENT)
Dept: RADIOLOGY | Facility: HOSPITAL | Age: 71
Discharge: HOME | End: 2021-11-10
Attending: INTERNAL MEDICINE
Payer: COMMERCIAL

## 2021-11-10 DIAGNOSIS — M17.11 ARTHRITIS OF RIGHT KNEE: Primary | ICD-10-CM

## 2021-11-10 DIAGNOSIS — M17.11 ARTHRITIS OF RIGHT KNEE: ICD-10-CM

## 2021-11-11 ENCOUNTER — OFFICE VISIT (OUTPATIENT)
Dept: PODIATRY | Facility: CLINIC | Age: 71
End: 2021-11-11
Payer: COMMERCIAL

## 2021-11-11 ENCOUNTER — OFFICE VISIT (OUTPATIENT)
Dept: FAMILY MEDICINE | Facility: CLINIC | Age: 71
End: 2021-11-11
Payer: COMMERCIAL

## 2021-11-11 ENCOUNTER — HOSPITAL ENCOUNTER (OUTPATIENT)
Dept: RADIOLOGY | Facility: HOSPITAL | Age: 71
Discharge: HOME | End: 2021-11-11
Attending: INTERNAL MEDICINE
Payer: COMMERCIAL

## 2021-11-11 ENCOUNTER — PATIENT MESSAGE (OUTPATIENT)
Dept: FAMILY MEDICINE | Facility: CLINIC | Age: 71
End: 2021-11-11
Payer: COMMERCIAL

## 2021-11-11 VITALS
SYSTOLIC BLOOD PRESSURE: 102 MMHG | DIASTOLIC BLOOD PRESSURE: 70 MMHG | BODY MASS INDEX: 26.47 KG/M2 | OXYGEN SATURATION: 97 % | HEIGHT: 68 IN | TEMPERATURE: 98 F | HEART RATE: 82 BPM | WEIGHT: 174.63 LBS

## 2021-11-11 VITALS — BODY MASS INDEX: 26.37 KG/M2 | WEIGHT: 174 LBS | HEIGHT: 68 IN

## 2021-11-11 DIAGNOSIS — M20.42 HAMMER TOES OF BOTH FEET: ICD-10-CM

## 2021-11-11 DIAGNOSIS — E78.5 HYPERLIPIDEMIA, UNSPECIFIED HYPERLIPIDEMIA TYPE: ICD-10-CM

## 2021-11-11 DIAGNOSIS — E11.9 COMPREHENSIVE DIABETIC FOOT EXAMINATION, TYPE 2 DM, ENCOUNTER FOR: Primary | ICD-10-CM

## 2021-11-11 DIAGNOSIS — M20.11 HALLUX ABDUCTO VALGUS, RIGHT: ICD-10-CM

## 2021-11-11 DIAGNOSIS — M20.41 HAMMER TOES OF BOTH FEET: ICD-10-CM

## 2021-11-11 DIAGNOSIS — M20.12 HALLUX ABDUCTO VALGUS, LEFT: ICD-10-CM

## 2021-11-11 DIAGNOSIS — M17.12 ARTHRITIS OF LEFT KNEE: Primary | ICD-10-CM

## 2021-11-11 DIAGNOSIS — E11.42 TYPE 2 DIABETES MELLITUS WITH DIABETIC POLYNEUROPATHY, WITHOUT LONG-TERM CURRENT USE OF INSULIN: ICD-10-CM

## 2021-11-11 DIAGNOSIS — M25.562 LEFT KNEE PAIN, UNSPECIFIED CHRONICITY: ICD-10-CM

## 2021-11-11 DIAGNOSIS — M25.562 LEFT KNEE PAIN, UNSPECIFIED CHRONICITY: Primary | ICD-10-CM

## 2021-11-11 PROCEDURE — 1125F PR PAIN SEVERITY QUANTIFIED, PAIN PRESENT: ICD-10-PCS | Mod: CPTII,S$GLB,, | Performed by: INTERNAL MEDICINE

## 2021-11-11 PROCEDURE — 73560 X-RAY EXAM OF KNEE 1 OR 2: CPT | Mod: 59,TC,FY,PO,RT

## 2021-11-11 PROCEDURE — 3066F NEPHROPATHY DOC TX: CPT | Mod: CPTII,S$GLB,, | Performed by: PODIATRIST

## 2021-11-11 PROCEDURE — 1159F PR MEDICATION LIST DOCUMENTED IN MEDICAL RECORD: ICD-10-PCS | Mod: CPTII,S$GLB,, | Performed by: PODIATRIST

## 2021-11-11 PROCEDURE — 4010F ACE/ARB THERAPY RXD/TAKEN: CPT | Mod: CPTII,S$GLB,, | Performed by: PODIATRIST

## 2021-11-11 PROCEDURE — 3044F PR MOST RECENT HEMOGLOBIN A1C LEVEL <7.0%: ICD-10-PCS | Mod: CPTII,S$GLB,, | Performed by: PODIATRIST

## 2021-11-11 PROCEDURE — 3066F NEPHROPATHY DOC TX: CPT | Mod: CPTII,S$GLB,, | Performed by: INTERNAL MEDICINE

## 2021-11-11 PROCEDURE — 3288F PR FALLS RISK ASSESSMENT DOCUMENTED: ICD-10-PCS | Mod: CPTII,S$GLB,, | Performed by: PODIATRIST

## 2021-11-11 PROCEDURE — 3072F PR LOW RISK FOR RETINOPATHY: ICD-10-PCS | Mod: CPTII,S$GLB,, | Performed by: INTERNAL MEDICINE

## 2021-11-11 PROCEDURE — 3044F HG A1C LEVEL LT 7.0%: CPT | Mod: CPTII,S$GLB,, | Performed by: PODIATRIST

## 2021-11-11 PROCEDURE — 3061F PR NEG MICROALBUMINURIA RESULT DOCUMENTED/REVIEW: ICD-10-PCS | Mod: CPTII,S$GLB,, | Performed by: INTERNAL MEDICINE

## 2021-11-11 PROCEDURE — 3044F HG A1C LEVEL LT 7.0%: CPT | Mod: CPTII,S$GLB,, | Performed by: INTERNAL MEDICINE

## 2021-11-11 PROCEDURE — 1101F PT FALLS ASSESS-DOCD LE1/YR: CPT | Mod: CPTII,S$GLB,, | Performed by: PODIATRIST

## 2021-11-11 PROCEDURE — 3008F PR BODY MASS INDEX (BMI) DOCUMENTED: ICD-10-PCS | Mod: CPTII,S$GLB,, | Performed by: INTERNAL MEDICINE

## 2021-11-11 PROCEDURE — 4010F ACE/ARB THERAPY RXD/TAKEN: CPT | Mod: CPTII,S$GLB,, | Performed by: INTERNAL MEDICINE

## 2021-11-11 PROCEDURE — 3066F PR DOCUMENTATION OF TREATMENT FOR NEPHROPATHY: ICD-10-PCS | Mod: CPTII,S$GLB,, | Performed by: INTERNAL MEDICINE

## 2021-11-11 PROCEDURE — 99999 PR PBB SHADOW E&M-EST. PATIENT-LVL III: CPT | Mod: PBBFAC,,, | Performed by: INTERNAL MEDICINE

## 2021-11-11 PROCEDURE — 3078F PR MOST RECENT DIASTOLIC BLOOD PRESSURE < 80 MM HG: ICD-10-PCS | Mod: CPTII,S$GLB,, | Performed by: INTERNAL MEDICINE

## 2021-11-11 PROCEDURE — 1125F AMNT PAIN NOTED PAIN PRSNT: CPT | Mod: CPTII,S$GLB,, | Performed by: INTERNAL MEDICINE

## 2021-11-11 PROCEDURE — 3074F SYST BP LT 130 MM HG: CPT | Mod: CPTII,S$GLB,, | Performed by: INTERNAL MEDICINE

## 2021-11-11 PROCEDURE — 1159F MED LIST DOCD IN RCRD: CPT | Mod: CPTII,S$GLB,, | Performed by: PODIATRIST

## 2021-11-11 PROCEDURE — 1160F PR REVIEW ALL MEDS BY PRESCRIBER/CLIN PHARMACIST DOCUMENTED: ICD-10-PCS | Mod: CPTII,S$GLB,, | Performed by: PODIATRIST

## 2021-11-11 PROCEDURE — 73560 XR KNEE ORTHO LEFT: ICD-10-PCS | Mod: 26,RT,, | Performed by: RADIOLOGY

## 2021-11-11 PROCEDURE — 99999 PR PBB SHADOW E&M-EST. PATIENT-LVL III: ICD-10-PCS | Mod: PBBFAC,,, | Performed by: INTERNAL MEDICINE

## 2021-11-11 PROCEDURE — 3044F PR MOST RECENT HEMOGLOBIN A1C LEVEL <7.0%: ICD-10-PCS | Mod: CPTII,S$GLB,, | Performed by: INTERNAL MEDICINE

## 2021-11-11 PROCEDURE — 99214 OFFICE O/P EST MOD 30 MIN: CPT | Mod: S$GLB,,, | Performed by: INTERNAL MEDICINE

## 2021-11-11 PROCEDURE — 3288F FALL RISK ASSESSMENT DOCD: CPT | Mod: CPTII,S$GLB,, | Performed by: PODIATRIST

## 2021-11-11 PROCEDURE — 73560 X-RAY EXAM OF KNEE 1 OR 2: CPT | Mod: 26,RT,, | Performed by: RADIOLOGY

## 2021-11-11 PROCEDURE — 3072F LOW RISK FOR RETINOPATHY: CPT | Mod: CPTII,S$GLB,, | Performed by: INTERNAL MEDICINE

## 2021-11-11 PROCEDURE — 99214 PR OFFICE/OUTPT VISIT, EST, LEVL IV, 30-39 MIN: ICD-10-PCS | Mod: S$GLB,,, | Performed by: INTERNAL MEDICINE

## 2021-11-11 PROCEDURE — 3288F FALL RISK ASSESSMENT DOCD: CPT | Mod: CPTII,S$GLB,, | Performed by: INTERNAL MEDICINE

## 2021-11-11 PROCEDURE — 99999 PR PBB SHADOW E&M-EST. PATIENT-LVL IV: ICD-10-PCS | Mod: PBBFAC,,, | Performed by: PODIATRIST

## 2021-11-11 PROCEDURE — 99213 PR OFFICE/OUTPT VISIT, EST, LEVL III, 20-29 MIN: ICD-10-PCS | Mod: S$GLB,,, | Performed by: PODIATRIST

## 2021-11-11 PROCEDURE — 4010F PR ACE/ARB THEARPY RXD/TAKEN: ICD-10-PCS | Mod: CPTII,S$GLB,, | Performed by: INTERNAL MEDICINE

## 2021-11-11 PROCEDURE — 4010F PR ACE/ARB THEARPY RXD/TAKEN: ICD-10-PCS | Mod: CPTII,S$GLB,, | Performed by: PODIATRIST

## 2021-11-11 PROCEDURE — 3008F BODY MASS INDEX DOCD: CPT | Mod: CPTII,S$GLB,, | Performed by: PODIATRIST

## 2021-11-11 PROCEDURE — 3008F BODY MASS INDEX DOCD: CPT | Mod: CPTII,S$GLB,, | Performed by: INTERNAL MEDICINE

## 2021-11-11 PROCEDURE — 3074F PR MOST RECENT SYSTOLIC BLOOD PRESSURE < 130 MM HG: ICD-10-PCS | Mod: CPTII,S$GLB,, | Performed by: INTERNAL MEDICINE

## 2021-11-11 PROCEDURE — 73562 X-RAY EXAM OF KNEE 3: CPT | Mod: 26,LT,, | Performed by: RADIOLOGY

## 2021-11-11 PROCEDURE — 1101F PT FALLS ASSESS-DOCD LE1/YR: CPT | Mod: CPTII,S$GLB,, | Performed by: INTERNAL MEDICINE

## 2021-11-11 PROCEDURE — 1126F PR PAIN SEVERITY QUANTIFIED, NO PAIN PRESENT: ICD-10-PCS | Mod: CPTII,S$GLB,, | Performed by: PODIATRIST

## 2021-11-11 PROCEDURE — 3061F PR NEG MICROALBUMINURIA RESULT DOCUMENTED/REVIEW: ICD-10-PCS | Mod: CPTII,S$GLB,, | Performed by: PODIATRIST

## 2021-11-11 PROCEDURE — 3061F NEG MICROALBUMINURIA REV: CPT | Mod: CPTII,S$GLB,, | Performed by: INTERNAL MEDICINE

## 2021-11-11 PROCEDURE — 3061F NEG MICROALBUMINURIA REV: CPT | Mod: CPTII,S$GLB,, | Performed by: PODIATRIST

## 2021-11-11 PROCEDURE — 3078F DIAST BP <80 MM HG: CPT | Mod: CPTII,S$GLB,, | Performed by: INTERNAL MEDICINE

## 2021-11-11 PROCEDURE — 1126F AMNT PAIN NOTED NONE PRSNT: CPT | Mod: CPTII,S$GLB,, | Performed by: PODIATRIST

## 2021-11-11 PROCEDURE — 73562 XR KNEE ORTHO LEFT: ICD-10-PCS | Mod: 26,LT,, | Performed by: RADIOLOGY

## 2021-11-11 PROCEDURE — 1101F PR PT FALLS ASSESS DOC 0-1 FALLS W/OUT INJ PAST YR: ICD-10-PCS | Mod: CPTII,S$GLB,, | Performed by: INTERNAL MEDICINE

## 2021-11-11 PROCEDURE — 3072F PR LOW RISK FOR RETINOPATHY: ICD-10-PCS | Mod: CPTII,S$GLB,, | Performed by: PODIATRIST

## 2021-11-11 PROCEDURE — 3008F PR BODY MASS INDEX (BMI) DOCUMENTED: ICD-10-PCS | Mod: CPTII,S$GLB,, | Performed by: PODIATRIST

## 2021-11-11 PROCEDURE — 1101F PR PT FALLS ASSESS DOC 0-1 FALLS W/OUT INJ PAST YR: ICD-10-PCS | Mod: CPTII,S$GLB,, | Performed by: PODIATRIST

## 2021-11-11 PROCEDURE — 3288F PR FALLS RISK ASSESSMENT DOCUMENTED: ICD-10-PCS | Mod: CPTII,S$GLB,, | Performed by: INTERNAL MEDICINE

## 2021-11-11 PROCEDURE — 1160F RVW MEDS BY RX/DR IN RCRD: CPT | Mod: CPTII,S$GLB,, | Performed by: PODIATRIST

## 2021-11-11 PROCEDURE — 3072F LOW RISK FOR RETINOPATHY: CPT | Mod: CPTII,S$GLB,, | Performed by: PODIATRIST

## 2021-11-11 PROCEDURE — 99213 OFFICE O/P EST LOW 20 MIN: CPT | Mod: S$GLB,,, | Performed by: PODIATRIST

## 2021-11-11 PROCEDURE — 99999 PR PBB SHADOW E&M-EST. PATIENT-LVL IV: CPT | Mod: PBBFAC,,, | Performed by: PODIATRIST

## 2021-11-11 PROCEDURE — 3066F PR DOCUMENTATION OF TREATMENT FOR NEPHROPATHY: ICD-10-PCS | Mod: CPTII,S$GLB,, | Performed by: PODIATRIST

## 2021-11-11 RX ORDER — ATORVASTATIN CALCIUM 40 MG/1
40 TABLET, FILM COATED ORAL DAILY
Qty: 90 TABLET | Refills: 3 | Status: SHIPPED | OUTPATIENT
Start: 2021-11-11 | End: 2022-09-24 | Stop reason: SDUPTHER

## 2021-11-15 ENCOUNTER — OFFICE VISIT (OUTPATIENT)
Dept: ORTHOPEDICS | Facility: CLINIC | Age: 71
End: 2021-11-15
Payer: COMMERCIAL

## 2021-11-15 VITALS
TEMPERATURE: 98 F | HEART RATE: 62 BPM | OXYGEN SATURATION: 99 % | RESPIRATION RATE: 18 BRPM | HEIGHT: 68 IN | BODY MASS INDEX: 27.06 KG/M2 | DIASTOLIC BLOOD PRESSURE: 70 MMHG | SYSTOLIC BLOOD PRESSURE: 102 MMHG | WEIGHT: 178.56 LBS

## 2021-11-15 DIAGNOSIS — M17.11 PRIMARY OSTEOARTHRITIS OF RIGHT KNEE: Primary | ICD-10-CM

## 2021-11-15 DIAGNOSIS — M17.12 ARTHRITIS OF LEFT KNEE: ICD-10-CM

## 2021-11-15 DIAGNOSIS — M23.222 DERANGEMENT OF POSTERIOR HORN OF MEDIAL MENISCUS DUE TO OLD TEAR OR INJURY, LEFT KNEE: ICD-10-CM

## 2021-11-15 PROCEDURE — 3061F PR NEG MICROALBUMINURIA RESULT DOCUMENTED/REVIEW: ICD-10-PCS | Mod: CPTII,S$GLB,, | Performed by: ORTHOPAEDIC SURGERY

## 2021-11-15 PROCEDURE — 99999 PR PBB SHADOW E&M-EST. PATIENT-LVL III: CPT | Mod: PBBFAC,,, | Performed by: ORTHOPAEDIC SURGERY

## 2021-11-15 PROCEDURE — 3074F PR MOST RECENT SYSTOLIC BLOOD PRESSURE < 130 MM HG: ICD-10-PCS | Mod: CPTII,S$GLB,, | Performed by: ORTHOPAEDIC SURGERY

## 2021-11-15 PROCEDURE — 1126F PR PAIN SEVERITY QUANTIFIED, NO PAIN PRESENT: ICD-10-PCS | Mod: CPTII,S$GLB,, | Performed by: ORTHOPAEDIC SURGERY

## 2021-11-15 PROCEDURE — 3078F PR MOST RECENT DIASTOLIC BLOOD PRESSURE < 80 MM HG: ICD-10-PCS | Mod: CPTII,S$GLB,, | Performed by: ORTHOPAEDIC SURGERY

## 2021-11-15 PROCEDURE — 4010F ACE/ARB THERAPY RXD/TAKEN: CPT | Mod: CPTII,S$GLB,, | Performed by: ORTHOPAEDIC SURGERY

## 2021-11-15 PROCEDURE — 3072F LOW RISK FOR RETINOPATHY: CPT | Mod: CPTII,S$GLB,, | Performed by: ORTHOPAEDIC SURGERY

## 2021-11-15 PROCEDURE — 99213 PR OFFICE/OUTPT VISIT, EST, LEVL III, 20-29 MIN: ICD-10-PCS | Mod: S$GLB,,, | Performed by: ORTHOPAEDIC SURGERY

## 2021-11-15 PROCEDURE — 3066F NEPHROPATHY DOC TX: CPT | Mod: CPTII,S$GLB,, | Performed by: ORTHOPAEDIC SURGERY

## 2021-11-15 PROCEDURE — 3078F DIAST BP <80 MM HG: CPT | Mod: CPTII,S$GLB,, | Performed by: ORTHOPAEDIC SURGERY

## 2021-11-15 PROCEDURE — 3008F BODY MASS INDEX DOCD: CPT | Mod: CPTII,S$GLB,, | Performed by: ORTHOPAEDIC SURGERY

## 2021-11-15 PROCEDURE — 99213 OFFICE O/P EST LOW 20 MIN: CPT | Mod: S$GLB,,, | Performed by: ORTHOPAEDIC SURGERY

## 2021-11-15 PROCEDURE — 99999 PR PBB SHADOW E&M-EST. PATIENT-LVL III: ICD-10-PCS | Mod: PBBFAC,,, | Performed by: ORTHOPAEDIC SURGERY

## 2021-11-15 PROCEDURE — 3074F SYST BP LT 130 MM HG: CPT | Mod: CPTII,S$GLB,, | Performed by: ORTHOPAEDIC SURGERY

## 2021-11-15 PROCEDURE — 3288F PR FALLS RISK ASSESSMENT DOCUMENTED: ICD-10-PCS | Mod: CPTII,S$GLB,, | Performed by: ORTHOPAEDIC SURGERY

## 2021-11-15 PROCEDURE — 1126F AMNT PAIN NOTED NONE PRSNT: CPT | Mod: CPTII,S$GLB,, | Performed by: ORTHOPAEDIC SURGERY

## 2021-11-15 PROCEDURE — 3008F PR BODY MASS INDEX (BMI) DOCUMENTED: ICD-10-PCS | Mod: CPTII,S$GLB,, | Performed by: ORTHOPAEDIC SURGERY

## 2021-11-15 PROCEDURE — 3288F FALL RISK ASSESSMENT DOCD: CPT | Mod: CPTII,S$GLB,, | Performed by: ORTHOPAEDIC SURGERY

## 2021-11-15 PROCEDURE — 1101F PT FALLS ASSESS-DOCD LE1/YR: CPT | Mod: CPTII,S$GLB,, | Performed by: ORTHOPAEDIC SURGERY

## 2021-11-15 PROCEDURE — 3066F PR DOCUMENTATION OF TREATMENT FOR NEPHROPATHY: ICD-10-PCS | Mod: CPTII,S$GLB,, | Performed by: ORTHOPAEDIC SURGERY

## 2021-11-15 PROCEDURE — 4010F PR ACE/ARB THEARPY RXD/TAKEN: ICD-10-PCS | Mod: CPTII,S$GLB,, | Performed by: ORTHOPAEDIC SURGERY

## 2021-11-15 PROCEDURE — 3072F PR LOW RISK FOR RETINOPATHY: ICD-10-PCS | Mod: CPTII,S$GLB,, | Performed by: ORTHOPAEDIC SURGERY

## 2021-11-15 PROCEDURE — 3044F HG A1C LEVEL LT 7.0%: CPT | Mod: CPTII,S$GLB,, | Performed by: ORTHOPAEDIC SURGERY

## 2021-11-15 PROCEDURE — 1101F PR PT FALLS ASSESS DOC 0-1 FALLS W/OUT INJ PAST YR: ICD-10-PCS | Mod: CPTII,S$GLB,, | Performed by: ORTHOPAEDIC SURGERY

## 2021-11-15 PROCEDURE — 3061F NEG MICROALBUMINURIA REV: CPT | Mod: CPTII,S$GLB,, | Performed by: ORTHOPAEDIC SURGERY

## 2021-11-15 PROCEDURE — 3044F PR MOST RECENT HEMOGLOBIN A1C LEVEL <7.0%: ICD-10-PCS | Mod: CPTII,S$GLB,, | Performed by: ORTHOPAEDIC SURGERY

## 2021-11-25 ENCOUNTER — PATIENT MESSAGE (OUTPATIENT)
Dept: FAMILY MEDICINE | Facility: CLINIC | Age: 71
End: 2021-11-25
Payer: COMMERCIAL

## 2021-11-25 DIAGNOSIS — G89.29 CHRONIC MIDLINE LOW BACK PAIN WITHOUT SCIATICA: Primary | ICD-10-CM

## 2021-11-25 DIAGNOSIS — M54.50 CHRONIC MIDLINE LOW BACK PAIN WITHOUT SCIATICA: Primary | ICD-10-CM

## 2021-11-26 RX ORDER — TRAMADOL HYDROCHLORIDE 50 MG/1
50 TABLET ORAL EVERY 12 HOURS PRN
Qty: 30 TABLET | Refills: 0 | Status: SHIPPED | OUTPATIENT
Start: 2021-11-26 | End: 2022-05-13

## 2021-12-02 ENCOUNTER — HOSPITAL ENCOUNTER (OUTPATIENT)
Dept: RADIOLOGY | Facility: HOSPITAL | Age: 71
Discharge: HOME | End: 2021-12-02
Attending: FAMILY MEDICINE
Payer: COMMERCIAL

## 2021-12-02 ENCOUNTER — OFFICE VISIT (OUTPATIENT)
Dept: FAMILY MEDICINE | Facility: CLINIC | Age: 71
End: 2021-12-02
Payer: COMMERCIAL

## 2021-12-02 VITALS
BODY MASS INDEX: 27.43 KG/M2 | HEIGHT: 68 IN | TEMPERATURE: 98 F | OXYGEN SATURATION: 98 % | WEIGHT: 181 LBS | DIASTOLIC BLOOD PRESSURE: 68 MMHG | SYSTOLIC BLOOD PRESSURE: 130 MMHG | HEART RATE: 69 BPM

## 2021-12-02 DIAGNOSIS — E66.3 OVERWEIGHT (BMI 25.0-29.9): ICD-10-CM

## 2021-12-02 DIAGNOSIS — E78.5 HYPERLIPIDEMIA, UNSPECIFIED HYPERLIPIDEMIA TYPE: ICD-10-CM

## 2021-12-02 DIAGNOSIS — I10 ESSENTIAL HYPERTENSION: ICD-10-CM

## 2021-12-02 DIAGNOSIS — M79.89 SWELLING OF CALF: Primary | ICD-10-CM

## 2021-12-02 DIAGNOSIS — E11.42 TYPE 2 DIABETES MELLITUS WITH DIABETIC POLYNEUROPATHY, WITHOUT LONG-TERM CURRENT USE OF INSULIN: ICD-10-CM

## 2021-12-02 DIAGNOSIS — M79.89 SWELLING OF CALF: ICD-10-CM

## 2021-12-02 PROCEDURE — 4010F ACE/ARB THERAPY RXD/TAKEN: CPT | Mod: CPTII,S$GLB,, | Performed by: FAMILY MEDICINE

## 2021-12-02 PROCEDURE — 99214 OFFICE O/P EST MOD 30 MIN: CPT | Mod: S$GLB,,, | Performed by: FAMILY MEDICINE

## 2021-12-02 PROCEDURE — 93971 US LOWER EXTREMITY VEINS LEFT: ICD-10-PCS | Mod: 26,LT,, | Performed by: RADIOLOGY

## 2021-12-02 PROCEDURE — 3061F NEG MICROALBUMINURIA REV: CPT | Mod: CPTII,S$GLB,, | Performed by: FAMILY MEDICINE

## 2021-12-02 PROCEDURE — 99214 PR OFFICE/OUTPT VISIT, EST, LEVL IV, 30-39 MIN: ICD-10-PCS | Mod: S$GLB,,, | Performed by: FAMILY MEDICINE

## 2021-12-02 PROCEDURE — 3066F PR DOCUMENTATION OF TREATMENT FOR NEPHROPATHY: ICD-10-PCS | Mod: CPTII,S$GLB,, | Performed by: FAMILY MEDICINE

## 2021-12-02 PROCEDURE — 3072F PR LOW RISK FOR RETINOPATHY: ICD-10-PCS | Mod: CPTII,S$GLB,, | Performed by: FAMILY MEDICINE

## 2021-12-02 PROCEDURE — 3072F LOW RISK FOR RETINOPATHY: CPT | Mod: CPTII,S$GLB,, | Performed by: FAMILY MEDICINE

## 2021-12-02 PROCEDURE — 93971 EXTREMITY STUDY: CPT | Mod: TC,LT

## 2021-12-02 PROCEDURE — 3066F NEPHROPATHY DOC TX: CPT | Mod: CPTII,S$GLB,, | Performed by: FAMILY MEDICINE

## 2021-12-02 PROCEDURE — 3061F PR NEG MICROALBUMINURIA RESULT DOCUMENTED/REVIEW: ICD-10-PCS | Mod: CPTII,S$GLB,, | Performed by: FAMILY MEDICINE

## 2021-12-02 PROCEDURE — 4010F PR ACE/ARB THEARPY RXD/TAKEN: ICD-10-PCS | Mod: CPTII,S$GLB,, | Performed by: FAMILY MEDICINE

## 2021-12-02 PROCEDURE — 99999 PR PBB SHADOW E&M-EST. PATIENT-LVL IV: ICD-10-PCS | Mod: PBBFAC,,, | Performed by: FAMILY MEDICINE

## 2021-12-02 PROCEDURE — 93971 EXTREMITY STUDY: CPT | Mod: 26,LT,, | Performed by: RADIOLOGY

## 2021-12-02 PROCEDURE — 99999 PR PBB SHADOW E&M-EST. PATIENT-LVL IV: CPT | Mod: PBBFAC,,, | Performed by: FAMILY MEDICINE

## 2021-12-28 RX ORDER — METFORMIN HYDROCHLORIDE 500 MG/1
TABLET ORAL
Qty: 180 TABLET | Refills: 1 | Status: SHIPPED | OUTPATIENT
Start: 2021-12-28 | End: 2022-02-25 | Stop reason: SDUPTHER

## 2022-02-11 ENCOUNTER — OFFICE VISIT (OUTPATIENT)
Dept: FAMILY MEDICINE | Facility: CLINIC | Age: 72
End: 2022-02-11
Payer: COMMERCIAL

## 2022-02-11 VITALS
HEART RATE: 74 BPM | OXYGEN SATURATION: 97 % | TEMPERATURE: 98 F | WEIGHT: 181.69 LBS | HEIGHT: 68 IN | DIASTOLIC BLOOD PRESSURE: 62 MMHG | BODY MASS INDEX: 27.54 KG/M2 | SYSTOLIC BLOOD PRESSURE: 128 MMHG

## 2022-02-11 DIAGNOSIS — E78.5 HYPERLIPIDEMIA, UNSPECIFIED HYPERLIPIDEMIA TYPE: ICD-10-CM

## 2022-02-11 DIAGNOSIS — E11.42 TYPE 2 DIABETES MELLITUS WITH DIABETIC POLYNEUROPATHY, WITHOUT LONG-TERM CURRENT USE OF INSULIN: ICD-10-CM

## 2022-02-11 DIAGNOSIS — E66.3 OVERWEIGHT (BMI 25.0-29.9): ICD-10-CM

## 2022-02-11 DIAGNOSIS — I10 ESSENTIAL HYPERTENSION: ICD-10-CM

## 2022-02-11 DIAGNOSIS — H92.02 DISCOMFORT OF LEFT EAR: Primary | ICD-10-CM

## 2022-02-11 PROCEDURE — 3078F DIAST BP <80 MM HG: CPT | Mod: CPTII,S$GLB,, | Performed by: FAMILY MEDICINE

## 2022-02-11 PROCEDURE — 1159F PR MEDICATION LIST DOCUMENTED IN MEDICAL RECORD: ICD-10-PCS | Mod: CPTII,S$GLB,, | Performed by: FAMILY MEDICINE

## 2022-02-11 PROCEDURE — 99214 OFFICE O/P EST MOD 30 MIN: CPT | Mod: S$GLB,,, | Performed by: FAMILY MEDICINE

## 2022-02-11 PROCEDURE — 1160F PR REVIEW ALL MEDS BY PRESCRIBER/CLIN PHARMACIST DOCUMENTED: ICD-10-PCS | Mod: CPTII,S$GLB,, | Performed by: FAMILY MEDICINE

## 2022-02-11 PROCEDURE — 3072F LOW RISK FOR RETINOPATHY: CPT | Mod: CPTII,S$GLB,, | Performed by: FAMILY MEDICINE

## 2022-02-11 PROCEDURE — 3072F PR LOW RISK FOR RETINOPATHY: ICD-10-PCS | Mod: CPTII,S$GLB,, | Performed by: FAMILY MEDICINE

## 2022-02-11 PROCEDURE — 1160F RVW MEDS BY RX/DR IN RCRD: CPT | Mod: CPTII,S$GLB,, | Performed by: FAMILY MEDICINE

## 2022-02-11 PROCEDURE — 1126F AMNT PAIN NOTED NONE PRSNT: CPT | Mod: CPTII,S$GLB,, | Performed by: FAMILY MEDICINE

## 2022-02-11 PROCEDURE — 1159F MED LIST DOCD IN RCRD: CPT | Mod: CPTII,S$GLB,, | Performed by: FAMILY MEDICINE

## 2022-02-11 PROCEDURE — 3078F PR MOST RECENT DIASTOLIC BLOOD PRESSURE < 80 MM HG: ICD-10-PCS | Mod: CPTII,S$GLB,, | Performed by: FAMILY MEDICINE

## 2022-02-11 PROCEDURE — 3288F FALL RISK ASSESSMENT DOCD: CPT | Mod: CPTII,S$GLB,, | Performed by: FAMILY MEDICINE

## 2022-02-11 PROCEDURE — 1101F PT FALLS ASSESS-DOCD LE1/YR: CPT | Mod: CPTII,S$GLB,, | Performed by: FAMILY MEDICINE

## 2022-02-11 PROCEDURE — 1101F PR PT FALLS ASSESS DOC 0-1 FALLS W/OUT INJ PAST YR: ICD-10-PCS | Mod: CPTII,S$GLB,, | Performed by: FAMILY MEDICINE

## 2022-02-11 PROCEDURE — 99214 PR OFFICE/OUTPT VISIT, EST, LEVL IV, 30-39 MIN: ICD-10-PCS | Mod: S$GLB,,, | Performed by: FAMILY MEDICINE

## 2022-02-11 PROCEDURE — 99999 PR PBB SHADOW E&M-EST. PATIENT-LVL IV: ICD-10-PCS | Mod: PBBFAC,,, | Performed by: FAMILY MEDICINE

## 2022-02-11 PROCEDURE — 3288F PR FALLS RISK ASSESSMENT DOCUMENTED: ICD-10-PCS | Mod: CPTII,S$GLB,, | Performed by: FAMILY MEDICINE

## 2022-02-11 PROCEDURE — 1126F PR PAIN SEVERITY QUANTIFIED, NO PAIN PRESENT: ICD-10-PCS | Mod: CPTII,S$GLB,, | Performed by: FAMILY MEDICINE

## 2022-02-11 PROCEDURE — 3008F PR BODY MASS INDEX (BMI) DOCUMENTED: ICD-10-PCS | Mod: CPTII,S$GLB,, | Performed by: FAMILY MEDICINE

## 2022-02-11 PROCEDURE — 3074F PR MOST RECENT SYSTOLIC BLOOD PRESSURE < 130 MM HG: ICD-10-PCS | Mod: CPTII,S$GLB,, | Performed by: FAMILY MEDICINE

## 2022-02-11 PROCEDURE — 3008F BODY MASS INDEX DOCD: CPT | Mod: CPTII,S$GLB,, | Performed by: FAMILY MEDICINE

## 2022-02-11 PROCEDURE — 3074F SYST BP LT 130 MM HG: CPT | Mod: CPTII,S$GLB,, | Performed by: FAMILY MEDICINE

## 2022-02-11 PROCEDURE — 99999 PR PBB SHADOW E&M-EST. PATIENT-LVL IV: CPT | Mod: PBBFAC,,, | Performed by: FAMILY MEDICINE

## 2022-02-11 NOTE — PROGRESS NOTES
"  Physical Exam  /62   Pulse 74   Temp 98.4 °F (36.9 °C) (Oral)   Ht 5' 8" (1.727 m)   Wt 82.4 kg (181 lb 10.5 oz)   SpO2 97%   BMI 27.62 kg/m²      Office Visit    Patient Name: John Lemos    : 1950  MRN: [id number]      Assessment/Plan:  John Lemos is a 71 y.o. male who presents today for :    Discomfort of left ear  -mild, improving today, exam unremarkable, reassurance provided that he does not have an ear infection and advised supportive care. May continue with using Debrox as needed to remove cerumen. Advised using warm water to clean out ears to avoid dizziness.       Type 2 diabetes mellitus with diabetic polyneuropathy, without long-term current use of insulin  Essential hypertension  Hyperlipidemia, unspecified hyperlipidemia type  Overweight (BMI 25.0-29.9)  -previous A1c reviewed:   Lab Results   Component Value Date    HGBA1C 6.8 (H) 2021     -well controlled, continue current medication regimen  -reviewed with patient about routine diabetic care      Follow up for worsening Sx. Urgent care/ED precautions provided.    This note was created by combination of typed  and MModal dictation.  Transcription errors may be present.  If there are any questions, please contact me.      ----------------------------------------------------------------------------------------------------------------------      HPI:  Patient Care Team:  Chan Estrada MD as PCP - General (Internal Medicine)  Chan Estrada MD as Diabetes Digital Medicine Responsible Provider (Internal Medicine)  Mag Manjarrez as Digital Medicine Health   Alessandra Sarabia PharmD as Diabetes Digital Medicine Clinician (Pharmacist)  Samantha Romero LPN as Care Coordinator    John is a 71 y.o. male with      Patient Active Problem List   Diagnosis    GERD (gastroesophageal reflux disease)    DDD (degenerative disc disease), lumbar    ED (erectile dysfunction)    Dyslipidemia    Neuropathy    Type " 2 diabetes mellitus with diabetic polyneuropathy    Screening for colon cancer    Primary osteoarthritis of right knee    Nuclear sclerosis of both eyes       Patient with PMHx as above presents today for follow up of L ear discomfort. He states he had some sensation of object in his L ear for over a week. Has been using Debrox to remove ear wax. He did endorse an episode of dizziness after he washed his ear out 4 days ago. Yesterday, he noticed mild pain in the left ear and wants to get it evaluated to make sure he doesn't have an ear infection. He does have occasional sinus congestion and pressure. He currently denies any fever/chills/N/V/sore throat/decreased taste or smell/SOB/body aches/cough. He is otherwise doing well on his current medication regimen for HTN and DM, which are both well controlled.                 Hemoglobin A1C   Date Value Ref Range Status   11/05/2021 6.8 (H) 4.0 - 5.6 % Final     Comment:     ADA Screening Guidelines:  5.7-6.4%  Consistent with prediabetes  >or=6.5%  Consistent with diabetes    High levels of fetal hemoglobin interfere with the HbA1C  assay. Heterozygous hemoglobin variants (HbS, HgC, etc)do  not significantly interfere with this assay.   However, presence of multiple variants may affect accuracy.     05/03/2021 7.0 (H) 4.0 - 5.6 % Final     Comment:     ADA Screening Guidelines:  5.7-6.4%  Consistent with prediabetes  >or=6.5%  Consistent with diabetes    High levels of fetal hemoglobin interfere with the HbA1C  assay. Heterozygous hemoglobin variants (HbS, HgC, etc)do  not significantly interfere with this assay.   However, presence of multiple variants may affect accuracy.     02/05/2021 7.2 (H) 4.0 - 5.6 % Final     Comment:     ADA Screening Guidelines:  5.7-6.4%  Consistent with prediabetes  >or=6.5%  Consistent with diabetes    High levels of fetal hemoglobin interfere with the HbA1C  assay. Heterozygous hemoglobin variants (HbS, HgC, etc)do  not significantly  interfere with this assay.   However, presence of multiple variants may affect accuracy.           Additional ROS      CONST: no fever, no activity change  EYES: no vision change.   ENT: no sore throat. No dysphagia.   CV: no CP with exertion  RESP: no SOB  GI: no N/V/diarrhea/constipation  : no urinary concerns  MSK: no new myalgias or arthralgias.   SKIN: no new rashes  NEURO: no focal deficits.   PSYCH: no new issues.                 Patient Active Problem List   Diagnosis    GERD (gastroesophageal reflux disease)    DDD (degenerative disc disease), lumbar    ED (erectile dysfunction)    Dyslipidemia    Neuropathy    Type 2 diabetes mellitus with diabetic polyneuropathy    Screening for colon cancer    Primary osteoarthritis of right knee    Nuclear sclerosis of both eyes       Current Medications  Medications reviewed/updated.     Current Outpatient Medications on File Prior to Visit   Medication Sig Dispense Refill    atorvastatin (LIPITOR) 40 MG tablet Take 1 tablet (40 mg total) by mouth once daily. 90 tablet 3    blood sugar diagnostic (FREESTYLE LITE STRIPS) Strp Inject 1 each as directed once daily. 100 each 3    DULoxetine (CYMBALTA) 60 MG capsule Take 1 capsule (60 mg total) by mouth once daily. 90 capsule 0    fluticasone (FLONASE) 50 mcg/actuation nasal spray 1 spray (50 mcg total) by Each Nare route once daily. 16 g 2    lancets (FREESTYLE LANCETS) 28 gauge Misc Inject 1 lancet as directed once daily. 100 each 3    levocetirizine (XYZAL) 5 MG tablet Take 1 tablet (5 mg total) by mouth every evening. 30 tablet 0    losartan (COZAAR) 50 MG tablet Take 1 tablet by mouth once daily 90 tablet 0    metFORMIN (GLUCOPHAGE) 500 MG tablet TAKE 2 TABLETS BY MOUTH TWICE DAILY WITH BREAKFAST AND WITH SUPPER 180 tablet 1    naftifine (NAFTIN) 1 % cream Apply topically once daily.      omeprazole (PRILOSEC) 20 MG capsule Take 20 mg by mouth. 1 Capsule, Delayed Release(E.C.) Oral Every morning       traMADoL (ULTRAM) 50 mg tablet Take 1 tablet (50 mg total) by mouth every 12 (twelve) hours as needed for Pain. 30 tablet 0    ipratropium (ATROVENT) 21 mcg (0.03 %) nasal spray 2 sprays by Nasal route 3 (three) times daily. (Patient not taking: No sig reported) 30 mL 0     No current facility-administered medications on file prior to visit.           Past Surgical History:   Procedure Laterality Date    COLONOSCOPY N/A 11/25/2019    Procedure: COLONOSCOPY;  Surgeon: Eddie Leslie MD;  Location: [facility];  Service: Endoscopy;  Laterality: N/A;       Family History   Problem Relation Age of Onset    Diabetes Mother     Cancer Mother     Hypertension Mother     Diabetes Father     Cancer Father     No Known Problems Sister     No Known Problems Brother     No Known Problems Maternal Aunt     No Known Problems Maternal Uncle     No Known Problems Paternal Aunt     No Known Problems Paternal Uncle     No Known Problems Maternal Grandmother     No Known Problems Maternal Grandfather     No Known Problems Paternal Grandmother     No Known Problems Paternal Grandfather     Amblyopia Neg Hx     Blindness Neg Hx     Cataracts Neg Hx     Glaucoma Neg Hx     Macular degeneration Neg Hx     Retinal detachment Neg Hx     Strabismus Neg Hx     Stroke Neg Hx     Thyroid disease Neg Hx        Social History     Socioeconomic History    Marital status:    Occupational History    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     Employer: [employer]   Tobacco Use    Smoking status: Never Smoker    Smokeless tobacco: Never Used   Substance and Sexual Activity    Alcohol use: No    Drug use: No    Sexual activity: Not Currently     Social Determinants of Health     Financial Resource Strain: Low Risk     Difficulty of Paying Living Expenses: Not hard at all   Food Insecurity: No Food Insecurity    Worried About Running Out of Food in the Last Year:  "Never true    Ran Out of Food in the Last Year: Never true   Transportation Needs: No Transportation Needs    Lack of Transportation (Medical): No    Lack of Transportation (Non-Medical): No   Physical Activity: Insufficiently Active    Days of Exercise per Week: 2 days    Minutes of Exercise per Session: 20 min   Stress: No Stress Concern Present    Feeling of Stress : Only a little   Social Connections: Unknown    Frequency of Communication with Friends and Family: More than three times a week    Frequency of Social Gatherings with Friends and Family: More than three times a week    Active Member of Clubs or Organizations: Yes    Attends Club or Organization Meetings: More than 4 times per year    Marital Status:    Housing Stability: Unknown    Unable to Pay for Housing in the Last Year: No    Unstable Housing in the Last Year: No             Allergies   Review of patient's allergies indicates:   Allergen Reactions    Sulfa (sulfonamide antibiotics) Swelling             Review of Systems  See HPI      [unfilled]  /62   Pulse 74   Temp 98.4 °F (36.9 °C) (Oral)   Ht 5' 8" (1.727 m)   Wt 82.4 kg (181 lb 10.5 oz)   SpO2 97%   BMI 27.62 kg/m²       GEN: NAD, pleasant  HEENT: NCAT, PERRLA, EOMI, sclera clear, anicteric, bilatearl ear canal exam wnl with minimal cerumen, both TMs intact without erythema nor effusion, no drainage - no pain with movement of the tragus and auricle, O/P clear, MMM with no lesions  NECK: normal, supple  LUNGS: CTAB, no w/r/r, normal respiratory effort  HEART: RRR, normal S1 and S2, no m/r/g, no palpitations, no edema  SKIN: warm and dry with normal turgor, no rashes, no other lesions.   PSYCH: AOx3, appropriate mood and affect.   MSK: extremities warm/well perfused, normal ROM in all 4 extremities, no c/c/e.   NEURO: normal without focal findings, CN II-XII are intact      "

## 2022-02-25 DIAGNOSIS — M51.36 DDD (DEGENERATIVE DISC DISEASE), LUMBAR: ICD-10-CM

## 2022-02-25 NOTE — TELEPHONE ENCOUNTER
Refills have been requested for the following medications:         DULoxetine (CYMBALTA) 60 MG capsule [Chan Estrada MD]         metFORMIN (GLUCOPHAGE) 500 MG tablet [Chan Estrada MD]     Preferred pharmacy: [facility] [address]

## 2022-02-25 NOTE — TELEPHONE ENCOUNTER
Care Due:                  Date            Visit Type   Department     Provider  --------------------------------------------------------------------------------                                SAME DAY -   [facility]                              ESTABLISHED   MED/ INTERNAL  Last Visit: 02-      PATIENT      MED/ PEDS      [facility] GOLDMAN [facility]                              PRIMARY      MED/ INTERNAL  Next Visit: 05-      CARE ([facility])   MED/ PEDS      Chan Etsrada                                                            Last  Test          Frequency    Reason                     Performed    Due Date  --------------------------------------------------------------------------------    CMP.........  12 months..  DULoxetine, atorvastatin,   06- 05-                             losartan, metFORMIN......    HBA1C.......  6 months...  metFORMIN................  11- 05-    Powered by [employer]. Reference number: [id number].   2/25/2022 6:19:34 AM CST

## 2022-03-02 RX ORDER — DULOXETIN HYDROCHLORIDE 60 MG/1
60 CAPSULE, DELAYED RELEASE ORAL DAILY
Qty: 90 CAPSULE | Refills: 0 | Status: SHIPPED | OUTPATIENT
Start: 2022-03-02 | End: 2022-05-29

## 2022-03-02 RX ORDER — METFORMIN HYDROCHLORIDE 500 MG/1
TABLET ORAL
Qty: 180 TABLET | Refills: 1 | Status: SHIPPED | OUTPATIENT
Start: 2022-03-02 | End: 2022-06-08 | Stop reason: SDUPTHER

## 2022-03-08 ENCOUNTER — PATIENT MESSAGE (OUTPATIENT)
Dept: OTHER | Facility: OTHER | Age: 72
End: 2022-03-08
Payer: COMMERCIAL

## 2022-04-16 DIAGNOSIS — E11.9 CONTROLLED TYPE 2 DIABETES MELLITUS WITHOUT COMPLICATION, WITHOUT LONG-TERM CURRENT USE OF INSULIN: Chronic | ICD-10-CM

## 2022-04-16 NOTE — TELEPHONE ENCOUNTER
No new care gaps identified.  Powered by [employer]. Reference number: [id number].   4/16/2022 9:27:51 AM CDT

## 2022-04-19 ENCOUNTER — OFFICE VISIT (OUTPATIENT)
Dept: URGENT CARE | Facility: CLINIC | Age: 72
End: 2022-04-19
Payer: COMMERCIAL

## 2022-04-19 VITALS
SYSTOLIC BLOOD PRESSURE: 156 MMHG | WEIGHT: 181 LBS | HEIGHT: 68 IN | RESPIRATION RATE: 16 BRPM | HEART RATE: 63 BPM | OXYGEN SATURATION: 98 % | DIASTOLIC BLOOD PRESSURE: 77 MMHG | TEMPERATURE: 97 F | BODY MASS INDEX: 27.43 KG/M2

## 2022-04-19 DIAGNOSIS — G89.29 CHRONIC PAIN OF RIGHT KNEE: Primary | ICD-10-CM

## 2022-04-19 DIAGNOSIS — M25.561 CHRONIC PAIN OF RIGHT KNEE: Primary | ICD-10-CM

## 2022-04-19 PROCEDURE — 3072F PR LOW RISK FOR RETINOPATHY: ICD-10-PCS | Mod: CPTII,S$GLB,, | Performed by: FAMILY MEDICINE

## 2022-04-19 PROCEDURE — 73564 X-RAY EXAM KNEE 4 OR MORE: CPT | Mod: RT,S$GLB,, | Performed by: INTERNAL MEDICINE

## 2022-04-19 PROCEDURE — 99213 PR OFFICE/OUTPT VISIT, EST, LEVL III, 20-29 MIN: ICD-10-PCS | Mod: S$GLB,,, | Performed by: FAMILY MEDICINE

## 2022-04-19 PROCEDURE — 1160F RVW MEDS BY RX/DR IN RCRD: CPT | Mod: CPTII,S$GLB,, | Performed by: FAMILY MEDICINE

## 2022-04-19 PROCEDURE — 3072F LOW RISK FOR RETINOPATHY: CPT | Mod: CPTII,S$GLB,, | Performed by: FAMILY MEDICINE

## 2022-04-19 PROCEDURE — 1101F PT FALLS ASSESS-DOCD LE1/YR: CPT | Mod: CPTII,S$GLB,, | Performed by: FAMILY MEDICINE

## 2022-04-19 PROCEDURE — 3078F DIAST BP <80 MM HG: CPT | Mod: CPTII,S$GLB,, | Performed by: FAMILY MEDICINE

## 2022-04-19 PROCEDURE — 1160F PR REVIEW ALL MEDS BY PRESCRIBER/CLIN PHARMACIST DOCUMENTED: ICD-10-PCS | Mod: CPTII,S$GLB,, | Performed by: FAMILY MEDICINE

## 2022-04-19 PROCEDURE — 1125F AMNT PAIN NOTED PAIN PRSNT: CPT | Mod: CPTII,S$GLB,, | Performed by: FAMILY MEDICINE

## 2022-04-19 PROCEDURE — 73564 XR KNEE COMP 4 OR MORE VIEWS RIGHT: ICD-10-PCS | Mod: RT,S$GLB,, | Performed by: INTERNAL MEDICINE

## 2022-04-19 PROCEDURE — 3077F PR MOST RECENT SYSTOLIC BLOOD PRESSURE >= 140 MM HG: ICD-10-PCS | Mod: CPTII,S$GLB,, | Performed by: FAMILY MEDICINE

## 2022-04-19 PROCEDURE — 1101F PR PT FALLS ASSESS DOC 0-1 FALLS W/OUT INJ PAST YR: ICD-10-PCS | Mod: CPTII,S$GLB,, | Performed by: FAMILY MEDICINE

## 2022-04-19 PROCEDURE — 3288F FALL RISK ASSESSMENT DOCD: CPT | Mod: CPTII,S$GLB,, | Performed by: FAMILY MEDICINE

## 2022-04-19 PROCEDURE — 3008F PR BODY MASS INDEX (BMI) DOCUMENTED: ICD-10-PCS | Mod: CPTII,S$GLB,, | Performed by: FAMILY MEDICINE

## 2022-04-19 PROCEDURE — 99213 OFFICE O/P EST LOW 20 MIN: CPT | Mod: S$GLB,,, | Performed by: FAMILY MEDICINE

## 2022-04-19 PROCEDURE — 3078F PR MOST RECENT DIASTOLIC BLOOD PRESSURE < 80 MM HG: ICD-10-PCS | Mod: CPTII,S$GLB,, | Performed by: FAMILY MEDICINE

## 2022-04-19 PROCEDURE — 3008F BODY MASS INDEX DOCD: CPT | Mod: CPTII,S$GLB,, | Performed by: FAMILY MEDICINE

## 2022-04-19 PROCEDURE — 3288F PR FALLS RISK ASSESSMENT DOCUMENTED: ICD-10-PCS | Mod: CPTII,S$GLB,, | Performed by: FAMILY MEDICINE

## 2022-04-19 PROCEDURE — 1159F PR MEDICATION LIST DOCUMENTED IN MEDICAL RECORD: ICD-10-PCS | Mod: CPTII,S$GLB,, | Performed by: FAMILY MEDICINE

## 2022-04-19 PROCEDURE — 3077F SYST BP >= 140 MM HG: CPT | Mod: CPTII,S$GLB,, | Performed by: FAMILY MEDICINE

## 2022-04-19 PROCEDURE — 1159F MED LIST DOCD IN RCRD: CPT | Mod: CPTII,S$GLB,, | Performed by: FAMILY MEDICINE

## 2022-04-19 PROCEDURE — 1125F PR PAIN SEVERITY QUANTIFIED, PAIN PRESENT: ICD-10-PCS | Mod: CPTII,S$GLB,, | Performed by: FAMILY MEDICINE

## 2022-04-19 RX ORDER — DICLOFENAC SODIUM 10 MG/G
2 GEL TOPICAL 4 TIMES DAILY
Qty: 20 G | Refills: 0 | Status: SHIPPED | OUTPATIENT
Start: 2022-04-19

## 2022-04-19 NOTE — PROGRESS NOTES
"Subjective:       Patient ID: John Lemos is a 71 y.o. male.    Vitals:  height is 5' 8" (1.727 m) and weight is 82.1 kg (181 lb). His temperature is 97.1 °F (36.2 °C). His blood pressure is 156/77 (abnormal) and his pulse is 63. His respiration is 16 and oxygen saturation is 98%.     Chief Complaint: Knee Pain    Pt stated that he was gardening this past week and his right knee started causing him pain . Pt stated that the pain has been worsening . Pt stated that he has taken tylenol . Pt stated that it is also difficukt to walk . Pts pharmacy has been updated .   Provider note begins below:  Pt reports starting before easter with worsening chronic right knee pain, he notes normally he is able to walk without problem, but pain with twisting movements, but since Friday he has had worsening right knee pain, says he is [employer] and more kneeling during this time of the year could have caused it. He took tylenol this morning, but he was having a lot of stiffness when he woke up today. Denies any rashes, fever, chills. Continues to work FT as teacher.     Knee Pain   The incident occurred 5 to 7 days ago. The incident occurred at home. There was no injury mechanism. The pain is present in the right knee. The quality of the pain is described as aching. The pain is at a severity of 8/10. The pain is moderate. The pain has been worsening since onset. Associated symptoms include an inability to bear weight and muscle weakness. Pertinent negatives include no loss of motion, loss of sensation, numbness or tingling. He reports no foreign bodies present. The symptoms are aggravated by movement, palpation and weight bearing. He has tried acetaminophen for the symptoms. The treatment provided mild relief.       Constitution: Negative for activity change, appetite change, chills, sweating, fatigue, fever, unexpected weight change and generalized weakness.   Musculoskeletal: Positive for pain, joint pain, joint swelling, " arthritis and pain with walking. Negative for trauma, abnormal ROM of joint, gout, back pain, muscle cramps, muscle ache and history of spine disorder.   Skin: Negative for erythema.   Neurological: Negative for numbness.       Objective:      Physical Exam   Constitutional: He is oriented to person, place, and time. He appears well-developed.  Non-toxic appearance. He does not appear ill. No distress.   HENT:   Head: Normocephalic and atraumatic.   Ears:   Right Ear: External ear normal.   Left Ear: External ear normal.   Nose: Nose normal.   Mouth/Throat: Oropharynx is clear and moist.   Eyes: EOM and lids are normal.   Neck: Trachea normal and phonation normal. Neck supple.   Musculoskeletal:      Right knee: He exhibits decreased range of motion (pain worse with extension, unable to fully extend the knee.), swelling and effusion. He exhibits no ecchymosis, no deformity, no laceration, no erythema and no bony tenderness. No tenderness found.      Left knee: He exhibits normal range of motion, no swelling, no effusion, no ecchymosis, no deformity, no laceration, no erythema and no bony tenderness. No tenderness found.      Comments: Right knee is not red, warm, to suggest septic joint, he has no f/c as well.    Neurological: He is alert and oriented to person, place, and time.   Skin: Skin is warm, dry, intact and not diaphoretic. not left knee and not right kneeNo erythema   Psychiatric: His speech is normal and behavior is normal. Judgment and thought content normal.   Nursing note and vitals reviewed.        Assessment:       1. Chronic pain of right knee        X-Ray Knee Complete 4 Or More Views Right    Result Date: 4/19/2022  EXAMINATION: XR KNEE COMP 4 OR MORE VIEWS RIGHT CLINICAL HISTORY: Pain in right knee TECHNIQUE: Three views of the right knee were performed. COMPARISON: Knee radiographs 06/15/2020 and 11/11/2021 FINDINGS: Right knee: Tricompartmental degenerative changes, severe in the medial  compartment where there is bone-on-bone contact.  No acute fracture or dislocation.  Small joint effusion.  Possible 1.3 cm ossified intra-articular loose body in the anterior joint space. Left knee: Standing and sunrise views of the left knee demonstrate moderate degenerative changes of the medial compartment and mild degenerative changes of the patellofemoral compartment.     Degenerative changes as described. Electronically signed by: Vazquez Aldana Date:    04/19/2022 Time:    10:51    Plan:       Assisted pt with portal, sched him with ortho Thursday.   Try top voltaren  S/s of septic joint discussed in detail    Discussed results/diagnosis/plan with patient in clinic. Strict precautions given to patient to monitor for worsening signs and symptoms. Advised to follow up with PCP or specialist.    Explained side effects of medications prescribed with patient and informed him/her to discontinue use if he/she has any side effects and to inform UC or PCP if this occurs. All questions answered. Strict ED verses clinic return precautions stressed and given in depth. Advised if symptoms worsens of fail to improve he/she should go to the Emergency Room. Discharge and follow-up instructions given verbally/printed with the patient who expressed understanding and willingness to comply with my recommendations. Patient voiced understanding and in agreement with current treatment plan. Patient exits the exam room in no acute distress. Conversant and engaged during discharge discussion, verbalized understanding.      Chronic pain of right knee  -     X-Ray Knee Complete 4 Or More Views Right; Future; Expected date: 04/19/2022  -     diclofenac sodium (VOLTAREN) 1 % Gel; Apply 2 g topically 4 (four) times daily.  Dispense: 20 g; Refill: 0           Medical Decision Making:   History:   Old Medical Records: I decided to obtain old medical records.  Old Records Summarized: records from clinic visits and other records.       Patient  Instructions   General Discharge Instructions   PLEASE READ YOUR DISCHARGE INSTRUCTIONS ENTIRELY AS IT CONTAINS IMPORTANT INFORMATION.  If you were prescribed a narcotic or controlled medication, do not drive or operate heavy equipment or machinery while taking these medications.  If you were prescribed antibiotics, please take them to completion.  You must understand that you've received an Urgent Care treatment only and that you may be released before all your medical problems are known or treated. You, the patient, will arrange for follow up care as instructed.    OVER THE COUNTER RECOMMENDATIONS/SUGGESTIONS.    Make sure to stay well hydrated.    Use Nasal Saline to mechanically move any post nasal drip from your eustachian tube or from the back of your throat.    Use warm salt water gargles to ease your throat pain. Warm salt water gargles as needed for sore throat- 1/2 tsp salt to 1 cup warm water, gargle as desired.    Use an antihistamine such as Claritin, Zyrtec or Allegra to dry you out.    Use pseudoephedrine (behind the counter) to decongest. Pseudoephedrine 30 mg up to 240 mg /day. It can raise your blood pressure and give you palpitations.    Use mucinex (guaifenesin) to break up mucous up to 2400mg/day to loosen any mucous.    The mucinex DM pill has a cough suppressant that can be sedating. It can be used at night to stop the tickle at the back of your throat.    You can use Mucinex D (it has guaifenesin and a high dose of pseudoephedrine) in the mornings to help decongest.    Use Afrin in each nare for no longer than 3 days, as it is addictive. It can also dry out your mucous membranes and cause elevated blood pressure. This is especially useful if you are flying.    Use Flonase 1-2 sprays/nostril per day. It is a local acting steroid nasal spray, if you develop a bloody nose, stop using the medication immediately.    Sometimes Nyquil at night is beneficial to help you get some rest, however it is  sedating and it does have an antihistamine, and tylenol.    Honey is a natural cough suppressant that can be used.    Tylenol up to 4,000 mg a day is safe for short periods and can be used for body aches, pain, and fever. However in high doses and prolonged use it can cause liver irritation.    Ibuprofen is a non-steroidal anti-inflammatory that can be used for body aches, pain, and fever.However it can also cause stomach irritation if over used.     Follow up with your PCP or specialty clinic as instructed in the next 2-3 days if not improved or as needed. You can call [phone] to schedule an appointment with appropriate provider.      If you condition worsens, we recommend that you receive another evaluation at the emergency room immediately or contact your primary medical clinic's after hours call service to discuss your concerns.      Please return here or go to the Emergency Department for any concerns or worsening condition.   You can also call [phone] to schedule an appointment with the appropriate provider.    Please return here or go to the Emergency Department for any concerns or worsening of condition.    Thank you for choosing [facility]!    Our goal in the Urgent Care is to always provide outstanding medical care. You may receive a survey by mail or e-mail in the next week regarding your experience today. We would greatly appreciate you completing and returning the survey. Your feedback provides us with a way to recognize our staff who provide very good care, and it helps us learn how to improve when your experience was below our aspiration of excellence.      We appreciate you trusting us with your medical care. We hope you feel better soon. We will be happy to take care of you for all of your future medical needs.    Sincerely,    IAM Zuleta      Chronic Knee Pain   About this topic   The knee is a large, complex joint. It is made up of 4 bones: The thigh bone, two  "lower leg bones, and the kneecap. There is a capsule around the joint. Cartilage acts like a shock absorber in the knee and reduces friction, which helps the knee move more smoothly. The knee also has ligaments and tendons. Ligaments are bands of tissue that join one bone to another. Tendons are bands of tissue that join muscles to the bone. There are many muscles around the knee and in front and back of the thigh. If there is a problem with any of these parts of the joint, you can have pain in your knee.       What are the causes?   · Arthritis ? There are a few types of arthritis which all cause swelling of a joint. Osteoarthritis is the most common and happens over time with "wear and tear" on the joint.  · Overuse ? Repeat motions or too much bending at the knee can lead to pain.  · Bursitis ? Bursae are small fluid-filled sacs that help tendons glide easier. These can get swollen and hurt. This often happens to people who do work on their knees, like gardeners, roofers, and carpet layers.  · Ligament tear or sprain ? Ligament injuries can make the knee shaky or unstable and painful.  · Meniscus tear ? Injuries to the meniscus or cartilage can make your knee lock and cause pain with some movements.  · Muscle strain ? Injuries to the muscles near the knee happen often with sports. If these do not heal the right way, ongoing pain can happen.  · Patellar tendinopathy ? The tendon that goes over your kneecap can get swollen and hurt due to overuse and repetitive stress on the knee.  · Chondromalacia patella ? This is a health problem where there is pain under the kneecap from cartilage being worn.  · Dislocating kneecap ? If your kneecap slips out of its groove, pain can happen.  · Baker's cyst ? This is a lump that is behind the knee. This is also known as a popliteal cyst.  · Hip, ankle, back problems ? Problems in the back and in nearby joints, such as the hip and ankle, can cause pain in the knee.  · Fluid " collection ? Fluid can collect in the knee joint after a knee injury and lead to pain if not treated.  · Osgood-Schlatter disease ? This is a health problem seen in children and teens. The front of the knee below the kneecap is bothered by repeat movements.  · Osteochondritis dissecans ? This is a health problem seen in children and teens. There is a problem with the blood flow to the bone and cartilage of the knee.  · Plica syndrome ? Plica are folds of tissue that are left over from growth before birth. These can get sore and cause pain.  · Patellofemoral pain syndrome - This happens when you have pain in front of your knee or around or behind your kneecap.  · Iliotibial band syndrome - This happens when the iliotibial band, tissues that runs down the outside of the thigh from the hip to the shin, is overused. It causes pain on the outside of the knee.  What are the main signs?   · Pain or stiffness  · Swelling  · Warmth or redness  · Visible deformity  · Hard to walk, go up or down stairs, or put weight on your leg  · Knee locking ? not able to bend or straighten your knee fully  · Knee is weak, netta, or gives way  · Crunching and popping noises in the knee  How does the doctor diagnose this health problem?   Your doctor will look at your knee. Your doctor will feel all over your knee to find where the pain is. The doctor may move your knee and push or pull on many parts to check your motion and strength. Your doctor may have you stand and walk to see if your knee is stable. The doctor may order:  · Blood tests  · X-ray  · CT or MRI scan  · Ultrasound  · Surgery ? At times, arthroscopic surgery may be needed to find the cause of the pain.  How does the doctor treat this health problem?   Finding out the true cause of your knee pain is the most important step to fix the problem and lower your pain. Some things that may lessen your knee pain are:  · Rest  · Ice  · Compression  · Elevation - keeping the knee  raised  · Braces or supports  · Heat may be used later but not right away. Heat can make swelling worse.  · Exercises to stretch and strengthen the knee  · Physical therapy (PT)  · Surgery  What drugs may be needed?   The doctor may order drugs to:  · Help with pain and swelling  · Fight an infection  The doctor may give you a shot of an anti-inflammatory drug called a corticosteroid. This will help with swelling. Talk with your doctor about the risks of this shot.  What can be done to prevent this health problem?   · If your knee pain is due to overuse, do not do repetitive movements that caused the problem if possible.  · Take breaks often when doing things that use repeat movements.  · Do not sit or keep your knee in one position for long periods of time.  · If you sleep on your side, use a pillow in between your legs. This can help take stress off of the knee.  · Always warm up and stretch before a workout and cool down after.  · If you are a runner, actively stretch before a run. Talk to your doctor to make sure you understand the difference between active and passive stretching. Use good ways to train, such as slowly adding to how far you run.  · Run on softer surfaces such as a track. This is easier on your knee than a hard surface like cement.  · Try activities like swimming or biking rather than running. Running can put a lot of stress on your knee joint.  · Stay away from activities that could result in twisting, sudden stops and starts, and blows to the knee. Sports such as basketball, skiing, football, and jogging are some common sports that can lead to knee injuries.  · Wear shoes with good support. Replace your shoes often.  · Keep a healthy weight. Being too heavy puts more stress on the knee joint. This makes the knee more at risk for injury.  · Stay active and work out to keep your muscles strong and flexible.  Where can I learn more?   [facility]  Choices  http://www.nhs.uk/conditions/knee-pain/Pages/Introduction.aspx   Last Reviewed Date   2020-04-23  Consumer Information Use and Disclaimer   This information is not specific medical advice and does not replace information you receive from your health care provider. This is only a brief summary of general information. It does NOT include all information about conditions, illnesses, injuries, tests, procedures, treatments, therapies, discharge instructions or life-style choices that may apply to you. You must talk with your health care provider for complete information about your health and treatment options. This information should not be used to decide whether or not to accept your health care providers advice, instructions or recommendations. Only your health care provider has the knowledge and training to provide advice that is right for you.  Copyright   Copyright © 2021 [employer] and its affiliates and/or licensors. All rights reserved.     Patient Education        Chronic Knee Pain Discharge Instructions   About this topic   The knee is a large, complex joint. It is made up of 4 bones: the thigh bone, two lower leg bones, and the kneecap. There is a capsule around the joint. Cartilage acts like a shock absorber in the knee. It reduces friction to help the knee move more smoothly. The knee also has ligaments and tendons. Ligaments are bands of tissue that join one bone to another. Tendons are bands of tissue that join muscles to the bone. There are many muscles around the knee and in front and back of the thigh. If there is a problem with any of these parts of the joint, you can have pain in your knee.             What care is needed at home?   · Ask your doctor what you need to do when you go home. Make sure you ask questions if you do not understand what the doctor says. This way you will know what you need to do.  · Rest. Allow your injury to heal before you do slow movements.  · Place an ice  pack or a bag of frozen peas wrapped in a towel over the painful part. Never put ice right on the skin. Do not leave the ice on more than 10 to 15 minutes at a time.  · Prop your leg up on pillows to help with swelling.  · Brace or neoprene sleeve for support and swelling  · Heat may be used later but not right away. Heat can make swelling worse. If your doctor tells you to use heat, put a heating pad on the painful part for no more than 20 minutes at a time. Never go to sleep with a heating pad on as this can cause burns.  What follow-up care is needed?   Your doctor may ask you to make visits to the office to check on your progress. Be sure to keep these visits. Your doctor may send you to physical therapy to help you heal faster.  What drugs may be needed?   The doctor may order drugs to:  · Help with pain and swelling  · Fight an infection  The doctor may give you a shot of an anti-inflammatory drug called a corticosteroid. This will help with swelling. Talk with your doctor about the risks of this shot.  Will physical activity be limited?   You may need to rest your knee for a while. You should not do physical activity that makes your health problem worse. Talk to your doctor if you run, work out, or play sports. You may not be able to do those things until your health problem gets better.  What can be done to prevent this health problem?   · If your knee pain is due to overuse, do not do movements that caused the problem if possible.  · Take breaks often when doing things that use repeat movements.  · Do not sit or keep your knee in one position for long periods of time.  · If you sleep on your side, use a pillow in between your legs. This can help take stress off of the knee.  · Always warm up and stretch before a workout and cool down after.  · If you are a runner, stretch before a run. Use good ways to train, such as slowly adding to how far you run.  · Run on softer surfaces such as a track. This is easier  on your knee than a hard surface like cement.  · Try activities like swimming or biking rather than running. Running can put a lot of stress on your knee joint.  · Stay away from activities that could result in twisting, sudden stops and starts, and blows to the knee. Sports such as basketball, skiing, football, and jogging are some common sports that can lead to knee injuries.  · Wear shoes with good support. Replace your shoes often.  · Keep a healthy weight. Being too heavy puts more stress on the knee joint. This makes the knee more at risk for injury.  · Stay active and work out to keep your muscles strong and flexible.  When do I need to call the doctor?   · Pain or swelling gets worse  · Not able to walk  · Health problem is not better or you are feeling worse  Teach Back: Helping You Understand   The Teach Back Method helps you understand the information we are giving you. After you talk with the staff, tell them in your own words what you learned. This helps to make sure the staff has described each thing clearly. It also helps to explain things that may have been confusing. Before going home, make sure you can do these:  · I can tell you about my pain.  · I can tell you what may help ease my pain.  · I can tell you what I will do if I have more pain or swelling or I am not able to walk.  Where can I learn more?   [facility] Choices  http://www.nhs.uk/conditions/knee-pain/Pages/Introduction.aspx   Last Reviewed Date   2020-11-16  Consumer Information Use and Disclaimer   This information is not specific medical advice and does not replace information you receive from your health care provider. This is only a brief summary of general information. It does NOT include all information about conditions, illnesses, injuries, tests, procedures, treatments, therapies, discharge instructions or life-style choices that may apply to you. You must talk with your health care provider for complete information about your health  "and treatment options. This information should not be used to decide whether or not to accept your health care providers advice, instructions or recommendations. Only your health care provider has the knowledge and training to provide advice that is right for you.  Copyright   Copyright © 2021 [employer] and its affiliates and/or licensors. All rights reserved.     Patient Education        Knee Pain   The Basics   Written by the doctors and editors at [facility]   What causes knee pain? -- Many different conditions can cause knee pain. Some of the most common are listed below.  · Bending or using the knee too much - This can cause pain in the front of the knee that worsens with running, climbing steps, or sitting for a long time.  · Arthritis - Arthritis is a general term that means inflammation of the joints. There are lots of types of arthritis. The most common type, called osteoarthritis, often comes with age. It can cause pain, stiffness, and swelling (figure 1).  · Bursitis - Bursitis happens when fluid-filled sacs around the knee (called "bursae") get irritated or swollen (figure 2). Bursitis can cause pain and swelling.  · A collection of fluid in the knee - This can happen after a knee injury.  · A tear in the meniscus - The meniscus is a cushion of rubbery material (cartilage) between the thigh bone and the leg bone (figure 3).  · A tear in a ligament - Ligaments are bands of tissue that connect one bone to another. There are 4 ligaments in each knee (figure 3).  · Muscle strain - Different leg muscles move the knee joint, causing the knee to bend and straighten. If one of these muscles doesn't work well, moving the knee can cause pain.  · Other knee injuries, a knee joint infection, or a condition called gout, which causes crystals to form inside joints.  · Conditions that don't involve the knee - For example, problems in the hip can sometimes cause knee pain.  Is there anything I can do on my own to " "feel better? -- Yes. To ease your symptoms, you can:  · Put ice on the knee to reduce pain and swelling - For the first few weeks after an injury, or after an activity that makes your pain worse, you can try icing your knee. Put a cold gel pack, bag of ice, or bag of frozen vegetables on the injured area every 1 to 2 hours, for 15 minutes each time. Put a thin towel between the ice (or other cold object) and your skin. To reduce swelling, sit or lie down and raise your leg above the level of your heart when you put ice on it.  · Rest your knee and avoid movements that worsen the pain - Try not to squat, kneel, or run. Also, don't use exercise machines, such as stair steppers or rowing machines. Instead, you can walk or swim (the front and back crawl strokes) for exercise.  · Take a pain-relieving medicine, such as acetaminophen (sample brand name: Tylenol) or ibuprofen (sample brand names: Advil, Motrin).  Should I see a doctor or nurse? -- See your doctor or nurse if:  · You are unable to put weight on your knee, your knee "locks" in place, or your knee "gives out"  · Your knee is very swollen and painful  · You have a fever with knee pain, swelling, and redness  · Your knee pain doesn't get better or gets worse after you treat it on your own for a few days  How is knee pain treated? -- The right treatment for knee pain depends on what is causing it. Treatments might include:  · Wearing a knee brace or shoe insert  · Doing exercises to strengthen and stretch the muscles that move the knee joint - Ask your doctor or nurse which exercises can help with the cause of your pain.  · Having physical therapy  · Getting a shot of medicine in the knee  · Other medicines  · Surgery  All topics are updated as new evidence becomes available and our peer review process is complete.  This topic retrieved from [employer] on: Sep 21, 2021.  Topic 35407 Version 11.0  Release: 29.4.2 - C29.263  © 2021 UpToDate, Inc. and/or its " "affiliates. All rights reserved.  figure 1: Knee osteoarthritis     This drawing shows a normal knee joint next to a knee joint with osteoarthritis (OA). In the OA joint, the cartilage covering the ends of the bones roughens and becomes thin, while the bone underneath the cartilage grows thicker. Bony growths called "osteophytes" can form. The space between the bones also becomes narrower.  Graphic 314300 Version 2.0     figure 2: Knee bursa (prepatellar bursa)     Graphic 79965 Version 3.0     figure 3: Front view of the knee     This drawing shows the inner parts of the knee as seen from the front. A small bone (called the patella or the "knee cap") that sits in front of the knee has been removed so that you can see what is under that bone. The anterior cruciate ligament (ACL) is in the middle in white. It connects the thigh bone (called the "femur") to the shin bone (called the "tibia"). The meniscus is a cushion of rubbery material (cartilage) between the thigh bone and the shin bone.  Graphic 37387 Version 5.0     Consumer Information Use and Disclaimer   This information is not specific medical advice and does not replace information you receive from your health care provider. This is only a brief summary of general information. It does NOT include all information about conditions, illnesses, injuries, tests, procedures, treatments, therapies, discharge instructions or life-style choices that may apply to you. You must talk with your health care provider for complete information about your health and treatment options. This information should not be used to decide whether or not to accept your health care provider's advice, instructions or recommendations. Only your health care provider has the knowledge and training to provide advice that is right for you. The use of this information is governed by the [employer] End User License Agreement, available at " [email or web address]/[email or web address].The use of [employer] content is governed by the [employer] Terms of Use. ©2021 [employer] All rights reserved.  Copyright   © 2021 UpToDate, Inc. and/or its affiliates. All rights reserved.

## 2022-04-19 NOTE — PATIENT INSTRUCTIONS
General Discharge Instructions   PLEASE READ YOUR DISCHARGE INSTRUCTIONS ENTIRELY AS IT CONTAINS IMPORTANT INFORMATION.  If you were prescribed a narcotic or controlled medication, do not drive or operate heavy equipment or machinery while taking these medications.  If you were prescribed antibiotics, please take them to completion.  You must understand that you've received an Urgent Care treatment only and that you may be released before all your medical problems are known or treated. You, the patient, will arrange for follow up care as instructed.    OVER THE COUNTER RECOMMENDATIONS/SUGGESTIONS.    Make sure to stay well hydrated.    Use Nasal Saline to mechanically move any post nasal drip from your eustachian tube or from the back of your throat.    Use warm salt water gargles to ease your throat pain. Warm salt water gargles as needed for sore throat- 1/2 tsp salt to 1 cup warm water, gargle as desired.    Use an antihistamine such as Claritin, Zyrtec or Allegra to dry you out.    Use pseudoephedrine (behind the counter) to decongest. Pseudoephedrine 30 mg up to 240 mg /day. It can raise your blood pressure and give you palpitations.    Use mucinex (guaifenesin) to break up mucous up to 2400mg/day to loosen any mucous.    The mucinex DM pill has a cough suppressant that can be sedating. It can be used at night to stop the tickle at the back of your throat.    You can use Mucinex D (it has guaifenesin and a high dose of pseudoephedrine) in the mornings to help decongest.    Use Afrin in each nare for no longer than 3 days, as it is addictive. It can also dry out your mucous membranes and cause elevated blood pressure. This is especially useful if you are flying.    Use Flonase 1-2 sprays/nostril per day. It is a local acting steroid nasal spray, if you develop a bloody nose, stop using the medication immediately.    Sometimes Nyquil at night is beneficial to help you get some rest, however it is sedating and it  does have an antihistamine, and tylenol.    Honey is a natural cough suppressant that can be used.    Tylenol up to 4,000 mg a day is safe for short periods and can be used for body aches, pain, and fever. However in high doses and prolonged use it can cause liver irritation.    Ibuprofen is a non-steroidal anti-inflammatory that can be used for body aches, pain, and fever.However it can also cause stomach irritation if over used.     Follow up with your PCP or specialty clinic as instructed in the next 2-3 days if not improved or as needed. You can call [phone] to schedule an appointment with appropriate provider.      If you condition worsens, we recommend that you receive another evaluation at the emergency room immediately or contact your primary medical clinic's after hours call service to discuss your concerns.      Please return here or go to the Emergency Department for any concerns or worsening condition.   You can also call [phone] to schedule an appointment with the appropriate provider.    Please return here or go to the Emergency Department for any concerns or worsening of condition.    Thank you for choosing [facility]!    Our goal in the Urgent Care is to always provide outstanding medical care. You may receive a survey by mail or e-mail in the next week regarding your experience today. We would greatly appreciate you completing and returning the survey. Your feedback provides us with a way to recognize our staff who provide very good care, and it helps us learn how to improve when your experience was below our aspiration of excellence.      We appreciate you trusting us with your medical care. We hope you feel better soon. We will be happy to take care of you for all of your future medical needs.    Sincerely,    IAM Zuleta

## 2022-04-20 ENCOUNTER — PATIENT MESSAGE (OUTPATIENT)
Dept: FAMILY MEDICINE | Facility: CLINIC | Age: 72
End: 2022-04-20
Payer: COMMERCIAL

## 2022-04-20 RX ORDER — LOSARTAN POTASSIUM 50 MG/1
50 TABLET ORAL DAILY
Qty: 90 TABLET | Refills: 0 | Status: SHIPPED | OUTPATIENT
Start: 2022-04-20 | End: 2022-07-28

## 2022-04-21 ENCOUNTER — OFFICE VISIT (OUTPATIENT)
Dept: ORTHOPEDICS | Facility: CLINIC | Age: 72
End: 2022-04-21
Payer: COMMERCIAL

## 2022-04-21 VITALS
SYSTOLIC BLOOD PRESSURE: 138 MMHG | WEIGHT: 181 LBS | BODY MASS INDEX: 27.43 KG/M2 | OXYGEN SATURATION: 99 % | DIASTOLIC BLOOD PRESSURE: 77 MMHG | HEART RATE: 64 BPM | RESPIRATION RATE: 18 BRPM | HEIGHT: 68 IN

## 2022-04-21 DIAGNOSIS — M17.11 PRIMARY OSTEOARTHRITIS OF RIGHT KNEE: Primary | ICD-10-CM

## 2022-04-21 PROCEDURE — 99213 OFFICE O/P EST LOW 20 MIN: CPT | Mod: S$GLB,,, | Performed by: ORTHOPAEDIC SURGERY

## 2022-04-21 PROCEDURE — 99999 PR PBB SHADOW E&M-EST. PATIENT-LVL IV: ICD-10-PCS | Mod: PBBFAC,,, | Performed by: ORTHOPAEDIC SURGERY

## 2022-04-21 PROCEDURE — 3072F LOW RISK FOR RETINOPATHY: CPT | Mod: CPTII,S$GLB,, | Performed by: ORTHOPAEDIC SURGERY

## 2022-04-21 PROCEDURE — 1159F MED LIST DOCD IN RCRD: CPT | Mod: CPTII,S$GLB,, | Performed by: ORTHOPAEDIC SURGERY

## 2022-04-21 PROCEDURE — 3072F PR LOW RISK FOR RETINOPATHY: ICD-10-PCS | Mod: CPTII,S$GLB,, | Performed by: ORTHOPAEDIC SURGERY

## 2022-04-21 PROCEDURE — 3075F SYST BP GE 130 - 139MM HG: CPT | Mod: CPTII,S$GLB,, | Performed by: ORTHOPAEDIC SURGERY

## 2022-04-21 PROCEDURE — 1125F PR PAIN SEVERITY QUANTIFIED, PAIN PRESENT: ICD-10-PCS | Mod: CPTII,S$GLB,, | Performed by: ORTHOPAEDIC SURGERY

## 2022-04-21 PROCEDURE — 3075F PR MOST RECENT SYSTOLIC BLOOD PRESS GE 130-139MM HG: ICD-10-PCS | Mod: CPTII,S$GLB,, | Performed by: ORTHOPAEDIC SURGERY

## 2022-04-21 PROCEDURE — 4010F PR ACE/ARB THEARPY RXD/TAKEN: ICD-10-PCS | Mod: CPTII,S$GLB,, | Performed by: ORTHOPAEDIC SURGERY

## 2022-04-21 PROCEDURE — 3008F BODY MASS INDEX DOCD: CPT | Mod: CPTII,S$GLB,, | Performed by: ORTHOPAEDIC SURGERY

## 2022-04-21 PROCEDURE — 4010F ACE/ARB THERAPY RXD/TAKEN: CPT | Mod: CPTII,S$GLB,, | Performed by: ORTHOPAEDIC SURGERY

## 2022-04-21 PROCEDURE — 1125F AMNT PAIN NOTED PAIN PRSNT: CPT | Mod: CPTII,S$GLB,, | Performed by: ORTHOPAEDIC SURGERY

## 2022-04-21 PROCEDURE — 3008F PR BODY MASS INDEX (BMI) DOCUMENTED: ICD-10-PCS | Mod: CPTII,S$GLB,, | Performed by: ORTHOPAEDIC SURGERY

## 2022-04-21 PROCEDURE — 99213 PR OFFICE/OUTPT VISIT, EST, LEVL III, 20-29 MIN: ICD-10-PCS | Mod: S$GLB,,, | Performed by: ORTHOPAEDIC SURGERY

## 2022-04-21 PROCEDURE — 3078F DIAST BP <80 MM HG: CPT | Mod: CPTII,S$GLB,, | Performed by: ORTHOPAEDIC SURGERY

## 2022-04-21 PROCEDURE — 1159F PR MEDICATION LIST DOCUMENTED IN MEDICAL RECORD: ICD-10-PCS | Mod: CPTII,S$GLB,, | Performed by: ORTHOPAEDIC SURGERY

## 2022-04-21 PROCEDURE — 3078F PR MOST RECENT DIASTOLIC BLOOD PRESSURE < 80 MM HG: ICD-10-PCS | Mod: CPTII,S$GLB,, | Performed by: ORTHOPAEDIC SURGERY

## 2022-04-21 PROCEDURE — 99999 PR PBB SHADOW E&M-EST. PATIENT-LVL IV: CPT | Mod: PBBFAC,,, | Performed by: ORTHOPAEDIC SURGERY

## 2022-04-21 RX ORDER — MELOXICAM 15 MG/1
15 TABLET ORAL DAILY
Qty: 30 TABLET | Refills: 3 | Status: SHIPPED | OUTPATIENT
Start: 2022-04-21 | End: 2022-09-24 | Stop reason: SDUPTHER

## 2022-04-21 NOTE — PROGRESS NOTES
NEW PATIENT ORTHOPAEDIC: Knee    PRIMARY CARE PHYSICIAN: Chan Estrada MD   REFERRING PROVIDER: No referring provider defined for this encounter.     ASSESSMENT & PLAN:    Impression:  Left Knee Mild OsteoArthritis  Left Knee Medial Meniscus Tear  Right Knee Severe OsteoArthritis    Follow Up Plan: for surgical planning    Surgery:    John Lemos appears to have sufficient symptoms to warrant surgical intervention and is an appropriate candidate for right Primary Total Knee Arthroplasty as evidenced by months of unsuccessful non-operative treatment as outlined in the HPI below and progressive symptoms. He is interested in surgical intervention at this time, but needs to figure out school leave. He will call to schedule soon. Will set up CT scan, PCP and anesthesia clearances based on that surgery date.      Non operative care:    John Lemos has physical exam evidence of above and wishes to pursue an non-operative care. I am recommending the following: activity modification and observation, NSAIDs PRN. Consideration of injections to left knee. Consideration of partial knee on the right previously but has pain with kneeling and sunrise view with osteophytes, think he would be a better candidate for TKA.     The patient has been ordered:  None    CONSULTS:   None    ACTIVE PROBLEM LIST  Patient Active Problem List   Diagnosis    GERD (gastroesophageal reflux disease)    DDD (degenerative disc disease), lumbar    ED (erectile dysfunction)    Dyslipidemia    Neuropathy    Type 2 diabetes mellitus with diabetic polyneuropathy    Screening for colon cancer    Primary osteoarthritis of right knee    Nuclear sclerosis of both eyes           SUBJECTIVE    CHIEF COMPLAINT: Knee Pain    HPI:   John Lemos is a 71 y.o. male here for evaluation and management of bilateral right greater than left knee pain. There is not a specific incident that brought about this pain. he has had progressive problems with  the knee(s) starting 6 months ago but progressing to multiple times a day over the past 3 months which is interfering with activities which include: enjoying hobbies and exercise    Currently the pain in the joint is rated at 1 out of 10 with moderate activity.  The pain is intermittent and is located in the Bilateral knee, located medially. The pain is described as aching and sharp. Relieving factors include rest and prescription medication.     There is associated Popping.     He was previously seen by Rachelle Diop in July 2020, offered injection but with intermittent pains at time, elected for NSAIDs and more conservative treatments. He has always been dealing with right knee pain and stiffness. The left knee pain is new. He wanted to evaluate the left knee today. He is aware of chronic pain and diagnosis of right knee arthritis.      John Lemos has no additional complaints.     Interval history 4/21/22: Increased right knee pain and effusion. Has been taking topical Voltaren with some improvement in pain. Interested in surgical intervention.     PROGRESSIVE SYMPTOMS:  Pain worsened by weight bearing  Pain effecting living situation    FUNCTIONAL STATUS:   Climb a flight of stairs or walk up a hill     PREVIOUS TREATMENTS:  Medical: RX NSAIDS  Physical Therapy: Activities Modified   Previous Orthopaedic Surgery: None    REVIEW OF SYSTEMS:  PAIN ASSESSMENT:  See HPI.  MUSCULOSKELETAL: See HPI.  OTHER 10 point review of systems is negative except as stated in HPI above    PAST MEDICAL HISTORY   has a past medical history of Arthritis, Cataract, Diabetes mellitus (7 yrs), Diabetes mellitus type 2, controlled (9/8/2012), Eye injury, and Hyperlipidemia.     PAST SURGICAL HISTORY   has a past surgical history that includes Colonoscopy (N/A, 11/25/2019).     FAMILY HISTORY  family history includes Cancer in his father and mother; Diabetes in his father and mother; Hypertension in his mother; No Known Problems in his  brother, maternal aunt, maternal grandfather, maternal grandmother, maternal uncle, paternal aunt, paternal grandfather, paternal grandmother, paternal uncle, and sister.     SOCIAL HISTORY   reports that he has never smoked. He has never used smokeless tobacco. He reports that he does not drink alcohol and does not use drugs.     ALLERGIES   Review of patient's allergies indicates:   Allergen Reactions    Sulfa (sulfonamide antibiotics) Swelling        MEDICATIONS  Current Outpatient Medications on File Prior to Visit   Medication Sig Dispense Refill    atorvastatin (LIPITOR) 40 MG tablet Take 1 tablet (40 mg total) by mouth once daily. 90 tablet 3    blood sugar diagnostic (FREESTYLE LITE STRIPS) Strp Inject 1 each as directed once daily. 100 each 3    diclofenac sodium (VOLTAREN) 1 % Gel Apply 2 g topically 4 (four) times daily. 20 g 0    DULoxetine (CYMBALTA) 60 MG capsule Take 1 capsule (60 mg total) by mouth once daily. 90 capsule 0    fluticasone (FLONASE) 50 mcg/actuation nasal spray 1 spray (50 mcg total) by Each Nare route once daily. 16 g 2    lancets (FREESTYLE LANCETS) 28 gauge Misc Inject 1 lancet as directed once daily. 100 each 3    losartan (COZAAR) 50 MG tablet Take 1 tablet (50 mg total) by mouth once daily. 90 tablet 0    metFORMIN (GLUCOPHAGE) 500 MG tablet TAKE 2 TABLETS BY MOUTH TWICE DAILY WITH BREAKFAST AND WITH SUPPER 180 tablet 1    naftifine (NAFTIN) 1 % cream Apply topically once daily.      omeprazole (PRILOSEC) 20 MG capsule Take 20 mg by mouth. 1 Capsule, Delayed Release(E.C.) Oral Every morning      traMADoL (ULTRAM) 50 mg tablet Take 1 tablet (50 mg total) by mouth every 12 (twelve) hours as needed for Pain. 30 tablet 0    ipratropium (ATROVENT) 21 mcg (0.03 %) nasal spray 2 sprays by Nasal route 3 (three) times daily. (Patient not taking: No sig reported) 30 mL 0    levocetirizine (XYZAL) 5 MG tablet Take 1 tablet (5 mg total) by mouth every evening. 30 tablet 0  "    No current facility-administered medications on file prior to visit.          PHYSICAL EXAM   height is 5' 8" (1.727 m) and weight is 82.1 kg (181 lb). His blood pressure is 138/77 and his pulse is 64. His respiration is 18 and oxygen saturation is 99%.   Body mass index is 27.52 kg/m².      All other systems deferred.  GENERAL:  No acute distress  HABITUS: Normal  GAIT: Antalgic  SKIN: Normal     KNEE EXAM:    bilateral:   Effusion: Minimal joint effusion  TTP: yes over Medial Joint Line   yes crepitus with passive knee ROM  Passive ROM: Extension 0, Flexion 130  No pain with manipulation of patella  Stable to varus/valgus stress. No increased laxity to anterior/posterior drawer testing  negative Yeison's test  No pain with IR/ER rotation of the hip  5/5 strength in knee flexion and extension, ankle plantarflexion and dorsiflexion  Neurovascular Status: Sensation intact to light touch in Sural, Saphenous, SPN, DPN, Tibial nerve distribution  2+ pulse DP/PT, normal capillary refill, foot has normal warmth    DATA:  Diagnostic tests reviewed for today's visit:     4v of the knee reveal Mild degenerative changes of the left knee, severe medial compartment disease on the right knee. Right knee with advanced arthritic changes including joint space narrowing, subchondral sclerosis.        "

## 2022-05-03 ENCOUNTER — LAB VISIT (OUTPATIENT)
Dept: LAB | Facility: HOSPITAL | Age: 72
End: 2022-05-03
Attending: INTERNAL MEDICINE
Payer: COMMERCIAL

## 2022-05-03 DIAGNOSIS — E11.42 TYPE 2 DIABETES MELLITUS WITH DIABETIC POLYNEUROPATHY, WITHOUT LONG-TERM CURRENT USE OF INSULIN: ICD-10-CM

## 2022-05-03 LAB
ANION GAP SERPL CALC-SCNC: 8 MMOL/L (ref 8–16)
BUN SERPL-MCNC: 12 MG/DL (ref 8–23)
CALCIUM SERPL-MCNC: 9.2 MG/DL (ref 8.7–10.5)
CHLORIDE SERPL-SCNC: 103 MMOL/L (ref 95–110)
CHOLEST SERPL-MCNC: 127 MG/DL (ref 120–199)
CHOLEST/HDLC SERPL: 2.2 {RATIO} (ref 2–5)
CO2 SERPL-SCNC: 28 MMOL/L (ref 23–29)
CREAT SERPL-MCNC: 0.8 MG/DL (ref 0.5–1.4)
EST. GFR  (AFRICAN AMERICAN): >60 ML/MIN/1.73 M^2
EST. GFR  (NON AFRICAN AMERICAN): >60 ML/MIN/1.73 M^2
ESTIMATED AVG GLUCOSE: 183 MG/DL (ref 68–131)
GLUCOSE SERPL-MCNC: 111 MG/DL (ref 70–110)
HBA1C MFR BLD: 8 % (ref 4–5.6)
HDLC SERPL-MCNC: 57 MG/DL (ref 40–75)
HDLC SERPL: 44.9 % (ref 20–50)
LDLC SERPL CALC-MCNC: 59.4 MG/DL (ref 63–159)
NONHDLC SERPL-MCNC: 70 MG/DL
POTASSIUM SERPL-SCNC: 3.8 MMOL/L (ref 3.5–5.1)
SODIUM SERPL-SCNC: 139 MMOL/L (ref 136–145)
TRIGL SERPL-MCNC: 53 MG/DL (ref 30–150)

## 2022-05-03 PROCEDURE — 36415 COLL VENOUS BLD VENIPUNCTURE: CPT | Mod: PO | Performed by: INTERNAL MEDICINE

## 2022-05-03 PROCEDURE — 83036 HEMOGLOBIN GLYCOSYLATED A1C: CPT | Performed by: INTERNAL MEDICINE

## 2022-05-03 PROCEDURE — 80061 LIPID PANEL: CPT | Performed by: INTERNAL MEDICINE

## 2022-05-03 PROCEDURE — 80048 BASIC METABOLIC PNL TOTAL CA: CPT | Performed by: INTERNAL MEDICINE

## 2022-05-12 ENCOUNTER — PATIENT MESSAGE (OUTPATIENT)
Dept: FAMILY MEDICINE | Facility: CLINIC | Age: 72
End: 2022-05-12
Payer: COMMERCIAL

## 2022-05-13 ENCOUNTER — OFFICE VISIT (OUTPATIENT)
Dept: FAMILY MEDICINE | Facility: CLINIC | Age: 72
End: 2022-05-13
Payer: COMMERCIAL

## 2022-05-13 ENCOUNTER — PATIENT MESSAGE (OUTPATIENT)
Dept: FAMILY MEDICINE | Facility: CLINIC | Age: 72
End: 2022-05-13

## 2022-05-13 DIAGNOSIS — E11.42 TYPE 2 DIABETES MELLITUS WITH DIABETIC POLYNEUROPATHY, WITHOUT LONG-TERM CURRENT USE OF INSULIN: Primary | ICD-10-CM

## 2022-05-13 DIAGNOSIS — E78.5 DYSLIPIDEMIA: ICD-10-CM

## 2022-05-13 DIAGNOSIS — M17.11 PRIMARY OSTEOARTHRITIS OF RIGHT KNEE: ICD-10-CM

## 2022-05-13 DIAGNOSIS — Z02.89 ENCOUNTER FOR COMPLETION OF FORM WITH PATIENT: ICD-10-CM

## 2022-05-13 PROCEDURE — 4010F PR ACE/ARB THEARPY RXD/TAKEN: ICD-10-PCS | Mod: CPTII,95,, | Performed by: INTERNAL MEDICINE

## 2022-05-13 PROCEDURE — 3052F PR MOST RECENT HEMOGLOBIN A1C LEVEL 8.0 - < 9.0%: ICD-10-PCS | Mod: CPTII,95,, | Performed by: INTERNAL MEDICINE

## 2022-05-13 PROCEDURE — 3052F HG A1C>EQUAL 8.0%<EQUAL 9.0%: CPT | Mod: CPTII,95,, | Performed by: INTERNAL MEDICINE

## 2022-05-13 PROCEDURE — 99214 OFFICE O/P EST MOD 30 MIN: CPT | Mod: 95,,, | Performed by: INTERNAL MEDICINE

## 2022-05-13 PROCEDURE — 99214 PR OFFICE/OUTPT VISIT, EST, LEVL IV, 30-39 MIN: ICD-10-PCS | Mod: 95,,, | Performed by: INTERNAL MEDICINE

## 2022-05-13 PROCEDURE — 3072F LOW RISK FOR RETINOPATHY: CPT | Mod: CPTII,95,, | Performed by: INTERNAL MEDICINE

## 2022-05-13 PROCEDURE — 4010F ACE/ARB THERAPY RXD/TAKEN: CPT | Mod: CPTII,95,, | Performed by: INTERNAL MEDICINE

## 2022-05-13 PROCEDURE — 3072F PR LOW RISK FOR RETINOPATHY: ICD-10-PCS | Mod: CPTII,95,, | Performed by: INTERNAL MEDICINE

## 2022-05-13 NOTE — PROGRESS NOTES
Subjective:       Chief Complaint  Chief Complaint   Patient presents with    Diabetes       HPI  John Lemos is a 71 y.o. male with multiple medical diagnoses as listed in the medical history and problem list that presents for above complaint(s).    The patient location is: [place]   Visit type: audiovisual    Face to Face time with patient: 11 minutes  20 minutes of total time spent on the encounter, which includes face to face time and non-face to face time preparing to see the patient (eg, review of tests), Obtaining and/or reviewing separately obtained history, Documenting clinical information in the electronic or other health record, Independently interpreting results (not separately reported) and communicating results to the patient/family/caregiver, or Care coordination (not separately reported).     Each patient to whom he or she provides medical services by telemedicine is:  (1) informed of the relationship between the physician and patient and the respective role of any other health care provider with respect to management of the patient; and (2) notified that he or she may decline to receive medical services by telemedicine and may withdraw from such care at any time.    DM  Last A1c was 8%  His home BG readings are 120 or less fasting for the past week    Knee pain  Swelling and pain of right knee  Was considering injections  Taking meloxicam 15 mg daily as needed for pain  Seeing Ortho/Dr Steel - April 2022    Patient Care Team:  Chan Estrada MD as PCP - General (Internal Medicine)  Chan Estrada MD as Diabetes Digital Medicine Responsible Provider (Internal Medicine)  Mag Manjarrez as Digital Medicine Health   Alessandra Sarabia PharmD as Diabetes Digital Medicine Clinician (Pharmacist)  Samantha oRmero LPN as Care Coordinator      PAST MEDICAL HISTORY:  Past Medical History:   Diagnosis Date    Arthritis     Cataract     ou    Diabetes mellitus 7 yrs    lbs: 118 1 week ago     Diabetes mellitus type 2, controlled 9/8/2012    Eye injury     fb    Hyperlipidemia        PAST SURGICAL HISTORY:  Past Surgical History:   Procedure Laterality Date    COLONOSCOPY N/A 11/25/2019    Procedure: COLONOSCOPY;  Surgeon: Eddie Leslie MD;  [facility];  Service: Endoscopy;  Laterality: N/A;       SOCIAL HISTORY:  Social History     Socioeconomic History    Marital status:    Occupational History    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     Employer: [employer]   Tobacco Use    Smoking status: Never Smoker    Smokeless tobacco: Never Used   Substance and Sexual Activity    Alcohol use: No    Drug use: No    Sexual activity: Not Currently     Social Determinants of Health     Financial Resource Strain: Low Risk     Difficulty of Paying Living Expenses: Not hard at all   Food Insecurity: No Food Insecurity    Worried About Running Out of Food in the Last Year: Never true    Ran Out of Food in the Last Year: Never true   Transportation Needs: No Transportation Needs    Lack of Transportation (Medical): No    Lack of Transportation (Non-Medical): No   Physical Activity: Insufficiently Active    Days of Exercise per Week: 2 days    Minutes of Exercise per Session: 30 min   Stress: No Stress Concern Present    Feeling of Stress : Not at all   Social Connections: Unknown    Frequency of Communication with Friends and Family: More than three times a week    Frequency of Social Gatherings with Friends and Family: More than three times a week    Active Member of Clubs or Organizations: Yes    Attends Club or Organization Meetings: Patient refused    Marital Status:    Housing Stability: Unknown    Unable to Pay for Housing in the Last Year: No    Unstable Housing in the Last Year: No       FAMILY HISTORY:  Family History   Problem Relation Age of Onset    Diabetes Mother     Cancer Mother     Hypertension Mother      Diabetes Father     Cancer Father     No Known Problems Sister     No Known Problems Brother     No Known Problems Maternal Aunt     No Known Problems Maternal Uncle     No Known Problems Paternal Aunt     No Known Problems Paternal Uncle     No Known Problems Maternal Grandmother     No Known Problems Maternal Grandfather     No Known Problems Paternal Grandmother     No Known Problems Paternal Grandfather     Amblyopia Neg Hx     Blindness Neg Hx     Cataracts Neg Hx     Glaucoma Neg Hx     Macular degeneration Neg Hx     Retinal detachment Neg Hx     Strabismus Neg Hx     Stroke Neg Hx     Thyroid disease Neg Hx        ALLERGIES AND MEDICATIONS: updated and reviewed.  Review of patient's allergies indicates:   Allergen Reactions    Sulfa (sulfonamide antibiotics) Swelling     Current Outpatient Medications   Medication Sig Dispense Refill    atorvastatin (LIPITOR) 40 MG tablet Take 1 tablet (40 mg total) by mouth once daily. 90 tablet 3    DULoxetine (CYMBALTA) 60 MG capsule Take 1 capsule (60 mg total) by mouth once daily. 90 capsule 0    losartan (COZAAR) 50 MG tablet Take 1 tablet (50 mg total) by mouth once daily. 90 tablet 0    meloxicam (MOBIC) 15 MG tablet Take 1 tablet (15 mg total) by mouth once daily. 30 tablet 3    metFORMIN (GLUCOPHAGE) 500 MG tablet TAKE 2 TABLETS BY MOUTH TWICE DAILY WITH BREAKFAST AND WITH SUPPER 180 tablet 1    blood sugar diagnostic (FREESTYLE LITE STRIPS) Strp Inject 1 each as directed once daily. 100 each 3    diclofenac sodium (VOLTAREN) 1 % Gel Apply 2 g topically 4 (four) times daily. 20 g 0    fluticasone (FLONASE) 50 mcg/actuation nasal spray 1 spray (50 mcg total) by Each Nare route once daily. 16 g 2    ipratropium (ATROVENT) 21 mcg (0.03 %) nasal spray 2 sprays by Nasal route 3 (three) times daily. (Patient not taking: No sig reported) 30 mL 0    lancets (FREESTYLE LANCETS) 28 gauge Misc Inject 1 lancet as directed once daily. 100 each  3    levocetirizine (XYZAL) 5 MG tablet Take 1 tablet (5 mg total) by mouth every evening. 30 tablet 0    naftifine (NAFTIN) 1 % cream Apply topically once daily.       No current facility-administered medications for this visit.         ROS  Review of Systems   Constitutional: Negative for activity change and unexpected weight change.   HENT: Negative for hearing loss, rhinorrhea and trouble swallowing.    Eyes: Negative for discharge and visual disturbance.   Respiratory: Negative for chest tightness and wheezing.    Cardiovascular: Negative for chest pain and palpitations.   Gastrointestinal: Negative for blood in stool, constipation, diarrhea and vomiting.   Endocrine: Negative for polydipsia and polyuria.   Genitourinary: Negative for difficulty urinating, hematuria and urgency.   Musculoskeletal: Positive for arthralgias and joint swelling. Negative for neck pain.   Neurological: Negative for weakness and headaches.   Psychiatric/Behavioral: Negative for confusion and dysphoric mood.         Objective:         Physical Exam  Vitals reviewed.   Constitutional:       General: He is not in acute distress.     Appearance: He is well-developed.   HENT:      Head: Normocephalic and atraumatic.   Pulmonary:      Effort: Pulmonary effort is normal.   Psychiatric:         Behavior: Behavior normal.             Assessment:     1. Type 2 diabetes mellitus with diabetic polyneuropathy, without long-term current use of insulin    2. Dyslipidemia    3. Primary osteoarthritis of right knee    4. Encounter for completion of form with patient      Plan:     John was seen today for diabetes.    Diagnoses and all orders for this visit:    Type 2 diabetes mellitus with diabetic polyneuropathy, without long-term current use of insulin  Dyslipidemia  A1c 8% - on Metformin monotherapy   Encouraged adherence   Recheck in 3 months     Primary osteoarthritis of right knee  Severe - seeing Ortho/Dr Damián Martin  PRN    Form completion  Handicap form completed      Health Maintenance       Date Due Completion Date    COVID-19 Vaccine (4 - Booster for Pfizer series) 02/16/2022 11/16/2021    Hemoglobin A1c 08/03/2022 5/3/2022    Override on 11/25/2015: Done    Eye Exam 09/15/2022 9/15/2021    Diabetes Urine Screening 11/05/2022 11/5/2021    Foot Exam 11/11/2022 11/11/2021    Override on 11/11/2021: Done    Override on 8/20/2020: Done    Override on 7/15/2019: Done    Override on 1/16/2018: Done    Override on 11/25/2015: Done    Low Dose Statin 02/11/2023 2/11/2022    Lipid Panel 05/03/2023 5/3/2022    TETANUS VACCINE 02/19/2026 2/19/2016    Colorectal Cancer Screening 11/25/2029 11/25/2019        F/u in 3 months    The patient expressed understanding and no barriers to adherence were identified.     1. The patient indicates understanding of these issues and agrees with the plan. Brief care plan is updated and reviewed with the patient as applicable.     2. The patient is given an After Visit Summary that lists all medications with directions, allergies, orders placed during this encounter and follow-up instructions.     3. I have reviewed the patient's medical information including past medical, family, and social history sections including the medications and allergies.     4. We discussed the patient's current medications. I reconciled the patient's medication list and prepared and supplied needed refills.       Chan Estrada MD    Internal Medicine-Pediatrics

## 2022-06-06 ENCOUNTER — OFFICE VISIT (OUTPATIENT)
Dept: ORTHOPEDICS | Facility: CLINIC | Age: 72
End: 2022-06-06
Payer: COMMERCIAL

## 2022-06-06 VITALS
DIASTOLIC BLOOD PRESSURE: 78 MMHG | WEIGHT: 181 LBS | BODY MASS INDEX: 27.43 KG/M2 | OXYGEN SATURATION: 99 % | SYSTOLIC BLOOD PRESSURE: 130 MMHG | HEIGHT: 68 IN | HEART RATE: 64 BPM | RESPIRATION RATE: 15 BRPM

## 2022-06-06 DIAGNOSIS — M17.11 PRIMARY OSTEOARTHRITIS OF RIGHT KNEE: Primary | ICD-10-CM

## 2022-06-06 PROCEDURE — 20610 LARGE JOINT ASPIRATION/INJECTION: R KNEE: ICD-10-PCS | Mod: RT,S$GLB,, | Performed by: ORTHOPAEDIC SURGERY

## 2022-06-06 PROCEDURE — 1125F PR PAIN SEVERITY QUANTIFIED, PAIN PRESENT: ICD-10-PCS | Mod: CPTII,S$GLB,, | Performed by: ORTHOPAEDIC SURGERY

## 2022-06-06 PROCEDURE — 99999 PR PBB SHADOW E&M-EST. PATIENT-LVL IV: ICD-10-PCS | Mod: PBBFAC,,, | Performed by: ORTHOPAEDIC SURGERY

## 2022-06-06 PROCEDURE — 1159F PR MEDICATION LIST DOCUMENTED IN MEDICAL RECORD: ICD-10-PCS | Mod: CPTII,S$GLB,, | Performed by: ORTHOPAEDIC SURGERY

## 2022-06-06 PROCEDURE — 3075F SYST BP GE 130 - 139MM HG: CPT | Mod: CPTII,S$GLB,, | Performed by: ORTHOPAEDIC SURGERY

## 2022-06-06 PROCEDURE — 3075F PR MOST RECENT SYSTOLIC BLOOD PRESS GE 130-139MM HG: ICD-10-PCS | Mod: CPTII,S$GLB,, | Performed by: ORTHOPAEDIC SURGERY

## 2022-06-06 PROCEDURE — 1159F MED LIST DOCD IN RCRD: CPT | Mod: CPTII,S$GLB,, | Performed by: ORTHOPAEDIC SURGERY

## 2022-06-06 PROCEDURE — 3052F PR MOST RECENT HEMOGLOBIN A1C LEVEL 8.0 - < 9.0%: ICD-10-PCS | Mod: CPTII,S$GLB,, | Performed by: ORTHOPAEDIC SURGERY

## 2022-06-06 PROCEDURE — 99213 OFFICE O/P EST LOW 20 MIN: CPT | Mod: 25,S$GLB,, | Performed by: ORTHOPAEDIC SURGERY

## 2022-06-06 PROCEDURE — 99999 PR PBB SHADOW E&M-EST. PATIENT-LVL IV: CPT | Mod: PBBFAC,,, | Performed by: ORTHOPAEDIC SURGERY

## 2022-06-06 PROCEDURE — 3072F PR LOW RISK FOR RETINOPATHY: ICD-10-PCS | Mod: CPTII,S$GLB,, | Performed by: ORTHOPAEDIC SURGERY

## 2022-06-06 PROCEDURE — 3008F BODY MASS INDEX DOCD: CPT | Mod: CPTII,S$GLB,, | Performed by: ORTHOPAEDIC SURGERY

## 2022-06-06 PROCEDURE — 3072F LOW RISK FOR RETINOPATHY: CPT | Mod: CPTII,S$GLB,, | Performed by: ORTHOPAEDIC SURGERY

## 2022-06-06 PROCEDURE — 4010F PR ACE/ARB THEARPY RXD/TAKEN: ICD-10-PCS | Mod: CPTII,S$GLB,, | Performed by: ORTHOPAEDIC SURGERY

## 2022-06-06 PROCEDURE — 3052F HG A1C>EQUAL 8.0%<EQUAL 9.0%: CPT | Mod: CPTII,S$GLB,, | Performed by: ORTHOPAEDIC SURGERY

## 2022-06-06 PROCEDURE — 20610 DRAIN/INJ JOINT/BURSA W/O US: CPT | Mod: RT,S$GLB,, | Performed by: ORTHOPAEDIC SURGERY

## 2022-06-06 PROCEDURE — 3078F DIAST BP <80 MM HG: CPT | Mod: CPTII,S$GLB,, | Performed by: ORTHOPAEDIC SURGERY

## 2022-06-06 PROCEDURE — 3008F PR BODY MASS INDEX (BMI) DOCUMENTED: ICD-10-PCS | Mod: CPTII,S$GLB,, | Performed by: ORTHOPAEDIC SURGERY

## 2022-06-06 PROCEDURE — 1125F AMNT PAIN NOTED PAIN PRSNT: CPT | Mod: CPTII,S$GLB,, | Performed by: ORTHOPAEDIC SURGERY

## 2022-06-06 PROCEDURE — 99213 PR OFFICE/OUTPT VISIT, EST, LEVL III, 20-29 MIN: ICD-10-PCS | Mod: 25,S$GLB,, | Performed by: ORTHOPAEDIC SURGERY

## 2022-06-06 PROCEDURE — 4010F ACE/ARB THERAPY RXD/TAKEN: CPT | Mod: CPTII,S$GLB,, | Performed by: ORTHOPAEDIC SURGERY

## 2022-06-06 PROCEDURE — 3078F PR MOST RECENT DIASTOLIC BLOOD PRESSURE < 80 MM HG: ICD-10-PCS | Mod: CPTII,S$GLB,, | Performed by: ORTHOPAEDIC SURGERY

## 2022-06-06 RX ORDER — TRIAMCINOLONE ACETONIDE 40 MG/ML
40 INJECTION, SUSPENSION INTRA-ARTICULAR; INTRAMUSCULAR
Status: DISCONTINUED | OUTPATIENT
Start: 2022-06-06 | End: 2022-06-06 | Stop reason: HOSPADM

## 2022-06-06 RX ADMIN — TRIAMCINOLONE ACETONIDE 40 MG: 40 INJECTION, SUSPENSION INTRA-ARTICULAR; INTRAMUSCULAR at 12:06

## 2022-06-06 NOTE — PROCEDURES
Large Joint Aspiration/Injection: R knee    Date/Time: 6/6/2022 12:40 PM  Performed by: Judson Steel MD  Authorized by: Judson Steel MD     Consent Done?:  Yes (Verbal)  Indications:  Arthritis  Site marked: the procedure site was marked    Timeout: prior to procedure the correct patient, procedure, and site was verified    Prep: patient was prepped and draped in usual sterile fashion      Local anesthesia used?: Yes    Local anesthetic:  Topical anesthetic and lidocaine 1% without epinephrine    Details:  Needle Size:  22 G  Approach:  Anterolateral  Location:  Knee  Site:  R knee  Medications:  40 mg triamcinolone acetonide 40 mg/mL  Patient tolerance:  Patient tolerated the procedure well with no immediate complications

## 2022-07-27 DIAGNOSIS — E11.9 CONTROLLED TYPE 2 DIABETES MELLITUS WITHOUT COMPLICATION, WITHOUT LONG-TERM CURRENT USE OF INSULIN: Chronic | ICD-10-CM

## 2022-07-27 NOTE — TELEPHONE ENCOUNTER
No new care gaps identified.  [facility] Embedded Care Gaps. Reference number: [id number]. 7/27/2022   11:59:08 AM RISHABHT

## 2022-07-28 RX ORDER — LOSARTAN POTASSIUM 50 MG/1
50 TABLET ORAL DAILY
Qty: 90 TABLET | Refills: 3 | Status: SHIPPED | OUTPATIENT
Start: 2022-07-28 | End: 2022-08-25 | Stop reason: SDUPTHER

## 2022-07-28 NOTE — TELEPHONE ENCOUNTER
Refill Decision Note   John Lemos  is requesting a refill authorization.  Brief Assessment and Rationale for Refill:  Approve     Medication Therapy Plan:       Medication Reconciliation Completed: No   Comments:     No Care Gaps recommended.     Note composed:11:19 AM 07/28/2022

## 2022-08-04 ENCOUNTER — PATIENT OUTREACH (OUTPATIENT)
Dept: ADMINISTRATIVE | Facility: HOSPITAL | Age: 72
End: 2022-08-04
Payer: COMMERCIAL

## 2022-08-04 DIAGNOSIS — E11.42 TYPE 2 DIABETES MELLITUS WITH DIABETIC POLYNEUROPATHY, WITHOUT LONG-TERM CURRENT USE OF INSULIN: Primary | ICD-10-CM

## 2022-09-08 ENCOUNTER — PATIENT MESSAGE (OUTPATIENT)
Dept: OTHER | Facility: OTHER | Age: 72
End: 2022-09-08
Payer: COMMERCIAL

## 2022-09-27 ENCOUNTER — PATIENT OUTREACH (OUTPATIENT)
Dept: ADMINISTRATIVE | Facility: HOSPITAL | Age: 72
End: 2022-09-27
Payer: COMMERCIAL

## 2022-11-03 ENCOUNTER — OFFICE VISIT (OUTPATIENT)
Dept: URGENT CARE | Facility: CLINIC | Age: 72
End: 2022-11-03
Payer: COMMERCIAL

## 2022-11-03 VITALS
HEIGHT: 68 IN | WEIGHT: 181 LBS | TEMPERATURE: 98 F | OXYGEN SATURATION: 96 % | BODY MASS INDEX: 27.43 KG/M2 | SYSTOLIC BLOOD PRESSURE: 146 MMHG | HEART RATE: 63 BPM | DIASTOLIC BLOOD PRESSURE: 78 MMHG

## 2022-11-03 DIAGNOSIS — H66.92 LEFT OTITIS MEDIA, UNSPECIFIED OTITIS MEDIA TYPE: Primary | ICD-10-CM

## 2022-11-03 PROCEDURE — 3008F BODY MASS INDEX DOCD: CPT | Mod: CPTII,S$GLB,, | Performed by: NURSE PRACTITIONER

## 2022-11-03 PROCEDURE — 3072F LOW RISK FOR RETINOPATHY: CPT | Mod: CPTII,S$GLB,, | Performed by: NURSE PRACTITIONER

## 2022-11-03 PROCEDURE — 3078F PR MOST RECENT DIASTOLIC BLOOD PRESSURE < 80 MM HG: ICD-10-PCS | Mod: CPTII,S$GLB,, | Performed by: NURSE PRACTITIONER

## 2022-11-03 PROCEDURE — 1125F PR PAIN SEVERITY QUANTIFIED, PAIN PRESENT: ICD-10-PCS | Mod: CPTII,S$GLB,, | Performed by: NURSE PRACTITIONER

## 2022-11-03 PROCEDURE — 3077F PR MOST RECENT SYSTOLIC BLOOD PRESSURE >= 140 MM HG: ICD-10-PCS | Mod: CPTII,S$GLB,, | Performed by: NURSE PRACTITIONER

## 2022-11-03 PROCEDURE — 3008F PR BODY MASS INDEX (BMI) DOCUMENTED: ICD-10-PCS | Mod: CPTII,S$GLB,, | Performed by: NURSE PRACTITIONER

## 2022-11-03 PROCEDURE — 3072F PR LOW RISK FOR RETINOPATHY: ICD-10-PCS | Mod: CPTII,S$GLB,, | Performed by: NURSE PRACTITIONER

## 2022-11-03 PROCEDURE — 99213 OFFICE O/P EST LOW 20 MIN: CPT | Mod: S$GLB,,, | Performed by: NURSE PRACTITIONER

## 2022-11-03 PROCEDURE — 1159F MED LIST DOCD IN RCRD: CPT | Mod: CPTII,S$GLB,, | Performed by: NURSE PRACTITIONER

## 2022-11-03 PROCEDURE — 4010F ACE/ARB THERAPY RXD/TAKEN: CPT | Mod: CPTII,S$GLB,, | Performed by: NURSE PRACTITIONER

## 2022-11-03 PROCEDURE — 1159F PR MEDICATION LIST DOCUMENTED IN MEDICAL RECORD: ICD-10-PCS | Mod: CPTII,S$GLB,, | Performed by: NURSE PRACTITIONER

## 2022-11-03 PROCEDURE — 4010F PR ACE/ARB THEARPY RXD/TAKEN: ICD-10-PCS | Mod: CPTII,S$GLB,, | Performed by: NURSE PRACTITIONER

## 2022-11-03 PROCEDURE — 3078F DIAST BP <80 MM HG: CPT | Mod: CPTII,S$GLB,, | Performed by: NURSE PRACTITIONER

## 2022-11-03 PROCEDURE — 99213 PR OFFICE/OUTPT VISIT, EST, LEVL III, 20-29 MIN: ICD-10-PCS | Mod: S$GLB,,, | Performed by: NURSE PRACTITIONER

## 2022-11-03 PROCEDURE — 1160F PR REVIEW ALL MEDS BY PRESCRIBER/CLIN PHARMACIST DOCUMENTED: ICD-10-PCS | Mod: CPTII,S$GLB,, | Performed by: NURSE PRACTITIONER

## 2022-11-03 PROCEDURE — 1125F AMNT PAIN NOTED PAIN PRSNT: CPT | Mod: CPTII,S$GLB,, | Performed by: NURSE PRACTITIONER

## 2022-11-03 PROCEDURE — 3052F PR MOST RECENT HEMOGLOBIN A1C LEVEL 8.0 - < 9.0%: ICD-10-PCS | Mod: CPTII,S$GLB,, | Performed by: NURSE PRACTITIONER

## 2022-11-03 PROCEDURE — 3077F SYST BP >= 140 MM HG: CPT | Mod: CPTII,S$GLB,, | Performed by: NURSE PRACTITIONER

## 2022-11-03 PROCEDURE — 3052F HG A1C>EQUAL 8.0%<EQUAL 9.0%: CPT | Mod: CPTII,S$GLB,, | Performed by: NURSE PRACTITIONER

## 2022-11-03 PROCEDURE — 1160F RVW MEDS BY RX/DR IN RCRD: CPT | Mod: CPTII,S$GLB,, | Performed by: NURSE PRACTITIONER

## 2022-11-03 RX ORDER — CETIRIZINE HYDROCHLORIDE 10 MG/1
10 TABLET ORAL DAILY
Qty: 30 TABLET | Refills: 0 | Status: SHIPPED | OUTPATIENT
Start: 2022-11-03 | End: 2024-02-12

## 2022-11-03 RX ORDER — AMOXICILLIN AND CLAVULANATE POTASSIUM 875; 125 MG/1; MG/1
1 TABLET, FILM COATED ORAL 2 TIMES DAILY
Qty: 14 TABLET | Refills: 0 | Status: SHIPPED | OUTPATIENT
Start: 2022-11-03 | End: 2023-04-03

## 2022-11-03 NOTE — PROGRESS NOTES
"Subjective:       Patient ID: John Lemos is a 72 y.o. male.    Vitals:  height is 5' 8" (1.727 m) and weight is 82.1 kg (181 lb). His oral temperature is 97.7 °F (36.5 °C). His blood pressure is 146/78 (abnormal) and his pulse is 63. His oxygen saturation is 96%.     Chief Complaint: Ear Problem (Ringing in ear - Entered by patient)    72-year-old male presents to clinic for evaluation of left ear pain x1 week.  He denies drainage from ear.  He denies fever.  He denies any other symptoms.  He is not taking any medications for his symptoms.  He is awake alert, answers questions appropriately, no acute distress noted on today's visit.    Ear Fullness   There is pain in the left ear. This is a new problem. Episode onset: 1 weel. The problem occurs constantly. The problem has been unchanged. There has been no fever. The pain is at a severity of 2/10. The pain is mild. Pertinent negatives include no abdominal pain, coughing, diarrhea, ear discharge, headaches, hearing loss, neck pain, rash, rhinorrhea, sore throat or vomiting. He has tried nothing for the symptoms.     Constitution: Negative for activity change, appetite change, chills, sweating, fatigue and fever.   HENT:  Positive for ear pain. Negative for ear discharge, hearing loss, congestion and sore throat.    Neck: Negative for neck pain.   Respiratory:  Negative for cough.    Gastrointestinal:  Negative for abdominal pain, vomiting and diarrhea.   Skin:  Negative for rash.   Neurological:  Negative for headaches.     Objective:      Physical Exam   Constitutional: He is oriented to person, place, and time. He appears well-developed.  Non-toxic appearance. He does not appear ill. No distress.   HENT:   Head: Normocephalic and atraumatic. Head is without abrasion, without contusion and without laceration.   Ears:   Right Ear: External ear normal.   Left Ear: External ear normal. There is tenderness. Tympanic membrane is erythematous.   Nose: Nose normal. "   Mouth/Throat: Oropharynx is clear and moist and mucous membranes are normal.   Eyes: Conjunctivae, EOM and lids are normal. Right eye exhibits no discharge. Left eye exhibits no discharge.   Neck: Trachea normal and phonation normal.   Cardiovascular: Normal rate.   Pulmonary/Chest: Effort normal. No respiratory distress.   Abdominal: Normal appearance.   Musculoskeletal: Normal range of motion.         General: Normal range of motion.   Neurological: He is alert and oriented to person, place, and time.   Skin: Skin is warm, dry, intact, not diaphoretic and not pale. No abrasion, No burn and No ecchymosis   Psychiatric: His speech is normal and behavior is normal. Mood, judgment and thought content normal.   Nursing note and vitals reviewed.      Assessment:       1. Left otitis media, unspecified otitis media type          Plan:         Left otitis media, unspecified otitis media type  -     amoxicillin-clavulanate 875-125mg (AUGMENTIN) 875-125 mg per tablet; Take 1 tablet by mouth 2 (two) times daily.  Dispense: 14 tablet; Refill: 0  -     cetirizine (ZYRTEC) 10 MG tablet; Take 1 tablet (10 mg total) by mouth once daily.  Dispense: 30 tablet; Refill: 0       Patient Instructions   - You must understand that you have received an Urgent Care treatment only and that you may be released before all of your medical problems are known or treated.   - You, the patient, will arrange for follow up care as instructed.   - If your condition worsens or fails to improve we recommend that you receive another evaluation at the ER immediately or contact your PCP to discuss your concerns or return here.

## 2022-11-09 ENCOUNTER — TELEPHONE (OUTPATIENT)
Dept: FAMILY MEDICINE | Facility: CLINIC | Age: 72
End: 2022-11-09
Payer: COMMERCIAL

## 2022-11-09 NOTE — TELEPHONE ENCOUNTER
----- Message from Chan Estrada MD sent at 11/7/2022  9:10 PM CST -----  Regarding: A1c Due  Hi Kelly - can we schedule him for his overdue A1c this week? Thanks!

## 2022-11-10 ENCOUNTER — LAB VISIT (OUTPATIENT)
Dept: LAB | Facility: HOSPITAL | Age: 72
End: 2022-11-10
Attending: INTERNAL MEDICINE
Payer: COMMERCIAL

## 2022-11-10 DIAGNOSIS — E11.42 TYPE 2 DIABETES MELLITUS WITH DIABETIC POLYNEUROPATHY, WITHOUT LONG-TERM CURRENT USE OF INSULIN: ICD-10-CM

## 2022-11-10 LAB
ESTIMATED AVG GLUCOSE: 203 MG/DL (ref 68–131)
HBA1C MFR BLD: 8.7 % (ref 4–5.6)

## 2022-11-10 PROCEDURE — 83036 HEMOGLOBIN GLYCOSYLATED A1C: CPT | Performed by: INTERNAL MEDICINE

## 2022-11-10 PROCEDURE — 36415 COLL VENOUS BLD VENIPUNCTURE: CPT | Mod: PO | Performed by: INTERNAL MEDICINE

## 2022-11-27 DIAGNOSIS — E11.42 TYPE 2 DIABETES MELLITUS WITH DIABETIC POLYNEUROPATHY, WITHOUT LONG-TERM CURRENT USE OF INSULIN: Primary | ICD-10-CM

## 2022-11-27 RX ORDER — DULAGLUTIDE 0.75 MG/.5ML
0.75 INJECTION, SOLUTION SUBCUTANEOUS
Qty: 4 PEN | Refills: 2 | Status: SHIPPED | OUTPATIENT
Start: 2022-11-27 | End: 2022-12-31

## 2022-11-27 NOTE — PROGRESS NOTES
Please schedule patient for labs prior to DM follow-up appointment with me in February/March 2023 (labs prior: A1c, BMP)

## 2022-11-28 ENCOUNTER — OFFICE VISIT (OUTPATIENT)
Dept: OPTOMETRY | Facility: CLINIC | Age: 72
End: 2022-11-28
Payer: COMMERCIAL

## 2022-11-28 ENCOUNTER — TELEPHONE (OUTPATIENT)
Dept: FAMILY MEDICINE | Facility: CLINIC | Age: 72
End: 2022-11-28
Payer: COMMERCIAL

## 2022-11-28 DIAGNOSIS — H52.4 MYOPIA OF BOTH EYES WITH ASTIGMATISM AND PRESBYOPIA: ICD-10-CM

## 2022-11-28 DIAGNOSIS — H52.13 MYOPIA OF BOTH EYES WITH ASTIGMATISM AND PRESBYOPIA: ICD-10-CM

## 2022-11-28 DIAGNOSIS — H25.13 NUCLEAR SCLEROSIS OF BOTH EYES: ICD-10-CM

## 2022-11-28 DIAGNOSIS — Z01.00 DIABETIC EYE EXAM: Primary | ICD-10-CM

## 2022-11-28 DIAGNOSIS — E11.9 DIABETIC EYE EXAM: Primary | ICD-10-CM

## 2022-11-28 DIAGNOSIS — H52.203 MYOPIA OF BOTH EYES WITH ASTIGMATISM AND PRESBYOPIA: ICD-10-CM

## 2022-11-28 PROCEDURE — 92015 DETERMINE REFRACTIVE STATE: CPT | Mod: S$GLB,,, | Performed by: OPTOMETRIST

## 2022-11-28 PROCEDURE — 1101F PT FALLS ASSESS-DOCD LE1/YR: CPT | Mod: CPTII,S$GLB,, | Performed by: OPTOMETRIST

## 2022-11-28 PROCEDURE — 4010F ACE/ARB THERAPY RXD/TAKEN: CPT | Mod: CPTII,S$GLB,, | Performed by: OPTOMETRIST

## 2022-11-28 PROCEDURE — 1126F AMNT PAIN NOTED NONE PRSNT: CPT | Mod: CPTII,S$GLB,, | Performed by: OPTOMETRIST

## 2022-11-28 PROCEDURE — 99999 PR PBB SHADOW E&M-EST. PATIENT-LVL III: ICD-10-PCS | Mod: PBBFAC,,, | Performed by: OPTOMETRIST

## 2022-11-28 PROCEDURE — 1159F PR MEDICATION LIST DOCUMENTED IN MEDICAL RECORD: ICD-10-PCS | Mod: CPTII,S$GLB,, | Performed by: OPTOMETRIST

## 2022-11-28 PROCEDURE — 4010F PR ACE/ARB THEARPY RXD/TAKEN: ICD-10-PCS | Mod: CPTII,S$GLB,, | Performed by: OPTOMETRIST

## 2022-11-28 PROCEDURE — 99999 PR PBB SHADOW E&M-EST. PATIENT-LVL III: CPT | Mod: PBBFAC,,, | Performed by: OPTOMETRIST

## 2022-11-28 PROCEDURE — 1159F MED LIST DOCD IN RCRD: CPT | Mod: CPTII,S$GLB,, | Performed by: OPTOMETRIST

## 2022-11-28 PROCEDURE — 92014 COMPRE OPH EXAM EST PT 1/>: CPT | Mod: S$GLB,,, | Performed by: OPTOMETRIST

## 2022-11-28 PROCEDURE — 3288F FALL RISK ASSESSMENT DOCD: CPT | Mod: CPTII,S$GLB,, | Performed by: OPTOMETRIST

## 2022-11-28 PROCEDURE — 3052F HG A1C>EQUAL 8.0%<EQUAL 9.0%: CPT | Mod: CPTII,S$GLB,, | Performed by: OPTOMETRIST

## 2022-11-28 PROCEDURE — 3052F PR MOST RECENT HEMOGLOBIN A1C LEVEL 8.0 - < 9.0%: ICD-10-PCS | Mod: CPTII,S$GLB,, | Performed by: OPTOMETRIST

## 2022-11-28 PROCEDURE — 3288F PR FALLS RISK ASSESSMENT DOCUMENTED: ICD-10-PCS | Mod: CPTII,S$GLB,, | Performed by: OPTOMETRIST

## 2022-11-28 PROCEDURE — 92015 PR REFRACTION: ICD-10-PCS | Mod: S$GLB,,, | Performed by: OPTOMETRIST

## 2022-11-28 PROCEDURE — 2023F DILAT RTA XM W/O RTNOPTHY: CPT | Mod: CPTII,S$GLB,, | Performed by: OPTOMETRIST

## 2022-11-28 PROCEDURE — 1160F PR REVIEW ALL MEDS BY PRESCRIBER/CLIN PHARMACIST DOCUMENTED: ICD-10-PCS | Mod: CPTII,S$GLB,, | Performed by: OPTOMETRIST

## 2022-11-28 PROCEDURE — 1126F PR PAIN SEVERITY QUANTIFIED, NO PAIN PRESENT: ICD-10-PCS | Mod: CPTII,S$GLB,, | Performed by: OPTOMETRIST

## 2022-11-28 PROCEDURE — 92014 PR EYE EXAM, EST PATIENT,COMPREHESV: ICD-10-PCS | Mod: S$GLB,,, | Performed by: OPTOMETRIST

## 2022-11-28 PROCEDURE — 2023F PR DILATED RETINAL EXAM W/O EVID OF RETINOPATHY: ICD-10-PCS | Mod: CPTII,S$GLB,, | Performed by: OPTOMETRIST

## 2022-11-28 PROCEDURE — 1160F RVW MEDS BY RX/DR IN RCRD: CPT | Mod: CPTII,S$GLB,, | Performed by: OPTOMETRIST

## 2022-11-28 PROCEDURE — 1101F PR PT FALLS ASSESS DOC 0-1 FALLS W/OUT INJ PAST YR: ICD-10-PCS | Mod: CPTII,S$GLB,, | Performed by: OPTOMETRIST

## 2022-11-28 NOTE — PROGRESS NOTES
Subjective:       Patient ID: John Lemos is a 72 y.o. male      Chief Complaint   Patient presents with    Concerns About Ocular Health    Diabetic Eye Exam     History of Present Illness  Dls: 9/15/21 Dr. Ortiz     73 y/o male presents today for diabetic eye exam.  Pt c/o jumping lids os off/on.   Pt states no changes in vision.   Pt wears pal's     this am    No tearing  No itching  No burning  No pain  No ha's  + ou off/on floaters  No flashes    Eye meds  None      Hemoglobin A1C       Date                     Value               Ref Range             Status                11/10/2022               8.7 (H)             4.0 - 5.6 %           Final                  05/03/2022               8.0 (H)             4.0 - 5.6 %           Final                 11/05/2021               6.8 (H)             4.0 - 5.6 %           Final              Assessment/Plan:     1. Diabetic eye exam  Reggie vision    No diabetic retinopathy. Discussed with pt the effects of diabetes on vision, importance of good blood sugar control, compliance with meds, and follow up care with PCP. Return in 1 year for dilated eye exam, sooner PRN.    2. Nuclear sclerosis of both eyes  Educated pt on presence of cataracts and effects on vision. No surgery at this time. Recheck in one year, sooner PRN.    3. Myopia of both eyes with astigmatism and presbyopia  Educated patient on refractive error and discussed lens options. Dispensed updated spectacle Rx. Educated about adaptation period to new specs.    Eyeglass Final Rx       Eyeglass Final Rx         Sphere Cylinder Axis Add    Right -1.50 +1.00 020 +2.50    Left -1.75 Sphere  +2.50      Expiration Date: 11/28/2023                      Follow up in about 1 year (around 11/28/2023) for Diabetic Eye Exam.

## 2022-11-28 NOTE — TELEPHONE ENCOUNTER
----- Message from Chan Estrada MD sent at 11/27/2022 10:28 AM CST -----  Please schedule patient for labs prior to DM follow-up appointment with me in February/March 2023 (labs prior: A1c, BMP)

## 2022-11-29 ENCOUNTER — PATIENT MESSAGE (OUTPATIENT)
Dept: FAMILY MEDICINE | Facility: CLINIC | Age: 72
End: 2022-11-29
Payer: COMMERCIAL

## 2022-11-29 ENCOUNTER — PATIENT MESSAGE (OUTPATIENT)
Dept: FAMILY MEDICINE | Facility: CLINIC | Age: 72
End: 2022-11-29

## 2022-11-29 ENCOUNTER — LAB VISIT (OUTPATIENT)
Dept: LAB | Facility: HOSPITAL | Age: 72
End: 2022-11-29
Attending: INTERNAL MEDICINE
Payer: COMMERCIAL

## 2022-11-29 ENCOUNTER — OFFICE VISIT (OUTPATIENT)
Dept: FAMILY MEDICINE | Facility: CLINIC | Age: 72
End: 2022-11-29
Payer: COMMERCIAL

## 2022-11-29 ENCOUNTER — TELEPHONE (OUTPATIENT)
Dept: FAMILY MEDICINE | Facility: CLINIC | Age: 72
End: 2022-11-29
Payer: COMMERCIAL

## 2022-11-29 DIAGNOSIS — R30.0 DYSURIA: ICD-10-CM

## 2022-11-29 DIAGNOSIS — E11.42 TYPE 2 DIABETES MELLITUS WITH DIABETIC POLYNEUROPATHY, WITHOUT LONG-TERM CURRENT USE OF INSULIN: ICD-10-CM

## 2022-11-29 DIAGNOSIS — R39.89 SUSPECTED UTI: Primary | ICD-10-CM

## 2022-11-29 DIAGNOSIS — K21.9 GASTROESOPHAGEAL REFLUX DISEASE, UNSPECIFIED WHETHER ESOPHAGITIS PRESENT: ICD-10-CM

## 2022-11-29 DIAGNOSIS — E78.5 DYSLIPIDEMIA: ICD-10-CM

## 2022-11-29 DIAGNOSIS — R30.0 DYSURIA: Primary | ICD-10-CM

## 2022-11-29 PROCEDURE — 4010F PR ACE/ARB THEARPY RXD/TAKEN: ICD-10-PCS | Mod: CPTII,95,, | Performed by: NURSE PRACTITIONER

## 2022-11-29 PROCEDURE — 4010F ACE/ARB THERAPY RXD/TAKEN: CPT | Mod: CPTII,95,, | Performed by: NURSE PRACTITIONER

## 2022-11-29 PROCEDURE — 99214 OFFICE O/P EST MOD 30 MIN: CPT | Mod: 95,,, | Performed by: NURSE PRACTITIONER

## 2022-11-29 PROCEDURE — 3052F HG A1C>EQUAL 8.0%<EQUAL 9.0%: CPT | Mod: CPTII,95,, | Performed by: NURSE PRACTITIONER

## 2022-11-29 PROCEDURE — 3052F PR MOST RECENT HEMOGLOBIN A1C LEVEL 8.0 - < 9.0%: ICD-10-PCS | Mod: CPTII,95,, | Performed by: NURSE PRACTITIONER

## 2022-11-29 PROCEDURE — 99214 PR OFFICE/OUTPT VISIT, EST, LEVL IV, 30-39 MIN: ICD-10-PCS | Mod: 95,,, | Performed by: NURSE PRACTITIONER

## 2022-11-29 PROCEDURE — 3072F PR LOW RISK FOR RETINOPATHY: ICD-10-PCS | Mod: CPTII,95,, | Performed by: NURSE PRACTITIONER

## 2022-11-29 PROCEDURE — 87661 TRICHOMONAS VAGINALIS AMPLIF: CPT | Performed by: NURSE PRACTITIONER

## 2022-11-29 PROCEDURE — 3072F LOW RISK FOR RETINOPATHY: CPT | Mod: CPTII,95,, | Performed by: NURSE PRACTITIONER

## 2022-11-29 RX ORDER — NITROFURANTOIN 25; 75 MG/1; MG/1
100 CAPSULE ORAL 2 TIMES DAILY
Qty: 14 CAPSULE | Refills: 0 | Status: SHIPPED | OUTPATIENT
Start: 2022-11-29 | End: 2022-12-06

## 2022-11-29 NOTE — PATIENT INSTRUCTIONS
//////////PHONE NUMBERS//////////    Bariatrics---[phone]  Breast Surgery---[phone]  Case Management---[phone]  Colonoscopy---[phone]  Imaging, Xray, CT, MRI, Ultrasound---[phone]  Infectious Disease---[phone]  Interventional Radiology---[phone]  Medical Records---[phone]  [facility] On [facility]---[phone]  DME---[phone]  Optometry/Ophthalmology---[phone]  [facility]---[phone]  Physical Therapy---[phone]  Psychiatry---[phone]  Plastic Surgery---[phone]  Sleep Study---[phone]  Smoking Cessation---[phone]  Vaccine Hotline---[phone]  Wound Care---[phone]  COVID Vaccine center---[phone]  Referral Desk---[phone]    /////////////////////////////////////////////////

## 2022-11-29 NOTE — TELEPHONE ENCOUNTER
----- Message from Khris Gaines NP sent at 11/29/2022 10:18 AM CST -----  I have placed orders for for urine samples for this patient prior to their telehealth visit today. Please assist pt in scheduling an appointment to drop off the urine sample at an [facility] lab closest to them. Thank you.

## 2022-11-29 NOTE — PROGRESS NOTES
The patient location is:  Patient Home  The chief complaint leading to consultation is: as below  Visit type: Virtual visit with synchronous audio and video  Total time spent with patient: 15  minutes  Each patient to whom he or she provides medical services by telemedicine is:  (1) informed of the relationship between the physician and patient and the respective role of any other health care provider with respect to management of the patient; and (2) notified that he may decline to receive medical services by telemedicine and may withdraw from such care at any time.      HPI     Chief Complaint:  Pain with urination      John Lemos is a 72 y.o. male with multiple medical diagnoses as listed in the medical history and problem list that presents for pain with urination.  Pt is new to me but is known to this clinic with his last appointment being Visit date not found.      Dysuria   This is a recurrent problem. The current episode started in the past 7 days. The problem occurs every urination. The problem has been gradually worsening. The quality of the pain is described as burning. The pain is at a severity of 8/10. The pain is moderate. There has been no fever. He is Sexually active. There is No history of pyelonephritis. Associated symptoms include chills (resolved), a discharge, frequency, sweats and urgency. Pertinent negatives include no behavior changes, flank pain, hematuria, hesitancy, nausea, possible pregnancy, vomiting, weight loss, constipation, rash or withholding. He has tried nothing for the symptoms. The treatment provided no relief. His past medical history is significant for diabetes mellitus. There is no history of catheterization, diabetes insipidus, genitourinary reflux, hypertension, kidney stones, recurrent UTIs, a single kidney, STD, urinary stasis or a urological procedure.     Pt was previously treated for cystitis with ciprofloxacin in 2017.     he is compliant with medications daily  without any adverse side effects.    Assessment & Plan     Problem List Items Addressed This Visit          Cardiac/Vascular    Dyslipidemia    discussed ways to lower triglycerides such as cutting simple sugars out of diet (white breads, candies, cookies, cakes, etc.) and reducing/eliminating intake of highly processed trans fatty acids.   Exercise 30 minutes a day for 4-5 days a week.   Eat more fiber.         Endocrine    Type 2 diabetes mellitus with diabetic polyneuropathy    -discussed with patient about routine diabetic care that includes but are not limited to regular eye exams, skin care, daily foot exam, proper nutrition, regular BG monitoring at home (fasting and mealtime) and medication compliance in a diabetic.  Target morning BS  and meal-time BS <180      Overview     June 2019 A1c 8.9%            GI    GERD (gastroesophageal reflux disease)    -stable, discussed with patient about avoiding potential dietary triggers  -avoid spicy/greasy/sour/acidic foods, as well as tea/coffee/chocolate if possible  -Tylenol as needed for pain, avoid NSAIDs  -Keep food diary         Other Visit Diagnoses       Suspected UTI    -  Primary    Symptoms include dysuria, with urinary frequency and urgency ongoing x 5 days. Symptoms have remained stable in severity. Denies flank pain, nausea, fever. However he did experience once episode of chills which has since resolved. Denies confusion or other associated symptoms or hematuria.     Discussed treatment options with pt. He is allergic to sulfa abx. Pt declined cipro due to potential AE.     Pt has already provided urine sample, CBC, CMP which was ordered prior to this appointment.     Based on s/s will treat presumptively for acute cystitis.     Macrobid bid x 7 days.    -advised adequate hydration and proper hygiene  -avoid bladder irritants such as tea, coffee, caffeine, alcohol, artificial sweeteners, citrus, spicy foods, acidic foods,chocolate, tomato-based  foods, smoking    UTI prevention:  a. perineal hygiene  b. drink water prior to intercourse and urinate afterwards and avoid certain positions which could increase likelyhood of UTIs,  c. establish regular bladder habits,   e. increase fluids and avoid caffeine and alcohol.  -signs of pylenephritis and to go to the ED if develops fever, chills, n/v, back pain, worsening dyuria, hematuria.    Discussed DDx, condition, and treatment.   Education sent to patient portal/included in after visit summary.  ED precautions given.   Notify provider if symptoms do not resolve or increase in severity.   Patient verbalizes understanding and agrees with plan of care.      Relevant Medications    nitrofurantoin, macrocrystal-monohydrate, (MACROBID) 100 MG capsule            --------------------------------------------      Health Maintenance:  Health Maintenance         Date Due Completion Date    Foot Exam 11/11/2022 11/11/2021    Override on 11/11/2021: Done    Override on 8/20/2020: Done    Override on 7/15/2019: Done    Override on 1/16/2018: Done    Override on 11/25/2015: Done    Diabetes Urine Screening 11/05/2022 11/5/2021    Hemoglobin A1c 02/10/2023 11/10/2022    Override on 11/25/2015: Done    Lipid Panel 05/03/2023 5/3/2022    Low Dose Statin 11/28/2023 11/28/2022    Eye Exam 11/28/2023 11/28/2022    TETANUS VACCINE 02/19/2026 2/19/2016    Colorectal Cancer Screening 11/25/2029 11/25/2019            Health maintenance reviewed.      Follow Up:  Follow up in about 2 weeks (around 12/13/2022), or if symptoms worsen or fail to improve.    Exam     Review of Systems:  (as noted above)  Review of Systems   Constitutional:  Positive for chills (resolved). Negative for fever and weight loss.   HENT:  Negative for trouble swallowing.    Eyes:  Negative for visual disturbance.   Respiratory:  Negative for chest tightness and shortness of breath.    Cardiovascular:  Negative for chest pain.   Gastrointestinal:  Negative for  blood in stool, constipation, nausea and vomiting.   Genitourinary:  Positive for dysuria, frequency and urgency. Negative for flank pain, hematuria, hesitancy and penile swelling.        Denies blood in urine     Skin:  Negative for rash.     Physical Exam:   Physical Exam  Constitutional:       General: He is not in acute distress.     Appearance: He is not ill-appearing, toxic-appearing or diaphoretic.   Pulmonary:      Effort: Pulmonary effort is normal.   Neurological:      Mental Status: He is alert.   Psychiatric:         Mood and Affect: Mood normal.     There were no vitals filed for this visit.   There is no height or weight on file to calculate BMI.        History     Past Medical History:  Past Medical History:   Diagnosis Date    Arthritis     Cataract     ou    Diabetes mellitus 7 yrs    lbs: 118 1 week ago    Diabetes mellitus type 2, controlled 9/8/2012    Eye injury     fb    Hyperlipidemia        Past Surgical History:  Past Surgical History:   Procedure Laterality Date    COLONOSCOPY N/A 11/25/2019    Procedure: COLONOSCOPY;  Surgeon: Eddie Leslie MD;  Location: [facility];  Service: Endoscopy;  Laterality: N/A;       Social History:  Social History     Socioeconomic History    Marital status:    Occupational History    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     Employer: [employer]   Tobacco Use    Smoking status: Never    Smokeless tobacco: Never   Substance and Sexual Activity    Alcohol use: No    Drug use: No    Sexual activity: Not Currently     Social Determinants of Health     Financial Resource Strain: Low Risk     Difficulty of Paying Living Expenses: Not hard at all   Food Insecurity: No Food Insecurity    Worried About Running Out of Food in the Last Year: Never true    Ran Out of Food in the Last Year: Never true   Transportation Needs: No Transportation Needs    Lack of Transportation (Medical): No    Lack of Transportation  (Non-Medical): No   Physical Activity: Insufficiently Active    Days of Exercise per Week: 2 days    Minutes of Exercise per Session: 30 min   Stress: Stress Concern Present    Feeling of Stress : To some extent   Social Connections: Unknown    Frequency of Communication with Friends and Family: More than three times a week    Frequency of Social Gatherings with Friends and Family: Once a week    Active Member of Clubs or Organizations: Yes    Attends Club or Organization Meetings: More than 4 times per year    Marital Status:    Housing Stability: Unknown    Unable to Pay for Housing in the Last Year: No    Unstable Housing in the Last Year: No       Family History:  Family History   Problem Relation Age of Onset    Diabetes Mother     Cancer Mother     Hypertension Mother     Diabetes Father     Cancer Father     No Known Problems Sister     No Known Problems Brother     No Known Problems Maternal Aunt     No Known Problems Maternal Uncle     No Known Problems Paternal Aunt     No Known Problems Paternal Uncle     No Known Problems Maternal Grandmother     No Known Problems Maternal Grandfather     No Known Problems Paternal Grandmother     No Known Problems Paternal Grandfather     Amblyopia Neg Hx     Blindness Neg Hx     Cataracts Neg Hx     Glaucoma Neg Hx     Macular degeneration Neg Hx     Retinal detachment Neg Hx     Strabismus Neg Hx     Stroke Neg Hx     Thyroid disease Neg Hx        Allergies and Medications: (updated and reviewed)  Review of patient's allergies indicates:   Allergen Reactions    Sulfa (sulfonamide antibiotics) Swelling     Current Outpatient Medications   Medication Sig Dispense Refill    amoxicillin-clavulanate 875-125mg (AUGMENTIN) 875-125 mg per tablet Take 1 tablet by mouth 2 (two) times daily. 14 tablet 0    atorvastatin (LIPITOR) 40 MG tablet Take 1 tablet (40 mg total) by mouth once daily. 90 tablet 3    blood sugar diagnostic (FREESTYLE LITE STRIPS) Strp Inject 1 each as  directed once daily. 100 each 3    cetirizine (ZYRTEC) 10 MG tablet Take 1 tablet (10 mg total) by mouth once daily. 30 tablet 0    diclofenac sodium (VOLTAREN) 1 % Gel Apply 2 g topically 4 (four) times daily. 20 g 0    dulaglutide (TRULICITY) 0.75 mg/0.5 mL pen injector Inject 0.75 mg into the skin every 7 days. 4 pen 2    DULoxetine (CYMBALTA) 60 MG capsule Take 1 capsule (60 mg total) by mouth once daily. 90 capsule 0    fluticasone (FLONASE) 50 mcg/actuation nasal spray 1 spray (50 mcg total) by Each Nare route once daily. 16 g 2    ipratropium (ATROVENT) 21 mcg (0.03 %) nasal spray 2 sprays by Nasal route 3 (three) times daily. 30 mL 0    lancets (FREESTYLE LANCETS) 28 gauge Misc Inject 1 lancet as directed once daily. 100 each 3    levocetirizine (XYZAL) 5 MG tablet Take 1 tablet (5 mg total) by mouth every evening. 90 tablet 3    losartan (COZAAR) 50 MG tablet Take 1 tablet (50 mg total) by mouth once daily. 90 tablet 3    meloxicam (MOBIC) 15 MG tablet Take 1 tablet (15 mg total) by mouth once daily. 30 tablet 3    metFORMIN (GLUCOPHAGE) 500 MG tablet TAKE 2 TABLETS BY MOUTH TWICE DAILY WITH BREAKFAST AND WITH SUPPER 180 tablet 1    naftifine (NAFTIN) 1 % cream Apply topically once daily.      nitrofurantoin, macrocrystal-monohydrate, (MACROBID) 100 MG capsule Take 1 capsule (100 mg total) by mouth 2 (two) times daily. for 7 days 14 capsule 0     No current facility-administered medications for this visit.       Patient Care Team:  Chan Estrada MD as PCP - General (Internal Medicine)  Chan Estrada MD as Diabetes Digital Medicine Responsible Provider (Internal Medicine)  Mag Manjarrez as Digital Medicine Health   Alessandra Sarabia PharmD as Diabetes Digital Medicine Clinician (Pharmacist)  Nikolai Abebe MA as Care Coordinator  Chan Estrada MD as Consulting Physician (Internal Medicine)      The patient expressed understanding and no barriers to adherence were identified.      - The patient  indicates understanding of these issues and agrees with the plan. Brief care plan is updated and reviewed with the patient as applicable.      - The patient was sent an After Visit Summary virtually that lists all medications with directions, allergies, education, orders placed during this encounter and follow-up instructions.      - I have reviewed the patient's medical information including past medical, family, and social history sections including the medications and allergies.      - We discussed the patient's current medications.     This note was created by combination of typed  and MModal dictation.  Transcription errors may be present.  If there are any questions, please contact me.       Khris Gaines NP

## 2022-12-02 ENCOUNTER — TELEPHONE (OUTPATIENT)
Dept: FAMILY MEDICINE | Facility: CLINIC | Age: 72
End: 2022-12-02
Payer: COMMERCIAL

## 2022-12-02 NOTE — TELEPHONE ENCOUNTER
----- Message from Boby Estrada sent at 12/2/2022  4:34 AM CST -----  Regarding: Lab Client Services  Contact: [phone]  Good Morning,     My name is Boby Estrada I work in the Lab Client Services. We had a problem with some lab work on this patient. If someone from your office could call us at [phone] or [phone] that would be great. Anyone in my department can help.      Thank you

## 2022-12-02 NOTE — TELEPHONE ENCOUNTER
Spoke with the lab, states the patient came in on 11/29/2022 and the CTGC testing was cancelled for insufficient amount of sample due to coagulation    Patient needs to have test repeated

## 2022-12-05 DIAGNOSIS — R39.89 SUSPECTED UTI: ICD-10-CM

## 2022-12-06 DIAGNOSIS — R30.0 DYSURIA: Primary | ICD-10-CM

## 2022-12-06 RX ORDER — NITROFURANTOIN 25; 75 MG/1; MG/1
100 CAPSULE ORAL 2 TIMES DAILY
Qty: 14 CAPSULE | Refills: 0 | OUTPATIENT
Start: 2022-12-06 | End: 2022-12-13

## 2022-12-06 NOTE — TELEPHONE ENCOUNTER
----- Message from Khris Gaines NP sent at 12/3/2022  9:44 PM CST -----  Regarding: FW: Lab Client Services  Contact: [phone]  Please call them and help resolve their concerns. Thank you.   ----- Message -----  From: Boby Estrada  Sent: 12/2/2022   4:34 AM CST  To: Khris Gaines NP, #  Subject: Lab Client Services                              Good Morning,     My name is Boby Estrada I work in the Lab Client Services. We had a problem with some lab work on this patient. If someone from your office could call us at [phone] or [phone] that would be great. Anyone in my department can help.      Thank you

## 2022-12-06 NOTE — TELEPHONE ENCOUNTER
FYI,  When you send a message please give some information as to what it's about. Lab states there was not enough urine to do the lab on 11/29.

## 2022-12-08 ENCOUNTER — LAB VISIT (OUTPATIENT)
Dept: LAB | Facility: HOSPITAL | Age: 72
End: 2022-12-08
Attending: INTERNAL MEDICINE
Payer: COMMERCIAL

## 2022-12-08 DIAGNOSIS — R30.0 DYSURIA: ICD-10-CM

## 2022-12-08 PROCEDURE — 87186 SC STD MICRODIL/AGAR DIL: CPT | Performed by: NURSE PRACTITIONER

## 2022-12-08 PROCEDURE — 87088 URINE BACTERIA CULTURE: CPT | Performed by: NURSE PRACTITIONER

## 2022-12-08 PROCEDURE — 81001 URINALYSIS AUTO W/SCOPE: CPT | Performed by: NURSE PRACTITIONER

## 2022-12-08 PROCEDURE — 87086 URINE CULTURE/COLONY COUNT: CPT | Performed by: NURSE PRACTITIONER

## 2022-12-08 PROCEDURE — 87077 CULTURE AEROBIC IDENTIFY: CPT | Performed by: NURSE PRACTITIONER

## 2022-12-09 ENCOUNTER — PATIENT MESSAGE (OUTPATIENT)
Dept: FAMILY MEDICINE | Facility: CLINIC | Age: 72
End: 2022-12-09
Payer: COMMERCIAL

## 2022-12-09 LAB
BACTERIA #/AREA URNS AUTO: ABNORMAL /HPF
BILIRUB UR QL STRIP: NEGATIVE
CLARITY UR REFRACT.AUTO: ABNORMAL
COLOR UR AUTO: YELLOW
GLUCOSE UR QL STRIP: NEGATIVE
HGB UR QL STRIP: NEGATIVE
KETONES UR QL STRIP: NEGATIVE
LEUKOCYTE ESTERASE UR QL STRIP: ABNORMAL
MICROSCOPIC COMMENT: ABNORMAL
NITRITE UR QL STRIP: NEGATIVE
PH UR STRIP: 6 [PH] (ref 5–8)
PROT UR QL STRIP: ABNORMAL
RBC #/AREA URNS AUTO: 1 /HPF (ref 0–4)
SP GR UR STRIP: 1.02 (ref 1–1.03)
SQUAMOUS #/AREA URNS AUTO: 1 /HPF
URN SPEC COLLECT METH UR: ABNORMAL
WBC #/AREA URNS AUTO: 28 /HPF (ref 0–5)

## 2022-12-10 DIAGNOSIS — R39.89 SUSPECTED UTI: ICD-10-CM

## 2022-12-12 ENCOUNTER — TELEPHONE (OUTPATIENT)
Dept: FAMILY MEDICINE | Facility: CLINIC | Age: 72
End: 2022-12-12
Payer: COMMERCIAL

## 2022-12-12 ENCOUNTER — PATIENT MESSAGE (OUTPATIENT)
Dept: FAMILY MEDICINE | Facility: CLINIC | Age: 72
End: 2022-12-12
Payer: COMMERCIAL

## 2022-12-12 DIAGNOSIS — N30.00 ACUTE CYSTITIS WITHOUT HEMATURIA: Primary | ICD-10-CM

## 2022-12-12 DIAGNOSIS — N30.00 ACUTE CYSTITIS WITHOUT HEMATURIA: ICD-10-CM

## 2022-12-12 LAB — BACTERIA UR CULT: ABNORMAL

## 2022-12-12 RX ORDER — NITROFURANTOIN 25; 75 MG/1; MG/1
100 CAPSULE ORAL 2 TIMES DAILY
Qty: 14 CAPSULE | Refills: 0 | OUTPATIENT
Start: 2022-12-12 | End: 2022-12-19

## 2022-12-12 RX ORDER — CIPROFLOXACIN 250 MG/1
250 TABLET, FILM COATED ORAL 2 TIMES DAILY
Qty: 6 TABLET | Refills: 0 | Status: SHIPPED | OUTPATIENT
Start: 2022-12-12 | End: 2022-12-12

## 2022-12-12 RX ORDER — CIPROFLOXACIN 500 MG/1
500 TABLET ORAL 2 TIMES DAILY
Qty: 14 TABLET | Refills: 0 | Status: SHIPPED | OUTPATIENT
Start: 2022-12-12 | End: 2022-12-19

## 2022-12-12 NOTE — TELEPHONE ENCOUNTER
Contains abnormal data Urine culture  Order: [id number]  Status: Final result     Visible to patient: Yes (seen)     Next appt: 04/10/2023 at 01:20 PM in Family Medicine (Chan Estrada MD)     Specimen Information: Urine   0 Result Notes    1 Follow-up Encounter  Component 4 d ago    Urine Culture, Routine  Abnormal   ENTEROBACTER CLOACAE   > 100,000 cfu/ml     Resulting Agency [facility]        Susceptibility     Enterobacter cloacae     CULTURE, URINE     Cefepime <=2 mcg/mL Sensitive     Ceftriaxone 8 mcg/mL Resistant     Ciprofloxacin <=1 mcg/mL Sensitive     Ertapenem <=0.5 mcg/mL Sensitive     Gentamicin <=4 mcg/mL Sensitive     Levofloxacin <=2 mcg/mL Sensitive     Meropenem <=1 mcg/mL Sensitive     Nitrofurantoin 64 mcg/mL Intermediate     Piperacillin/Tazo <=16 mcg/mL Sensitive     Tobramycin <=4 mcg/mL Sensitive     Trimeth/Sulfa <=2/38 mcg/mL Sensitive               Linear View                Problem List Items Addressed This Visit    None  Visit Diagnoses       Acute cystitis without hematuria    -  Primary    Relevant Medications    ciprofloxacin HCl (CIPRO) 250 MG tablet          Discussed potential AE of ciprofloxacin. Education sent to portal.    Called pt and discussed lab results, all questions answered.     Discussed DDx, condition, and treatment.   Education sent to patient portal/included in after visit summary.  ED precautions given.   Notify provider if symptoms do not resolve or increase in severity.   Patient verbalizes understanding and agrees with plan of care.

## 2022-12-20 ENCOUNTER — TELEPHONE (OUTPATIENT)
Dept: FAMILY MEDICINE | Facility: CLINIC | Age: 72
End: 2022-12-20
Payer: COMMERCIAL

## 2022-12-20 NOTE — TELEPHONE ENCOUNTER
"----- Message from Sherri Ortegaas sent at 12/20/2022 10:24 AM CST -----  Regarding: Deborah "Send out lab at [facility]"  .Type: Patient Call Back    Who called Deborah "Send out lab at [facility]"    What is the request in detail: [facility] can not process pt's lab work due to being lost in transit and it will be cancelled. Please redraw & reorder lab     Can the clinic reply by [facility]? Call back     Would the patient rather a call back or a response via [facility]?  Call back     Best call back number: [phone]            "

## 2022-12-26 ENCOUNTER — PATIENT MESSAGE (OUTPATIENT)
Dept: OTHER | Facility: OTHER | Age: 72
End: 2022-12-26
Payer: COMMERCIAL

## 2022-12-27 ENCOUNTER — PATIENT MESSAGE (OUTPATIENT)
Dept: OTHER | Facility: OTHER | Age: 72
End: 2022-12-27
Payer: COMMERCIAL

## 2022-12-31 DIAGNOSIS — Z12.5 ENCOUNTER FOR PROSTATE CANCER SCREENING: ICD-10-CM

## 2022-12-31 DIAGNOSIS — E11.42 TYPE 2 DIABETES MELLITUS WITH DIABETIC POLYNEUROPATHY, WITHOUT LONG-TERM CURRENT USE OF INSULIN: Primary | ICD-10-CM

## 2022-12-31 RX ORDER — DULAGLUTIDE 1.5 MG/.5ML
1.5 INJECTION, SOLUTION SUBCUTANEOUS
Qty: 4 PEN | Refills: 2 | Status: SHIPPED | OUTPATIENT
Start: 2022-12-31 | End: 2023-01-30

## 2023-01-03 ENCOUNTER — TELEPHONE (OUTPATIENT)
Dept: FAMILY MEDICINE | Facility: CLINIC | Age: 73
End: 2023-01-03
Payer: COMMERCIAL

## 2023-01-03 NOTE — TELEPHONE ENCOUNTER
----- Message from Chan Estrada MD sent at 12/31/2022  9:36 AM CST -----  Please call patient and schedule pt for a repeat A1c with complete blood count, PSA and lipid panel in March 2023  for his annual labs (patient has an appointment with me in April 2023)    Chan Estrada MD  Internal Medicine-Pediatrics

## 2023-01-24 ENCOUNTER — TELEPHONE (OUTPATIENT)
Dept: FAMILY MEDICINE | Facility: CLINIC | Age: 73
End: 2023-01-24
Payer: COMMERCIAL

## 2023-01-24 DIAGNOSIS — U07.1 COVID-19: Primary | ICD-10-CM

## 2023-01-24 NOTE — TELEPHONE ENCOUNTER
Message given to MD verbally. Call was placed to patient and notified/informed that MD sent prescription for Paxlovid to pharmacy. To start medication as soon as  also, verbalized to patient to hold off on taking his Lipitor until Paxlovid pack complete. Understanding verbalized.

## 2023-01-24 NOTE — TELEPHONE ENCOUNTER
----- Message from Hugo Jacobsen sent at 1/24/2023  1:12 PM CST -----  Type: Patient Call Back    Who called: Self     What is the request in detail: PT states he tested + for Covid today and wants to inform Dr and also trying to see if there is any medication he recommend for him to take or want to prescribe to him     Can the clinic reply by [facility]? No     Would the patient rather a call back or a response via [facility]? Call     Best call back number: [phone] (home)

## 2023-01-24 NOTE — TELEPHONE ENCOUNTER
----- Message from Hugo Jacobsen sent at 1/24/2023  1:12 PM CST -----  Type: Patient Call Back    Who called: Self     What is the request in detail: PT states he tested + for Covid today and wants to inform Dr and also trying to see if there is any medication he recommend for him to take or want to prescribe to him     Can the clinic reply by [facility]? No     Would the patient rather a call back or a response via [facility]? Call     Best call back number: [phone] (home)            [facility]  [id number] [facility] [id number] [phone]      Ritu Zarco :1954 MRN:[id number]    2022 Time Session Began: 1105  Time Session Ended: 1150    Due to COVID-19 precautions, this visit was performed via live interactive two-way Video visit with patient's verbal consent.   Clinician Location:Home.  Patient Location: Home.  Verified patient identity:  [x] Yes    Session Type:Therapy 38-52 minutes (56585)    Others Present: NONE    Intervention: Cognitive, Supportive, grief    Suicide/Homicide/Violence Ideation: No    If Yes, explain: n/a    Current Outpatient Medications   Medication Sig   • oxybutynin (DITROPAN-XL) 10 MG 24 hr tablet TAKE 1 TABLET BY MOUTH DAILY   • atorvastatin (LIPITOR) 40 MG tablet TAKE 1 TABLET BY MOUTH DAILY   • tiZANidine (ZANAFLEX) 2 MG tablet Take 1 tablet by mouth every 6 hours as needed for Muscle spasms.   • nystatin (MYCOSTATIN) 414244 UNIT/GM cream Apply to affected areas three times a day   • meclizine (ANTIVERT) 25 MG tablet Take 1 tablet by mouth 3 times daily as needed for Dizziness.   • Cholecalciferol (vitamin D) 50 mcg (2,000 units) capsule    • ascorbic acid (Vitamin C) 250 MG tablet Take 500 mg by mouth daily.   • zinc methionate 50 MG capsule    • Cyanocobalamin (Vitamin B-12) 2500 MCG SL Tab    • CRANBERRY PO    • Ferrous Sulfate (Iron) 325 (65 Fe) MG Tab    • Omega-3 Fatty Acids (FISH OIL PO)    • clotrimazole (LOTRIMIN) 1 % cream Apply to affected skin region under left breast.   • loratadine (CLARITIN) 10 MG tablet Take 10 mg by mouth daily.   • diphenhydrAMINE (BENADRYL) 25 MG tablet Take 25 mg by mouth every 6 hours as needed.     No current facility-administered medications for this visit.       Change in Medication(s) Reported: N/A  If Yes, explain: n/a    Patient/Family Education Provided: N/A  Patient/Family Displays Understanding: N/A    If No,  explain: n/a    Chief complaint in patient's own words: \"Last week was hard, I feel lonely.\"    Progress Note containing chief complaint and symptoms/problems related to the complaint:    (Data/Action/Response/Plan)    D: Patient reports her mother has transitioned to hospice care and her dog is undergoing serious health issues. She states overall she has been managing the change with the support of family and friends. However, she reports last week was extremely difficult, she cried often and stayed home rather than visit her mother. Patient states the providers who cared for her spouse are the same providers who are now caring for her mother. Patient states she feels lonely.     A: Processed patient's grief, validated her feelings and provided supportive listening. Reviewed the grief process and encouraged patient to notice her thoughts/emotions and allow herself to experience this without judgement. Praised patient for utilizing her supports and asking for what she needs. Reiterated the benefits of mindfulness relaxation.    R: Patient presented with somber mood with congruent affect. She demonstrated insight regarding her current grief and was receptive to feedback.     P: Follow up in three weeks.    Need for Community Resources Assessed: Yes    Resources Needed: No    If Yes, what resources: n/a    Primary Diagnosis: Adjustment disorder with depressed mood  (primary encounter diagnosis)    Treatment Plan: Unchanged    Discharge Plan: Strategies Discussed to Maintain Gains    Next Appointment: 5-13-22  Diana Crouch LCSW

## 2023-02-27 DIAGNOSIS — M17.11 PRIMARY OSTEOARTHRITIS OF RIGHT KNEE: ICD-10-CM

## 2023-02-27 RX ORDER — MELOXICAM 15 MG/1
15 TABLET ORAL DAILY
Qty: 30 TABLET | Refills: 3 | Status: SHIPPED | OUTPATIENT
Start: 2023-02-27 | End: 2023-05-11 | Stop reason: SDUPTHER

## 2023-02-27 RX ORDER — METFORMIN HYDROCHLORIDE 500 MG/1
TABLET ORAL
Qty: 360 TABLET | Refills: 0 | Status: SHIPPED | OUTPATIENT
Start: 2023-02-27 | End: 2023-06-28 | Stop reason: SDUPTHER

## 2023-02-27 NOTE — TELEPHONE ENCOUNTER
Care Due:                  Date            Visit Type   Department     Provider  --------------------------------------------------------------------------------                                ESTABLISHED   [facility]                              PATIENT -    MED/ INTERNAL  Last Visit: 05-      VIRTUAL      MED/ PEDS      Chan GOLDMAN -         [facility]                              PRIMARY      MED/ INTERNAL  Next Visit: 04-      CARE ([facility])   MED/ PEDMONTSE Estrada                                                            Last  Test          Frequency    Reason                     Performed    Due Date  --------------------------------------------------------------------------------    CMP.........  12 months..  atorvastatin.............  Not Found    Overdue    Lipid Panel.  12 months..  atorvastatin.............  05- 04-    [facility] Embedded Care Gaps. Reference number: [id number]. 2/27/2023   5:05:32 AM CST

## 2023-02-27 NOTE — TELEPHONE ENCOUNTER
Refill Decision Note   John Lemos  is requesting a refill authorization.  Brief Assessment and Rationale for Refill:  Approve     Medication Therapy Plan:  CMP NTBL to [facility] on 4/3/23    Medication Reconciliation Completed: No   Comments:     Provider Staff:     Action is required for this patient.   Please see care gap opportunities below in Care Due Message.     Thanks!  [facility]     Appointments      Date Provider   Last Visit   5/13/2022 Chan Estrada MD   Next Visit   4/3/2023 Chan Estrada MD     Note composed:2:48 PM 02/27/2023           Note composed:2:48 PM 02/27/2023

## 2023-02-28 ENCOUNTER — LAB VISIT (OUTPATIENT)
Dept: LAB | Facility: HOSPITAL | Age: 73
End: 2023-02-28
Attending: INTERNAL MEDICINE
Payer: COMMERCIAL

## 2023-02-28 DIAGNOSIS — Z12.5 ENCOUNTER FOR PROSTATE CANCER SCREENING: ICD-10-CM

## 2023-02-28 DIAGNOSIS — E11.42 TYPE 2 DIABETES MELLITUS WITH DIABETIC POLYNEUROPATHY, WITHOUT LONG-TERM CURRENT USE OF INSULIN: ICD-10-CM

## 2023-02-28 LAB
BASOPHILS # BLD AUTO: 0.03 K/UL (ref 0–0.2)
BASOPHILS NFR BLD: 0.5 % (ref 0–1.9)
DIFFERENTIAL METHOD: ABNORMAL
EOSINOPHIL # BLD AUTO: 0.3 K/UL (ref 0–0.5)
EOSINOPHIL NFR BLD: 4.9 % (ref 0–8)
ERYTHROCYTE [DISTWIDTH] IN BLOOD BY AUTOMATED COUNT: 13.5 % (ref 11.5–14.5)
HCT VFR BLD AUTO: 43.2 % (ref 40–54)
HGB BLD-MCNC: 13.2 G/DL (ref 14–18)
IMM GRANULOCYTES # BLD AUTO: 0.01 K/UL (ref 0–0.04)
IMM GRANULOCYTES NFR BLD AUTO: 0.2 % (ref 0–0.5)
LYMPHOCYTES # BLD AUTO: 2.4 K/UL (ref 1–4.8)
LYMPHOCYTES NFR BLD: 40.7 % (ref 18–48)
MCH RBC QN AUTO: 28.7 PG (ref 27–31)
MCHC RBC AUTO-ENTMCNC: 30.6 G/DL (ref 32–36)
MCV RBC AUTO: 94 FL (ref 82–98)
MONOCYTES # BLD AUTO: 0.6 K/UL (ref 0.3–1)
MONOCYTES NFR BLD: 9.3 % (ref 4–15)
NEUTROPHILS # BLD AUTO: 2.6 K/UL (ref 1.8–7.7)
NEUTROPHILS NFR BLD: 44.4 % (ref 38–73)
NRBC BLD-RTO: 0 /100 WBC
PLATELET # BLD AUTO: 290 K/UL (ref 150–450)
PMV BLD AUTO: 10.1 FL (ref 9.2–12.9)
RBC # BLD AUTO: 4.6 M/UL (ref 4.6–6.2)
WBC # BLD AUTO: 5.89 K/UL (ref 3.9–12.7)

## 2023-02-28 PROCEDURE — 83036 HEMOGLOBIN GLYCOSYLATED A1C: CPT | Performed by: INTERNAL MEDICINE

## 2023-02-28 PROCEDURE — 36415 COLL VENOUS BLD VENIPUNCTURE: CPT | Mod: PO | Performed by: INTERNAL MEDICINE

## 2023-02-28 PROCEDURE — 84153 ASSAY OF PSA TOTAL: CPT | Performed by: INTERNAL MEDICINE

## 2023-02-28 PROCEDURE — 80061 LIPID PANEL: CPT | Performed by: INTERNAL MEDICINE

## 2023-02-28 PROCEDURE — 85025 COMPLETE CBC W/AUTO DIFF WBC: CPT | Performed by: INTERNAL MEDICINE

## 2023-03-01 LAB
CHOLEST SERPL-MCNC: 135 MG/DL (ref 120–199)
CHOLEST/HDLC SERPL: 2.7 {RATIO} (ref 2–5)
COMPLEXED PSA SERPL-MCNC: 0.98 NG/ML (ref 0–4)
ESTIMATED AVG GLUCOSE: 157 MG/DL (ref 68–131)
HBA1C MFR BLD: 7.1 % (ref 4–5.6)
HDLC SERPL-MCNC: 50 MG/DL (ref 40–75)
HDLC SERPL: 37 % (ref 20–50)
LDLC SERPL CALC-MCNC: 63 MG/DL (ref 63–159)
NONHDLC SERPL-MCNC: 85 MG/DL
TRIGL SERPL-MCNC: 110 MG/DL (ref 30–150)

## 2023-03-16 DIAGNOSIS — M51.36 DDD (DEGENERATIVE DISC DISEASE), LUMBAR: ICD-10-CM

## 2023-03-16 RX ORDER — DULOXETIN HYDROCHLORIDE 60 MG/1
60 CAPSULE, DELAYED RELEASE ORAL DAILY
Qty: 90 CAPSULE | Refills: 0 | Status: SHIPPED | OUTPATIENT
Start: 2023-03-16 | End: 2023-06-28 | Stop reason: SDUPTHER

## 2023-03-16 NOTE — TELEPHONE ENCOUNTER
No new care gaps identified.  [facility] Embedded Care Gaps. Reference number: [id number]. 3/16/2023   11:14:38 AM RISHABHT

## 2023-03-23 ENCOUNTER — PATIENT MESSAGE (OUTPATIENT)
Dept: ADMINISTRATIVE | Facility: HOSPITAL | Age: 73
End: 2023-03-23
Payer: COMMERCIAL

## 2023-04-03 ENCOUNTER — OFFICE VISIT (OUTPATIENT)
Dept: FAMILY MEDICINE | Facility: CLINIC | Age: 73
End: 2023-04-03
Payer: COMMERCIAL

## 2023-04-03 ENCOUNTER — LAB VISIT (OUTPATIENT)
Dept: LAB | Facility: HOSPITAL | Age: 73
End: 2023-04-03
Attending: INTERNAL MEDICINE
Payer: COMMERCIAL

## 2023-04-03 VITALS
DIASTOLIC BLOOD PRESSURE: 62 MMHG | BODY MASS INDEX: 27.01 KG/M2 | OXYGEN SATURATION: 98 % | SYSTOLIC BLOOD PRESSURE: 120 MMHG | WEIGHT: 178.25 LBS | HEART RATE: 77 BPM | TEMPERATURE: 99 F | HEIGHT: 68 IN

## 2023-04-03 DIAGNOSIS — E78.5 HYPERLIPIDEMIA, UNSPECIFIED HYPERLIPIDEMIA TYPE: ICD-10-CM

## 2023-04-03 DIAGNOSIS — M25.511 ACUTE PAIN OF RIGHT SHOULDER: ICD-10-CM

## 2023-04-03 DIAGNOSIS — E11.42 TYPE 2 DIABETES MELLITUS WITH DIABETIC POLYNEUROPATHY, WITHOUT LONG-TERM CURRENT USE OF INSULIN: ICD-10-CM

## 2023-04-03 DIAGNOSIS — I10 ESSENTIAL HYPERTENSION: ICD-10-CM

## 2023-04-03 DIAGNOSIS — J30.9 ALLERGIC RHINITIS, UNSPECIFIED SEASONALITY, UNSPECIFIED TRIGGER: ICD-10-CM

## 2023-04-03 DIAGNOSIS — E11.42 TYPE 2 DIABETES MELLITUS WITH DIABETIC POLYNEUROPATHY, WITHOUT LONG-TERM CURRENT USE OF INSULIN: Primary | ICD-10-CM

## 2023-04-03 LAB
ALBUMIN/CREAT UR: 10.8 UG/MG (ref 0–30)
CREAT UR-MCNC: 195 MG/DL (ref 23–375)
MICROALBUMIN UR DL<=1MG/L-MCNC: 21 UG/ML

## 2023-04-03 PROCEDURE — 3288F PR FALLS RISK ASSESSMENT DOCUMENTED: ICD-10-PCS | Mod: CPTII,S$GLB,, | Performed by: INTERNAL MEDICINE

## 2023-04-03 PROCEDURE — 99999 PR PBB SHADOW E&M-EST. PATIENT-LVL III: CPT | Mod: PBBFAC,,, | Performed by: INTERNAL MEDICINE

## 2023-04-03 PROCEDURE — 99214 OFFICE O/P EST MOD 30 MIN: CPT | Mod: S$GLB,,, | Performed by: INTERNAL MEDICINE

## 2023-04-03 PROCEDURE — 3051F PR MOST RECENT HEMOGLOBIN A1C LEVEL 7.0 - < 8.0%: ICD-10-PCS | Mod: CPTII,S$GLB,, | Performed by: INTERNAL MEDICINE

## 2023-04-03 PROCEDURE — 3072F PR LOW RISK FOR RETINOPATHY: ICD-10-PCS | Mod: CPTII,S$GLB,, | Performed by: INTERNAL MEDICINE

## 2023-04-03 PROCEDURE — 82570 ASSAY OF URINE CREATININE: CPT | Performed by: INTERNAL MEDICINE

## 2023-04-03 PROCEDURE — 99214 PR OFFICE/OUTPT VISIT, EST, LEVL IV, 30-39 MIN: ICD-10-PCS | Mod: S$GLB,,, | Performed by: INTERNAL MEDICINE

## 2023-04-03 PROCEDURE — 3074F SYST BP LT 130 MM HG: CPT | Mod: CPTII,S$GLB,, | Performed by: INTERNAL MEDICINE

## 2023-04-03 PROCEDURE — 1126F AMNT PAIN NOTED NONE PRSNT: CPT | Mod: CPTII,S$GLB,, | Performed by: INTERNAL MEDICINE

## 2023-04-03 PROCEDURE — 3066F NEPHROPATHY DOC TX: CPT | Mod: CPTII,S$GLB,, | Performed by: INTERNAL MEDICINE

## 2023-04-03 PROCEDURE — 3074F PR MOST RECENT SYSTOLIC BLOOD PRESSURE < 130 MM HG: ICD-10-PCS | Mod: CPTII,S$GLB,, | Performed by: INTERNAL MEDICINE

## 2023-04-03 PROCEDURE — 3078F PR MOST RECENT DIASTOLIC BLOOD PRESSURE < 80 MM HG: ICD-10-PCS | Mod: CPTII,S$GLB,, | Performed by: INTERNAL MEDICINE

## 2023-04-03 PROCEDURE — 3288F FALL RISK ASSESSMENT DOCD: CPT | Mod: CPTII,S$GLB,, | Performed by: INTERNAL MEDICINE

## 2023-04-03 PROCEDURE — 1101F PT FALLS ASSESS-DOCD LE1/YR: CPT | Mod: CPTII,S$GLB,, | Performed by: INTERNAL MEDICINE

## 2023-04-03 PROCEDURE — 1159F MED LIST DOCD IN RCRD: CPT | Mod: CPTII,S$GLB,, | Performed by: INTERNAL MEDICINE

## 2023-04-03 PROCEDURE — 4010F PR ACE/ARB THEARPY RXD/TAKEN: ICD-10-PCS | Mod: CPTII,S$GLB,, | Performed by: INTERNAL MEDICINE

## 2023-04-03 PROCEDURE — 3061F NEG MICROALBUMINURIA REV: CPT | Mod: CPTII,S$GLB,, | Performed by: INTERNAL MEDICINE

## 2023-04-03 PROCEDURE — 3078F DIAST BP <80 MM HG: CPT | Mod: CPTII,S$GLB,, | Performed by: INTERNAL MEDICINE

## 2023-04-03 PROCEDURE — 1101F PR PT FALLS ASSESS DOC 0-1 FALLS W/OUT INJ PAST YR: ICD-10-PCS | Mod: CPTII,S$GLB,, | Performed by: INTERNAL MEDICINE

## 2023-04-03 PROCEDURE — 1159F PR MEDICATION LIST DOCUMENTED IN MEDICAL RECORD: ICD-10-PCS | Mod: CPTII,S$GLB,, | Performed by: INTERNAL MEDICINE

## 2023-04-03 PROCEDURE — 4010F ACE/ARB THERAPY RXD/TAKEN: CPT | Mod: CPTII,S$GLB,, | Performed by: INTERNAL MEDICINE

## 2023-04-03 PROCEDURE — 3008F PR BODY MASS INDEX (BMI) DOCUMENTED: ICD-10-PCS | Mod: CPTII,S$GLB,, | Performed by: INTERNAL MEDICINE

## 2023-04-03 PROCEDURE — 3008F BODY MASS INDEX DOCD: CPT | Mod: CPTII,S$GLB,, | Performed by: INTERNAL MEDICINE

## 2023-04-03 PROCEDURE — 1126F PR PAIN SEVERITY QUANTIFIED, NO PAIN PRESENT: ICD-10-PCS | Mod: CPTII,S$GLB,, | Performed by: INTERNAL MEDICINE

## 2023-04-03 PROCEDURE — 3066F PR DOCUMENTATION OF TREATMENT FOR NEPHROPATHY: ICD-10-PCS | Mod: CPTII,S$GLB,, | Performed by: INTERNAL MEDICINE

## 2023-04-03 PROCEDURE — 99999 PR PBB SHADOW E&M-EST. PATIENT-LVL III: ICD-10-PCS | Mod: PBBFAC,,, | Performed by: INTERNAL MEDICINE

## 2023-04-03 PROCEDURE — 3072F LOW RISK FOR RETINOPATHY: CPT | Mod: CPTII,S$GLB,, | Performed by: INTERNAL MEDICINE

## 2023-04-03 PROCEDURE — 3051F HG A1C>EQUAL 7.0%<8.0%: CPT | Mod: CPTII,S$GLB,, | Performed by: INTERNAL MEDICINE

## 2023-04-03 PROCEDURE — 3061F PR NEG MICROALBUMINURIA RESULT DOCUMENTED/REVIEW: ICD-10-PCS | Mod: CPTII,S$GLB,, | Performed by: INTERNAL MEDICINE

## 2023-04-03 RX ORDER — DICLOFENAC SODIUM 50 MG/1
50 TABLET, DELAYED RELEASE ORAL 2 TIMES DAILY PRN
Qty: 30 TABLET | Refills: 0 | Status: SHIPPED | OUTPATIENT
Start: 2023-04-03

## 2023-04-03 NOTE — PROGRESS NOTES
Chief Complaint  Chief Complaint   Patient presents with    Follow-up    Diabetes    shoulder tingling      1 - 3   weeks        HPI  John Lemos is a 72 y.o. male with multiple medical diagnoses as listed in the medical history and problem list that presents for evaluation of shoulder pain.  Their last appointment with primary care was 11/29/2022.      Reports right shoulder pain started 2 weeks. No inciting trauma. Associated with a occasional tingling sensation in the right side of the neck and upper shoulder. Denies arm or hand weakness or numbness.     Notes ongoing mild numbness in the feet. Reports good relief with duloxetine.     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     Employer: [employer]   Tobacco Use    Smoking status: Never    Smokeless tobacco: Never   Substance and Sexual Activity    Alcohol use: No    Drug use: No    Sexual activity: Not Currently     Social Determinants of Health     Financial Resource Strain: Low Risk     Difficulty of Paying Living Expenses: Not hard at all   Food Insecurity: No Food Insecurity    Worried About Running Out of Food in the Last Year: Never true    Ran Out of Food in the Last Year: Never true   Transportation Needs: No Transportation Needs    Lack of Transportation (Medical): No    Lack of Transportation (Non-Medical): No   Physical Activity:  Insufficiently Active    Days of Exercise per Week: 3 days    Minutes of Exercise per Session: 20 min   Stress: Stress Concern Present    Feeling of Stress : Rather much   Social Connections: Unknown    Frequency of Communication with Friends and Family: More than three times a week    Frequency of Social Gatherings with Friends and Family: Once a week    Active Member of Clubs or Organizations: Yes    Attends Club or Organization Meetings: More than 4 times per year    Marital Status:    Housing Stability: Unknown    Unable to Pay for Housing in the Last Year: No    Unstable Housing in the Last Year: No       FAMILY HISTORY:  Family History   Problem Relation Age of Onset    Diabetes Mother     Cancer Mother     Hypertension Mother     Diabetes Father     Cancer Father     No Known Problems Sister     No Known Problems Brother     No Known Problems Maternal Aunt     No Known Problems Maternal Uncle     No Known Problems Paternal Aunt     No Known Problems Paternal Uncle     No Known Problems Maternal Grandmother     No Known Problems Maternal Grandfather     No Known Problems Paternal Grandmother     No Known Problems Paternal Grandfather     Amblyopia Neg Hx     Blindness Neg Hx     Cataracts Neg Hx     Glaucoma Neg Hx     Macular degeneration Neg Hx     Retinal detachment Neg Hx     Strabismus Neg Hx     Stroke Neg Hx     Thyroid disease Neg Hx        ALLERGIES AND MEDICATIONS: updated and reviewed.  Review of patient's allergies indicates:   Allergen Reactions    Sulfa (sulfonamide antibiotics) Swelling     Current Outpatient Medications   Medication Sig Dispense Refill    amoxicillin-clavulanate 875-125mg (AUGMENTIN) 875-125 mg per tablet Take 1 tablet by mouth 2 (two) times daily. (Patient not taking: Reported on 4/3/2023) 14 tablet 0    atorvastatin (LIPITOR) 40 MG tablet Take 1 tablet (40 mg total) by mouth once daily. 90 tablet 3    blood sugar diagnostic (FREESTYLE LITE STRIPS) Strp Inject 1 each as  "directed once daily. 100 each 3    cetirizine (ZYRTEC) 10 MG tablet Take 1 tablet (10 mg total) by mouth once daily. 30 tablet 0    diclofenac sodium (VOLTAREN) 1 % Gel Apply 2 g topically 4 (four) times daily. 20 g 0    DULoxetine (CYMBALTA) 60 MG capsule Take 1 capsule (60 mg total) by mouth once daily. 90 capsule 0    fluticasone (FLONASE) 50 mcg/actuation nasal spray 1 spray (50 mcg total) by Each Nare route once daily. 16 g 2    ipratropium (ATROVENT) 21 mcg (0.03 %) nasal spray 2 sprays by Nasal route 3 (three) times daily. 30 mL 0    lancets (FREESTYLE LANCETS) 28 gauge Misc Inject 1 lancet as directed once daily. 100 each 3    levocetirizine (XYZAL) 5 MG tablet Take 1 tablet (5 mg total) by mouth every evening. 90 tablet 3    losartan (COZAAR) 50 MG tablet Take 1 tablet (50 mg total) by mouth once daily. 90 tablet 3    meloxicam (MOBIC) 15 MG tablet Take 1 tablet (15 mg total) by mouth once daily. 30 tablet 3    metFORMIN (GLUCOPHAGE) 500 MG tablet TAKE 2 TABLETS BY MOUTH TWICE DAILY WITH BREAKFAST AND WITH SUPPER 360 tablet 0    naftifine (NAFTIN) 1 % cream Apply topically once daily.       No current facility-administered medications for this visit.         ROS  Review of Systems        Physical Exam  Vitals:    04/03/23 1452   BP: 120/62   Pulse: 77   Temp: 98.9 °F (37.2 °C)   TempSrc: Oral   SpO2: 98%   Weight: 80.8 kg (178 lb 3.9 oz)   Height: 5' 8" (1.727 m)    Body mass index is 27.1 kg/m².  Weight: 80.8 kg (178 lb 3.9 oz)   Height: 5' 8" (172.7 cm)   Physical Exam  Constitutional:       Appearance: Normal appearance.   Eyes:      Conjunctiva/sclera: Conjunctivae normal.   Cardiovascular:      Rate and Rhythm: Normal rate and regular rhythm.      Pulses: Normal pulses.      Heart sounds: Normal heart sounds.   Pulmonary:      Effort: Pulmonary effort is normal.      Breath sounds: Normal breath sounds.   Musculoskeletal:      Comments: Positive hawkings test on the right  Mild pain with shoulder " extension on the right  Normal range of motion bilaterally   Skin:     Findings: No lesion.      Comments: Intact sensation with monofilament test  No wounds appreciated on foot exam   Neurological:      General: No focal deficit present.      Mental Status: He is alert and oriented to person, place, and time. Mental status is at baseline.   Psychiatric:         Mood and Affect: Mood normal.         Behavior: Behavior normal.         Thought Content: Thought content normal.         Judgment: Judgment normal.         Health Maintenance         Date Due Completion Date    Diabetes Urine Screening 11/05/2022 11/5/2021    Foot Exam 11/11/2022 11/11/2021    Override on 11/11/2021: Done    Override on 8/20/2020: Done    Override on 7/15/2019: Done    Override on 1/16/2018: Done    Override on 11/25/2015: Done    Hemoglobin A1c 08/28/2023 2/28/2023    Override on 11/25/2015: Done    Low Dose Statin 11/28/2023 11/28/2022    Eye Exam 11/28/2023 11/28/2022    Lipid Panel 02/28/2024 2/28/2023    TETANUS VACCINE 02/19/2026 2/19/2016    Colorectal Cancer Screening 11/25/2029 11/25/2019          Assessment and Plan:    Type 2 diabetes mellitus with diabetic polyneuropathy, without long-term current use of insulin  2/28/23 A1c of 7.1%, down from 8.4% 11/22  - continue metformin 500 mg tablet , 2 tablets daily with breakfast and dinner  - continue duloxetine 60 mg tablet once daily    Right shoulder pain  Consider rotator cuff strain/injury  Counseled patient on home exercise program to improve symptoms  - start diclofenac 50 mg EC tablet twice daily prn, Ortho/PT referral if symptoms not improved in future    Hyperlipidemia, unspecified hyperlipidemia type  - continue atorvastatin 40 mg tablet nightly     Essential hypertension  120/62 mmHg today    Allergic rhinitis, unspecified seasonality, unspecified trigger  - continue cetirizine 10 mg tablet    Isai Jacques MS4    I hereby acknowledge that I am relying upon documentation  authored by a medical student working under my supervision and further I hereby attest that I have verified the student documentation or findings by personally performing the physical exam and medical decision making activities of the Evaluation and Management service to be billed.    Chan Estrada MD

## 2023-05-11 DIAGNOSIS — M17.11 PRIMARY OSTEOARTHRITIS OF RIGHT KNEE: ICD-10-CM

## 2023-05-11 RX ORDER — MELOXICAM 15 MG/1
15 TABLET ORAL DAILY
Qty: 30 TABLET | Refills: 3 | Status: SHIPPED | OUTPATIENT
Start: 2023-05-11 | End: 2023-12-18 | Stop reason: SDUPTHER

## 2023-06-01 ENCOUNTER — OFFICE VISIT (OUTPATIENT)
Dept: URGENT CARE | Facility: CLINIC | Age: 73
End: 2023-06-01
Payer: COMMERCIAL

## 2023-06-01 VITALS
HEIGHT: 68 IN | HEART RATE: 71 BPM | TEMPERATURE: 98 F | DIASTOLIC BLOOD PRESSURE: 74 MMHG | RESPIRATION RATE: 18 BRPM | SYSTOLIC BLOOD PRESSURE: 122 MMHG | WEIGHT: 178 LBS | BODY MASS INDEX: 26.98 KG/M2 | OXYGEN SATURATION: 97 %

## 2023-06-01 DIAGNOSIS — J01.90 ACUTE SINUSITIS WITH SYMPTOMS > 10 DAYS: Primary | ICD-10-CM

## 2023-06-01 LAB
CTP QC/QA: YES
SARS-COV-2 AG RESP QL IA.RAPID: NEGATIVE

## 2023-06-01 PROCEDURE — 87811 SARS-COV-2 COVID19 W/OPTIC: CPT | Mod: QW,S$GLB,, | Performed by: NURSE PRACTITIONER

## 2023-06-01 PROCEDURE — 87811 SARS CORONAVIRUS 2 ANTIGEN POCT, MANUAL READ: ICD-10-PCS | Mod: QW,S$GLB,, | Performed by: NURSE PRACTITIONER

## 2023-06-01 PROCEDURE — 99214 OFFICE O/P EST MOD 30 MIN: CPT | Mod: S$GLB,,, | Performed by: NURSE PRACTITIONER

## 2023-06-01 PROCEDURE — 99214 PR OFFICE/OUTPT VISIT, EST, LEVL IV, 30-39 MIN: ICD-10-PCS | Mod: S$GLB,,, | Performed by: NURSE PRACTITIONER

## 2023-06-01 RX ORDER — BENZONATATE 200 MG/1
200 CAPSULE ORAL 3 TIMES DAILY PRN
Qty: 30 CAPSULE | Refills: 0 | Status: SHIPPED | OUTPATIENT
Start: 2023-06-01

## 2023-06-01 RX ORDER — AMOXICILLIN AND CLAVULANATE POTASSIUM 875; 125 MG/1; MG/1
1 TABLET, FILM COATED ORAL 2 TIMES DAILY
Qty: 14 TABLET | Refills: 0 | Status: SHIPPED | OUTPATIENT
Start: 2023-06-01 | End: 2023-08-07

## 2023-06-01 RX ORDER — FLUTICASONE PROPIONATE 50 MCG
1 SPRAY, SUSPENSION (ML) NASAL 2 TIMES DAILY
Qty: 9.9 ML | Refills: 0 | Status: SHIPPED | OUTPATIENT
Start: 2023-06-01

## 2023-06-01 RX ORDER — CETIRIZINE HYDROCHLORIDE 10 MG/1
10 TABLET ORAL DAILY
Qty: 30 TABLET | Refills: 0 | Status: SHIPPED | OUTPATIENT
Start: 2023-06-01 | End: 2024-02-12 | Stop reason: SDUPTHER

## 2023-06-01 NOTE — PROGRESS NOTES
"Subjective:      Patient ID: John Lemos is a 72 y.o. male.    Vitals:  height is 5' 8" (1.727 m) and weight is 80.7 kg (178 lb). His oral temperature is 98.4 °F (36.9 °C). His blood pressure is 122/74 and his pulse is 71. His respiration is 18 and oxygen saturation is 97%.     Chief Complaint: Cough    72-year-old male presents to clinic for evaluation of nasal congestion, bilateral ear pressure, chills, cough x3 weeks.  Patient also reports swollen glands and sore throat.  He reports taking over-the-counter cough suppressant with minimal relief.  He denies fever.  He is awake and alert, answers questions appropriately, no acute distress noted on today's visit.    Cough  This is a recurrent problem. The current episode started 1 to 4 weeks ago (symptoms since last viist). The problem has been gradually worsening. The problem occurs constantly. The cough is Productive of purulent sputum. Associated symptoms include ear congestion, ear pain and a sore throat. Pertinent negatives include no chest pain, chills, fever or shortness of breath. Nothing aggravates the symptoms. He has tried OTC cough suppressant for the symptoms. The treatment provided no relief.     Constitution: Negative for activity change, appetite change, chills, fatigue and fever.   HENT:  Positive for ear pain, congestion and sore throat. Negative for tongue pain.    Cardiovascular:  Negative for chest pain.   Respiratory:  Positive for cough. Negative for shortness of breath.    Neurological:  Negative for dizziness.    Objective:     Physical Exam   Constitutional: He is oriented to person, place, and time. He appears well-developed.  Non-toxic appearance. He does not appear ill. No distress.   HENT:   Head: Normocephalic and atraumatic. Head is without abrasion, without contusion and without laceration.   Ears:   Right Ear: Tympanic membrane and external ear normal.   Left Ear: Tympanic membrane and external ear normal.   Nose: Congestion " present.   Mouth/Throat: Mucous membranes are normal. Mucous membranes are moist. Posterior oropharyngeal erythema present. No oropharyngeal exudate. Oropharynx is clear.   Eyes: Conjunctivae, EOM and lids are normal. Right eye exhibits no discharge. Left eye exhibits no discharge.   Neck: Trachea normal and phonation normal.   Cardiovascular: Normal rate and normal heart sounds.   Pulmonary/Chest: Effort normal and breath sounds normal. No stridor. No respiratory distress. He has no wheezes.   Abdominal: Normal appearance.   Musculoskeletal: Normal range of motion.         General: Normal range of motion.   Neurological: He is alert and oriented to person, place, and time.   Skin: Skin is warm, dry, intact, not diaphoretic and not pale. No abrasion, No burn and No ecchymosis   Psychiatric: His speech is normal and behavior is normal. Mood, judgment and thought content normal.   Nursing note and vitals reviewed.  Results for orders placed or performed in visit on 06/01/23   SARS Coronavirus 2 Antigen, POCT Manual Read   Result Value Ref Range    SARS Coronavirus 2 Antigen Negative Negative     Acceptable Yes        Assessment:     1. Acute sinusitis with symptoms > 10 days        Plan:       Acute sinusitis with symptoms > 10 days  -     SARS Coronavirus 2 Antigen, POCT Manual Read  -     amoxicillin-clavulanate 875-125mg (AUGMENTIN) 875-125 mg per tablet; Take 1 tablet by mouth 2 (two) times daily.  Dispense: 14 tablet; Refill: 0  -     fluticasone propionate (FLONASE) 50 mcg/actuation nasal spray; 1 spray (50 mcg total) by Each Nostril route 2 (two) times daily.  Dispense: 9.9 mL; Refill: 0  -     cetirizine (ZYRTEC) 10 MG tablet; Take 1 tablet (10 mg total) by mouth once daily.  Dispense: 30 tablet; Refill: 0  -     benzonatate (TESSALON) 200 MG capsule; Take 1 capsule (200 mg total) by mouth 3 (three) times daily as needed for Cough.  Dispense: 30 capsule; Refill: 0      - negative COVID on  today's visit.  Given length of symptoms, will treat for suspected bacterial etiology.  Continue symptomatic care.  Follow-up with PCP.  Patient verbalized understanding, and is in agreement with plan.    Patient Instructions   - Follow up with your PCP or specialty clinic as directed in the next 1-2 weeks if not improved or as needed.  You can call [phone] to schedule an appointment with the appropriate provider.    - Go to the ER or seek medical attention immediately if you develop new or worsening symptoms.    - You must understand that you have received an Urgent Care treatment only and that you may be released before all of your medical problems are known or treated.   - You, the patient, will arrange for follow up care as instructed.   - If your condition worsens or fails to improve we recommend that you receive another evaluation at the ER immediately or contact your PCP to discuss your concerns or return here.

## 2023-06-01 NOTE — PATIENT INSTRUCTIONS
- Follow up with your PCP or specialty clinic as directed in the next 1-2 weeks if not improved or as needed.  You can call [phone] to schedule an appointment with the appropriate provider.    - Go to the ER or seek medical attention immediately if you develop new or worsening symptoms.    - You must understand that you have received an Urgent Care treatment only and that you may be released before all of your medical problems are known or treated.   - You, the patient, will arrange for follow up care as instructed.   - If your condition worsens or fails to improve we recommend that you receive another evaluation at the ER immediately or contact your PCP to discuss your concerns or return here.

## 2023-06-28 DIAGNOSIS — E11.9 CONTROLLED TYPE 2 DIABETES MELLITUS WITHOUT COMPLICATION, WITHOUT LONG-TERM CURRENT USE OF INSULIN: Chronic | ICD-10-CM

## 2023-06-28 DIAGNOSIS — M51.36 DDD (DEGENERATIVE DISC DISEASE), LUMBAR: ICD-10-CM

## 2023-06-28 RX ORDER — DULOXETIN HYDROCHLORIDE 60 MG/1
60 CAPSULE, DELAYED RELEASE ORAL DAILY
Qty: 90 CAPSULE | Refills: 1 | Status: SHIPPED | OUTPATIENT
Start: 2023-06-28 | End: 2023-12-11

## 2023-06-28 RX ORDER — LOSARTAN POTASSIUM 50 MG/1
50 TABLET ORAL DAILY
Qty: 90 TABLET | Refills: 1 | Status: SHIPPED | OUTPATIENT
Start: 2023-06-28 | End: 2023-10-22 | Stop reason: SDUPTHER

## 2023-06-28 RX ORDER — METFORMIN HYDROCHLORIDE 500 MG/1
TABLET ORAL
Qty: 360 TABLET | Refills: 0 | Status: SHIPPED | OUTPATIENT
Start: 2023-06-28 | End: 2023-10-14 | Stop reason: SDUPTHER

## 2023-06-28 NOTE — TELEPHONE ENCOUNTER
Care Due:                  Date            Visit Type   Department     Provider  --------------------------------------------------------------------------------                                [facility]                              PRIMARY      MED/ INTERNAL  Last Visit: 04-      CARE ([facility])   MED/ HO[place]      Chan Estrada                              [facility]      MED/ INTERNAL  Next Visit: 08-      CARE ([facility])   MED/ JENNIFER Estrada                                                            Last  Test          Frequency    Reason                     Performed    Due Date  --------------------------------------------------------------------------------    CMP.........  12 months..  atorvastatin.............  Not Found    Overdue    HBA1C.......  6 months...  metFORMIN................  03- 08-    [facility] Embedded Care Due Messages. Reference number: [id number].   6/28/2023 6:59:26 AM CDT

## 2023-06-28 NOTE — TELEPHONE ENCOUNTER
Provider Staff:  Action required for this patient     Please see care gap opportunities below in Care Due Message.    Thanks!  [facility]     Appointments      Date Provider   Last Visit   4/3/2023 Chan Estrada MD   Next Visit   8/7/2023 Chan Estrada MD     Refill Decision Note   John Lemos  is requesting a refill authorization.  Brief Assessment and Rationale for Refill:  Approve     Medication Therapy Plan:         Comments:     Note composed:2:35 PM 06/28/2023

## 2023-07-01 ENCOUNTER — OFFICE VISIT (OUTPATIENT)
Dept: URGENT CARE | Facility: CLINIC | Age: 73
End: 2023-07-01
Payer: COMMERCIAL

## 2023-07-01 VITALS
HEART RATE: 81 BPM | TEMPERATURE: 99 F | BODY MASS INDEX: 26.98 KG/M2 | RESPIRATION RATE: 17 BRPM | HEIGHT: 68 IN | DIASTOLIC BLOOD PRESSURE: 75 MMHG | WEIGHT: 178 LBS | OXYGEN SATURATION: 98 % | SYSTOLIC BLOOD PRESSURE: 145 MMHG

## 2023-07-01 DIAGNOSIS — R31.9 HEMATURIA, UNSPECIFIED TYPE: ICD-10-CM

## 2023-07-01 DIAGNOSIS — N48.1 BALANITIS: Primary | ICD-10-CM

## 2023-07-01 LAB
BILIRUB UR QL STRIP: NEGATIVE
GLUCOSE UR QL STRIP: POSITIVE
KETONES UR QL STRIP: NEGATIVE
LEUKOCYTE ESTERASE UR QL STRIP: NEGATIVE
PH, POC UA: 5
POC BLOOD, URINE: POSITIVE
POC NITRATES, URINE: NEGATIVE
PROT UR QL STRIP: NEGATIVE
SP GR UR STRIP: 1.02 (ref 1–1.03)
UROBILINOGEN UR STRIP-ACNC: ABNORMAL (ref 0.3–2.2)

## 2023-07-01 PROCEDURE — 87086 URINE CULTURE/COLONY COUNT: CPT

## 2023-07-01 PROCEDURE — 81003 POCT URINALYSIS, DIPSTICK, AUTOMATED, W/O SCOPE: ICD-10-PCS | Mod: QW,S$GLB,,

## 2023-07-01 PROCEDURE — 99213 PR OFFICE/OUTPT VISIT, EST, LEVL III, 20-29 MIN: ICD-10-PCS | Mod: S$GLB,,,

## 2023-07-01 PROCEDURE — 81003 URINALYSIS AUTO W/O SCOPE: CPT | Mod: QW,S$GLB,,

## 2023-07-01 PROCEDURE — 99213 OFFICE O/P EST LOW 20 MIN: CPT | Mod: S$GLB,,,

## 2023-07-01 RX ORDER — CLOTRIMAZOLE 1 %
CREAM (GRAM) TOPICAL 2 TIMES DAILY
Qty: 45 G | Refills: 0 | Status: SHIPPED | OUTPATIENT
Start: 2023-07-01 | End: 2023-08-07

## 2023-07-01 NOTE — PROGRESS NOTES
"Subjective:      Patient ID: John Lemos is a 72 y.o. male.    Vitals:  height is 5' 8" (1.727 m) and weight is 80.7 kg (178 lb). His tympanic temperature is 98.7 °F (37.1 °C). His blood pressure is 145/75 (abnormal) and his pulse is 81. His respiration is 17 and oxygen saturation is 98%.     Chief Complaint: Hematuria    Patient is a 72-year-old male who presents with irritation inflammation to the head of the penis for the past 3 days.  He notices a white film over the head of the penis every morning and has wipe it off.  Having associated dysuria, urinary urgency, frequency as well.  Noticed blood in his urine this morning.  Denies fever, chills, abdominal pain, back pain, flank pain, pelvic pain, penile discharge, testicular pain or swelling, nausea, vomiting.    Hematuria  This is a new problem. The current episode started in the past 7 days. The problem is unchanged. His pain is at a severity of 2/10. The pain is mild. He describes his urine color as yellow. Irritative symptoms include frequency and urgency. Associated symptoms include dysuria. Pertinent negatives include no abdominal pain, chills, fever, flank pain, nausea, vomiting or sore throat.     Constitution: Negative for chills and fever.   HENT:  Negative for ear pain, congestion and sore throat.    Neck: Negative for neck pain and neck stiffness.   Cardiovascular:  Negative for chest pain.   Eyes:  Negative for eye discharge, eye itching and eye redness.   Gastrointestinal:  Negative for abdominal pain, nausea and vomiting.   Genitourinary:  Positive for dysuria, frequency, urgency, hematuria and penile pain. Negative for flank pain, bladder incontinence, penile swelling, scrotal swelling, testicular pain and pelvic pain.   Skin:  Negative for rash.   Neurological:  Negative for dizziness and headaches.    Objective:     Physical Exam   Constitutional: He is oriented to person, place, and time. He appears well-developed.   HENT:   Head: " Normocephalic and atraumatic.   Ears:   Right Ear: External ear normal.   Left Ear: External ear normal.   Nose: Nose normal.   Mouth/Throat: Oropharynx is clear and moist.   Eyes: Conjunctivae, EOM and lids are normal. Pupils are equal, round, and reactive to light.   Neck: Trachea normal and phonation normal. Neck supple.   Cardiovascular: Normal rate, regular rhythm, normal heart sounds and normal pulses.   Pulmonary/Chest: Effort normal and breath sounds normal. No respiratory distress.   Abdominal: He exhibits no distension. Soft. There is no abdominal tenderness. There is no left CVA tenderness and no right CVA tenderness.   Genitourinary:         Comments: Inflammation to glans penis     Musculoskeletal: Normal range of motion.         General: Normal range of motion.   Neurological: He is alert and oriented to person, place, and time.   Skin: Skin is warm, dry and intact. Capillary refill takes less than 2 seconds.   Psychiatric: His speech is normal and behavior is normal. Judgment and thought content normal.   Nursing note and vitals reviewed.chaperone present     Results for orders placed or performed in visit on 07/01/23   POCT Urinalysis, Dipstick, Automated, W/O Scope   Result Value Ref Range    POC Blood, Urine Positive (A) Negative    POC Bilirubin, Urine Negative Negative    POC Urobilinogen, Urine norm 0.3 - 2.2    POC Ketones, Urine Negative Negative    POC Protein, Urine Negative Negative    POC Nitrates, Urine Negative Negative    POC Glucose, Urine Positive (A) Negative    pH, UA 5.0     POC Specific Gravity, Urine 1.020 1.003 - 1.029    POC Leukocytes, Urine Negative Negative     Assessment:     1. Balanitis    2. Hematuria, unspecified type      Plan:     Balanitis  -     clotrimazole (LOTRIMIN) 1 % cream; Apply topically 2 (two) times daily.  Dispense: 45 g; Refill: 0    Hematuria, unspecified type  -     POCT Urinalysis, Dipstick, Automated, W/O Scope  -     Urine culture      Treating for  balanitis with clotrimazole.  UA with blood and glucose, but no nitrates or leukocytes.  Will obtain urine culture and treat with antibiotics if necessary.            Patient Instructions   - apply antifungal cream as directed  - we will call you with urine culture results.    - Follow up with your PCP or specialty clinic as directed in the next 1-2 weeks if not improved or as needed.  You can call [phone] to schedule an appointment with the appropriate provider.    - Go to the ER or seek medical attention immediately if you develop new or worsening symptoms.    - You must understand that you have received an Urgent Care treatment only and that you may be released before all of your medical problems are known or treated.   - You, the patient, will arrange for follow up care as instructed.   - If your condition worsens or fails to improve we recommend that you receive another evaluation at the ER immediately or contact your PCP to discuss your concerns or return here.

## 2023-07-01 NOTE — PATIENT INSTRUCTIONS
- apply antifungal cream as directed  - we will call you with urine culture results.    - Follow up with your PCP or specialty clinic as directed in the next 1-2 weeks if not improved or as needed.  You can call [phone] to schedule an appointment with the appropriate provider.    - Go to the ER or seek medical attention immediately if you develop new or worsening symptoms.    - You must understand that you have received an Urgent Care treatment only and that you may be released before all of your medical problems are known or treated.   - You, the patient, will arrange for follow up care as instructed.   - If your condition worsens or fails to improve we recommend that you receive another evaluation at the ER immediately or contact your PCP to discuss your concerns or return here.     
Urgent need for Intervention

## 2023-07-03 LAB — BACTERIA UR CULT: NO GROWTH

## 2023-07-07 ENCOUNTER — TELEPHONE (OUTPATIENT)
Dept: URGENT CARE | Facility: CLINIC | Age: 73
End: 2023-07-07
Payer: COMMERCIAL

## 2023-07-07 ENCOUNTER — PATIENT MESSAGE (OUTPATIENT)
Dept: FAMILY MEDICINE | Facility: CLINIC | Age: 73
End: 2023-07-07
Payer: COMMERCIAL

## 2023-07-08 ENCOUNTER — OFFICE VISIT (OUTPATIENT)
Dept: URGENT CARE | Facility: CLINIC | Age: 73
End: 2023-07-08
Payer: COMMERCIAL

## 2023-07-08 VITALS
HEIGHT: 68 IN | HEART RATE: 61 BPM | TEMPERATURE: 98 F | SYSTOLIC BLOOD PRESSURE: 133 MMHG | WEIGHT: 178 LBS | DIASTOLIC BLOOD PRESSURE: 73 MMHG | OXYGEN SATURATION: 98 % | RESPIRATION RATE: 16 BRPM | BODY MASS INDEX: 26.98 KG/M2

## 2023-07-08 DIAGNOSIS — N39.0 URINARY TRACT INFECTION WITH HEMATURIA, SITE UNSPECIFIED: Primary | ICD-10-CM

## 2023-07-08 DIAGNOSIS — R31.9 URINARY TRACT INFECTION WITH HEMATURIA, SITE UNSPECIFIED: Primary | ICD-10-CM

## 2023-07-08 DIAGNOSIS — R30.0 DYSURIA: ICD-10-CM

## 2023-07-08 LAB
BILIRUB UR QL STRIP: NEGATIVE
GLUCOSE UR QL STRIP: POSITIVE
KETONES UR QL STRIP: NEGATIVE
LEUKOCYTE ESTERASE UR QL STRIP: NEGATIVE
PH, POC UA: 5
POC BLOOD, URINE: POSITIVE
POC NITRATES, URINE: NEGATIVE
PROT UR QL STRIP: NEGATIVE
SP GR UR STRIP: 1.02 (ref 1–1.03)
UROBILINOGEN UR STRIP-ACNC: NORMAL (ref 0.3–2.2)

## 2023-07-08 PROCEDURE — 87086 URINE CULTURE/COLONY COUNT: CPT

## 2023-07-08 PROCEDURE — 81003 URINALYSIS AUTO W/O SCOPE: CPT | Mod: QW,S$GLB,,

## 2023-07-08 PROCEDURE — 87591 N.GONORRHOEAE DNA AMP PROB: CPT

## 2023-07-08 PROCEDURE — 96372 PR INJECTION,THERAP/PROPH/DIAG2ST, IM OR SUBCUT: ICD-10-PCS | Mod: S$GLB,,,

## 2023-07-08 PROCEDURE — 99213 OFFICE O/P EST LOW 20 MIN: CPT | Mod: 25,S$GLB,,

## 2023-07-08 PROCEDURE — 81003 POCT URINALYSIS, DIPSTICK, AUTOMATED, W/O SCOPE: ICD-10-PCS | Mod: QW,S$GLB,,

## 2023-07-08 PROCEDURE — 99213 PR OFFICE/OUTPT VISIT, EST, LEVL III, 20-29 MIN: ICD-10-PCS | Mod: 25,S$GLB,,

## 2023-07-08 PROCEDURE — 96372 THER/PROPH/DIAG INJ SC/IM: CPT | Mod: S$GLB,,,

## 2023-07-08 RX ORDER — CEFDINIR 300 MG/1
300 CAPSULE ORAL 2 TIMES DAILY
Qty: 20 CAPSULE | Refills: 0 | Status: SHIPPED | OUTPATIENT
Start: 2023-07-08 | End: 2023-07-18

## 2023-07-08 RX ORDER — LIDOCAINE HYDROCHLORIDE 10 MG/ML
1 INJECTION INFILTRATION; PERINEURAL
Status: COMPLETED | OUTPATIENT
Start: 2023-07-08 | End: 2023-07-08

## 2023-07-08 RX ORDER — CEFTRIAXONE 1 G/1
1 INJECTION, POWDER, FOR SOLUTION INTRAMUSCULAR; INTRAVENOUS
Status: DISCONTINUED | OUTPATIENT
Start: 2023-07-08 | End: 2023-07-08

## 2023-07-08 RX ORDER — CEFTRIAXONE 1 G/1
1 INJECTION, POWDER, FOR SOLUTION INTRAMUSCULAR; INTRAVENOUS
Status: COMPLETED | OUTPATIENT
Start: 2023-07-08 | End: 2023-07-08

## 2023-07-08 RX ADMIN — CEFTRIAXONE 1 G: 1 INJECTION, POWDER, FOR SOLUTION INTRAMUSCULAR; INTRAVENOUS at 12:07

## 2023-07-08 RX ADMIN — LIDOCAINE HYDROCHLORIDE 1 ML: 10 INJECTION INFILTRATION; PERINEURAL at 12:07

## 2023-07-08 NOTE — PATIENT INSTRUCTIONS
Please return here or go to the Emergency Department for any concerns or worsening of condition.    You received a rocephin injection in clinic today for suspected UTI.    Please take CEFDINIR for UTI. Please complete the full course of this antibiotics. Please take this antibiotic with food and a full glass of water.    Someone will contact you with your results from your urine culture results and any potential changes to your treatment plan.    Please follow up with your primary care doctor or specialist as needed.    If you  smoke, please stop smoking.

## 2023-07-08 NOTE — PROGRESS NOTES
"Subjective:     Patient ID: John Lemos is a 72 y.o. male.    Vitals:  height is 5' 8" (1.727 m) and weight is 80.7 kg (178 lb). His oral temperature is 98.2 °F (36.8 °C). His blood pressure is 133/73 and his pulse is 61. His respiration is 16 and oxygen saturation is 98%.     Chief Complaint: Dysuria    Patient states that he has been having dysuria for a month. Patient has had a UA/urine culture with blood in his urine. Patient states that he was given clotrimazole that help with inflammation on his penis which has now resolved. Patient states that he is still having dysuria and blood in his urine. Associated symptoms include chills, increased urinary frequency. Patient denies fever, nausea, vomiting, flank pain, abdominal pain. Patient states that he does not smoke. Patient states that he is sexually active with his wife and does not use protection.    Dysuria   This is a new problem. The current episode started more than 1 month ago. The problem occurs every urination. The problem has been unchanged. The quality of the pain is described as burning. The pain is at a severity of 4/10. The pain is mild. There has been no fever. He is Sexually active. There is No history of pyelonephritis. Associated symptoms include chills, frequency and hematuria. Pertinent negatives include no behavior changes, discharge, flank pain, hesitancy, nausea, possible pregnancy, sweats, urgency, vomiting, weight loss, bubble bath use, constipation, rash or withholding. He has tried nothing for the symptoms. The treatment provided no relief. His past medical history is significant for diabetes mellitus and hypertension. There is no history of catheterization, kidney stones or a single kidney.     Constitution: Positive for chills. Negative for fever.   Cardiovascular:  Negative for chest pain and sob on exertion.   Respiratory:  Negative for shortness of breath.    Gastrointestinal:  Negative for abdominal pain, nausea, vomiting, " constipation and diarrhea.   Genitourinary:  Positive for dysuria, frequency and hematuria. Negative for urgency, flank pain, history of kidney stones, painful ejaculation, genital sore, penile discharge, painful ejaculation, penile pain, penile swelling, scrotal swelling, testicular pain and pelvic pain.   Musculoskeletal:  Negative for muscle ache.   Skin:  Negative for rash.      Objective:     Physical Exam   Constitutional:  Non-toxic appearance. He does not appear ill. No distress. normal  HENT:   Head: Normocephalic.   Cardiovascular: Normal rate, regular rhythm and normal heart sounds.   No murmur heard.Exam reveals no gallop.   Pulmonary/Chest: Effort normal and breath sounds normal. No stridor. No respiratory distress. He has no wheezes. He has no rhonchi. He has no rales. He exhibits no tenderness.         Comments: Patient is in no respiratory distress. Breath sounds even, unlabored, and clear to auscultation bilaterally. No accessory muscle usage. Patient able to speak in complete sentences with ease.    Abdominal: Normal appearance and bowel sounds are normal. He exhibits no distension and no mass. Soft. There is no abdominal tenderness. There is no rebound, no guarding, no left CVA tenderness and no right CVA tenderness. No hernia. Hernia confirmed negative in the left inguinal area and confirmed negative in the right inguinal area.   Genitourinary:    Testes normal.   Sebastian stage (genital) is 5. no penile rash and no pubic lice. Cremasteric reflex is present. Right epididymis is/has Normal. Left epididymis is/has normal. Uncircumcised. No phimosis, paraphimosis, hypospadias, penile erythema, penile tenderness or penile swelling. Penis exhibits no lesions. No discharge found.   Lymphadenopathy: No inguinal adenopathy noted on the right or left side.   Neurological: He is alert.   Skin: Skin is not diaphoretic.   Nursing note and vitals reviewed.    Results for orders placed or performed in visit on  07/08/23   POCT Urinalysis, Dipstick, Automated, W/O Scope   Result Value Ref Range    POC Blood, Urine Positive (A) Negative    POC Bilirubin, Urine Negative Negative    POC Urobilinogen, Urine normal 0.3 - 2.2    POC Ketones, Urine Negative Negative    POC Protein, Urine Negative Negative    POC Nitrates, Urine Negative Negative    POC Glucose, Urine Positive (A) Negative    pH, UA 5.0     POC Specific Gravity, Urine 1.025 1.003 - 1.029    POC Leukocytes, Urine Negative Negative     Assessment:     1. Urinary tract infection with hematuria, site unspecified    2. Dysuria      Plan:   Previous notes reviewed.  Vital signs reviewed.  Labs ordered. Labs reviewed.  Discussed UTI, home care, tx options, and given follow up precautions.  Patient was briefed on my thought process and diagnosis.  Patient involved with the treatment plan and agreed to the plan. Will treat for UTI due to patient having symptoms for 1 month and is now having chills, this apepars to be due to bacterial etiology, will repeat urine culture and test for G/C, patient given rocephin injection with lidocaine and oral abx to take outpatient. Patient encouraged to follow up with PCP. ER precautions given.  Patient informed on warning signs, patient understood warning signs and to go to urgent care or ER if warning signs appear.  Patient discussed with Dr. Malcom Lezama via secure chat, Dr. Lezama agrees to the plan.     Patient Instructions   Please return here or go to the Emergency Department for any concerns or worsening of condition.    You received a rocephin injection in clinic today for suspected UTI.    Please take CEFDINIR for UTI. Please complete the full course of this antibiotics. Please take this antibiotic with food and a full glass of water.    Someone will contact you with your results from your urine culture results and any potential changes to your treatment plan.    Please follow up with your primary care doctor or specialist as  needed.    If you  smoke, please stop smoking.    Urinary tract infection with hematuria, site unspecified  -     Discontinue: cefTRIAXone injection 1 g  -     cefTRIAXone injection 1 g  -     LIDOcaine HCL 10 mg/ml (1%) injection 1 mL  -     cefdinir (OMNICEF) 300 MG capsule; Take 1 capsule (300 mg total) by mouth 2 (two) times daily. for 10 days  Dispense: 20 capsule; Refill: 0  -     Urine culture    Dysuria  -     POCT Urinalysis, Dipstick, Automated, W/O Scope  -     C. trachomatis/N. gonorrhoeae by AMP DNA [facility]; Urine      Aubrey Manuel PA-C

## 2023-07-08 NOTE — TELEPHONE ENCOUNTER
I spoke with the patient and reviewed his negative urine culture.  He's going to come in tomorrow to get re evaluated because he states that he's now having an increase in dysuria with blood when he dabs his penis with a tissue paper.  He denies fever.  He is not having inflammation to penis anymore with the lotion.

## 2023-07-09 LAB — BACTERIA UR CULT: NO GROWTH

## 2023-07-11 LAB
C TRACH DNA SPEC QL NAA+PROBE: NOT DETECTED
N GONORRHOEA DNA SPEC QL NAA+PROBE: NOT DETECTED

## 2023-07-12 ENCOUNTER — TELEPHONE (OUTPATIENT)
Dept: URGENT CARE | Facility: CLINIC | Age: 73
End: 2023-07-12
Payer: COMMERCIAL

## 2023-07-12 NOTE — TELEPHONE ENCOUNTER
Patient contacted.   Patient informed on results.  Patient states that he feels much better.  Patient encouraged to follow up with his PCP.  All questions answered on this encounter.

## 2023-08-07 ENCOUNTER — OFFICE VISIT (OUTPATIENT)
Dept: FAMILY MEDICINE | Facility: CLINIC | Age: 73
End: 2023-08-07
Payer: COMMERCIAL

## 2023-08-07 ENCOUNTER — LAB VISIT (OUTPATIENT)
Dept: LAB | Facility: HOSPITAL | Age: 73
End: 2023-08-07
Attending: INTERNAL MEDICINE
Payer: COMMERCIAL

## 2023-08-07 VITALS
WEIGHT: 177.25 LBS | RESPIRATION RATE: 17 BRPM | OXYGEN SATURATION: 97 % | HEIGHT: 68 IN | DIASTOLIC BLOOD PRESSURE: 70 MMHG | SYSTOLIC BLOOD PRESSURE: 126 MMHG | BODY MASS INDEX: 26.86 KG/M2 | HEART RATE: 66 BPM | TEMPERATURE: 98 F

## 2023-08-07 DIAGNOSIS — E11.42 TYPE 2 DIABETES MELLITUS WITH DIABETIC POLYNEUROPATHY, WITHOUT LONG-TERM CURRENT USE OF INSULIN: Primary | ICD-10-CM

## 2023-08-07 DIAGNOSIS — E78.5 DYSLIPIDEMIA: ICD-10-CM

## 2023-08-07 DIAGNOSIS — R31.29 OTHER MICROSCOPIC HEMATURIA: ICD-10-CM

## 2023-08-07 DIAGNOSIS — M17.0 PRIMARY OSTEOARTHRITIS OF BOTH KNEES: ICD-10-CM

## 2023-08-07 DIAGNOSIS — I10 ESSENTIAL HYPERTENSION: ICD-10-CM

## 2023-08-07 LAB
BACTERIA #/AREA URNS AUTO: NORMAL /HPF
MICROSCOPIC COMMENT: NORMAL
RBC #/AREA URNS AUTO: 1 /HPF (ref 0–4)
SQUAMOUS #/AREA URNS AUTO: 3 /HPF
WBC #/AREA URNS AUTO: 0 /HPF (ref 0–5)

## 2023-08-07 PROCEDURE — 1159F MED LIST DOCD IN RCRD: CPT | Mod: CPTII,S$GLB,, | Performed by: INTERNAL MEDICINE

## 2023-08-07 PROCEDURE — 3072F PR LOW RISK FOR RETINOPATHY: ICD-10-PCS | Mod: CPTII,S$GLB,, | Performed by: INTERNAL MEDICINE

## 2023-08-07 PROCEDURE — 3078F PR MOST RECENT DIASTOLIC BLOOD PRESSURE < 80 MM HG: ICD-10-PCS | Mod: CPTII,S$GLB,, | Performed by: INTERNAL MEDICINE

## 2023-08-07 PROCEDURE — 3074F PR MOST RECENT SYSTOLIC BLOOD PRESSURE < 130 MM HG: ICD-10-PCS | Mod: CPTII,S$GLB,, | Performed by: INTERNAL MEDICINE

## 2023-08-07 PROCEDURE — 1126F PR PAIN SEVERITY QUANTIFIED, NO PAIN PRESENT: ICD-10-PCS | Mod: CPTII,S$GLB,, | Performed by: INTERNAL MEDICINE

## 2023-08-07 PROCEDURE — 3061F PR NEG MICROALBUMINURIA RESULT DOCUMENTED/REVIEW: ICD-10-PCS | Mod: CPTII,S$GLB,, | Performed by: INTERNAL MEDICINE

## 2023-08-07 PROCEDURE — 3072F LOW RISK FOR RETINOPATHY: CPT | Mod: CPTII,S$GLB,, | Performed by: INTERNAL MEDICINE

## 2023-08-07 PROCEDURE — 1159F PR MEDICATION LIST DOCUMENTED IN MEDICAL RECORD: ICD-10-PCS | Mod: CPTII,S$GLB,, | Performed by: INTERNAL MEDICINE

## 2023-08-07 PROCEDURE — 3008F BODY MASS INDEX DOCD: CPT | Mod: CPTII,S$GLB,, | Performed by: INTERNAL MEDICINE

## 2023-08-07 PROCEDURE — 1126F AMNT PAIN NOTED NONE PRSNT: CPT | Mod: CPTII,S$GLB,, | Performed by: INTERNAL MEDICINE

## 2023-08-07 PROCEDURE — 3066F NEPHROPATHY DOC TX: CPT | Mod: CPTII,S$GLB,, | Performed by: INTERNAL MEDICINE

## 2023-08-07 PROCEDURE — 99214 PR OFFICE/OUTPT VISIT, EST, LEVL IV, 30-39 MIN: ICD-10-PCS | Mod: S$GLB,,, | Performed by: INTERNAL MEDICINE

## 2023-08-07 PROCEDURE — 1101F PT FALLS ASSESS-DOCD LE1/YR: CPT | Mod: CPTII,S$GLB,, | Performed by: INTERNAL MEDICINE

## 2023-08-07 PROCEDURE — 1101F PR PT FALLS ASSESS DOC 0-1 FALLS W/OUT INJ PAST YR: ICD-10-PCS | Mod: CPTII,S$GLB,, | Performed by: INTERNAL MEDICINE

## 2023-08-07 PROCEDURE — 3008F PR BODY MASS INDEX (BMI) DOCUMENTED: ICD-10-PCS | Mod: CPTII,S$GLB,, | Performed by: INTERNAL MEDICINE

## 2023-08-07 PROCEDURE — 81001 URINALYSIS AUTO W/SCOPE: CPT | Performed by: INTERNAL MEDICINE

## 2023-08-07 PROCEDURE — 3061F NEG MICROALBUMINURIA REV: CPT | Mod: CPTII,S$GLB,, | Performed by: INTERNAL MEDICINE

## 2023-08-07 PROCEDURE — 3052F HG A1C>EQUAL 8.0%<EQUAL 9.0%: CPT | Mod: CPTII,S$GLB,, | Performed by: INTERNAL MEDICINE

## 2023-08-07 PROCEDURE — 4010F ACE/ARB THERAPY RXD/TAKEN: CPT | Mod: CPTII,S$GLB,, | Performed by: INTERNAL MEDICINE

## 2023-08-07 PROCEDURE — 3288F FALL RISK ASSESSMENT DOCD: CPT | Mod: CPTII,S$GLB,, | Performed by: INTERNAL MEDICINE

## 2023-08-07 PROCEDURE — 3288F PR FALLS RISK ASSESSMENT DOCUMENTED: ICD-10-PCS | Mod: CPTII,S$GLB,, | Performed by: INTERNAL MEDICINE

## 2023-08-07 PROCEDURE — 99999 PR PBB SHADOW E&M-EST. PATIENT-LVL V: CPT | Mod: PBBFAC,,, | Performed by: INTERNAL MEDICINE

## 2023-08-07 PROCEDURE — 3052F PR MOST RECENT HEMOGLOBIN A1C LEVEL 8.0 - < 9.0%: ICD-10-PCS | Mod: CPTII,S$GLB,, | Performed by: INTERNAL MEDICINE

## 2023-08-07 PROCEDURE — 4010F PR ACE/ARB THEARPY RXD/TAKEN: ICD-10-PCS | Mod: CPTII,S$GLB,, | Performed by: INTERNAL MEDICINE

## 2023-08-07 PROCEDURE — 99214 OFFICE O/P EST MOD 30 MIN: CPT | Mod: S$GLB,,, | Performed by: INTERNAL MEDICINE

## 2023-08-07 PROCEDURE — 3078F DIAST BP <80 MM HG: CPT | Mod: CPTII,S$GLB,, | Performed by: INTERNAL MEDICINE

## 2023-08-07 PROCEDURE — 99999 PR PBB SHADOW E&M-EST. PATIENT-LVL V: ICD-10-PCS | Mod: PBBFAC,,, | Performed by: INTERNAL MEDICINE

## 2023-08-07 PROCEDURE — 3066F PR DOCUMENTATION OF TREATMENT FOR NEPHROPATHY: ICD-10-PCS | Mod: CPTII,S$GLB,, | Performed by: INTERNAL MEDICINE

## 2023-08-07 PROCEDURE — 3074F SYST BP LT 130 MM HG: CPT | Mod: CPTII,S$GLB,, | Performed by: INTERNAL MEDICINE

## 2023-08-07 NOTE — PROGRESS NOTES
Chief Complaint  Chief Complaint   Patient presents with    Follow-up     diabetes       HPI  John Lemos is a 73 y.o. male with multiple medical diagnoses as listed in the medical history and problem list that presents for f/u for diabetes care. Their last appointment with primary care was 4/3/23.      Last A1C 7.1% on 2/28/23  Missing a part for his blood glucose meter so has not been checking his blood sugars  Reports going to several birthday celebrations July, ate more usual  Otherwise adherent to medications   Denies any nausea, vomiting, diarrhea, or abdominal pain.   Feeling more fatigue than usual, attributes to increased activity     Dysuria   Seen one month ago at urgent care for dysuria x 1 month. UA showed hematuria and started on cefdinir. Urine culture negative. Also started on clotrimazole for inflammation on his penis which has since resolved. No dysuria or hematuria since. Denies flank pain or urinary urgency.     Right Shoulder Pain  Previously reported right shoulder pain, took diclofenac with relief of symptoms. Occasional tingling, but mild.     Feet Numbness   Notes ongoing mild numbness in the feet. Reports good relief with duloxetine.   Sees podiatrist Dr. Burrows and needs to schedule f/u     PAST MEDICAL HISTORY:  Past Medical History:   Diagnosis Date    Arthritis     Cataract     ou    Diabetes mellitus 7 yrs    lbs: 118 1 week ago    Diabetes mellitus type 2, controlled 9/8/2012    Eye injury     fb    Hyperlipidemia        PAST SURGICAL HISTORY:  Past Surgical History:   Procedure Laterality Date    COLONOSCOPY N/A 11/25/2019    Procedure: COLONOSCOPY;  Surgeon: Eddie Leslie MD;  Location: [facility];  Service: Endoscopy;  Laterality: N/A;       SOCIAL HISTORY:  Social History     Socioeconomic History    Marital status:    Occupational History    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 [employer]     Employer: [employer]   Tobacco Use     Smoking status: Never    Smokeless tobacco: Never   Substance and Sexual Activity    Alcohol use: No    Drug use: No    Sexual activity: Not Currently     Social Determinants of Health     Financial Resource Strain: Low Risk  (3/28/2023)    Overall Financial Resource Strain (CARDIA)     Difficulty of Paying Living Expenses: Not hard at all   Food Insecurity: No Food Insecurity (3/28/2023)    Hunger Vital Sign     Worried About Running Out of Food in the Last Year: Never true     Ran Out of Food in the Last Year: Never true   Transportation Needs: No Transportation Needs (3/28/2023)    PRAPARE - Transportation     Lack of Transportation (Medical): No     Lack of Transportation (Non-Medical): No   Physical Activity: Insufficiently Active (3/28/2023)    Exercise Vital Sign     Days of Exercise per Week: 3 days     Minutes of Exercise per Session: 20 min   Stress: Stress Concern Present (3/28/2023)    [employer] [place] [employer] - Occupational Stress Questionnaire     Feeling of Stress : Rather much   Social Connections: Unknown (3/28/2023)    Social Connection and Isolation Panel [NHANES]     Frequency of Communication with Friends and Family: More than three times a week     Frequency of Social Gatherings with Friends and Family: Once a week     Active Member of Clubs or Organizations: Yes     Attends Club or Organization Meetings: More than 4 times per year     Marital Status:    Housing Stability: Unknown (3/28/2023)    Housing Stability Vital Sign     Unable to Pay for Housing in the Last Year: No     Unstable Housing in the Last Year: No       FAMILY HISTORY:  Family History   Problem Relation Age of Onset    Diabetes Mother     Cancer Mother     Hypertension Mother     Diabetes Father     Cancer Father     No Known Problems Sister     No Known Problems Brother     No Known Problems Maternal Aunt     No Known Problems Maternal Uncle     No Known Problems Paternal Aunt     No Known Problems  Paternal Uncle     No Known Problems Maternal Grandmother     No Known Problems Maternal Grandfather     No Known Problems Paternal Grandmother     No Known Problems Paternal Grandfather     Amblyopia Neg Hx     Blindness Neg Hx     Cataracts Neg Hx     Glaucoma Neg Hx     Macular degeneration Neg Hx     Retinal detachment Neg Hx     Strabismus Neg Hx     Stroke Neg Hx     Thyroid disease Neg Hx        ALLERGIES AND MEDICATIONS: updated and reviewed.  Review of patient's allergies indicates:   Allergen Reactions    Sulfa (sulfonamide antibiotics) Swelling     Current Outpatient Medications   Medication Sig Dispense Refill    atorvastatin (LIPITOR) 40 MG tablet Take 1 tablet (40 mg total) by mouth once daily. 90 tablet 3    benzonatate (TESSALON) 200 MG capsule Take 1 capsule (200 mg total) by mouth 3 (three) times daily as needed for Cough. 30 capsule 0    blood sugar diagnostic (FREESTYLE LITE STRIPS) Strp Inject 1 each as directed once daily. 100 each 3    cetirizine (ZYRTEC) 10 MG tablet Take 1 tablet (10 mg total) by mouth once daily. 30 tablet 0    cetirizine (ZYRTEC) 10 MG tablet Take 1 tablet (10 mg total) by mouth once daily. 30 tablet 0    diclofenac (VOLTAREN) 50 MG EC tablet Take 1 tablet (50 mg total) by mouth 2 (two) times daily as needed (shoulder pain). 30 tablet 0    diclofenac sodium (VOLTAREN) 1 % Gel Apply 2 g topically 4 (four) times daily. 20 g 0    DULoxetine (CYMBALTA) 60 MG capsule Take 1 capsule (60 mg total) by mouth once daily. 90 capsule 1    fluticasone (FLONASE) 50 mcg/actuation nasal spray 1 spray (50 mcg total) by Each Nare route once daily. 16 g 2    fluticasone propionate (FLONASE) 50 mcg/actuation nasal spray 1 spray (50 mcg total) by Each Nostril route 2 (two) times daily. 9.9 mL 0    ipratropium (ATROVENT) 21 mcg (0.03 %) nasal spray 2 sprays by Nasal route 3 (three) times daily. 30 mL 0    lancets (FREESTYLE LANCETS) 28 gauge Misc Inject 1 lancet as directed once daily. 100 each  "3    levocetirizine (XYZAL) 5 MG tablet Take 1 tablet (5 mg total) by mouth every evening. 90 tablet 3    losartan (COZAAR) 50 MG tablet Take 1 tablet (50 mg total) by mouth once daily. 90 tablet 1    meloxicam (MOBIC) 15 MG tablet Take 1 tablet (15 mg total) by mouth once daily. 30 tablet 3    metFORMIN (GLUCOPHAGE) 500 MG tablet TAKE 2 TABLETS BY MOUTH TWICE DAILY WITH BREAKFAST AND WITH SUPPER 360 tablet 0     No current facility-administered medications for this visit.         ROS  Review of Systems   Constitutional:  Positive for fatigue.   Gastrointestinal:  Negative for abdominal pain, diarrhea, nausea and vomiting.   Musculoskeletal:  Negative for myalgias.   Neurological:  Negative for weakness.   All other systems reviewed and are negative.        Physical Exam  Vitals:    08/07/23 1312   BP: 126/70   Pulse: 66   Resp: 17   Temp: 98.4 °F (36.9 °C)   SpO2: 97%   Weight: 80.4 kg (177 lb 4 oz)   Height: 5' 8" (1.727 m)      Body mass index is 26.95 kg/m².  Weight: 80.4 kg (177 lb 4 oz)   Height: 5' 8" (172.7 cm)   Physical Exam  Constitutional:       Appearance: Normal appearance.   Eyes:      Conjunctiva/sclera: Conjunctivae normal.   Cardiovascular:      Rate and Rhythm: Normal rate and regular rhythm.      Pulses: Normal pulses.      Heart sounds: Normal heart sounds.   Pulmonary:      Effort: Pulmonary effort is normal.      Breath sounds: Normal breath sounds.   Skin:     Findings: No lesion.   Neurological:      General: No focal deficit present.      Mental Status: He is alert and oriented to person, place, and time. Mental status is at baseline.   Psychiatric:         Mood and Affect: Mood normal.         Behavior: Behavior normal.         Thought Content: Thought content normal.         Judgment: Judgment normal.         Health Maintenance         Date Due Completion Date    Diabetes Urine Screening 11/05/2022 11/5/2021    Foot Exam 11/11/2022 11/11/2021    Override on 11/11/2021: Done    Override on " 8/20/2020: Done    Override on 7/15/2019: Done    Override on 1/16/2018: Done    Override on 11/25/2015: Done    Hemoglobin A1c 08/28/2023 2/28/2023    Override on 11/25/2015: Done    Low Dose Statin 11/28/2023 11/28/2022    Eye Exam 11/28/2023 11/28/2022    Lipid Panel 02/28/2024 2/28/2023    TETANUS VACCINE 02/19/2026 2/19/2016    Colorectal Cancer Screening 11/25/2029 11/25/2019          Assessment and Plan:    Type 2 diabetes mellitus with diabetic polyneuropathy, without long-term current use of insulin  2/28/23 A1c of 7.1%, down from 8.4% 11/22  - Recheck A1c today   - continue metformin 500 mg tablet, 2 tablets daily with breakfast and dinner  - continue duloxetine 60 mg tablet once daily  - follow up with podiatry     Hyperlipidemia, unspecified hyperlipidemia type  - continue atorvastatin 40 mg tablet nightly     Essential hypertension  126/70 mmHg today  - continue losartan 40 mg daily     Other microscopic hematuria  - Repeat UA today     Primary osteoarthritis of both knees  - continue meloxicam as needed    Allergic rhinitis, unspecified seasonality, unspecified trigger  - continue cetirizine 10 mg tablet    Amy Okeefe MS4    I hereby acknowledge that I am relying upon documentation authored by a medical student working under my supervision and further I hereby attest that I have verified the student documentation or findings by personally performing the physical exam and medical decision making activities of the Evaluation and Management service to be billed.    Chan Estrada MD

## 2023-08-08 ENCOUNTER — TELEPHONE (OUTPATIENT)
Dept: FAMILY MEDICINE | Facility: CLINIC | Age: 73
End: 2023-08-08
Payer: COMMERCIAL

## 2023-08-08 NOTE — TELEPHONE ENCOUNTER
----- Message from Hugo Jacobsen sent at 8/8/2023  9:49 AM CDT -----  Type:  Patient Returning Call    Who Called: Self      Who Left Message for Patient: Teja     Does the patient know what this is regarding?:Yes     Would the patient rather a call back or a response via [facility]? Call     Best Call Back Number: [phone] (home)

## 2023-08-08 NOTE — TELEPHONE ENCOUNTER
Call placed to patient to notify of MD message related to lab results & Trulicity use. Voicemail was left for return call to clinic. Awaiting call back.

## 2023-08-08 NOTE — TELEPHONE ENCOUNTER
----- Message from Chan Estrada MD sent at 8/8/2023  9:25 AM CDT -----  Please call the patient regarding the following results:    Patient's A1c has worsened to 9% from 7.1% -- please confirm whether or not he is still taking TRULICITY 1.5 mg/weekly? (Rx not in system). If not, I will resume/send refills.     Also, urine studies were negative for signs of blood or infection at this time.    Let me know if you have any questions or concerns.      Chan Estrada MD  Internal Medicine-Pediatrics

## 2023-08-21 ENCOUNTER — PATIENT MESSAGE (OUTPATIENT)
Dept: FAMILY MEDICINE | Facility: CLINIC | Age: 73
End: 2023-08-21
Payer: COMMERCIAL

## 2023-08-21 DIAGNOSIS — R30.0 DYSURIA: Primary | ICD-10-CM

## 2023-08-22 NOTE — TELEPHONE ENCOUNTER
Referral placed to desired specialty - patient may contact our Referrals Coordinator at [phone] if  they have not received a call within 2 business days to check on status of referral

## 2023-08-28 ENCOUNTER — PATIENT MESSAGE (OUTPATIENT)
Dept: OTHER | Facility: OTHER | Age: 73
End: 2023-08-28
Payer: COMMERCIAL

## 2023-09-15 NOTE — PROGRESS NOTES
"Subjective:       John Lemos is a 73 y.o. male who is a new patient who was referred by Dr. Chan Estrada  for evaluation of dysuria.      He reports dysuria. Prior UTIs in 12/2022. Recent UCx and GC/CT negative. Dysuria present for months, improved but still present with each void. Denies change in urinary symptoms. No new supplements. New med - trulicity. Normally drinks 40oz water daily. Coffee in AM. +++glucose in urine today.     Gross hematuria noted a few months ago. No known h/o nephrolithiasis.     PSA 2/23 - 0.98    UCx:  11/22 - 10-49k Enterobacter  12/22 - >100k Enterobacter  7/23 - neg  7/23 - neg      The following portions of the patient's history were reviewed and updated as appropriate: allergies, current medications, past family history, past medical history, past social history, past surgical history and problem list.    Review of Systems  Twelve point review of systems completed. Pertinent positive and negatives listed in HPI.      Objective:    Vitals: Wt 80 kg (176 lb 5.9 oz)   BMI 26.82 kg/m²     Physical Exam   General: well developed, well nourished in no acute distress  Head: normocephalic, atraumatic  Neck: no obvious enlargement of thyroid  HEENT: EOMI, mucus membranes moist, sclera anicteric, no hearing impairment  Lungs: symmetric expansion, non-labored breathing  Neuro: alert and oriented x 3, no gross deficits  Psych: normal judgment and insight, normal mood/affect and non-anxious  Genitourinary: deferred   NATHALIA: deferred      Lab Review   Urine analysis today in clinic shows +blood, +glucose     Lab Results   Component Value Date    WBC 5.89 02/28/2023    HGB 13.2 (L) 02/28/2023    HCT 43.2 02/28/2023    MCV 94 02/28/2023     02/28/2023     Lab Results   Component Value Date    CREATININE 0.9 11/29/2022    BUN 11 11/29/2022     Lab Results   Component Value Date    PSA 0.98 02/28/2023     No results found for: "PSADIAG"    Imaging  CT 2010  Reports and images were " personally reviewed by me and discussed with the patient today         Assessment/Plan:      1. Dysuria    - UCx, atypicals   - Increase hydration   - Glucosuria may be worsening   - Cystoscopy     2. Hematuria, gross    - Discussed etiology and workup of hematuria   - UCx   - Cytology - not indicated   - CT urogram   - Office cystoscopy     3. Glucosuria    - Hydration     4. H/o UTI   - CT / cysto      Follow up for cysto

## 2023-09-18 ENCOUNTER — OFFICE VISIT (OUTPATIENT)
Dept: UROLOGY | Facility: CLINIC | Age: 73
End: 2023-09-18
Payer: COMMERCIAL

## 2023-09-18 VITALS — WEIGHT: 176.38 LBS | BODY MASS INDEX: 26.82 KG/M2

## 2023-09-18 DIAGNOSIS — R30.0 DYSURIA: Primary | ICD-10-CM

## 2023-09-18 DIAGNOSIS — R31.0 HEMATURIA, GROSS: ICD-10-CM

## 2023-09-18 DIAGNOSIS — Z87.440 HISTORY OF UTI: ICD-10-CM

## 2023-09-18 DIAGNOSIS — R81 GLUCOSURIA: ICD-10-CM

## 2023-09-18 PROCEDURE — 1101F PT FALLS ASSESS-DOCD LE1/YR: CPT | Mod: CPTII,S$GLB,, | Performed by: UROLOGY

## 2023-09-18 PROCEDURE — 3072F LOW RISK FOR RETINOPATHY: CPT | Mod: CPTII,S$GLB,, | Performed by: UROLOGY

## 2023-09-18 PROCEDURE — 3066F NEPHROPATHY DOC TX: CPT | Mod: CPTII,S$GLB,, | Performed by: UROLOGY

## 2023-09-18 PROCEDURE — 99204 OFFICE O/P NEW MOD 45 MIN: CPT | Mod: S$GLB,,, | Performed by: UROLOGY

## 2023-09-18 PROCEDURE — 99999 PR PBB SHADOW E&M-EST. PATIENT-LVL IV: CPT | Mod: PBBFAC,,, | Performed by: UROLOGY

## 2023-09-18 PROCEDURE — 99204 PR OFFICE/OUTPT VISIT, NEW, LEVL IV, 45-59 MIN: ICD-10-PCS | Mod: S$GLB,,, | Performed by: UROLOGY

## 2023-09-18 PROCEDURE — 3066F PR DOCUMENTATION OF TREATMENT FOR NEPHROPATHY: ICD-10-PCS | Mod: CPTII,S$GLB,, | Performed by: UROLOGY

## 2023-09-18 PROCEDURE — 3008F PR BODY MASS INDEX (BMI) DOCUMENTED: ICD-10-PCS | Mod: CPTII,S$GLB,, | Performed by: UROLOGY

## 2023-09-18 PROCEDURE — 3288F FALL RISK ASSESSMENT DOCD: CPT | Mod: CPTII,S$GLB,, | Performed by: UROLOGY

## 2023-09-18 PROCEDURE — 1101F PR PT FALLS ASSESS DOC 0-1 FALLS W/OUT INJ PAST YR: ICD-10-PCS | Mod: CPTII,S$GLB,, | Performed by: UROLOGY

## 2023-09-18 PROCEDURE — 3008F BODY MASS INDEX DOCD: CPT | Mod: CPTII,S$GLB,, | Performed by: UROLOGY

## 2023-09-18 PROCEDURE — 3061F PR NEG MICROALBUMINURIA RESULT DOCUMENTED/REVIEW: ICD-10-PCS | Mod: CPTII,S$GLB,, | Performed by: UROLOGY

## 2023-09-18 PROCEDURE — 99999 PR PBB SHADOW E&M-EST. PATIENT-LVL IV: ICD-10-PCS | Mod: PBBFAC,,, | Performed by: UROLOGY

## 2023-09-18 PROCEDURE — 4010F PR ACE/ARB THEARPY RXD/TAKEN: ICD-10-PCS | Mod: CPTII,S$GLB,, | Performed by: UROLOGY

## 2023-09-18 PROCEDURE — 87086 URINE CULTURE/COLONY COUNT: CPT | Performed by: UROLOGY

## 2023-09-18 PROCEDURE — 3061F NEG MICROALBUMINURIA REV: CPT | Mod: CPTII,S$GLB,, | Performed by: UROLOGY

## 2023-09-18 PROCEDURE — 1160F PR REVIEW ALL MEDS BY PRESCRIBER/CLIN PHARMACIST DOCUMENTED: ICD-10-PCS | Mod: CPTII,S$GLB,, | Performed by: UROLOGY

## 2023-09-18 PROCEDURE — 4010F ACE/ARB THERAPY RXD/TAKEN: CPT | Mod: CPTII,S$GLB,, | Performed by: UROLOGY

## 2023-09-18 PROCEDURE — 1159F PR MEDICATION LIST DOCUMENTED IN MEDICAL RECORD: ICD-10-PCS | Mod: CPTII,S$GLB,, | Performed by: UROLOGY

## 2023-09-18 PROCEDURE — 3288F PR FALLS RISK ASSESSMENT DOCUMENTED: ICD-10-PCS | Mod: CPTII,S$GLB,, | Performed by: UROLOGY

## 2023-09-18 PROCEDURE — 1160F RVW MEDS BY RX/DR IN RCRD: CPT | Mod: CPTII,S$GLB,, | Performed by: UROLOGY

## 2023-09-18 PROCEDURE — 87798 DETECT AGENT NOS DNA AMP: CPT | Mod: 59 | Performed by: UROLOGY

## 2023-09-18 PROCEDURE — 3052F PR MOST RECENT HEMOGLOBIN A1C LEVEL 8.0 - < 9.0%: ICD-10-PCS | Mod: CPTII,S$GLB,, | Performed by: UROLOGY

## 2023-09-18 PROCEDURE — 87563 M. GENITALIUM AMP PROBE: CPT | Performed by: UROLOGY

## 2023-09-18 PROCEDURE — 3052F HG A1C>EQUAL 8.0%<EQUAL 9.0%: CPT | Mod: CPTII,S$GLB,, | Performed by: UROLOGY

## 2023-09-18 PROCEDURE — 1159F MED LIST DOCD IN RCRD: CPT | Mod: CPTII,S$GLB,, | Performed by: UROLOGY

## 2023-09-18 PROCEDURE — 3072F PR LOW RISK FOR RETINOPATHY: ICD-10-PCS | Mod: CPTII,S$GLB,, | Performed by: UROLOGY

## 2023-09-20 ENCOUNTER — TELEPHONE (OUTPATIENT)
Dept: UROLOGY | Facility: CLINIC | Age: 73
End: 2023-09-20
Payer: COMMERCIAL

## 2023-09-20 LAB
BACTERIA UR CULT: NO GROWTH
M GENITALIUM DNA UR QL NAA+PROBE: NEGATIVE
SPECIMEN SOURCE: NORMAL

## 2023-09-20 NOTE — TELEPHONE ENCOUNTER
Pt was contacted unable to lvm  ----- Message from Ashley Quinonez MD sent at 9/20/2023  9:50 AM CDT -----  Please inform pt that the urine culture was negative for infection. No need for antibiotics at this time. Thanks.

## 2023-09-22 LAB
SPECIMEN SOURCE: NORMAL
U PARVUM DNA SPEC QL NAA+PROBE: NEGATIVE
U UREALYTICUM DNA SPEC QL NAA+PROBE: NEGATIVE

## 2023-09-25 ENCOUNTER — HOSPITAL ENCOUNTER (OUTPATIENT)
Dept: RADIOLOGY | Facility: HOSPITAL | Age: 73
Discharge: HOME | End: 2023-09-25
Attending: UROLOGY
Payer: COMMERCIAL

## 2023-09-25 DIAGNOSIS — R30.0 DYSURIA: ICD-10-CM

## 2023-09-25 DIAGNOSIS — R31.0 HEMATURIA, GROSS: ICD-10-CM

## 2023-09-25 PROCEDURE — 74178 CT ABD&PLV WO CNTR FLWD CNTR: CPT | Mod: TC

## 2023-09-25 PROCEDURE — 74178 CT UROGRAM ABD PELVIS W WO: ICD-10-PCS | Mod: 26,,, | Performed by: RADIOLOGY

## 2023-09-25 PROCEDURE — 25500020 PHARM REV CODE 255: Performed by: UROLOGY

## 2023-09-25 PROCEDURE — 74178 CT ABD&PLV WO CNTR FLWD CNTR: CPT | Mod: 26,,, | Performed by: RADIOLOGY

## 2023-09-25 RX ADMIN — IOHEXOL 125 ML: 350 INJECTION, SOLUTION INTRAVENOUS at 03:09

## 2023-10-07 ENCOUNTER — PATIENT MESSAGE (OUTPATIENT)
Dept: ADMINISTRATIVE | Facility: OTHER | Age: 73
End: 2023-10-07
Payer: COMMERCIAL

## 2023-10-12 ENCOUNTER — PROCEDURE VISIT (OUTPATIENT)
Dept: UROLOGY | Facility: CLINIC | Age: 73
End: 2023-10-12
Payer: COMMERCIAL

## 2023-10-12 VITALS — BODY MASS INDEX: 26.31 KG/M2 | WEIGHT: 173.06 LBS

## 2023-10-12 DIAGNOSIS — R91.1 LUNG NODULE: ICD-10-CM

## 2023-10-12 DIAGNOSIS — R30.0 DYSURIA: Primary | ICD-10-CM

## 2023-10-12 DIAGNOSIS — R31.0 HEMATURIA, GROSS: ICD-10-CM

## 2023-10-12 PROCEDURE — 52000 CYSTOURETHROSCOPY: CPT | Mod: S$GLB,,, | Performed by: UROLOGY

## 2023-10-12 PROCEDURE — 52000 CYSTOSCOPY: ICD-10-PCS | Mod: S$GLB,,, | Performed by: UROLOGY

## 2023-10-12 NOTE — PROCEDURES
Cystoscopy    Date/Time: 10/12/2023 2:40 PM    Performed by: Ashley Quinonez MD  Authorized by: Ashley Quinonez MD    Consent Done?:  Yes (Written)  Timeout: prior to procedure the correct patient, procedure, and site was verified    Prep: patient was prepped and draped in usual sterile fashion    Anesthesia:  Lidocaine jelly  Indications: dysuria and hematuria    Position:  Dorsal lithotomy  Anesthesia:  Lidocaine jelly  Patient sedated?: No    Preparation: Patient was prepped and draped in usual sterile fashion    Scope type:  Flexible cystoscope  Stent inserted: No    Stent removed: No    External exam normal: Yes    Digital exam performed: No    Urethra normal: No (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 Able to bypass with scope.)    Urethral Internal Findings:  Stricture  Prostate normal: Yes    Bladder neck normal: Yes    Bladder normal: Yes     patient tolerated the procedure well with no immediate complications  Comments:        Normal cystoscopy. Two thin urethral strictures, formal dilation not required.     CT uro 9/23 - normal . +1.3cm RLL solid lung nodule. Discussed importance of follow up. Offered ordering CT chest. He will f/u with PCP re: next steps.

## 2023-10-12 NOTE — Clinical Note
Recent negative dysuria/hematuria workup (completed today). CT urogram showed 1.3cm RLL lung nodule. I offered to order CT chest. He elected to f/u with you for further eval/workup.

## 2023-10-14 ENCOUNTER — TELEPHONE (OUTPATIENT)
Dept: FAMILY MEDICINE | Facility: CLINIC | Age: 73
End: 2023-10-14
Payer: COMMERCIAL

## 2023-10-14 DIAGNOSIS — R91.1 SOLITARY PULMONARY NODULE: Primary | ICD-10-CM

## 2023-10-14 DIAGNOSIS — E11.42 TYPE 2 DIABETES MELLITUS WITH DIABETIC POLYNEUROPATHY, WITHOUT LONG-TERM CURRENT USE OF INSULIN: Primary | ICD-10-CM

## 2023-10-14 NOTE — TELEPHONE ENCOUNTER
Please call pt regarding CT chest ordered at this time to further evaluate a small nodule that was seen on his recent CT scan - we need the full CT chest to make sure he doesn't have any other nodules or worrisome areas in his lungs.    Should receive a call to schedule otherwise:    Radiology at [facility] [phone]

## 2023-10-14 NOTE — TELEPHONE ENCOUNTER
Care Due:                  Date            Visit Type   Department     Provider  --------------------------------------------------------------------------------                                [facility]                              PRIMARY      MED/ INTERNAL  Last Visit: 08-      CARE ([facility])   MED/ PEDS      Chan Estrada                              [facility]                              PRIMARY      MED/ INTERNAL  Next Visit: 02-      CARE ([facility])   MED/ PEDMONT[facility] Estrada                                                            Last  Test          Frequency    Reason                     Performed    Due Date  --------------------------------------------------------------------------------    CMP.........  12 months..  atorvastatin, losartan...  Not Found    Overdue    [employer] Embedded Care Due Messages. Reference number: [id number].   10/14/2023 2:47:22 PM CDT

## 2023-10-15 RX ORDER — METFORMIN HYDROCHLORIDE 500 MG/1
TABLET ORAL
Qty: 360 TABLET | Refills: 0 | Status: SHIPPED | OUTPATIENT
Start: 2023-10-15 | End: 2024-02-15 | Stop reason: SDUPTHER

## 2023-10-16 NOTE — TELEPHONE ENCOUNTER
Called patient to let him know that he needs to call back and schedule his lab appointment for his a1c.

## 2023-10-16 NOTE — TELEPHONE ENCOUNTER
----- Message from Alessandra Sarabia PharmD sent at 8/9/2023  9:09 AM CDT -----  Dr. Giles-    Making you aware of our conversation today since I saw your team tried to reach him to discuss his A1c increase also. He is agreeable to starting Trulicity. He did not actually take it last time, states it was never available with the shortages. Starting at 0.75 mg weekly, discussed increasing the dose up to 1.5 mg after 4 weeks.     He did not monitor his glucose for months, recently started again. He is also not compliant with metformin, recognizes this and will work on ways to improve compliance.     There are GLP shortages again - hoping he can obtain. Will keep you posted if we have to pivot the plan.     Alessandra

## 2023-10-16 NOTE — TELEPHONE ENCOUNTER
Please schedule patient for his A1c due in NOVEMBER 2023 (any day that is convenient)    All medications updated/refilled at this time    Let me know if you have any other questions or concerns.

## 2023-10-20 ENCOUNTER — OFFICE VISIT (OUTPATIENT)
Dept: URGENT CARE | Facility: CLINIC | Age: 73
End: 2023-10-20
Payer: COMMERCIAL

## 2023-10-20 VITALS
HEIGHT: 68 IN | DIASTOLIC BLOOD PRESSURE: 78 MMHG | HEART RATE: 74 BPM | RESPIRATION RATE: 17 BRPM | SYSTOLIC BLOOD PRESSURE: 118 MMHG | OXYGEN SATURATION: 97 % | WEIGHT: 173 LBS | TEMPERATURE: 98 F | BODY MASS INDEX: 26.22 KG/M2

## 2023-10-20 DIAGNOSIS — H66.92 LEFT ACUTE OTITIS MEDIA: Primary | ICD-10-CM

## 2023-10-20 PROCEDURE — 99213 OFFICE O/P EST LOW 20 MIN: CPT | Mod: S$GLB,,,

## 2023-10-20 PROCEDURE — 99213 PR OFFICE/OUTPT VISIT, EST, LEVL III, 20-29 MIN: ICD-10-PCS | Mod: S$GLB,,,

## 2023-10-20 RX ORDER — AMOXICILLIN AND CLAVULANATE POTASSIUM 875; 125 MG/1; MG/1
1 TABLET, FILM COATED ORAL 2 TIMES DAILY
Qty: 14 TABLET | Refills: 0 | Status: SHIPPED | OUTPATIENT
Start: 2023-10-20 | End: 2023-10-27

## 2023-10-20 NOTE — PROGRESS NOTES
"Subjective:      Patient ID: John Lemos is a 73 y.o. male.    Vitals:  height is 5' 8" (1.727 m) and weight is 78.5 kg (173 lb). His oral temperature is 97.5 °F (36.4 °C). His blood pressure is 118/78 and his pulse is 74. His respiration is 17 and oxygen saturation is 97%.     Chief Complaint: Otalgia    Patient is a 73-year-old male presenting with a one-week history nasal congestion and left ear pain and pressure.  Gets headaches at night.  Denies fever, chills, coughing, chest pain, SOB, rash, ear discharge, sore throat, dizziness.    Otalgia   There is pain in the left ear. This is a new problem. The current episode started in the past 7 days. The problem occurs constantly. The problem has been unchanged. There has been no fever. The pain is at a severity of 2/10. The patient is experiencing no pain. Associated symptoms include headaches. Pertinent negatives include no abdominal pain, coughing, diarrhea, ear discharge, hearing loss, neck pain, rash, rhinorrhea, sore throat or vomiting. He has tried nothing for the symptoms. The treatment provided no relief.       Constitution: Negative for chills and fever.   HENT:  Positive for ear pain and tinnitus. Negative for ear discharge, hearing loss and sore throat.    Neck: Negative for neck pain.   Cardiovascular:  Negative for chest pain.   Respiratory:  Negative for cough and shortness of breath.    Gastrointestinal:  Negative for abdominal pain, nausea, vomiting and diarrhea.   Musculoskeletal:  Negative for muscle ache.   Skin:  Negative for rash.   Neurological:  Positive for headaches. Negative for dizziness.      Objective:     Physical Exam   Constitutional: He is oriented to person, place, and time. He appears well-developed.   HENT:   Head: Normocephalic and atraumatic.   Ears:   Right Ear: Tympanic membrane, external ear and ear canal normal.   Left Ear: External ear and ear canal normal. Tympanic membrane is erythematous. A middle ear effusion is " present.   Nose: Nose normal.   Mouth/Throat: Oropharynx is clear and moist. Mucous membranes are moist. Oropharynx is clear.   Eyes: Conjunctivae, EOM and lids are normal. Pupils are equal, round, and reactive to light.   Neck: Trachea normal and phonation normal. Neck supple.   Musculoskeletal: Normal range of motion.         General: Normal range of motion.   Neurological: He is alert and oriented to person, place, and time.   Skin: Skin is warm, dry and intact.   Psychiatric: His speech is normal and behavior is normal. Judgment and thought content normal.   Nursing note and vitals reviewed.    Assessment:     1. Left acute otitis media        Plan:       Left acute otitis media  -     amoxicillin-clavulanate 875-125mg (AUGMENTIN) 875-125 mg per tablet; Take 1 tablet by mouth 2 (two) times a day. for 7 days  Dispense: 14 tablet; Refill: 0              Patient Instructions   - Rest.    - Drink plenty of fluids.  - Take antibiotics as prescribed  - Tylenol or ibuprofen for pain     - You can take over-the-counter claritin, zyrtec, allegra, or xyzal as directed. These are antihistamines that can help with runny nose, nasal congestion, sneezing, and helps to dry up post-nasal drip, which usually causes sore throat and cough.              - If you do NOT have high blood pressure, you may use a decongestant form (D)  of this medication (ie. Claritin- D, zyrtec-D, allegra-D) or if you do not take the D form, you can take sudafed (pseudoephedrine) over the counter, which is a decongestant. Do NOT take two decongestant (D) medications at the same time (such as mucinex-D and claritin-D or plain sudafed and claritin D)    - If you DO have Hypertension, anxiety, or palpitations, it is safe to take Coricidin HBP for relief of sinus symptoms.     - You can use Flonase (fluticasone) nasal spray as directed for sinus congestion and postnasal drip. This is a steroid nasal spray that works locally over time to decrease the  inflammation in your nose/sinuses and help with allergic symptoms. This is not an quick- relief spray like afrin, but it works well if used daily.  Discontinue if you develop nose bleed  - use nasal saline prior to Flonase.  - Use Ocean Spray Nasal Saline 1-3 puffs each nostril every 2-3 hours then blow out onto tissue. This is to irrigate the nasal passage way to clear the sinus openings. Use until sinus problem resolved.     - you can take plain Mucinex (guaifenesin) 1200 mg twice a day to help loosen mucous.      -warm salt water gargles can help with sore throat     - warm tea with honey can help with cough. Honey is a natural cough suppressant.     - Dextromethorphan (DM) is a cough suppressant over the counter (ie. mucinex DM, robitussin, delsym; dayquil/nyquil has DM as well.)        - Follow up with your PCP or specialty clinic as directed in the next 1-2 weeks if not improved or as needed.  You can call [phone] to schedule an appointment with the appropriate provider.       - Go to the ER if you develop new or worsening symptoms.      - You must understand that you have received an Urgent Care treatment only and that you may be released before all of your medical problems are known or treated.   - You, the patient, will arrange for follow up care as instructed.   - If your condition worsens or fails to improve we recommend that you receive another evaluation at the ER immediately or contact your PCP to discuss your concerns or return here.

## 2023-10-20 NOTE — PATIENT INSTRUCTIONS
- Rest.    - Drink plenty of fluids.  - Take antibiotics as prescribed  - Tylenol or ibuprofen for pain     - You can take over-the-counter claritin, zyrtec, allegra, or xyzal as directed. These are antihistamines that can help with runny nose, nasal congestion, sneezing, and helps to dry up post-nasal drip, which usually causes sore throat and cough.              - If you do NOT have high blood pressure, you may use a decongestant form (D)  of this medication (ie. Claritin- D, zyrtec-D, allegra-D) or if you do not take the D form, you can take sudafed (pseudoephedrine) over the counter, which is a decongestant. Do NOT take two decongestant (D) medications at the same time (such as mucinex-D and claritin-D or plain sudafed and claritin D)    - If you DO have Hypertension, anxiety, or palpitations, it is safe to take Coricidin HBP for relief of sinus symptoms.     - You can use Flonase (fluticasone) nasal spray as directed for sinus congestion and postnasal drip. This is a steroid nasal spray that works locally over time to decrease the inflammation in your nose/sinuses and help with allergic symptoms. This is not an quick- relief spray like afrin, but it works well if used daily.  Discontinue if you develop nose bleed  - use nasal saline prior to Flonase.  - Use Ocean Spray Nasal Saline 1-3 puffs each nostril every 2-3 hours then blow out onto tissue. This is to irrigate the nasal passage way to clear the sinus openings. Use until sinus problem resolved.     - you can take plain Mucinex (guaifenesin) 1200 mg twice a day to help loosen mucous.      -warm salt water gargles can help with sore throat     - warm tea with honey can help with cough. Honey is a natural cough suppressant.     - Dextromethorphan (DM) is a cough suppressant over the counter (ie. mucinex DM, robitussin, delsym; dayquil/nyquil has DM as well.)        - Follow up with your PCP or specialty clinic as directed in the next 1-2 weeks if not improved  or as needed.  You can call [phone] to schedule an appointment with the appropriate provider.       - Go to the ER if you develop new or worsening symptoms.      - You must understand that you have received an Urgent Care treatment only and that you may be released before all of your medical problems are known or treated.   - You, the patient, will arrange for follow up care as instructed.   - If your condition worsens or fails to improve we recommend that you receive another evaluation at the ER immediately or contact your PCP to discuss your concerns or return here.

## 2023-10-21 ENCOUNTER — LAB VISIT (OUTPATIENT)
Dept: LAB | Facility: HOSPITAL | Age: 73
End: 2023-10-21
Attending: INTERNAL MEDICINE
Payer: COMMERCIAL

## 2023-10-21 DIAGNOSIS — E11.42 TYPE 2 DIABETES MELLITUS WITH DIABETIC POLYNEUROPATHY, WITHOUT LONG-TERM CURRENT USE OF INSULIN: ICD-10-CM

## 2023-10-21 LAB
ESTIMATED AVG GLUCOSE: 154 MG/DL (ref 68–131)
HBA1C MFR BLD: 7 % (ref 4–5.6)

## 2023-10-21 PROCEDURE — 36415 COLL VENOUS BLD VENIPUNCTURE: CPT | Mod: PO | Performed by: INTERNAL MEDICINE

## 2023-10-21 PROCEDURE — 83036 HEMOGLOBIN GLYCOSYLATED A1C: CPT | Performed by: INTERNAL MEDICINE

## 2023-10-22 DIAGNOSIS — E11.9 CONTROLLED TYPE 2 DIABETES MELLITUS WITHOUT COMPLICATION, WITHOUT LONG-TERM CURRENT USE OF INSULIN: Chronic | ICD-10-CM

## 2023-10-22 NOTE — TELEPHONE ENCOUNTER
No care due was identified.  [facility] Embedded Care Due Messages. Reference number: [id number].   10/22/2023 1:50:20 PM CDT

## 2023-10-23 RX ORDER — LOSARTAN POTASSIUM 50 MG/1
50 TABLET ORAL DAILY
Qty: 90 TABLET | Refills: 1 | Status: SHIPPED | OUTPATIENT
Start: 2023-10-23

## 2023-10-28 ENCOUNTER — PATIENT MESSAGE (OUTPATIENT)
Dept: FAMILY MEDICINE | Facility: CLINIC | Age: 73
End: 2023-10-28
Payer: COMMERCIAL

## 2023-10-29 ENCOUNTER — PATIENT MESSAGE (OUTPATIENT)
Dept: FAMILY MEDICINE | Facility: CLINIC | Age: 73
End: 2023-10-29
Payer: COMMERCIAL

## 2023-10-30 ENCOUNTER — IMMUNIZATION (OUTPATIENT)
Dept: FAMILY MEDICINE | Facility: CLINIC | Age: 73
End: 2023-10-30
Payer: COMMERCIAL

## 2023-10-30 VITALS — TEMPERATURE: 98 F

## 2023-10-30 DIAGNOSIS — Z23 NEED FOR INFLUENZA VACCINATION: Primary | ICD-10-CM

## 2023-10-30 PROCEDURE — 90694 VACC AIIV4 NO PRSRV 0.5ML IM: CPT | Mod: S$GLB,,, | Performed by: INTERNAL MEDICINE

## 2023-10-30 PROCEDURE — 90694 FLU VACCINE - QUADRIVALENT - ADJUVANTED: ICD-10-PCS | Mod: S$GLB,,, | Performed by: INTERNAL MEDICINE

## 2023-10-30 PROCEDURE — 90471 IMMUNIZATION ADMIN: CPT | Mod: S$GLB,,, | Performed by: INTERNAL MEDICINE

## 2023-10-30 PROCEDURE — 90471 FLU VACCINE - QUADRIVALENT - ADJUVANTED: ICD-10-PCS | Mod: S$GLB,,, | Performed by: INTERNAL MEDICINE

## 2023-11-01 ENCOUNTER — OFFICE VISIT (OUTPATIENT)
Dept: FAMILY MEDICINE | Facility: CLINIC | Age: 73
End: 2023-11-01
Payer: COMMERCIAL

## 2023-11-01 VITALS
HEIGHT: 68 IN | TEMPERATURE: 98 F | OXYGEN SATURATION: 99 % | HEART RATE: 80 BPM | DIASTOLIC BLOOD PRESSURE: 68 MMHG | WEIGHT: 176.94 LBS | BODY MASS INDEX: 26.82 KG/M2 | SYSTOLIC BLOOD PRESSURE: 130 MMHG

## 2023-11-01 DIAGNOSIS — J30.2 SEASONAL ALLERGIES: Primary | ICD-10-CM

## 2023-11-01 PROBLEM — Z12.11 SCREENING FOR COLON CANCER: Status: RESOLVED | Noted: 2019-11-25 | Resolved: 2023-11-01

## 2023-11-01 PROCEDURE — 3051F PR MOST RECENT HEMOGLOBIN A1C LEVEL 7.0 - < 8.0%: ICD-10-PCS | Mod: CPTII,S$GLB,, | Performed by: FAMILY MEDICINE

## 2023-11-01 PROCEDURE — 1125F AMNT PAIN NOTED PAIN PRSNT: CPT | Mod: CPTII,S$GLB,, | Performed by: FAMILY MEDICINE

## 2023-11-01 PROCEDURE — 3008F PR BODY MASS INDEX (BMI) DOCUMENTED: ICD-10-PCS | Mod: CPTII,S$GLB,, | Performed by: FAMILY MEDICINE

## 2023-11-01 PROCEDURE — 3072F LOW RISK FOR RETINOPATHY: CPT | Mod: CPTII,S$GLB,, | Performed by: FAMILY MEDICINE

## 2023-11-01 PROCEDURE — 3078F PR MOST RECENT DIASTOLIC BLOOD PRESSURE < 80 MM HG: ICD-10-PCS | Mod: CPTII,S$GLB,, | Performed by: FAMILY MEDICINE

## 2023-11-01 PROCEDURE — 3061F NEG MICROALBUMINURIA REV: CPT | Mod: CPTII,S$GLB,, | Performed by: FAMILY MEDICINE

## 2023-11-01 PROCEDURE — 4010F PR ACE/ARB THEARPY RXD/TAKEN: ICD-10-PCS | Mod: CPTII,S$GLB,, | Performed by: FAMILY MEDICINE

## 2023-11-01 PROCEDURE — 3066F NEPHROPATHY DOC TX: CPT | Mod: CPTII,S$GLB,, | Performed by: FAMILY MEDICINE

## 2023-11-01 PROCEDURE — 3051F HG A1C>EQUAL 7.0%<8.0%: CPT | Mod: CPTII,S$GLB,, | Performed by: FAMILY MEDICINE

## 2023-11-01 PROCEDURE — 1159F PR MEDICATION LIST DOCUMENTED IN MEDICAL RECORD: ICD-10-PCS | Mod: CPTII,S$GLB,, | Performed by: FAMILY MEDICINE

## 2023-11-01 PROCEDURE — 99213 PR OFFICE/OUTPT VISIT, EST, LEVL III, 20-29 MIN: ICD-10-PCS | Mod: S$GLB,,, | Performed by: FAMILY MEDICINE

## 2023-11-01 PROCEDURE — 1125F PR PAIN SEVERITY QUANTIFIED, PAIN PRESENT: ICD-10-PCS | Mod: CPTII,S$GLB,, | Performed by: FAMILY MEDICINE

## 2023-11-01 PROCEDURE — 1101F PR PT FALLS ASSESS DOC 0-1 FALLS W/OUT INJ PAST YR: ICD-10-PCS | Mod: CPTII,S$GLB,, | Performed by: FAMILY MEDICINE

## 2023-11-01 PROCEDURE — 3061F PR NEG MICROALBUMINURIA RESULT DOCUMENTED/REVIEW: ICD-10-PCS | Mod: CPTII,S$GLB,, | Performed by: FAMILY MEDICINE

## 2023-11-01 PROCEDURE — 99999 PR PBB SHADOW E&M-EST. PATIENT-LVL IV: ICD-10-PCS | Mod: PBBFAC,,, | Performed by: FAMILY MEDICINE

## 2023-11-01 PROCEDURE — 3072F PR LOW RISK FOR RETINOPATHY: ICD-10-PCS | Mod: CPTII,S$GLB,, | Performed by: FAMILY MEDICINE

## 2023-11-01 PROCEDURE — 3075F PR MOST RECENT SYSTOLIC BLOOD PRESS GE 130-139MM HG: ICD-10-PCS | Mod: CPTII,S$GLB,, | Performed by: FAMILY MEDICINE

## 2023-11-01 PROCEDURE — 99999 PR PBB SHADOW E&M-EST. PATIENT-LVL IV: CPT | Mod: PBBFAC,,, | Performed by: FAMILY MEDICINE

## 2023-11-01 PROCEDURE — 3078F DIAST BP <80 MM HG: CPT | Mod: CPTII,S$GLB,, | Performed by: FAMILY MEDICINE

## 2023-11-01 PROCEDURE — 3075F SYST BP GE 130 - 139MM HG: CPT | Mod: CPTII,S$GLB,, | Performed by: FAMILY MEDICINE

## 2023-11-01 PROCEDURE — 1159F MED LIST DOCD IN RCRD: CPT | Mod: CPTII,S$GLB,, | Performed by: FAMILY MEDICINE

## 2023-11-01 PROCEDURE — 3066F PR DOCUMENTATION OF TREATMENT FOR NEPHROPATHY: ICD-10-PCS | Mod: CPTII,S$GLB,, | Performed by: FAMILY MEDICINE

## 2023-11-01 PROCEDURE — 3288F PR FALLS RISK ASSESSMENT DOCUMENTED: ICD-10-PCS | Mod: CPTII,S$GLB,, | Performed by: FAMILY MEDICINE

## 2023-11-01 PROCEDURE — 1101F PT FALLS ASSESS-DOCD LE1/YR: CPT | Mod: CPTII,S$GLB,, | Performed by: FAMILY MEDICINE

## 2023-11-01 PROCEDURE — 4010F ACE/ARB THERAPY RXD/TAKEN: CPT | Mod: CPTII,S$GLB,, | Performed by: FAMILY MEDICINE

## 2023-11-01 PROCEDURE — 3288F FALL RISK ASSESSMENT DOCD: CPT | Mod: CPTII,S$GLB,, | Performed by: FAMILY MEDICINE

## 2023-11-01 PROCEDURE — 99213 OFFICE O/P EST LOW 20 MIN: CPT | Mod: S$GLB,,, | Performed by: FAMILY MEDICINE

## 2023-11-01 PROCEDURE — 3008F BODY MASS INDEX DOCD: CPT | Mod: CPTII,S$GLB,, | Performed by: FAMILY MEDICINE

## 2023-11-01 RX ORDER — LORATADINE 10 MG/1
10 TABLET ORAL DAILY PRN
Qty: 30 TABLET | Refills: 3 | Status: SHIPPED | OUTPATIENT
Start: 2023-11-01 | End: 2024-02-12

## 2023-11-01 NOTE — PROGRESS NOTES
[facility]  Progress Note    SUBJECTIVE:     Chief Complaint   Patient presents with    Otalgia     Both ears    Ear Fullness     Fluid both ears       HPI   John Lemos  is a 73 y.o. male here for concerns about discomfort in both of his ears, worst on L. No fevers, chills, sinus pressure. Patient has no other new complaints/problems at this time.      Review of patient's allergies indicates:   Allergen Reactions    Sulfa (sulfonamide antibiotics) Swelling       Past Medical History:   Diagnosis Date    Arthritis     Cataract     ou    Diabetes mellitus 7 yrs    lbs: 118 1 week ago    Diabetes mellitus type 2, controlled 9/8/2012    Eye injury     fb    Hyperlipidemia      Past Surgical History:   Procedure Laterality Date    COLONOSCOPY N/A 11/25/2019    Procedure: COLONOSCOPY;  Surgeon: Eddie Leslie MD;  Location: [facility];  Service: Endoscopy;  Laterality: N/A;     Family History   Problem Relation Age of Onset    Diabetes Mother     Cancer Mother     Hypertension Mother     Diabetes Father     Cancer Father     No Known Problems Sister     No Known Problems Brother     No Known Problems Maternal Aunt     No Known Problems Maternal Uncle     No Known Problems Paternal Aunt     No Known Problems Paternal Uncle     No Known Problems Maternal Grandmother     No Known Problems Maternal Grandfather     No Known Problems Paternal Grandmother     No Known Problems Paternal Grandfather     Amblyopia Neg Hx     Blindness Neg Hx     Cataracts Neg Hx     Glaucoma Neg Hx     Macular degeneration Neg Hx     Retinal detachment Neg Hx     Strabismus Neg Hx     Stroke Neg Hx     Thyroid disease Neg Hx      Social History     Tobacco Use    Smoking status: Never    Smokeless tobacco: Never   Substance Use Topics    Alcohol use: No    Drug use: No        Review of Systems   Constitutional:  Negative for chills and fever.   HENT:          + ear discomfort     OBJECTIVE:     Vitals:    11/01/23 1436   BP: 130/68    Pulse: 80   Temp: 98.4 °F (36.9 °C)     Body mass index is 26.9 kg/m².    Physical Exam  Constitutional:       General: He is not in acute distress.     Appearance: He is not diaphoretic.   HENT:      Head: Normocephalic and atraumatic.   Eyes:      Conjunctiva/sclera: Conjunctivae normal.   Pulmonary:      Effort: Pulmonary effort is normal. No respiratory distress.   Skin:     General: Skin is warm.   Neurological:      Mental Status: He is alert and oriented to person, place, and time.         Old records were reviewed. Labs and/or images were independently reviewed.    ASSESSMENT     1. Seasonal allergies        PLAN:     Seasonal allergies  -     loratadine (CLARITIN) 10 mg tablet; Take 1 tablet (10 mg total) by mouth daily as needed for Allergies (or runny nose).  Dispense: 30 tablet; Refill: 3  -     no infection, no abx indicated at this time. Take anti-histamine as needed.      RTC PRCARLOS ALBERTO Limon MD  11/01/2023 2:56 PM

## 2023-11-03 ENCOUNTER — HOSPITAL ENCOUNTER (OUTPATIENT)
Dept: RADIOLOGY | Facility: HOSPITAL | Age: 73
Discharge: HOME | End: 2023-11-03
Attending: INTERNAL MEDICINE
Payer: COMMERCIAL

## 2023-11-03 DIAGNOSIS — R91.1 SOLITARY PULMONARY NODULE: ICD-10-CM

## 2023-11-03 PROCEDURE — 71250 CT THORAX DX C-: CPT | Mod: 26,,, | Performed by: RADIOLOGY

## 2023-11-03 PROCEDURE — 71250 CT CHEST WITHOUT CONTRAST: ICD-10-PCS | Mod: 26,,, | Performed by: RADIOLOGY

## 2023-11-03 PROCEDURE — 71250 CT THORAX DX C-: CPT | Mod: TC

## 2023-11-06 ENCOUNTER — OFFICE VISIT (OUTPATIENT)
Dept: OPTOMETRY | Facility: CLINIC | Age: 73
End: 2023-11-06
Payer: COMMERCIAL

## 2023-11-06 DIAGNOSIS — H52.13 MYOPIA OF BOTH EYES WITH ASTIGMATISM AND PRESBYOPIA: ICD-10-CM

## 2023-11-06 DIAGNOSIS — H25.13 NUCLEAR SCLEROSIS OF BOTH EYES: ICD-10-CM

## 2023-11-06 DIAGNOSIS — H52.4 MYOPIA OF BOTH EYES WITH ASTIGMATISM AND PRESBYOPIA: ICD-10-CM

## 2023-11-06 DIAGNOSIS — E11.36 TYPE 2 DIABETES MELLITUS WITH DIABETIC CATARACT, WITHOUT LONG-TERM CURRENT USE OF INSULIN: Primary | ICD-10-CM

## 2023-11-06 DIAGNOSIS — H52.203 MYOPIA OF BOTH EYES WITH ASTIGMATISM AND PRESBYOPIA: ICD-10-CM

## 2023-11-06 PROCEDURE — 92014 COMPRE OPH EXAM EST PT 1/>: CPT | Mod: S$GLB,,, | Performed by: OPTOMETRIST

## 2023-11-06 PROCEDURE — 4010F PR ACE/ARB THEARPY RXD/TAKEN: ICD-10-PCS | Mod: CPTII,S$GLB,, | Performed by: OPTOMETRIST

## 2023-11-06 PROCEDURE — 1159F PR MEDICATION LIST DOCUMENTED IN MEDICAL RECORD: ICD-10-PCS | Mod: CPTII,S$GLB,, | Performed by: OPTOMETRIST

## 2023-11-06 PROCEDURE — 1126F AMNT PAIN NOTED NONE PRSNT: CPT | Mod: CPTII,S$GLB,, | Performed by: OPTOMETRIST

## 2023-11-06 PROCEDURE — 3051F HG A1C>EQUAL 7.0%<8.0%: CPT | Mod: CPTII,S$GLB,, | Performed by: OPTOMETRIST

## 2023-11-06 PROCEDURE — 99999 PR PBB SHADOW E&M-EST. PATIENT-LVL III: CPT | Mod: PBBFAC,,, | Performed by: OPTOMETRIST

## 2023-11-06 PROCEDURE — 3061F NEG MICROALBUMINURIA REV: CPT | Mod: CPTII,S$GLB,, | Performed by: OPTOMETRIST

## 2023-11-06 PROCEDURE — 92014 PR EYE EXAM, EST PATIENT,COMPREHESV: ICD-10-PCS | Mod: S$GLB,,, | Performed by: OPTOMETRIST

## 2023-11-06 PROCEDURE — 2023F PR DILATED RETINAL EXAM W/O EVID OF RETINOPATHY: ICD-10-PCS | Mod: CPTII,S$GLB,, | Performed by: OPTOMETRIST

## 2023-11-06 PROCEDURE — 1159F MED LIST DOCD IN RCRD: CPT | Mod: CPTII,S$GLB,, | Performed by: OPTOMETRIST

## 2023-11-06 PROCEDURE — 1101F PR PT FALLS ASSESS DOC 0-1 FALLS W/OUT INJ PAST YR: ICD-10-PCS | Mod: CPTII,S$GLB,, | Performed by: OPTOMETRIST

## 2023-11-06 PROCEDURE — 1101F PT FALLS ASSESS-DOCD LE1/YR: CPT | Mod: CPTII,S$GLB,, | Performed by: OPTOMETRIST

## 2023-11-06 PROCEDURE — 2023F DILAT RTA XM W/O RTNOPTHY: CPT | Mod: CPTII,S$GLB,, | Performed by: OPTOMETRIST

## 2023-11-06 PROCEDURE — 1160F PR REVIEW ALL MEDS BY PRESCRIBER/CLIN PHARMACIST DOCUMENTED: ICD-10-PCS | Mod: CPTII,S$GLB,, | Performed by: OPTOMETRIST

## 2023-11-06 PROCEDURE — 3051F PR MOST RECENT HEMOGLOBIN A1C LEVEL 7.0 - < 8.0%: ICD-10-PCS | Mod: CPTII,S$GLB,, | Performed by: OPTOMETRIST

## 2023-11-06 PROCEDURE — 3066F NEPHROPATHY DOC TX: CPT | Mod: CPTII,S$GLB,, | Performed by: OPTOMETRIST

## 2023-11-06 PROCEDURE — 4010F ACE/ARB THERAPY RXD/TAKEN: CPT | Mod: CPTII,S$GLB,, | Performed by: OPTOMETRIST

## 2023-11-06 PROCEDURE — 92015 DETERMINE REFRACTIVE STATE: CPT | Mod: S$GLB,,, | Performed by: OPTOMETRIST

## 2023-11-06 PROCEDURE — 3066F PR DOCUMENTATION OF TREATMENT FOR NEPHROPATHY: ICD-10-PCS | Mod: CPTII,S$GLB,, | Performed by: OPTOMETRIST

## 2023-11-06 PROCEDURE — 1160F RVW MEDS BY RX/DR IN RCRD: CPT | Mod: CPTII,S$GLB,, | Performed by: OPTOMETRIST

## 2023-11-06 PROCEDURE — 1126F PR PAIN SEVERITY QUANTIFIED, NO PAIN PRESENT: ICD-10-PCS | Mod: CPTII,S$GLB,, | Performed by: OPTOMETRIST

## 2023-11-06 PROCEDURE — 3061F PR NEG MICROALBUMINURIA RESULT DOCUMENTED/REVIEW: ICD-10-PCS | Mod: CPTII,S$GLB,, | Performed by: OPTOMETRIST

## 2023-11-06 PROCEDURE — 99999 PR PBB SHADOW E&M-EST. PATIENT-LVL III: ICD-10-PCS | Mod: PBBFAC,,, | Performed by: OPTOMETRIST

## 2023-11-06 PROCEDURE — 3288F PR FALLS RISK ASSESSMENT DOCUMENTED: ICD-10-PCS | Mod: CPTII,S$GLB,, | Performed by: OPTOMETRIST

## 2023-11-06 PROCEDURE — 3288F FALL RISK ASSESSMENT DOCD: CPT | Mod: CPTII,S$GLB,, | Performed by: OPTOMETRIST

## 2023-11-06 PROCEDURE — 92015 PR REFRACTION: ICD-10-PCS | Mod: S$GLB,,, | Performed by: OPTOMETRIST

## 2023-11-06 NOTE — PROGRESS NOTES
Subjective:       Patient ID: John Lemos is a 73 y.o. male      Chief Complaint   Patient presents with    Concerns About Ocular Health    Diabetic Eye Exam     History of Present Illness   Dls: 11/28/22 Dr. Ortiz     74 y/o male presents today for diabetic eye exam.   Pt c/o blurry vision at distance and near ou.   Pt wears pal's     LBS ?     + ou tearing  No itching  No burning  No pain  + ha's  + ou floaters  No flashes    Eye meds  None    Pohx:  None    Fohx:   None    Hemoglobin A1C       Date                     Value               Ref Range             Status                10/21/2023               7.0 (H)             4.0 - 5.6 %           Final                  08/07/2023               9.0 (H)             4.0 - 5.6 %           Final                   02/28/2023               7.1 (H)             4.0 - 5.6 %           Final                   Assessment/Plan:     1. Type 2 diabetes mellitus with diabetic cataract, without long-term current use of insulin  No diabetic retinopathy. Discussed with pt the effects of diabetes on vision, importance of good blood sugar control, compliance with meds, and follow up care with PCP. Return in 1 year for dilated eye exam, sooner PRN.    2. Nuclear sclerosis of both eyes  Educated pt on presence of cataracts and effects on vision. No surgery at this time. Recheck in one year, sooner PRN.    3. Myopia of both eyes with astigmatism and presbyopia  Educated patient on refractive error and discussed lens options. Dispensed updated spectacle Rx. Educated about adaptation period to new specs.    Eyeglass Final Rx       Eyeglass Final Rx         Sphere Cylinder Axis Add    Right -2.00 +1.00 020 +2.50    Left -1.75 Sphere  +2.50      Expiration Date: 11/6/2024                      Follow up in about 1 year (around 11/6/2024) for Diabetic Eye Exam.

## 2023-11-08 DIAGNOSIS — E78.5 HYPERLIPIDEMIA, UNSPECIFIED HYPERLIPIDEMIA TYPE: ICD-10-CM

## 2023-11-08 RX ORDER — ATORVASTATIN CALCIUM 40 MG/1
40 TABLET, FILM COATED ORAL DAILY
Qty: 90 TABLET | Refills: 3 | Status: SHIPPED | OUTPATIENT
Start: 2023-11-08

## 2023-11-08 NOTE — TELEPHONE ENCOUNTER
No care due was identified.  [facility] Embedded Care Due Messages. Reference number: [id number].   11/08/2023 5:08:52 AM CST

## 2023-11-12 NOTE — PROGRESS NOTES
Order Repeat CT Scan (Feb 2024)    Your CT showed a very small likely insignificant nodule that increased in size since 9 years prior. I will repeat the CT scan in 3 months to make sure it is not worrisome.

## 2023-11-13 ENCOUNTER — PATIENT MESSAGE (OUTPATIENT)
Dept: FAMILY MEDICINE | Facility: CLINIC | Age: 73
End: 2023-11-13
Payer: COMMERCIAL

## 2023-11-13 DIAGNOSIS — J34.9 SINUS PROBLEM: Primary | ICD-10-CM

## 2023-11-15 ENCOUNTER — CLINICAL SUPPORT (OUTPATIENT)
Dept: AUDIOLOGY | Facility: CLINIC | Age: 73
End: 2023-11-15
Payer: COMMERCIAL

## 2023-11-15 ENCOUNTER — OFFICE VISIT (OUTPATIENT)
Dept: OTOLARYNGOLOGY | Facility: CLINIC | Age: 73
End: 2023-11-15
Payer: COMMERCIAL

## 2023-11-15 VITALS — WEIGHT: 176 LBS | DIASTOLIC BLOOD PRESSURE: 77 MMHG | SYSTOLIC BLOOD PRESSURE: 138 MMHG | BODY MASS INDEX: 26.76 KG/M2

## 2023-11-15 DIAGNOSIS — H69.93 EUSTACHIAN TUBE DYSFUNCTION, BILATERAL: Primary | ICD-10-CM

## 2023-11-15 DIAGNOSIS — H92.03 OTALGIA OF BOTH EARS: Primary | ICD-10-CM

## 2023-11-15 DIAGNOSIS — J34.9 SINUS PROBLEM: ICD-10-CM

## 2023-11-15 PROCEDURE — 4010F ACE/ARB THERAPY RXD/TAKEN: CPT | Mod: CPTII,S$GLB,, | Performed by: NURSE PRACTITIONER

## 2023-11-15 PROCEDURE — 92567 PR TYMPA2METRY: ICD-10-PCS | Mod: S$GLB,,,

## 2023-11-15 PROCEDURE — 3061F PR NEG MICROALBUMINURIA RESULT DOCUMENTED/REVIEW: ICD-10-PCS | Mod: CPTII,S$GLB,, | Performed by: NURSE PRACTITIONER

## 2023-11-15 PROCEDURE — 1126F AMNT PAIN NOTED NONE PRSNT: CPT | Mod: CPTII,S$GLB,, | Performed by: NURSE PRACTITIONER

## 2023-11-15 PROCEDURE — 3078F PR MOST RECENT DIASTOLIC BLOOD PRESSURE < 80 MM HG: ICD-10-PCS | Mod: CPTII,S$GLB,, | Performed by: NURSE PRACTITIONER

## 2023-11-15 PROCEDURE — 1159F PR MEDICATION LIST DOCUMENTED IN MEDICAL RECORD: ICD-10-PCS | Mod: CPTII,S$GLB,, | Performed by: NURSE PRACTITIONER

## 2023-11-15 PROCEDURE — 3066F PR DOCUMENTATION OF TREATMENT FOR NEPHROPATHY: ICD-10-PCS | Mod: CPTII,S$GLB,, | Performed by: NURSE PRACTITIONER

## 2023-11-15 PROCEDURE — 1126F PR PAIN SEVERITY QUANTIFIED, NO PAIN PRESENT: ICD-10-PCS | Mod: CPTII,S$GLB,, | Performed by: NURSE PRACTITIONER

## 2023-11-15 PROCEDURE — 3061F NEG MICROALBUMINURIA REV: CPT | Mod: CPTII,S$GLB,, | Performed by: NURSE PRACTITIONER

## 2023-11-15 PROCEDURE — 99203 PR OFFICE/OUTPT VISIT, NEW, LEVL III, 30-44 MIN: ICD-10-PCS | Mod: S$GLB,,, | Performed by: NURSE PRACTITIONER

## 2023-11-15 PROCEDURE — 1101F PR PT FALLS ASSESS DOC 0-1 FALLS W/OUT INJ PAST YR: ICD-10-PCS | Mod: CPTII,S$GLB,, | Performed by: NURSE PRACTITIONER

## 2023-11-15 PROCEDURE — 99203 OFFICE O/P NEW LOW 30 MIN: CPT | Mod: S$GLB,,, | Performed by: NURSE PRACTITIONER

## 2023-11-15 PROCEDURE — 3008F PR BODY MASS INDEX (BMI) DOCUMENTED: ICD-10-PCS | Mod: CPTII,S$GLB,, | Performed by: NURSE PRACTITIONER

## 2023-11-15 PROCEDURE — 3066F NEPHROPATHY DOC TX: CPT | Mod: CPTII,S$GLB,, | Performed by: NURSE PRACTITIONER

## 2023-11-15 PROCEDURE — 3288F FALL RISK ASSESSMENT DOCD: CPT | Mod: CPTII,S$GLB,, | Performed by: NURSE PRACTITIONER

## 2023-11-15 PROCEDURE — 3078F DIAST BP <80 MM HG: CPT | Mod: CPTII,S$GLB,, | Performed by: NURSE PRACTITIONER

## 2023-11-15 PROCEDURE — 1159F MED LIST DOCD IN RCRD: CPT | Mod: CPTII,S$GLB,, | Performed by: NURSE PRACTITIONER

## 2023-11-15 PROCEDURE — 4010F PR ACE/ARB THEARPY RXD/TAKEN: ICD-10-PCS | Mod: CPTII,S$GLB,, | Performed by: NURSE PRACTITIONER

## 2023-11-15 PROCEDURE — 92567 TYMPANOMETRY: CPT | Mod: S$GLB,,,

## 2023-11-15 PROCEDURE — 3008F BODY MASS INDEX DOCD: CPT | Mod: CPTII,S$GLB,, | Performed by: NURSE PRACTITIONER

## 2023-11-15 PROCEDURE — 3288F PR FALLS RISK ASSESSMENT DOCUMENTED: ICD-10-PCS | Mod: CPTII,S$GLB,, | Performed by: NURSE PRACTITIONER

## 2023-11-15 PROCEDURE — 3075F PR MOST RECENT SYSTOLIC BLOOD PRESS GE 130-139MM HG: ICD-10-PCS | Mod: CPTII,S$GLB,, | Performed by: NURSE PRACTITIONER

## 2023-11-15 PROCEDURE — 3051F HG A1C>EQUAL 7.0%<8.0%: CPT | Mod: CPTII,S$GLB,, | Performed by: NURSE PRACTITIONER

## 2023-11-15 PROCEDURE — 1101F PT FALLS ASSESS-DOCD LE1/YR: CPT | Mod: CPTII,S$GLB,, | Performed by: NURSE PRACTITIONER

## 2023-11-15 PROCEDURE — 3051F PR MOST RECENT HEMOGLOBIN A1C LEVEL 7.0 - < 8.0%: ICD-10-PCS | Mod: CPTII,S$GLB,, | Performed by: NURSE PRACTITIONER

## 2023-11-15 PROCEDURE — 3075F SYST BP GE 130 - 139MM HG: CPT | Mod: CPTII,S$GLB,, | Performed by: NURSE PRACTITIONER

## 2023-11-15 RX ORDER — AZELASTINE 1 MG/ML
1 SPRAY, METERED NASAL 2 TIMES DAILY
Qty: 30 ML | Refills: 3 | Status: SHIPPED | OUTPATIENT
Start: 2023-11-15

## 2023-11-15 RX ORDER — FLUTICASONE PROPIONATE 50 MCG
1 SPRAY, SUSPENSION (ML) NASAL 2 TIMES DAILY
Qty: 18.2 ML | Refills: 3 | Status: SHIPPED | OUTPATIENT
Start: 2023-11-15

## 2023-11-15 NOTE — PROGRESS NOTES
OTOLARYNGOLOGY CLINIC NOTE  Date:  11/15/2023     Chief complaint:  Chief Complaint   Patient presents with    Otalgia    Sinus Problem       History of Present Illness  John Lemos is a 73 y.o. male  presenting today for a new evaluation and treatment of ear pain. Pt reports his left ear started hurting about 3 weeks ago.  Pt reports his right ear also hurts but not as much as the left ear.  Pt reports he went to urgent care and was treated but his symptoms continued.  Pt reports going to his PCP about a week ago and started using the Flonase but hasn't felt any improvement.  Pt reports having constant pain at night like a throbbing.  Pt reports he can not lay on his left side due to the pain.      Review of medical records and prior documentation  Past medical records were reviewed with data pertinent to the chief complaint summarized in the HPI. Information obtained from review of medical records is attributed to respective sources in the HPI with reference to sources of information at their mention. Records reviewed included all recent notes from referring provider, primary care, and related subspecialty evaluations as available. This review of records was performed and additional data obtained to supplement history obtained from the patient and further inform medical decision making involved in formulating a plan of care accounting for all history and treatment relevant to the issues addressed.    Past Medical History  Past Medical History:   Diagnosis Date    Arthritis     Cataract     ou    Diabetes mellitus 7 yrs    lbs: 118 1 week ago    Diabetes mellitus type 2, controlled 9/8/2012    Eye injury     fb    Hyperlipidemia         Past Surgical History  Past Surgical History:   Procedure Laterality Date    COLONOSCOPY N/A 11/25/2019    Procedure: COLONOSCOPY;  Surgeon: Eddie Leslie MD;  Location: [facility];  Service: Endoscopy;  Laterality: N/A;        Medications  Current Outpatient Medications on File  Prior to Visit   Medication Sig Dispense Refill    atorvastatin (LIPITOR) 40 MG tablet Take 1 tablet (40 mg total) by mouth once daily. 90 tablet 3    benzonatate (TESSALON) 200 MG capsule Take 1 capsule (200 mg total) by mouth 3 (three) times daily as needed for Cough. 30 capsule 0    blood sugar diagnostic (FREESTYLE LITE STRIPS) Strp Inject 1 each as directed once daily. 100 each 3    cetirizine (ZYRTEC) 10 MG tablet Take 1 tablet (10 mg total) by mouth once daily. 30 tablet 0    cetirizine (ZYRTEC) 10 MG tablet Take 1 tablet (10 mg total) by mouth once daily. 30 tablet 0    diclofenac (VOLTAREN) 50 MG EC tablet Take 1 tablet (50 mg total) by mouth 2 (two) times daily as needed (shoulder pain). 30 tablet 0    diclofenac sodium (VOLTAREN) 1 % Gel Apply 2 g topically 4 (four) times daily. 20 g 0    dulaglutide (TRULICITY) 0.75 mg/0.5 mL pen injector Inject 0.75 mg into the skin every 7 days. 12 pen 1    DULoxetine (CYMBALTA) 60 MG capsule Take 1 capsule (60 mg total) by mouth once daily. 90 capsule 1    fluticasone (FLONASE) 50 mcg/actuation nasal spray 1 spray (50 mcg total) by Each Nare route once daily. 16 g 2    fluticasone propionate (FLONASE) 50 mcg/actuation nasal spray 1 spray (50 mcg total) by Each Nostril route 2 (two) times daily. 9.9 mL 0    ipratropium (ATROVENT) 21 mcg (0.03 %) nasal spray 2 sprays by Nasal route 3 (three) times daily. 30 mL 0    lancets (FREESTYLE LANCETS) 28 gauge Misc Inject 1 lancet as directed once daily. 100 each 3    loratadine (CLARITIN) 10 mg tablet Take 1 tablet (10 mg total) by mouth daily as needed for Allergies (or runny nose). 30 tablet 3    losartan (COZAAR) 50 MG tablet Take 1 tablet (50 mg total) by mouth once daily. 90 tablet 1    meloxicam (MOBIC) 15 MG tablet Take 1 tablet (15 mg total) by mouth once daily. 30 tablet 3    metFORMIN (GLUCOPHAGE) 500 MG tablet TAKE 2 TABLETS BY MOUTH TWICE DAILY WITH BREAKFAST AND WITH SUPPER 360 tablet 0     No current  facility-administered medications on file prior to visit.       Review of Systems  Review of Systems   Constitutional: Negative.    HENT:  Positive for ear pain.    Cardiovascular: Negative.    Gastrointestinal:  Positive for heartburn.   Skin: Negative.    Neurological:  Positive for dizziness and headaches.   Psychiatric/Behavioral: Negative.          Social History   reports that he has never smoked. He has never used smokeless tobacco. He reports that he does not drink alcohol and does not use drugs.     Family History  Family History   Problem Relation Age of Onset    Diabetes Mother     Cancer Mother     Hypertension Mother     Diabetes Father     Cancer Father     No Known Problems Sister     No Known Problems Brother     No Known Problems Maternal Aunt     No Known Problems Maternal Uncle     No Known Problems Paternal Aunt     No Known Problems Paternal Uncle     No Known Problems Maternal Grandmother     No Known Problems Maternal Grandfather     No Known Problems Paternal Grandmother     No Known Problems Paternal Grandfather     Amblyopia Neg Hx     Blindness Neg Hx     Cataracts Neg Hx     Glaucoma Neg Hx     Macular degeneration Neg Hx     Retinal detachment Neg Hx     Strabismus Neg Hx     Stroke Neg Hx     Thyroid disease Neg Hx         Physical Exam   Vitals:    11/15/23 1519   BP: 138/77    Body mass index is 26.76 kg/m².  Weight: 79.8 kg (176 lb)         GENERAL: no acute distress.  HEAD: normocephalic.   EYES: lids and lashes normal. No scleral icterus  EARS: external ear without lesion, normal pinna shape and position.  External auditory canal with normal cerumen, tympanic membrane fully visible, no perforation , no retraction. No middle ear effusion. Ossicles intact.   NOSE: external nose without significant bony abnormality  ORAL CAVITY/OROPHARYNX: tongue midline and mobile. Symmetric palate rise. Uvula midline.   NECK: trachea midline.   LYMPH NODES:No cervical lymphadenopathy.  RESPIRATORY:  no stridor, no stertor. Voice normal. Respirations nonlabored.  NEURO: alert, responds to questions appropriately.   Cranial nerve exam as indicated in above sections and additionally showed facial movement symmetric with good eye closure and symmetric smile.   PSYCH:mood appropriate    Imaging:  The patient does not have any pertinent and/or recent imaging of the head and neck.     Labs:  CBC  Recent Labs   Lab 02/28/23  1439   WBC 5.89   Hemoglobin 13.2 L   Hematocrit 43.2   MCV 94   Platelets 290     BMP  Recent Labs   Lab 05/03/22  0951 11/29/22  1119 09/25/23  1457   Glucose 111 H 119 H  --    Sodium 139 140  --    Potassium 3.8 3.6  --    Chloride 103 103  --    CO2 28 26  --    BUN 12 11  --    Creatinine 0.8 0.9 0.8   Calcium 9.2 9.4  --          Tympanometry revealed Type A tympanogram on the left and Type A tympanogram on the right.   Report of the audiologist performing this audiometric testing was also reviewed      Assessment  1. Eustachian tube dysfunction, bilateral  - fluticasone propionate (FLONASE) 50 mcg/actuation nasal spray; 1 spray (50 mcg total) by Each Nostril route 2 (two) times daily.  Dispense: 18.2 mL; Refill: 3  - azelastine (ASTELIN) 137 mcg (0.1 %) nasal spray; 1 spray (137 mcg total) by Nasal route 2 (two) times daily.  Dispense: 30 mL; Refill: 3    2. Sinus problem  - Ambulatory referral/consult to ENT       Plan:  Eustachian tube dysfunction (ETD) : ETD can be idiopathic, due to anatomic obstruction,  or caused by  Inflammation from either allergic rhinitis (AR ) or laryngopharyngeal reflux (LPR).    Sometimes this can lead to development of a middle ear effusion (IRIS) which may or may not get secondarily infected. Usually, the fluid will resolve without intervention however in some cases it can take 8-12 weeks for fluid to resolve completely. Occasionally a tympanostomy tube (PET) needs to be placed for this ETD treatment in certain conditions (persistent IRIS >2-3 months or if  severe pain associated with or without IRIS, significant retraction of tympanic membrane that appears to be starting to cause conductive hearing changes). No middle ear effusion today   In addition to treatment trials for either AR or LPR, the patient can gently auto insufflate daily to intermittently equalize middle ear pressure. If unsuccessful, pt should not continue with more frequent or more forceful attempts with this maneuver. Intermittent use of Afrin can also help however I advise to only use if having severe pain given risk of rhinitis medicamentosa (pt counseled extensively about  risk of physical addiction and not to use for prolonged time period).    Discussed plan of care with patient in detail and all questions answered. Patient reported understanding of plan of care.

## 2023-11-15 NOTE — PROGRESS NOTES
Please click on link to view Tympanograms:  Document on 11/15/2023 3:47 PM by Ivanna Ocampo AU.D: Tympanograms    Mr. John Lemos, a 73 y.o. male, was seen in the clinic today for tympanograms only. Mr. Lemos's main complaint was bilateral otalgia.    Otoscopy clear bilaterally. Tympanometry testing revealed Type A tympanograms bilaterally, indicating normal middle ear function.    Recommendations:  1. Otologic evaluation  2. Audiological evaluation if hearing changes  3. Hearing protection when in noise

## 2023-11-15 NOTE — PATIENT INSTRUCTIONS
"Information and instructions from your visit with me today:    Start using the following medication nasal sprays:   Fluticasone spray:    This medication is a steroid spray. It stays within the nose and does not have absorption into the body that leads to side effects that one has with oral steroid medication. Fluticasone nasal spray is the same as the Flonase brand nasal spray. Discuss with your pharmacist if the price is lower over the counter or with a prescription ( this varies depending on insurance). The medication that is over the counter is the same as the prescription medication. Use this medication as instructed on the prescription, 1-2 sprays on each side of your nose twice daily.     Azelastine  spray:  This medication is an antihistamine used to treat nasal symptoms of allergy, which works specifically in the nose unlike antihistamine pills which have more of an effect on the whole body. Use this medication as instructed on the prescription, 1 spray on each side of your nose twice daily.     Additional instructions for medication sprays  Place the tip of the medication bottle in your nose and aim slightly up and out on each side to get medication high and deep into your nose and sinuses, and not have it all deposit in the very front of your nose. Aim the tip of the nozzle towards the outer corner of your eye . You can imagine aiming towards the back of your eyeball on each side for this, as opposed to straight back to the center of your nose and head.     You need to use this medication every day regardless of symptoms, as it takes time ( a few weeks) to work and get the benefits. It does not work on an "as needed" basis like taking a decongestant. If your symptoms only occur in a particular season, then the medication can be used seasonally instead of year long. For seasonal symptoms, you should start using the spray twice daily a month before when you normally have symptoms ( for example, if symptoms " start in August, should start at the end of June).       NASAL SALINE SPRAY ( simply saline and arm and hammer are examples) There are several different brands found in the cold and flu aisle of the pharmacy. You can use any brand of saline spray - this will deliver the saline by a gentle mist ( if you have difficulty or discomfort with nasal rinse/ a lot of fluid in the nose, this will be more comfortable).       Always rinse your nose with saline prior to using medication sprays and wait a couple of hours before using again. You can use the saline throughout the day to help with stuffy nose or dry nose.    Do not use nasal decongestant sprays such as Afrin or similar products long term ( over 3 days) .  This can cause long term physical nasal addiction. Afrin should only be used if having nose bleeds, severe nasal congestion , or severe ear pain/fullness and should not be used for more than 2-3 days in a row . It is a not a medication that should be used for a long period of time.     It was nice meeting you today, and I look forward to helping you feel better soon. Please don't hesitate to call if you have any other questions or concerns, or if I can be of any assistance in the meantime.          CATHI Rolon, FNP-C  Otorhinolaryngology

## 2023-11-20 ENCOUNTER — TELEPHONE (OUTPATIENT)
Dept: PHARMACY | Facility: CLINIC | Age: 73
End: 2023-11-20
Payer: COMMERCIAL

## 2023-11-27 ENCOUNTER — PATIENT OUTREACH (OUTPATIENT)
Dept: ADMINISTRATIVE | Facility: HOSPITAL | Age: 73
End: 2023-11-27
Payer: COMMERCIAL

## 2023-12-09 DIAGNOSIS — M51.36 DDD (DEGENERATIVE DISC DISEASE), LUMBAR: ICD-10-CM

## 2023-12-09 NOTE — TELEPHONE ENCOUNTER
Care Due:                  Date            Visit Type   Department     Provider  --------------------------------------------------------------------------------                                [facility]                              PRIMARY      MED/ INTERNAL  Last Visit: 11-      CARE ([facility])   MED/ PEDS      DEANNA YORK                              [facility]                              PRIMARY      MED/ INTERNAL  Next Visit: 02-      CARE ([facility])   MED/ PEDS      Chan Estrada                                                            Last  Test          Frequency    Reason                     Performed    Due Date  --------------------------------------------------------------------------------    CBC.........  12 months..  diclofenac...............  02- 02-    Lipid Panel.  12 months..  atorvastatin.............  02- 02-    [facility] Embedded Care Due Messages. Reference number: [id number].   12/09/2023 10:01:59 AM CST

## 2023-12-11 RX ORDER — DULOXETIN HYDROCHLORIDE 60 MG/1
60 CAPSULE, DELAYED RELEASE ORAL
Qty: 90 CAPSULE | Refills: 2 | Status: SHIPPED | OUTPATIENT
Start: 2023-12-11

## 2023-12-11 NOTE — TELEPHONE ENCOUNTER
Provider Staff:  Action required for this patient     Labs due    Please see care gap opportunities below in Care Due Message.    Thanks!  [facility]     Appointments      Date Provider   Last Visit   8/7/2023 Chan Estrada MD   Next Visit   2/12/2024 Chan Estrada MD     Refill Decision Note   John Lemos  is requesting a refill authorization.  Brief Assessment and Rationale for Refill:  Approve     Medication Therapy Plan:  order matches      Comments:     Note composed:9:39 AM 12/11/2023

## 2023-12-18 DIAGNOSIS — M17.11 PRIMARY OSTEOARTHRITIS OF RIGHT KNEE: ICD-10-CM

## 2023-12-18 RX ORDER — MELOXICAM 15 MG/1
15 TABLET ORAL DAILY
Qty: 30 TABLET | Refills: 3 | Status: SHIPPED | OUTPATIENT
Start: 2023-12-18

## 2023-12-26 ENCOUNTER — OFFICE VISIT (OUTPATIENT)
Dept: PODIATRY | Facility: CLINIC | Age: 73
End: 2023-12-26
Payer: COMMERCIAL

## 2023-12-26 VITALS
HEIGHT: 68 IN | WEIGHT: 175.94 LBS | BODY MASS INDEX: 26.66 KG/M2 | SYSTOLIC BLOOD PRESSURE: 135 MMHG | HEART RATE: 76 BPM | DIASTOLIC BLOOD PRESSURE: 71 MMHG

## 2023-12-26 DIAGNOSIS — M20.11 HALLUX ABDUCTO VALGUS, RIGHT: ICD-10-CM

## 2023-12-26 DIAGNOSIS — M20.42 HAMMER TOES OF BOTH FEET: ICD-10-CM

## 2023-12-26 DIAGNOSIS — M20.12 HALLUX ABDUCTO VALGUS, LEFT: ICD-10-CM

## 2023-12-26 DIAGNOSIS — M20.41 HAMMER TOES OF BOTH FEET: ICD-10-CM

## 2023-12-26 DIAGNOSIS — R20.2 PARESTHESIA OF BOTH FEET: ICD-10-CM

## 2023-12-26 DIAGNOSIS — E11.9 COMPREHENSIVE DIABETIC FOOT EXAMINATION, TYPE 2 DM, ENCOUNTER FOR: Primary | ICD-10-CM

## 2023-12-26 PROCEDURE — 3078F DIAST BP <80 MM HG: CPT | Mod: CPTII,S$GLB,, | Performed by: PODIATRIST

## 2023-12-26 PROCEDURE — 99999 PR PBB SHADOW E&M-EST. PATIENT-LVL V: CPT | Mod: PBBFAC,,, | Performed by: PODIATRIST

## 2023-12-26 PROCEDURE — 99999 PR PBB SHADOW E&M-EST. PATIENT-LVL V: ICD-10-PCS | Mod: PBBFAC,,, | Performed by: PODIATRIST

## 2023-12-26 PROCEDURE — 1101F PR PT FALLS ASSESS DOC 0-1 FALLS W/OUT INJ PAST YR: ICD-10-PCS | Mod: CPTII,S$GLB,, | Performed by: PODIATRIST

## 2023-12-26 PROCEDURE — 1160F RVW MEDS BY RX/DR IN RCRD: CPT | Mod: CPTII,S$GLB,, | Performed by: PODIATRIST

## 2023-12-26 PROCEDURE — 3061F PR NEG MICROALBUMINURIA RESULT DOCUMENTED/REVIEW: ICD-10-PCS | Mod: CPTII,S$GLB,, | Performed by: PODIATRIST

## 2023-12-26 PROCEDURE — 1126F AMNT PAIN NOTED NONE PRSNT: CPT | Mod: CPTII,S$GLB,, | Performed by: PODIATRIST

## 2023-12-26 PROCEDURE — 3061F NEG MICROALBUMINURIA REV: CPT | Mod: CPTII,S$GLB,, | Performed by: PODIATRIST

## 2023-12-26 PROCEDURE — 3008F PR BODY MASS INDEX (BMI) DOCUMENTED: ICD-10-PCS | Mod: CPTII,S$GLB,, | Performed by: PODIATRIST

## 2023-12-26 PROCEDURE — 3008F BODY MASS INDEX DOCD: CPT | Mod: CPTII,S$GLB,, | Performed by: PODIATRIST

## 2023-12-26 PROCEDURE — 1126F PR PAIN SEVERITY QUANTIFIED, NO PAIN PRESENT: ICD-10-PCS | Mod: CPTII,S$GLB,, | Performed by: PODIATRIST

## 2023-12-26 PROCEDURE — 3066F NEPHROPATHY DOC TX: CPT | Mod: CPTII,S$GLB,, | Performed by: PODIATRIST

## 2023-12-26 PROCEDURE — 3075F SYST BP GE 130 - 139MM HG: CPT | Mod: CPTII,S$GLB,, | Performed by: PODIATRIST

## 2023-12-26 PROCEDURE — 4010F PR ACE/ARB THEARPY RXD/TAKEN: ICD-10-PCS | Mod: CPTII,S$GLB,, | Performed by: PODIATRIST

## 2023-12-26 PROCEDURE — 4010F ACE/ARB THERAPY RXD/TAKEN: CPT | Mod: CPTII,S$GLB,, | Performed by: PODIATRIST

## 2023-12-26 PROCEDURE — 99214 PR OFFICE/OUTPT VISIT, EST, LEVL IV, 30-39 MIN: ICD-10-PCS | Mod: S$GLB,,, | Performed by: PODIATRIST

## 2023-12-26 PROCEDURE — 3078F PR MOST RECENT DIASTOLIC BLOOD PRESSURE < 80 MM HG: ICD-10-PCS | Mod: CPTII,S$GLB,, | Performed by: PODIATRIST

## 2023-12-26 PROCEDURE — 99214 OFFICE O/P EST MOD 30 MIN: CPT | Mod: S$GLB,,, | Performed by: PODIATRIST

## 2023-12-26 PROCEDURE — 1159F PR MEDICATION LIST DOCUMENTED IN MEDICAL RECORD: ICD-10-PCS | Mod: CPTII,S$GLB,, | Performed by: PODIATRIST

## 2023-12-26 PROCEDURE — 3288F FALL RISK ASSESSMENT DOCD: CPT | Mod: CPTII,S$GLB,, | Performed by: PODIATRIST

## 2023-12-26 PROCEDURE — 3066F PR DOCUMENTATION OF TREATMENT FOR NEPHROPATHY: ICD-10-PCS | Mod: CPTII,S$GLB,, | Performed by: PODIATRIST

## 2023-12-26 PROCEDURE — 1160F PR REVIEW ALL MEDS BY PRESCRIBER/CLIN PHARMACIST DOCUMENTED: ICD-10-PCS | Mod: CPTII,S$GLB,, | Performed by: PODIATRIST

## 2023-12-26 PROCEDURE — 3075F PR MOST RECENT SYSTOLIC BLOOD PRESS GE 130-139MM HG: ICD-10-PCS | Mod: CPTII,S$GLB,, | Performed by: PODIATRIST

## 2023-12-26 PROCEDURE — 1101F PT FALLS ASSESS-DOCD LE1/YR: CPT | Mod: CPTII,S$GLB,, | Performed by: PODIATRIST

## 2023-12-26 PROCEDURE — 3051F PR MOST RECENT HEMOGLOBIN A1C LEVEL 7.0 - < 8.0%: ICD-10-PCS | Mod: CPTII,S$GLB,, | Performed by: PODIATRIST

## 2023-12-26 PROCEDURE — 3288F PR FALLS RISK ASSESSMENT DOCUMENTED: ICD-10-PCS | Mod: CPTII,S$GLB,, | Performed by: PODIATRIST

## 2023-12-26 PROCEDURE — 1159F MED LIST DOCD IN RCRD: CPT | Mod: CPTII,S$GLB,, | Performed by: PODIATRIST

## 2023-12-26 PROCEDURE — 3051F HG A1C>EQUAL 7.0%<8.0%: CPT | Mod: CPTII,S$GLB,, | Performed by: PODIATRIST

## 2023-12-26 NOTE — PATIENT INSTRUCTIONS
Over the counter pain creams: Voltaren Gel, Biofreeze, Bengay, tiger balm, two old goat, lidocaine gel,  Absorbine Veterinary Liniment Gel Topical Analgesic Sore Muscle and Joint Pain Relief    Recommend lotions: eucerin, eucerin for diabetics, aquaphor, A&D ointment, gold bond for diabetics, sween, McDaniels's Bees all purpose baby ointment,  urea 40 with aloe or SkinIntegra rapid crack repair (found on amazon.com)    Shoe recommendations: (try 6pm.com, zappos.com , nordstromrack.Ziippi, or shoes.com for discounted prices) you can visit varsity shoes in [place], DSW shoes in [place]  or jeanne rack in the [facility] (there are also several shoe brand outlets in the [facility])    ONLY purchase stability style tennis shoes NO flex, foam, free, yoga mat style shoes    Shoe examples:    Asics (GT 4000 or gel foundations), new balance stability type shoes (such as the 940 series), saucony (stabil c3),  Stearns (GTS or Beast or   transcend), propet, HokaOne (tennis shoe) Rafael (tennis shoes and boots)    Kvngft Nilson (women) Fly&Anthony (men), clarks, crocs, aerosoles, naturalizers, SAS, ecco, born, tran dixon, rockports (dress shoes)    Vionic, burkenstocks, fitflops, propet, taos, baretraps (sandals)    HokaOne sandals, crocs, propet (house shoes)      Nail Home remedy:  Vicks Vapor rub or Emuaid to nails for easier manageability    Diabetes: Inspecting Your Feet  Diabetes increases your chances of developing foot problems. So inspect your feet every day. This helps you find small skin irritations before they become serious infections. If you have trouble seeing the bottoms of your feet, use a mirror or ask a family member or friend to help.     Pressure spots on the bottom of the foot are common areas where problems develop.   How to check your feet  Below are tips to help you look for foot problems. Try to check your feet at the same time each day, such as when you get out of bed in the morning:  Check the top of  each foot. The tops of toes, back of the heel, and outer edge of the foot can get a lot of rubbing from poor-fitting shoes.  Check the bottom of each foot. Daily wear and tear often leads to problems at pressure spots.  Check the toes and nails. Fungal infections often occur between toes. Toenail problems can also be a sign of fungal infections or lead to breaks in the skin.  Check your shoes, too. Loose objects inside a shoe can injure the foot. Use your hand to feel inside your shoes for things like andree, loose stitching, or rough areas that could irritate your skin.  Warning signs  Look for any color changes in the foot. Redness with streaks can signal a severe infection, which needs immediate medical attention. Tell your doctor right away if you have any of these problems:  Swelling, sometimes with color changes, may be a sign of poor blood flow or infection. Symptoms include tenderness and an increase in the size of your foot.  Warm or hot areas on your feet may be signs of infection. A foot that is cold may not be getting enough blood.  Sensations such as burning, tingling, or pins and needles can be signs of a problem. Also check for areas that may be numb.  Hot spots are caused by friction or pressure. Look for hot spots in areas that get a lot of rubbing. Hot spots can turn into blisters, calluses, or sores.  Cracks and sores are caused by dry or irritated skin. They are a sign that the skin is breaking down, which can lead to infection.  Toenail problems to watch for include nails growing into the skin (ingrown toenail) and causing redness or pain. Thick, yellow, or discolored nails can signal a fungal infection.  Drainage and odor can develop from untreated sores and ulcers. Call your doctor right away if you notice white or yellow drainage, bleeding, or unpleasant odor.   © 8155-1871 [employer]. [address] [place] [address], [place] [address]. All rights reserved. This information is not  intended as a substitute for professional medical care. Always follow your healthcare professional's instructions.        Step-by-Step:  Inspecting Your Feet (Diabetes)    Date Last Reviewed: 10/1/2016  © 1214-2218 [employer]. [address] [place] [address], [place] [address]. All rights reserved. This information is not intended as a substitute for professional medical care. Always follow your healthcare professional's instructions.

## 2023-12-26 NOTE — PROGRESS NOTES
Subjective:      Patient ID: John Lemos is a 73 y.o. male.    Chief Complaint: Diabetes Mellitus (With burning sensation) and Diabetic Foot Exam (11/1/23 Dr Limon)    John is a 73 y.o. male who presents to the clinic upon referral from Dr. Sherman ref. provider found  for evaluation and treatment of diabetic feet. John has a past medical history of Arthritis, Cataract, Diabetes mellitus (7 yrs), Diabetes mellitus type 2, controlled (9/8/2012), Eye injury, and Hyperlipidemia.  The patient has no major complaints with feet, he has burning to his feet, not reproducible. Hx of lumbar DDD monitored by his PCP.  Chief concern is how to care for feet as a diabetic.    Last seen by us to Podiatry on 11/11/2021    PCP: Chan Estrada MD    Date Last Seen by PCP:   Chief Complaint   Patient presents with    Diabetes Mellitus     With burning sensation    Diabetic Foot Exam     11/1/23 Dr Limon       Current shoe gear: boat style shoes, supportive    Hemoglobin A1C   Date Value Ref Range Status   10/21/2023 7.0 (H) 4.0 - 5.6 % Final     Comment:     ADA Screening Guidelines:  5.7-6.4%  Consistent with prediabetes  >or=6.5%  Consistent with diabetes    High levels of fetal hemoglobin interfere with the HbA1C  assay. Heterozygous hemoglobin variants (HbS, HgC, etc)do  not significantly interfere with this assay.   However, presence of multiple variants may affect accuracy.     08/07/2023 9.0 (H) 4.0 - 5.6 % Final     Comment:     ADA Screening Guidelines:  5.7-6.4%  Consistent with prediabetes  >or=6.5%  Consistent with diabetes    High levels of fetal hemoglobin interfere with the HbA1C  assay. Heterozygous hemoglobin variants (HbS, HgC, etc)do  not significantly interfere with this assay.   However, presence of multiple variants may affect accuracy.     02/28/2023 7.1 (H) 4.0 - 5.6 % Final     Comment:     ADA Screening Guidelines:  5.7-6.4%  Consistent with prediabetes  >or=6.5%  Consistent with diabetes    High  levels of fetal hemoglobin interfere with the HbA1C  assay. Heterozygous hemoglobin variants (HbS, HgC, etc)do  not significantly interfere with this assay.   However, presence of multiple variants may affect accuracy.                 Patient Active Problem List   Diagnosis    GERD (gastroesophageal reflux disease)    DDD (degenerative disc disease), lumbar    ED (erectile dysfunction)    Dyslipidemia    Neuropathy    Type 2 diabetes mellitus with diabetic polyneuropathy    Primary osteoarthritis of right knee    Nuclear sclerosis of both eyes       Current Outpatient Medications on File Prior to Visit   Medication Sig Dispense Refill    atorvastatin (LIPITOR) 40 MG tablet Take 1 tablet (40 mg total) by mouth once daily. 90 tablet 3    azelastine (ASTELIN) 137 mcg (0.1 %) nasal spray 1 spray (137 mcg total) by Nasal route 2 (two) times daily. 30 mL 3    benzonatate (TESSALON) 200 MG capsule Take 1 capsule (200 mg total) by mouth 3 (three) times daily as needed for Cough. 30 capsule 0    blood sugar diagnostic (FREESTYLE LITE STRIPS) Strp Inject 1 each as directed once daily. 100 each 3    cetirizine (ZYRTEC) 10 MG tablet Take 1 tablet (10 mg total) by mouth once daily. 30 tablet 0    cetirizine (ZYRTEC) 10 MG tablet Take 1 tablet (10 mg total) by mouth once daily. 30 tablet 0    diclofenac (VOLTAREN) 50 MG EC tablet Take 1 tablet (50 mg total) by mouth 2 (two) times daily as needed (shoulder pain). 30 tablet 0    diclofenac sodium (VOLTAREN) 1 % Gel Apply 2 g topically 4 (four) times daily. 20 g 0    dulaglutide (TRULICITY) 0.75 mg/0.5 mL pen injector Inject 0.75 mg into the skin every 7 days. 12 pen 1    DULoxetine (CYMBALTA) 60 MG capsule Take 1 capsule by mouth once daily 90 capsule 2    fluticasone (FLONASE) 50 mcg/actuation nasal spray 1 spray (50 mcg total) by Each Nare route once daily. 16 g 2    fluticasone propionate (FLONASE) 50 mcg/actuation nasal spray 1 spray (50 mcg total) by Each  Nostril route 2 (two) times daily. 9.9 mL 0    fluticasone propionate (FLONASE) 50 mcg/actuation nasal spray 1 spray (50 mcg total) by Each Nostril route 2 (two) times daily. 18.2 mL 3    ipratropium (ATROVENT) 21 mcg (0.03 %) nasal spray 2 sprays by Nasal route 3 (three) times daily. 30 mL 0    lancets (FREESTYLE LANCETS) 28 gauge Misc Inject 1 lancet as directed once daily. 100 each 3    loratadine (CLARITIN) 10 mg tablet Take 1 tablet (10 mg total) by mouth daily as needed for Allergies (or runny nose). 30 tablet 3    losartan (COZAAR) 50 MG tablet Take 1 tablet (50 mg total) by mouth once daily. 90 tablet 1    meloxicam (MOBIC) 15 MG tablet Take 1 tablet (15 mg total) by mouth once daily. 30 tablet 3    metFORMIN (GLUCOPHAGE) 500 MG tablet TAKE 2 TABLETS BY MOUTH TWICE DAILY WITH BREAKFAST AND WITH SUPPER 360 tablet 0     No current facility-administered medications on file prior to visit.       Review of patient's allergies indicates:   Allergen Reactions    Sulfa (sulfonamide antibiotics) Swelling       Past Surgical History:   Procedure Laterality Date    COLONOSCOPY N/A 11/25/2019    Procedure: COLONOSCOPY;  Surgeon: Eddie Leslie MD;  Location: [facility];  Service: Endoscopy;  Laterality: N/A;       Family History   Problem Relation Age of Onset    Diabetes Mother     Cancer Mother     Hypertension Mother     Diabetes Father     Cancer Father     No Known Problems Sister     No Known Problems Brother     No Known Problems Maternal Aunt     No Known Problems Maternal Uncle     No Known Problems Paternal Aunt     No Known Problems Paternal Uncle     No Known Problems Maternal Grandmother     No Known Problems Maternal Grandfather     No Known Problems Paternal Grandmother     No Known Problems Paternal Grandfather     Amblyopia Neg Hx     Blindness Neg Hx     Cataracts Neg Hx     Glaucoma Neg Hx     Macular degeneration Neg Hx     Retinal detachment Neg Hx     Strabismus Neg Hx      Stroke Neg Hx     Thyroid disease Neg Hx        Social History     Socioeconomic History    Marital status:    Occupational History    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     Employer: [employer]   Tobacco Use    Smoking status: Never    Smokeless tobacco: Never   Substance and Sexual Activity    Alcohol use: No    Drug use: No    Sexual activity: Not Currently     Social Determinants of Health     Financial Resource Strain: Low Risk  (11/13/2023)    Overall Financial Resource Strain (CARDIA)     Difficulty of Paying Living Expenses: Not hard at all   Food Insecurity: No Food Insecurity (11/13/2023)    Hunger Vital Sign     Worried About Running Out of Food in the Last Year: Never true     Ran Out of Food in the Last Year: Never true   Transportation Needs: No Transportation Needs (11/13/2023)    PRAPARE - Transportation     Lack of Transportation (Medical): No     Lack of Transportation (Non-Medical): No   Physical Activity: Insufficiently Active (11/13/2023)    Exercise Vital Sign     Days of Exercise per Week: 2 days     Minutes of Exercise per Session: 20 min   Stress: Stress Concern Present (11/13/2023)    [employer] - Occupational Stress Questionnaire     Feeling of Stress : To some extent   Social Connections: Unknown (11/13/2023)    Social Connection and Isolation Panel [NHANES]     Frequency of Communication with Friends and Family: More than three times a week     Frequency of Social Gatherings with Friends and Family: Twice a week     Active Member of Clubs or Organizations: Yes     Attends Club or Organization Meetings: More than 4 times per year     Marital Status:    Housing Stability: Low Risk  (11/13/2023)    Housing Stability Vital Sign     Unable to Pay for Housing in the Last Year: No     Number of Places Lived in the Last Year: 1     Unstable Housing in the Last Year: No  "      Review of Systems   Constitutional: Negative for chills and fever.   Cardiovascular:  Negative for claudication and leg swelling.   Respiratory:  Negative for cough and shortness of breath.    Skin:  Positive for dry skin. Negative for itching, nail changes and rash.   Musculoskeletal:  Positive for arthritis, back pain, joint pain and myalgias. Negative for falls, joint swelling and muscle weakness.   Gastrointestinal:  Negative for diarrhea, nausea and vomiting.   Neurological:  Positive for paresthesias. Negative for numbness, tremors and weakness.   Psychiatric/Behavioral:  Negative for altered mental status and hallucinations.            Objective:      Vitals:    12/26/23 0709   BP: 135/71   Pulse: 76   Weight: 79.8 kg (175 lb 14.8 oz)   Height: 5' 8" (1.727 m)   PainSc: 0-No pain         Physical Exam  Nursing note reviewed.   Constitutional:       General: He is not in acute distress.     Appearance: He is not toxic-appearing or diaphoretic.      Comments: Pt. is well-developed, well-nourished, appears stated age, in no acute distress, alert and oriented x 3. No evidence of depression, anxiety, or agitation. Calm, cooperative, and communicative. Appropriate interactions and affect.   Cardiovascular:      Pulses:           Dorsalis pedis pulses are 2+ on the right side and 2+ on the left side.        Posterior tibial pulses are 2+ on the right side and 2+ on the left side.      Comments: dorsalis pedis, posterior tibial pulses, and perforating peroneal pulses are palpable bilaterally. Capillary refill time is within normal limits. Digital hair present.   Pulmonary:      Effort: No respiratory distress.   Musculoskeletal:      Right ankle: No tenderness. No lateral malleolus, medial malleolus, AITF ligament, CF ligament or posterior TF ligament tenderness.      Right Achilles Tendon: No defects. Alva's test negative.      Left ankle: No tenderness. No lateral malleolus, medial malleolus, AITF " ligament, CF ligament or posterior TF ligament tenderness.      Left Achilles Tendon: No defects. Alva's test negative.      Right foot: No tenderness or bony tenderness.      Left foot: No tenderness or bony tenderness.      Comments:  Decreased stride, station of gait.  apropulsive toe off.  Increased angle and base of gait.      Patient has hammertoes of digits 2-5 bilateral partially reducible without symptom today.    Visible and palpable bunion without pain at dorsomedial 1st metatarsal head right and left.  Hallux abducted right and left partially reducible, tracks laterally without being track bound.  No ecchymosis, erythema, edema, or cardinal signs infection or signs of trauma same foot.   Lymphadenopathy:      Comments: No lymphatic streaking    Negative lymphadenopathy bilateral popliteal fossa and tarsal tunnel.     Skin:     General: Skin is warm and dry.      Coloration: Skin is not pale.      Findings: No bruising, burn, erythema, lesion or rash.      Nails: There is no clubbing.      Comments: Skin is of normal turgor. Normal temperature gradient. Examination of the skin reveals no evidence of significant rashes, open lesions, suspicious appearing nevi or other concerning lesions.      Toenails 1-5 bilaterally are neatly trimmed; of normal color and thickness     Neurological:      Sensory: No sensory deficit.      Motor: No tremor or atrophy.      Comments: Lincoln-Bashir 5.07 monofilament is intact bilateral feet. Sharp/dull sensation is also intact Bilateral feet. Proprioception is grossly intact. Vibratory sensation intact (pt able to sense vibration stop within 3-5 seconds)    Paresthesias, and hyperesthesia bilateral feet at toes with no clearly identified trigger or source.   Psychiatric:         Attention and Perception: He is attentive.         Mood and Affect: Mood is not anxious. Affect is not inappropriate.         Speech: He is communicative. Speech is not slurred.          Behavior: Behavior is not combative.             Assessment:       Encounter Diagnoses   Name Primary?    Comprehensive diabetic foot examination, type 2 DM, encounter for Yes    Hammer toes of both feet     Hallux abducto valgus, left     Hallux abducto valgus, right     Paresthesia of both feet            Plan:       John was seen today for diabetes mellitus and diabetic foot exam.    Diagnoses and all orders for this visit:    Comprehensive diabetic foot examination, type 2 DM, encounter for    Hammer toes of both feet    Hallux abducto valgus, left    Hallux abducto valgus, right    Paresthesia of both feet        I counseled the patient on his conditions, their implications and medical management.    Education about the diabetic foot, neuropathy, and prevention of limb loss.    Shoe inspection. Diabetic Foot Education. Patient reminded of the importance of good nutrition/healthy diet/weight management and blood sugar control to help prevent podiatric complications of diabetes. Patient instructed on proper foot hygeine. Wear comfortable, proper fitting shoes. Wash feet daily. Dry well. After drying, apply moisturizer to feet (no lotion to webspaces). Inspect feet daily for skin breaks, blisters, swelling, or redness. Wear cotton socks (preferably white)  Change socks every day. Do NOT walk barefoot. Do NOT use heating pads or hot water soaks. We discussed wearing proper shoe gear, daily foot inspections, never walking without protective shoe gear.     Discussed edema control and the importance of daily moisturizer to the feet such as Gold bonds diabetic foot cream    Recommend applying vicks vaporub to thick abnormal toenails daily x 6 months to treat fungal nail infection.    rx diabetic shoes for protection and support    Explained possible causes of patients paresthesias including but not limited to systemic illness vs idiopathic vs edema vs low back pathology vs shoe gear.  In his case, the  paresthesias to his feet are likely secondary to lumbar degenerative joint disease.  Although he has bilateral lower extremity paresthesias, protective sensation is intact clinically and therefore based on clinical exam this patient does not qualify for foot care. Patients who qualify for nail care are usually as follows: diabetic with neurological manifestations, PVD, pernicious anemia, or CKD with appropriate modifiers that indicate high amputation risk (evidence of decreased perfusion, previous amputation, loss of protective sensation,etc). Therefore patient is low risk for developing lower extremity issues secondary to diabetes. I recommend continued yearly diabetic foot examinations. Patients without pedal manifestations of DM, do not qualify for nail/callus trimming      He will continue to monitor the areas daily, inspect his feet, wear protective shoe gear when ambulatory, moisturizer to maintain skin integrity and follow in this office in approximately 12 months, sooner p.r.n.

## 2024-01-05 ENCOUNTER — TELEPHONE (OUTPATIENT)
Dept: FAMILY MEDICINE | Facility: CLINIC | Age: 74
End: 2024-01-05
Payer: COMMERCIAL

## 2024-01-05 ENCOUNTER — PATIENT MESSAGE (OUTPATIENT)
Dept: FAMILY MEDICINE | Facility: CLINIC | Age: 74
End: 2024-01-05
Payer: COMMERCIAL

## 2024-01-05 NOTE — TELEPHONE ENCOUNTER
Patient notified via [employer] message that Mobility Impairment form ready for  here at [facility].

## 2024-02-02 ENCOUNTER — TELEPHONE (OUTPATIENT)
Dept: FAMILY MEDICINE | Facility: CLINIC | Age: 74
End: 2024-02-02
Payer: COMMERCIAL

## 2024-02-02 DIAGNOSIS — R91.1 SOLITARY PULMONARY NODULE: Primary | ICD-10-CM

## 2024-02-02 NOTE — TELEPHONE ENCOUNTER
----- Message from Chan Estrada MD sent at 11/12/2023  3:40 PM CST -----  Order Repeat CT Scan (Feb 2024)    Your CT showed a very small likely insignificant nodule that increased in size since 9 years prior. I will repeat the CT scan in 3 months to make sure it is not worrisome.

## 2024-02-02 NOTE — TELEPHONE ENCOUNTER
3 month CT SCAN reordered for Feb 2024    If any issues scheduling, please call Radiology at [facility] [phone]

## 2024-02-06 ENCOUNTER — TELEPHONE (OUTPATIENT)
Dept: FAMILY MEDICINE | Facility: CLINIC | Age: 74
End: 2024-02-06
Payer: COMMERCIAL

## 2024-02-12 ENCOUNTER — OFFICE VISIT (OUTPATIENT)
Dept: FAMILY MEDICINE | Facility: CLINIC | Age: 74
End: 2024-02-12
Payer: COMMERCIAL

## 2024-02-12 VITALS
DIASTOLIC BLOOD PRESSURE: 84 MMHG | SYSTOLIC BLOOD PRESSURE: 122 MMHG | TEMPERATURE: 98 F | OXYGEN SATURATION: 97 % | BODY MASS INDEX: 27.01 KG/M2 | WEIGHT: 178.25 LBS | HEART RATE: 72 BPM | HEIGHT: 68 IN

## 2024-02-12 DIAGNOSIS — F51.12 INSUFFICIENT SLEEP SYNDROME: ICD-10-CM

## 2024-02-12 DIAGNOSIS — E78.5 DYSLIPIDEMIA: ICD-10-CM

## 2024-02-12 DIAGNOSIS — I10 ESSENTIAL HYPERTENSION: ICD-10-CM

## 2024-02-12 DIAGNOSIS — E11.42 TYPE 2 DIABETES MELLITUS WITH DIABETIC POLYNEUROPATHY, WITHOUT LONG-TERM CURRENT USE OF INSULIN: Primary | ICD-10-CM

## 2024-02-12 DIAGNOSIS — N52.9 ERECTILE DYSFUNCTION, UNSPECIFIED ERECTILE DYSFUNCTION TYPE: ICD-10-CM

## 2024-02-12 DIAGNOSIS — J30.9 CHRONIC ALLERGIC RHINITIS: ICD-10-CM

## 2024-02-12 PROCEDURE — 4010F ACE/ARB THERAPY RXD/TAKEN: CPT | Mod: CPTII,S$GLB,, | Performed by: INTERNAL MEDICINE

## 2024-02-12 PROCEDURE — 3072F LOW RISK FOR RETINOPATHY: CPT | Mod: CPTII,S$GLB,, | Performed by: INTERNAL MEDICINE

## 2024-02-12 PROCEDURE — 1126F AMNT PAIN NOTED NONE PRSNT: CPT | Mod: CPTII,S$GLB,, | Performed by: INTERNAL MEDICINE

## 2024-02-12 PROCEDURE — 99999 PR PBB SHADOW E&M-EST. PATIENT-LVL III: CPT | Mod: PBBFAC,,, | Performed by: INTERNAL MEDICINE

## 2024-02-12 PROCEDURE — G2211 COMPLEX E/M VISIT ADD ON: HCPCS | Mod: S$GLB,,, | Performed by: INTERNAL MEDICINE

## 2024-02-12 PROCEDURE — 3074F SYST BP LT 130 MM HG: CPT | Mod: CPTII,S$GLB,, | Performed by: INTERNAL MEDICINE

## 2024-02-12 PROCEDURE — 3008F BODY MASS INDEX DOCD: CPT | Mod: CPTII,S$GLB,, | Performed by: INTERNAL MEDICINE

## 2024-02-12 PROCEDURE — 3079F DIAST BP 80-89 MM HG: CPT | Mod: CPTII,S$GLB,, | Performed by: INTERNAL MEDICINE

## 2024-02-12 PROCEDURE — 99214 OFFICE O/P EST MOD 30 MIN: CPT | Mod: S$GLB,,, | Performed by: INTERNAL MEDICINE

## 2024-02-12 PROCEDURE — 1159F MED LIST DOCD IN RCRD: CPT | Mod: CPTII,S$GLB,, | Performed by: INTERNAL MEDICINE

## 2024-02-12 RX ORDER — TADALAFIL 5 MG/1
5 TABLET ORAL DAILY
Qty: 30 TABLET | Refills: 11 | Status: SHIPPED | OUTPATIENT
Start: 2024-02-12 | End: 2025-02-11

## 2024-02-12 RX ORDER — DULAGLUTIDE 0.75 MG/.5ML
0.75 INJECTION, SOLUTION SUBCUTANEOUS
Qty: 12 PEN | Refills: 5 | Status: SHIPPED | OUTPATIENT
Start: 2024-02-12

## 2024-02-12 RX ORDER — CETIRIZINE HYDROCHLORIDE 10 MG/1
10 TABLET ORAL DAILY
Qty: 90 TABLET | Refills: 3 | Status: SHIPPED | OUTPATIENT
Start: 2024-02-12

## 2024-02-12 NOTE — PROGRESS NOTES
Chief Complaint  Chief Complaint   Patient presents with    Follow-up    Diabetes       HPI  John Lemos is a 73 y.o. male with multiple medical diagnoses as listed in the medical history and problem list that presents for diabetes follow-up.  Their last appointment with primary care was 11/1/2023.      Routine adult health maintenance   Discussed exercise, healthy diet and age appropriate vaccinations including RSV and Covid booster    Diabetes  Patient reports taking his metformin and trulicity, ran out of trulicity last week and has not yet taken his dose for this week 2/10. He monitors his blood sugar intermittently at home and says the average reading is between 125-130.     ENT  Patient complains of ringing in his ears, he has seen ENT and audiology (Oct. 2023) and plans to follow-up with them in 6 months  He also has allergies and takes claritin and zyrtec, plus flonase and astalin nasal sprays     Sleep   He reports sleeping propped up on a triangular pillow for the last 7 months and often wakes up coughing. He does not report excessive fatigue during the day, SOB, chest pain or leg swelling     Urology  Patient wakes once per night to urinate, reports it is not affecting his sleep. He also complains of inability to maintain an erection.       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 [employer]     Employer: [employer]   Tobacco Use    Smoking status: Never    Smokeless tobacco: Never   Substance and Sexual Activity    Alcohol use: No    Drug use: No    Sexual activity: Not Currently     Social Determinants of Health     Financial Resource Strain: Low Risk  (2/12/2024)    Overall Financial Resource Strain (CARDIA)     Difficulty of Paying Living Expenses: Not hard at all   Food Insecurity: No Food Insecurity (2/12/2024)    Hunger Vital Sign     Worried About Running Out of Food in the Last Year: Never true     Ran Out of Food in the Last Year: Never true   Transportation Needs: No Transportation Needs (2/12/2024)    PRAPARE - Transportation     Lack of Transportation (Medical): No     Lack of Transportation (Non-Medical): No   Physical Activity: Insufficiently Active (2/12/2024)    Exercise Vital Sign     Days of Exercise per Week: 5 days     Minutes of Exercise per Session: 20 min   Stress: No Stress Concern Present (2/12/2024)    [employer] [employer] - Occupational Stress Questionnaire     Feeling of Stress : Not at all   Social Connections: Unknown (2/12/2024)    Social Connection and Isolation Panel [NHANES]     Frequency of Communication with Friends and Family: More than three times a week     Frequency of Social Gatherings with Friends and Family: Once a week     Active Member of Clubs or Organizations: Yes     Attends Club or Organization Meetings: More than 4 times per year     Marital Status:    Housing Stability: Low Risk  (2/12/2024)    Housing Stability Vital Sign     Unable to Pay for Housing in the Last Year: No     Number of Places Lived in the Last Year: 1     Unstable Housing in the Last Year: No       FAMILY HISTORY:  Family History   Problem Relation Age of Onset    Diabetes Mother     Cancer Mother     Hypertension Mother     Diabetes Father     Cancer Father     No Known Problems Sister     No Known Problems Brother     No Known Problems Maternal Aunt     No Known  Problems Maternal Uncle     No Known Problems Paternal Aunt     No Known Problems Paternal Uncle     No Known Problems Maternal Grandmother     No Known Problems Maternal Grandfather     No Known Problems Paternal Grandmother     No Known Problems Paternal Grandfather     Amblyopia Neg Hx     Blindness Neg Hx     Cataracts Neg Hx     Glaucoma Neg Hx     Macular degeneration Neg Hx     Retinal detachment Neg Hx     Strabismus Neg Hx     Stroke Neg Hx     Thyroid disease Neg Hx        ALLERGIES AND MEDICATIONS: updated and reviewed.  Review of patient's allergies indicates:   Allergen Reactions    Sulfa (sulfonamide antibiotics) Swelling     Current Outpatient Medications   Medication Sig Dispense Refill    atorvastatin (LIPITOR) 40 MG tablet Take 1 tablet (40 mg total) by mouth once daily. 90 tablet 3    azelastine (ASTELIN) 137 mcg (0.1 %) nasal spray 1 spray (137 mcg total) by Nasal route 2 (two) times daily. 30 mL 3    benzonatate (TESSALON) 200 MG capsule Take 1 capsule (200 mg total) by mouth 3 (three) times daily as needed for Cough. 30 capsule 0    blood sugar diagnostic (FREESTYLE LITE STRIPS) Strp Inject 1 each as directed once daily. 100 each 3    diclofenac (VOLTAREN) 50 MG EC tablet Take 1 tablet (50 mg total) by mouth 2 (two) times daily as needed (shoulder pain). 30 tablet 0    diclofenac sodium (VOLTAREN) 1 % Gel Apply 2 g topically 4 (four) times daily. 20 g 0    DULoxetine (CYMBALTA) 60 MG capsule Take 1 capsule by mouth once daily 90 capsule 2    fluticasone (FLONASE) 50 mcg/actuation nasal spray 1 spray (50 mcg total) by Each Nare route once daily. 16 g 2    fluticasone propionate (FLONASE) 50 mcg/actuation nasal spray 1 spray (50 mcg total) by Each Nostril route 2 (two) times daily. 9.9 mL 0    fluticasone propionate (FLONASE) 50 mcg/actuation nasal spray 1 spray (50 mcg total) by Each Nostril route 2 (two) times daily. 18.2 mL 3    ipratropium (ATROVENT) 21 mcg (0.03 %) nasal spray 2 sprays by  "Nasal route 3 (three) times daily. 30 mL 0    lancets (FREESTYLE LANCETS) 28 gauge Misc Inject 1 lancet as directed once daily. 100 each 3    losartan (COZAAR) 50 MG tablet Take 1 tablet (50 mg total) by mouth once daily. 90 tablet 1    meloxicam (MOBIC) 15 MG tablet Take 1 tablet (15 mg total) by mouth once daily. 30 tablet 3    metFORMIN (GLUCOPHAGE) 500 MG tablet TAKE 2 TABLETS BY MOUTH TWICE DAILY WITH BREAKFAST AND WITH SUPPER 360 tablet 0    cetirizine (ZYRTEC) 10 MG tablet Take 1 tablet (10 mg total) by mouth once daily. 90 tablet 3    dulaglutide (TRULICITY) 0.75 mg/0.5 mL pen injector Inject 0.75 mg into the skin every 7 days. 12 pen 5    tadalafiL (CIALIS) 5 MG tablet Take 1 tablet (5 mg total) by mouth once daily. 30 tablet 11     No current facility-administered medications for this visit.         ROS  Review of Systems   Constitutional:  Negative for activity change, chills, fatigue and fever.   HENT:  Positive for tinnitus. Negative for congestion, ear pain, rhinorrhea, sinus pain and sore throat.    Eyes:  Negative for visual disturbance.   Respiratory:  Negative for cough, chest tightness and shortness of breath.    Cardiovascular:  Negative for chest pain, palpitations and leg swelling.   Gastrointestinal:  Negative for constipation, diarrhea, nausea and vomiting.   Endocrine: Negative for polyuria.   Genitourinary:  Positive for dysuria. Negative for difficulty urinating, frequency, hematuria and urgency.   Neurological:  Negative for dizziness, syncope, light-headedness and headaches.   Psychiatric/Behavioral:  Negative for agitation and confusion.            Physical Exam  Vitals:    02/12/24 1027   BP: 122/84   Pulse: 72   Temp: 98.2 °F (36.8 °C)   TempSrc: Oral   SpO2: 97%   Weight: 80.8 kg (178 lb 3.9 oz)   Height: 5' 8" (1.727 m)    Body mass index is 27.1 kg/m².  Weight: 80.8 kg (178 lb 3.9 oz)   Height: 5' 8" (172.7 cm)   Physical Exam  Constitutional:       Appearance: Normal appearance. " He is normal weight.   HENT:      Head: Normocephalic.      Right Ear: Tympanic membrane and ear canal normal.      Left Ear: Tympanic membrane and ear canal normal.      Nose: Nose normal.      Mouth/Throat:      Mouth: Mucous membranes are moist.      Pharynx: No oropharyngeal exudate or posterior oropharyngeal erythema.   Eyes:      Extraocular Movements: Extraocular movements intact.      Conjunctiva/sclera: Conjunctivae normal.      Pupils: Pupils are equal, round, and reactive to light.   Cardiovascular:      Rate and Rhythm: Normal rate and regular rhythm.      Pulses: Normal pulses.      Heart sounds: Normal heart sounds.   Pulmonary:      Effort: Pulmonary effort is normal.      Breath sounds: Normal breath sounds.   Abdominal:      General: Abdomen is flat.      Palpations: Abdomen is soft.   Musculoskeletal:         General: Normal range of motion.      Cervical back: Normal range of motion and neck supple.      Right lower leg: No edema.      Left lower leg: No edema.   Skin:     General: Skin is warm and dry.   Neurological:      General: No focal deficit present.      Mental Status: He is alert and oriented to person, place, and time.   Psychiatric:         Mood and Affect: Mood normal.         Behavior: Behavior normal.         Thought Content: Thought content normal.           Health Maintenance         Date Due Completion Date    RSV Vaccine (Age 60+ and Pregnant patients) (1 - 1-dose 60+ series) Never done ---    COVID-19 Vaccine (5 - 2023-24 season) 09/01/2023 9/21/2022    Lipid Panel 02/28/2024 2/28/2023    Diabetes Urine Screening 04/03/2024 4/3/2023    Hemoglobin A1c 04/21/2024 10/21/2023    Override on 11/25/2015: Done    Eye Exam 11/06/2024 11/6/2023    Foot Exam 12/26/2024 12/26/2023    Override on 11/11/2021: Done    Override on 8/20/2020: Done    Override on 7/15/2019: Done    Override on 1/16/2018: Done    Override on 11/25/2015: Done    Low Dose Statin 02/12/2025 2/12/2024    TETANUS  "VACCINE 02/19/2026 2/19/2016    Colorectal Cancer Screening 11/25/2029 11/25/2019              Assessment and Plan:    Type 2 diabetes mellitus with diabetic polyneuropathy, without long-term current use of insulin  Patient checks his blood sugar at home, avg readings 125-130. He admits to eating a relatively poor diet, fast food, "southern food" with few fruits and vegetables.   -     dulaglutide (TRULICITY) 0.75 mg/0.5 mL pen injector; Inject 0.75 mg into the skin every 7 days.  Dispense: 12 pen ; Refill: 5  -     Hemoglobin A1C; Future; Expected date: 05/01/2024  -     Lipid Panel; Future; Expected date: 05/01/2024  -     Microalbumin/Creatinine Ratio, Urine; Future; Expected date: 05/01/2024    Dyslipidemia   Lipids controlled presently - reviewed anti-hyperlipidemic regimen - continue current therapy  Atorvastatin 40 mg once daily     Essential hypertension  Well controlled /84 (2/12/24)    - losartan 50 mg once daily     Chronic allergic rhinitis  Discussed symptoms of allergic rhinitis and reviewed treatment modalities, including antihistamines, nasal steroids and advantages/limitations of OTC products (i.e., OTC decongestants, sinus rinse kits). Therapy as noted/per orders.  -     cetirizine (ZYRTEC) 10 MG tablet; Take 1 tablet (10 mg total) by mouth once daily.  Dispense: 90 tablet; Refill: 3    Insufficient sleep syndrome  Patient reports sleeping on an incline pillow, wakes up coughing sometimes and snores. Snoring improved with incline    Erectile dysfunction, unspecified erectile dysfunction type  Trial PDE5i therapy  -     tadalafiL (CIALIS) 5 MG tablet; Take 1 tablet (5 mg total) by mouth once daily.  Dispense: 30 tablet; Refill: 11    Mikey Jett ([email or web address])   -[employer]    I hereby acknowledge that I am relying upon documentation authored by a medical student working under my supervision and further I hereby attest that I have verified the student documentation or findings by " personally performing the physical exam and medical decision making activities of the Evaluation and Management service to be billed.    Chan Estrada MD

## 2024-02-12 NOTE — PATIENT INSTRUCTIONS
Switch CLARITIN (loratadine) to ZYRTEC daily only    Radiology at [facility] [phone]

## 2024-02-15 RX ORDER — METFORMIN HYDROCHLORIDE 500 MG/1
TABLET ORAL
Qty: 360 TABLET | Refills: 0 | Status: SHIPPED | OUTPATIENT
Start: 2024-02-15 | End: 2024-06-15 | Stop reason: SDUPTHER

## 2024-02-15 NOTE — TELEPHONE ENCOUNTER
No care due was identified.  [facility] Embedded Care Due Messages. Reference number: [id number].   2/15/2024 8:29:35 AM CST

## 2024-02-16 NOTE — TELEPHONE ENCOUNTER
Refill Decision Note   John Lemos  is requesting a refill authorization.  Brief Assessment and Rationale for Refill:  Approve     Medication Therapy Plan:        Comments:     Note composed:10:26 PM 02/15/2024

## 2024-03-12 ENCOUNTER — TELEPHONE (OUTPATIENT)
Dept: FAMILY MEDICINE | Facility: CLINIC | Age: 74
End: 2024-03-12
Payer: COMMERCIAL

## 2024-03-21 ENCOUNTER — OFFICE VISIT (OUTPATIENT)
Dept: URGENT CARE | Facility: CLINIC | Age: 74
End: 2024-03-21
Payer: COMMERCIAL

## 2024-03-21 VITALS
DIASTOLIC BLOOD PRESSURE: 72 MMHG | SYSTOLIC BLOOD PRESSURE: 138 MMHG | TEMPERATURE: 98 F | RESPIRATION RATE: 14 BRPM | WEIGHT: 185 LBS | HEART RATE: 74 BPM | OXYGEN SATURATION: 97 % | BODY MASS INDEX: 29.03 KG/M2 | HEIGHT: 67 IN

## 2024-03-21 DIAGNOSIS — H66.91 RIGHT OTITIS MEDIA, UNSPECIFIED OTITIS MEDIA TYPE: Primary | ICD-10-CM

## 2024-03-21 PROCEDURE — 99213 OFFICE O/P EST LOW 20 MIN: CPT | Mod: S$GLB,,, | Performed by: NURSE PRACTITIONER

## 2024-03-21 RX ORDER — FLUTICASONE PROPIONATE 50 MCG
1 SPRAY, SUSPENSION (ML) NASAL 2 TIMES DAILY
Qty: 9.9 ML | Refills: 0 | Status: SHIPPED | OUTPATIENT
Start: 2024-03-21

## 2024-03-21 RX ORDER — AMOXICILLIN AND CLAVULANATE POTASSIUM 875; 125 MG/1; MG/1
1 TABLET, FILM COATED ORAL 2 TIMES DAILY
Qty: 14 TABLET | Refills: 0 | Status: SHIPPED | OUTPATIENT
Start: 2024-03-21

## 2024-03-21 NOTE — PROGRESS NOTES
"Subjective:      Patient ID: John Lemos is a 73 y.o. male.    Vitals:  height is 5' 7" (1.702 m) and weight is 83.9 kg (185 lb). His oral temperature is 98 °F (36.7 °C). His blood pressure is 138/72 and his pulse is 74. His respiration is 14 and oxygen saturation is 97%.     Chief Complaint: Otalgia    73-year-old male presents to clinic for evaluation of right ear pain that radiates down the right side of his neck for the last 3 days.  He denies any discharge from ear.  Denies fever.  He is followed by ENT, history Eustachian tube dysfunction.  No medications taken.  He is awake and alert, behavior appropriate to situation, no acute distress noted on today's visit.    Otalgia   There is pain in the right ear. This is a new problem. The current episode started in the past 7 days (3 days). The problem has been gradually worsening. There has been no fever. The pain is at a severity of 4/10. Associated symptoms include a sore throat. Pertinent negatives include no abdominal pain, coughing, diarrhea, ear discharge, headaches, hearing loss, neck pain, rash, rhinorrhea or vomiting. He has tried nothing for the symptoms.       Constitution: Negative for activity change.   HENT:  Positive for ear pain and sore throat. Negative for ear discharge and hearing loss.    Neck: Negative for neck pain.   Cardiovascular:  Negative for chest pain.   Respiratory:  Negative for cough and shortness of breath.    Gastrointestinal:  Negative for abdominal pain, vomiting and diarrhea.   Skin:  Negative for rash.   Neurological:  Negative for dizziness and headaches.      Objective:     Physical Exam   Constitutional: He is oriented to person, place, and time. He appears well-developed. No distress.   HENT:   Head: Normocephalic and atraumatic. Head is without abrasion, without contusion and without laceration.   Ears:   Right Ear: External ear normal. There is tenderness. No no drainage. A middle ear effusion is present.   Left Ear: " External ear normal.   Nose: Congestion present.   Mouth/Throat: Oropharynx is clear and moist and mucous membranes are normal. No posterior oropharyngeal erythema.   Eyes: Conjunctivae, EOM and lids are normal.   Neck: Trachea normal and phonation normal.   Cardiovascular: Normal rate.   Pulmonary/Chest: Effort normal and breath sounds normal. No stridor. No respiratory distress.   Abdominal: Normal appearance.   Musculoskeletal: Normal range of motion.         General: Normal range of motion.   Neurological: He is alert and oriented to person, place, and time.   Skin: Skin is dry, intact, not diaphoretic and not pale. No abrasion, No burn and No ecchymosis   Psychiatric: His speech is normal and behavior is normal. Judgment and thought content normal.   Nursing note and vitals reviewed.      Assessment:     1. Right otitis media, unspecified otitis media type        Plan:       Right otitis media, unspecified otitis media type  -     amoxicillin-clavulanate 875-125mg (AUGMENTIN) 875-125 mg per tablet; Take 1 tablet by mouth 2 (two) times daily.  Dispense: 14 tablet; Refill: 0  -     fluticasone propionate (FLONASE) 50 mcg/actuation nasal spray; 1 spray (50 mcg total) by Each Nostril route 2 (two) times daily.  Dispense: 9.9 mL; Refill: 0      - Suspect symptoms related to Eustachian tube dysfunction, will cover for otitis media.  Follow-up with ENT and/or PCP.  Patient verbalized understanding and is in agreement with plan.    Patient Instructions   - Follow up with your PCP or specialty clinic as directed in the next 1-2 weeks if not improved or as needed.  You can call [phone] to schedule an appointment with the appropriate provider.    - Go to the ER or seek medical attention immediately if you develop new or worsening symptoms.    - You must understand that you have received an Urgent Care treatment only and that you may be released before all of your medical problems are known or treated.   - You, the  patient, will arrange for follow up care as instructed.   - If your condition worsens or fails to improve we recommend that you receive another evaluation at the ER immediately or contact your PCP to discuss your concerns or return here.

## 2024-03-21 NOTE — PATIENT INSTRUCTIONS
- Follow up with your PCP or specialty clinic as directed in the next 1-2 weeks if not improved or as needed.  You can call [phone] to schedule an appointment with the appropriate provider.    - Go to the ER or seek medical attention immediately if you develop new or worsening symptoms.    - You must understand that you have received an Urgent Care treatment only and that you may be released before all of your medical problems are known or treated.   - You, the patient, will arrange for follow up care as instructed.   - If your condition worsens or fails to improve we recommend that you receive another evaluation at the ER immediately or contact your PCP to discuss your concerns or return here.

## 2024-04-19 ENCOUNTER — OFFICE VISIT (OUTPATIENT)
Dept: URGENT CARE | Facility: CLINIC | Age: 74
End: 2024-04-19
Payer: COMMERCIAL

## 2024-04-19 VITALS
SYSTOLIC BLOOD PRESSURE: 159 MMHG | BODY MASS INDEX: 28.02 KG/M2 | HEART RATE: 86 BPM | TEMPERATURE: 98 F | OXYGEN SATURATION: 98 % | RESPIRATION RATE: 16 BRPM | WEIGHT: 178.56 LBS | DIASTOLIC BLOOD PRESSURE: 80 MMHG | HEIGHT: 67 IN

## 2024-04-19 DIAGNOSIS — M25.552 LEFT HIP PAIN: ICD-10-CM

## 2024-04-19 DIAGNOSIS — M54.32 SCIATIC PAIN, LEFT: Primary | ICD-10-CM

## 2024-04-19 PROCEDURE — 96372 THER/PROPH/DIAG INJ SC/IM: CPT | Mod: S$GLB,,, | Performed by: FAMILY MEDICINE

## 2024-04-19 PROCEDURE — 99213 OFFICE O/P EST LOW 20 MIN: CPT | Mod: 25,S$GLB,,

## 2024-04-19 RX ORDER — IBUPROFEN 600 MG/1
600 TABLET ORAL EVERY 6 HOURS PRN
Qty: 28 TABLET | Refills: 0 | Status: SHIPPED | OUTPATIENT
Start: 2024-04-19 | End: 2024-04-26

## 2024-04-19 RX ORDER — KETOROLAC TROMETHAMINE 30 MG/ML
30 INJECTION, SOLUTION INTRAMUSCULAR; INTRAVENOUS
Status: COMPLETED | OUTPATIENT
Start: 2024-04-19 | End: 2024-04-19

## 2024-04-19 RX ORDER — DEXAMETHASONE SODIUM PHOSPHATE 100 MG/10ML
10 INJECTION INTRAMUSCULAR; INTRAVENOUS ONCE
Status: COMPLETED | OUTPATIENT
Start: 2024-04-19 | End: 2024-04-19

## 2024-04-19 RX ADMIN — KETOROLAC TROMETHAMINE 30 MG: 30 INJECTION, SOLUTION INTRAMUSCULAR; INTRAVENOUS at 03:04

## 2024-04-19 RX ADMIN — DEXAMETHASONE SODIUM PHOSPHATE 10 MG: 100 INJECTION INTRAMUSCULAR; INTRAVENOUS at 03:04

## 2024-04-19 NOTE — PROGRESS NOTES
"Subjective:      Patient ID: John Lemos is a 73 y.o. male.    Vitals:  height is 5' 7" (1.702 m) and weight is 81 kg (178 lb 9.2 oz). His temperature is 98.1 °F (36.7 °C). His blood pressure is 159/80 (abnormal) and his pulse is 86. His respiration is 16 and oxygen saturation is 98%.     Chief Complaint: Hip Pain    74 y/o male presents with L hip pain that began 1 week ago that is now radiating down his left thigh and to his left hip flexor.  Patient denies any injury to the area, although states he thinks he may have strained something because he was doing some heavier than normal lifting at work before the pain started.  He is ambulatory with normal gait, normal range of function of hip and leg, no numbness or tingling to the lower extremity reported.  Patient does state he has degenerative disc disease in his back, although he notes no difference in pain or discomfort with that condition at this time.    Hip Pain   The incident occurred more than 1 week ago. There was no injury mechanism. The pain is present in the left hip. The quality of the pain is described as burning. The pain is at a severity of 8/10. The pain is moderate. The pain has been Constant since onset. Associated symptoms include tingling. Pertinent negatives include no inability to bear weight, loss of motion, loss of sensation, muscle weakness or numbness. He reports no foreign bodies present. The symptoms are aggravated by movement and weight bearing. He has tried acetaminophen for the symptoms. The treatment provided mild relief.       Constitution: Negative for fever and generalized weakness.   HENT:  Negative for ear pain, sinus pain and sore throat.    Neck: Negative for neck pain.   Cardiovascular:  Negative for chest pain.   Respiratory:  Negative for cough and shortness of breath.    Gastrointestinal:  Negative for abdominal pain.   Musculoskeletal:  Positive for joint pain (Left hip) and back pain.   Neurological:  Negative for " headaches and numbness.      Objective:     Physical Exam   Constitutional: He is oriented to person, place, and time. He appears well-developed. He is cooperative.   HENT:   Head: Normocephalic and atraumatic.   Ears:   Right Ear: Hearing, tympanic membrane, external ear and ear canal normal.   Left Ear: Hearing, tympanic membrane, external ear and ear canal normal.   Nose: Nose normal. No mucosal edema or nasal deformity. No epistaxis. Right sinus exhibits no maxillary sinus tenderness and no frontal sinus tenderness. Left sinus exhibits no maxillary sinus tenderness and no frontal sinus tenderness.   Mouth/Throat: Uvula is midline, oropharynx is clear and moist and mucous membranes are normal. No trismus in the jaw. Normal dentition. No uvula swelling.   Eyes: Conjunctivae and lids are normal.   Neck: Trachea normal and phonation normal. Neck supple.   Cardiovascular: Normal rate, regular rhythm, normal heart sounds and normal pulses.   Pulmonary/Chest: Effort normal and breath sounds normal.   Abdominal: Normal appearance and bowel sounds are normal. Soft.   Musculoskeletal: Normal range of motion.         General: Normal range of motion.      Right hip: He exhibits tenderness (Generalized). He exhibits normal range of motion, no bony tenderness, no crepitus, no deformity and no laceration.      Left hip: Normal.   Neurological: He is alert and oriented to person, place, and time. He exhibits normal muscle tone.   Skin: Skin is warm, dry and intact.   Psychiatric: His speech is normal and behavior is normal. Judgment and thought content normal.   Nursing note and vitals reviewed.      Assessment:     1. Sciatic pain, left    2. Left hip pain        Plan:   Physical exam as described above, history and physical are consistent with sciatic pain possibly due to muscle strain and hip.  We will treat with Toradol and 1 dose of dexamethasone in clinic, instructed patient to continue with NSAIDs at home.  Patient is a  diabetic, however on review last A1c 7.0 and patient states he monitors his blood sugars daily at home.  Discussed with patient that steroid injection may increase his blood sugar, patient acknowledges understanding and we will continue to monitor and follow a diabetic diet as recommended.  Discussed with patient to follow up with PCP if symptoms do not improve, he acknowledges understanding of plan of care and follow up.      Sciatic pain, left  -     ketorolac injection 30 mg  -     dexAMETHasone injection 10 mg    Left hip pain    Other orders  -     ibuprofen (ADVIL,MOTRIN) 600 MG tablet; Take 1 tablet (600 mg total) by mouth every 6 (six) hours as needed for Pain.  Dispense: 28 tablet; Refill: 0      Patient Instructions   600 mg Ibuprofen as needed for pain, start this tomorrow as you received a Toradol injection in clinic today.     Please follow up with your primary care doctor or specialist as needed.    If you experience any significant numbness, tingling, loss of bowel or bladder control, or loss of motor function follow up immediately in the Emergency Department.

## 2024-04-19 NOTE — PATIENT INSTRUCTIONS
600 mg Ibuprofen as needed for pain, start this tomorrow as you received a Toradol injection in clinic today.     Please follow up with your primary care doctor or specialist as needed.    If you experience any significant numbness, tingling, loss of bowel or bladder control, or loss of motor function follow up immediately in the Emergency Department.

## 2024-04-22 ENCOUNTER — OFFICE VISIT (OUTPATIENT)
Dept: UROLOGY | Facility: CLINIC | Age: 74
End: 2024-04-22
Payer: COMMERCIAL

## 2024-04-22 VITALS — WEIGHT: 177.38 LBS | BODY MASS INDEX: 27.78 KG/M2

## 2024-04-22 DIAGNOSIS — Z87.440 HISTORY OF UTI: ICD-10-CM

## 2024-04-22 DIAGNOSIS — R91.1 LUNG NODULE: ICD-10-CM

## 2024-04-22 DIAGNOSIS — R31.0 HEMATURIA, GROSS: ICD-10-CM

## 2024-04-22 DIAGNOSIS — R30.0 DYSURIA: Primary | ICD-10-CM

## 2024-04-22 PROCEDURE — 1159F MED LIST DOCD IN RCRD: CPT | Mod: CPTII,S$GLB,, | Performed by: UROLOGY

## 2024-04-22 PROCEDURE — 99213 OFFICE O/P EST LOW 20 MIN: CPT | Mod: S$GLB,,, | Performed by: UROLOGY

## 2024-04-22 PROCEDURE — 1101F PT FALLS ASSESS-DOCD LE1/YR: CPT | Mod: CPTII,S$GLB,, | Performed by: UROLOGY

## 2024-04-22 PROCEDURE — 3288F FALL RISK ASSESSMENT DOCD: CPT | Mod: CPTII,S$GLB,, | Performed by: UROLOGY

## 2024-04-22 PROCEDURE — 99999 PR PBB SHADOW E&M-EST. PATIENT-LVL III: CPT | Mod: PBBFAC,,, | Performed by: UROLOGY

## 2024-04-22 PROCEDURE — 3008F BODY MASS INDEX DOCD: CPT | Mod: CPTII,S$GLB,, | Performed by: UROLOGY

## 2024-04-22 PROCEDURE — 1160F RVW MEDS BY RX/DR IN RCRD: CPT | Mod: CPTII,S$GLB,, | Performed by: UROLOGY

## 2024-04-22 PROCEDURE — 3072F LOW RISK FOR RETINOPATHY: CPT | Mod: CPTII,S$GLB,, | Performed by: UROLOGY

## 2024-04-22 PROCEDURE — 4010F ACE/ARB THERAPY RXD/TAKEN: CPT | Mod: CPTII,S$GLB,, | Performed by: UROLOGY

## 2024-04-22 PROCEDURE — 1126F AMNT PAIN NOTED NONE PRSNT: CPT | Mod: CPTII,S$GLB,, | Performed by: UROLOGY

## 2024-04-22 NOTE — PROGRESS NOTES
Subjective:       John Lemos is a 73 y.o. male who is an established patient who was referred by Dr. Chan Estrada  for evaluation of dysuria.      He reports dysuria. Prior UTIs in 12/2022. Recent UCx and GC/CT negative. Dysuria present for months, improved but still present with each void. Denies change in urinary symptoms. No new supplements. New med - trulicity. Normally drinks 40oz water daily. Coffee in AM. +++glucose in urine today.     Gross hematuria noted a few months ago. No known h/o nephrolithiasis.     PSA 2/23 - 0.98    CT uro 9/23 - normal . +1.3cm RLL solid lung nodule. Discussed importance of follow up. Offered ordering CT chest. He will f/u with PCP re: next steps (seeing PCP now).     Cysto 10/23 - Normal cystoscopy. Two thin urethral strictures, formal dilation not required.       Still with occasional dysuria but less bothersome. No gross hematuria.     UCx:  11/22 - 10-49k Enterobacter  12/22 - >100k Enterobacter  7/23 - neg  7/23 - neg  9/23 - neg      The following portions of the patient's history were reviewed and updated as appropriate: allergies, current medications, past family history, past medical history, past social history, past surgical history and problem list.    Review of Systems  Twelve point review of systems completed. Pertinent positive and negatives listed in HPI.      Objective:    Vitals: Wt 80.4 kg (177 lb 5.8 oz)   BMI 27.78 kg/m²     Physical Exam   General: well developed, well nourished in no acute distress  Head: normocephalic, atraumatic  Neck: no obvious enlargement of thyroid  HEENT: EOMI, mucus membranes moist, sclera anicteric, no hearing impairment  Lungs: symmetric expansion, non-labored breathing  Neuro: alert and oriented x 3, no gross deficits  Psych: normal judgment and insight, normal mood/affect and non-anxious  Genitourinary: deferred   NATHALIA: deferred      Lab Review   Urine analysis today in clinic shows - no urine     Lab Results  "  Component Value Date    WBC 5.89 02/28/2023    HGB 13.2 (L) 02/28/2023    HCT 43.2 02/28/2023    MCV 94 02/28/2023     02/28/2023     Lab Results   Component Value Date    CREATININE 0.8 09/25/2023    BUN 11 11/29/2022     Lab Results   Component Value Date    PSA 0.98 02/28/2023     No results found for: "PSADIAG"    Imaging  CT 2010  Reports and images were personally reviewed by me and discussed with the patient today         Assessment/Plan:      1. Dysuria    - UCx, atypicals   - Increase hydration   - Glucosuria may be worsening   - Cystoscopy - essentially normal, thin strictures passed with scope     2. Hematuria, gross    - Discussed etiology and workup of hematuria   - UCx - neg   - Cytology - not indicated   - CT urogram - normal    - Office cystoscopy 10/23 - thin strictures     3. Glucosuria    - Hydration     4. H/o UTI   - CT / cysto - no  cause of UTI noted      Follow up in 1 year            "

## 2024-05-01 ENCOUNTER — PATIENT MESSAGE (OUTPATIENT)
Dept: FAMILY MEDICINE | Facility: CLINIC | Age: 74
End: 2024-05-01
Payer: COMMERCIAL

## 2024-05-01 DIAGNOSIS — M25.552 LEFT HIP PAIN: Primary | ICD-10-CM

## 2024-05-01 DIAGNOSIS — R91.1 SOLITARY PULMONARY NODULE: ICD-10-CM

## 2024-05-13 ENCOUNTER — PATIENT OUTREACH (OUTPATIENT)
Dept: ADMINISTRATIVE | Facility: HOSPITAL | Age: 74
End: 2024-05-13
Payer: COMMERCIAL

## 2024-05-13 ENCOUNTER — PATIENT MESSAGE (OUTPATIENT)
Dept: OPTOMETRY | Facility: CLINIC | Age: 74
End: 2024-05-13
Payer: COMMERCIAL

## 2024-05-15 ENCOUNTER — OFFICE VISIT (OUTPATIENT)
Dept: OPTOMETRY | Facility: CLINIC | Age: 74
End: 2024-05-15
Payer: COMMERCIAL

## 2024-05-15 DIAGNOSIS — H25.13 NUCLEAR SCLEROSIS OF BOTH EYES: ICD-10-CM

## 2024-05-15 DIAGNOSIS — H04.123 DRY EYE SYNDROME, BILATERAL: ICD-10-CM

## 2024-05-15 DIAGNOSIS — H43.391 VITREOUS FLOATERS OF RIGHT EYE: Primary | ICD-10-CM

## 2024-05-15 PROCEDURE — 1126F AMNT PAIN NOTED NONE PRSNT: CPT | Mod: CPTII,S$GLB,, | Performed by: OPTOMETRIST

## 2024-05-15 PROCEDURE — 1101F PT FALLS ASSESS-DOCD LE1/YR: CPT | Mod: CPTII,S$GLB,, | Performed by: OPTOMETRIST

## 2024-05-15 PROCEDURE — 3288F FALL RISK ASSESSMENT DOCD: CPT | Mod: CPTII,S$GLB,, | Performed by: OPTOMETRIST

## 2024-05-15 PROCEDURE — 92014 COMPRE OPH EXAM EST PT 1/>: CPT | Mod: S$GLB,,, | Performed by: OPTOMETRIST

## 2024-05-15 PROCEDURE — 99999 PR PBB SHADOW E&M-EST. PATIENT-LVL II: CPT | Mod: PBBFAC,,, | Performed by: OPTOMETRIST

## 2024-05-15 PROCEDURE — 4010F ACE/ARB THERAPY RXD/TAKEN: CPT | Mod: CPTII,S$GLB,, | Performed by: OPTOMETRIST

## 2024-05-15 PROCEDURE — 1159F MED LIST DOCD IN RCRD: CPT | Mod: CPTII,S$GLB,, | Performed by: OPTOMETRIST

## 2024-05-15 PROCEDURE — 1160F RVW MEDS BY RX/DR IN RCRD: CPT | Mod: CPTII,S$GLB,, | Performed by: OPTOMETRIST

## 2024-05-15 NOTE — PROGRESS NOTES
Subjective:       Patient ID: John Lemos is a 73 y.o. male      Chief Complaint   Patient presents with    Eye Problem     History of Present Illness  Dls: 11/6/23 Dr. Ortiz     74 y/o male presents today with c/o x 2-3 weeks fbs od increase in floaters od blurry vision od no flashes no pain no ha's    Eye meds  None       Assessment/Plan:     1. Vitreous floaters of right eye  Discussed causes of flashes and floaters with the patient and described the warnings of a possible retinal detachment. Advised patient to call if there is an increase in flashes or floaters.    2. Dry eye syndrome, bilateral  Recommend Refresh/Systane BID - QID OU PRN. Discussed chronicity.    3. Nuclear sclerosis of both eyes  OD > OS. VA stable. NVS per pt. Educated pt on presence of cataracts and effects on vision. No surgery at this time. Recheck in one year, sooner PRN.      Follow up if symptoms worsen or fail to improve.

## 2024-05-16 ENCOUNTER — TELEPHONE (OUTPATIENT)
Dept: ORTHOPEDICS | Facility: CLINIC | Age: 74
End: 2024-05-16
Payer: COMMERCIAL

## 2024-05-16 ENCOUNTER — HOSPITAL ENCOUNTER (OUTPATIENT)
Dept: RADIOLOGY | Facility: HOSPITAL | Age: 74
Discharge: HOME | End: 2024-05-16
Attending: INTERNAL MEDICINE
Payer: COMMERCIAL

## 2024-05-16 DIAGNOSIS — M25.552 LEFT HIP PAIN: ICD-10-CM

## 2024-05-16 PROCEDURE — 73521 X-RAY EXAM HIPS BI 2 VIEWS: CPT | Mod: 26,,, | Performed by: INTERNAL MEDICINE

## 2024-05-16 PROCEDURE — 73521 X-RAY EXAM HIPS BI 2 VIEWS: CPT | Mod: TC,FY,PO

## 2024-05-16 NOTE — TELEPHONE ENCOUNTER
Left message for patient about the change of his appt now he will be seeing  have move patient and left message for patient to come to the clinic for 12:30 instead of 1:00 pm

## 2024-05-17 ENCOUNTER — OFFICE VISIT (OUTPATIENT)
Dept: URGENT CARE | Facility: CLINIC | Age: 74
End: 2024-05-17
Payer: COMMERCIAL

## 2024-05-17 ENCOUNTER — OFFICE VISIT (OUTPATIENT)
Dept: ORTHOPEDICS | Facility: CLINIC | Age: 74
End: 2024-05-17
Payer: COMMERCIAL

## 2024-05-17 VITALS
SYSTOLIC BLOOD PRESSURE: 146 MMHG | BODY MASS INDEX: 27.78 KG/M2 | TEMPERATURE: 99 F | OXYGEN SATURATION: 96 % | DIASTOLIC BLOOD PRESSURE: 75 MMHG | HEIGHT: 67 IN | HEART RATE: 96 BPM | RESPIRATION RATE: 19 BRPM | WEIGHT: 177 LBS

## 2024-05-17 VITALS — BODY MASS INDEX: 27.82 KG/M2 | HEIGHT: 67 IN | WEIGHT: 177.25 LBS

## 2024-05-17 DIAGNOSIS — J06.9 VIRAL URI WITH COUGH: Primary | ICD-10-CM

## 2024-05-17 DIAGNOSIS — R05.9 COUGH, UNSPECIFIED TYPE: ICD-10-CM

## 2024-05-17 DIAGNOSIS — M16.12 PRIMARY OSTEOARTHRITIS OF LEFT HIP: Primary | ICD-10-CM

## 2024-05-17 DIAGNOSIS — M25.552 LEFT HIP PAIN: ICD-10-CM

## 2024-05-17 LAB
CTP QC/QA: YES
SARS-COV-2 RDRP RESP QL NAA+PROBE: NEGATIVE

## 2024-05-17 PROCEDURE — 87635 SARS-COV-2 COVID-19 AMP PRB: CPT | Mod: QW,S$GLB,, | Performed by: FAMILY MEDICINE

## 2024-05-17 PROCEDURE — 99213 OFFICE O/P EST LOW 20 MIN: CPT | Mod: S$GLB,,, | Performed by: FAMILY MEDICINE

## 2024-05-17 PROCEDURE — 1125F AMNT PAIN NOTED PAIN PRSNT: CPT | Mod: CPTII,S$GLB,, | Performed by: ORTHOPAEDIC SURGERY

## 2024-05-17 PROCEDURE — 99214 OFFICE O/P EST MOD 30 MIN: CPT | Mod: S$GLB,,, | Performed by: ORTHOPAEDIC SURGERY

## 2024-05-17 PROCEDURE — 3008F BODY MASS INDEX DOCD: CPT | Mod: CPTII,S$GLB,, | Performed by: ORTHOPAEDIC SURGERY

## 2024-05-17 PROCEDURE — 4010F ACE/ARB THERAPY RXD/TAKEN: CPT | Mod: CPTII,S$GLB,, | Performed by: ORTHOPAEDIC SURGERY

## 2024-05-17 PROCEDURE — 99999 PR PBB SHADOW E&M-EST. PATIENT-LVL IV: CPT | Mod: PBBFAC,,, | Performed by: ORTHOPAEDIC SURGERY

## 2024-05-17 PROCEDURE — 1159F MED LIST DOCD IN RCRD: CPT | Mod: CPTII,S$GLB,, | Performed by: ORTHOPAEDIC SURGERY

## 2024-05-17 RX ORDER — PROMETHAZINE HYDROCHLORIDE AND DEXTROMETHORPHAN HYDROBROMIDE 6.25; 15 MG/5ML; MG/5ML
5 SYRUP ORAL EVERY 6 HOURS PRN
Qty: 180 ML | Refills: 0 | Status: SHIPPED | OUTPATIENT
Start: 2024-05-17

## 2024-05-17 RX ORDER — BENZONATATE 200 MG/1
200 CAPSULE ORAL 3 TIMES DAILY PRN
Qty: 30 CAPSULE | Refills: 0 | Status: SHIPPED | OUTPATIENT
Start: 2024-05-17 | End: 2024-05-27

## 2024-05-17 RX ORDER — GUAIFENESIN 600 MG/1
1200 TABLET, EXTENDED RELEASE ORAL 2 TIMES DAILY
Qty: 40 TABLET | Refills: 0 | Status: SHIPPED | OUTPATIENT
Start: 2024-05-17 | End: 2024-05-27

## 2024-05-17 RX ORDER — CETIRIZINE HYDROCHLORIDE 10 MG/1
10 TABLET ORAL DAILY
Qty: 30 TABLET | Refills: 0 | Status: SHIPPED | OUTPATIENT
Start: 2024-05-17 | End: 2024-06-16

## 2024-05-17 NOTE — PATIENT INSTRUCTIONS
General Discharge Instructions   PLEASE READ YOUR DISCHARGE INSTRUCTIONS ENTIRELY AS IT CONTAINS IMPORTANT INFORMATION.  If you were prescribed a narcotic or controlled medication, do not drive or operate heavy equipment or machinery while taking these medications.  If you were prescribed antibiotics, please take them to completion.  You must understand that you've received an Urgent Care treatment only and that you may be released before all your medical problems are known or treated. You, the patient, will arrange for follow up care as instructed.    OVER THE COUNTER RECOMMENDATIONS/SUGGESTIONS.    Make sure to stay well hydrated.    Use Nasal Saline to mechanically move any post nasal drip from your eustachian tube or from the back of your throat.    Use warm salt water gargles to ease your throat pain. Warm salt water gargles as needed for sore throat- 1/2 tsp salt to 1 cup warm water, gargle as desired.    Use an antihistamine such as Claritin, Zyrtec or Allegra to dry you out.    Use pseudoephedrine (behind the counter) to decongest. Pseudoephedrine 30 mg up to 240 mg /day. It can raise your blood pressure and give you palpitations.    Use mucinex (guaifenesin) to break up mucous up to 2400mg/day to loosen any mucous.    The mucinex DM pill has a cough suppressant that can be sedating. It can be used at night to stop the tickle at the back of your throat.    You can use Mucinex D (it has guaifenesin and a high dose of pseudoephedrine) in the mornings to help decongest.    Use Afrin in each nare for no longer than 3 days, as it is addictive. It can also dry out your mucous membranes and cause elevated blood pressure. This is especially useful if you are flying.    Use Flonase 1-2 sprays/nostril per day. It is a local acting steroid nasal spray, if you develop a bloody nose, stop using the medication immediately.    Sometimes Nyquil at night is beneficial to help you get some rest, however it is sedating and it  does have an antihistamine, and tylenol.    Honey is a natural cough suppressant that can be used.    Tylenol up to 4,000 mg a day is safe for short periods and can be used for body aches, pain, and fever. However in high doses and prolonged use it can cause liver irritation.    Ibuprofen is a non-steroidal anti-inflammatory that can be used for body aches, pain, and fever.However it can also cause stomach irritation if over used.     Follow up with your PCP or specialty clinic as instructed in the next 2-3 days if not improved or as needed. You can call [phone] to schedule an appointment with appropriate provider.      If you condition worsens, we recommend that you receive another evaluation at the emergency room immediately or contact your primary medical clinic's after hours call service to discuss your concerns.      Please return here or go to the Emergency Department for any concerns or worsening condition.   You can also call [phone] to schedule an appointment with the appropriate provider.    Please return here or go to the Emergency Department for any concerns or worsening of condition.    Thank you for choosing [facility]!    Our goal in the Urgent Care is to always provide outstanding medical care. You may receive a survey by mail or e-mail in the next week regarding your experience today. We would greatly appreciate you completing and returning the survey. Your feedback provides us with a way to recognize our staff who provide very good care, and it helps us learn how to improve when your experience was below our aspiration of excellence.      We appreciate you trusting us with your medical care. We hope you feel better soon. We will be happy to take care of you for all of your future medical needs.    Sincerely,    IAM Zuleta  Patient Instructions   PLEASE READ YOUR DISCHARGE INSTRUCTIONS ENTIRELY AS IT CONTAINS IMPORTANT INFORMATION.        Please drink plenty of  "fluids. You body needs increased water but other beverages may aid in comfort.  You will know that you have had enough water to be hydrated when your urine is clear or at least a very pale yellow. Nasal saline may help with removal of mucus as well.  Ibuprofen is preferred for aches and pains as well as fever reduction. If you do not have high blood pressure, then you may use a decongestant such as pseudoephedrine or one of the above medications that have the letter, "-D" following it.  Hot tea with honey can help with sore throats as the heat with reduce the inflammation and the honey will coat your throat to help it feel better. Prescription pain medicine should be used for the significant throat sxs you are experiencing. Do not drive after taking this medication.     Lastly, good hand washing and cough hygiene (cough into your elbow) will help prevent the spread of the illness.  A general rule is that you are no longer contagious once you have been without a fever for over 24 hours without requiring fever reducing medications.   Please get plenty of rest.     Please return here or go to the Emergency Department for any concerns or worsening of condition.     Please take an over the counter antihistamine medication (allegra/Claritin/Zyrtec) of your choice as directed, they may help with some of the runny nose symptoms if you are having them.       Can continue mucinex. Use mucinex (guaifenisin) to break up mucous up to 2400mg/day to loosen any mucous. The mucinex DM pill has a cough suppressant that can be used at night to stop the tickle at the back of your throat. You may use Mucinex to help thin thick secretions to allow you to expel them but it only works if you drink more water.     If not allergic, please take over the counter Tylenol (Acetaminophen) and/or Motrin (Ibuprofen) as directed for control of pain and/or fever.  Please follow up with your primary care doctor or specialist as needed.     Sore throat " recommendations: Warm fluids, warm salt water gargles, throat lozenges, tea, honey, soup, rest, hydration.     Use over the counter flonase: one spray each nostril twice daily OR two sprays each nostril once daily.      Sinus rinses DO NOT USE TAP WATER, if you must, water must be a rolling boil for 1 minute, let it cool, then use.  May use distilled water, or over the counter nasal saline rinses.  Vics vapor rub in shower to help open nasal passages.  May use nasal gel to keep passages moisturized.  May use Nasal saline sprays during the day for added relief of congestion.   For those who go to the gym, please do not use the sauna or steam room now to clear sinuses.     If you  smoke, please stop smoking.        Please return or see your primary care doctor if you develop new or worsening symptoms.      Please arrange follow up with your primary medical clinic as soon as possible. You must understand that you've received an Urgent Care treatment only and that you may be released before all of your medical problems are known or treated. You, the patient, will arrange for follow up as instructed. If your symptoms worsen or fail to improve you should go to the Emergency Room.

## 2024-05-17 NOTE — PROGRESS NOTES
EST PATIENT ORTHOPAEDIC: HIP    PRIMARY CARE PHYSICIAN: Chan Estrada MD   REFERRING PROVIDER: Chan Estrada MD  [address] [place] [address]     ASSESSMENT & PLAN:    Impression:  Left Hip Severe Degenerative Osteoarthritis, Primary    Follow Up Plan: PRN     Non operative care:    John Lemos has physical exam evidence of above and wishes to pursue an non-operative care. I am recommending the following: Mobic, observation, activity modification, consideration of TAL if not improved    Patient comes in with a three-week history of atraumatic onset left hip pain.  I would previously seen him for his bilateral knee pains.  He saw primary care yesterday with this hip pain and they obtained x-rays.  It demonstrates end-stage arthritis of his left hip.  Being as he has only had 3 weeks of pain and no antecedent hip pain prior to that, I think it is reasonable to continue to watch this.  Overall he reports his motion and pain is improving on its own.  He has been taking over-the-counter anti-inflammatory medicines.  We discussed activity modification and prescription anti-inflammatories in efforts to help get him back to his baseline.  However with this end-stage arthritis I am not sure of his future aches and pains that he may or may not have in his hip.  He reports a hip groin pain.  This is reproduced with internal rotation of his hip.  He has limitations in external rotation.  He may be somebody that needs to consider hip replacement in the future but with the acuity of his symptoms perhaps we can avoid this.  The problem is I do not have other nonoperative treatment modalities for him.  I did not indicated brace, physical therapy, injections would be of significant benefit for him.  As a result anti-inflammatories and activity modification for now.  If ongoing issues and pain in the coming months can think about surgery.  His knees are doing well can see previous visit notes for these  pains.    The patient has been ordered:  None    CONSULTS:   None    ACTIVE PROBLEM LIST  Patient Active Problem List   Diagnosis    GERD (gastroesophageal reflux disease)    DDD (degenerative disc disease), lumbar    ED (erectile dysfunction)    Dyslipidemia    Neuropathy    Type 2 diabetes mellitus with diabetic polyneuropathy    Primary osteoarthritis of right knee    Nuclear sclerosis of both eyes           SUBJECTIVE    CHIEF COMPLAINT: Hip Pain    HPI:   John Lemos is a 73 y.o. male here for evaluation and management of left hip pain. There is not a specific incident that brought about this pain. he has had progressive problems with the hip(s) starting 3 weeks ago and is progressing which is now interfering with activities which include: walking, functional household ADL's, and dressing    Currently the pain in the joint is moderate with activity.  The pain is intermittent and is located in groin.  The pain is described as aching and sharp. Relieving factors include rest, over the counter medication , and repositioning. No associated Catching, Clicking, and Popping.     They do not report leg length inequality.     John Lemos has no additional complaints.     PROGRESSIVE SYMPTOMS:  Pain worsened by weight bearing  Pain effecting living situation    FUNCTIONAL STATUS:   Participate in recreational activities     PREVIOUS TREATMENTS:  Medical: OTC NSAIDS  Physical Therapy: Activities Modified   Previous Orthopaedic Surgery: None    REVIEW OF SYSTEMS:  PAIN ASSESSMENT:  See HPI.  MUSCULOSKELETAL: See HPI.  OTHER 10 point review of systems is negative except as stated in HPI above    PAST MEDICAL HISTORY   has a past medical history of Arthritis, Cataract, Diabetes mellitus (7 yrs), Diabetes mellitus type 2, controlled (9/8/2012), Eye injury, and Hyperlipidemia.     PAST SURGICAL HISTORY   has a past surgical history that includes Colonoscopy (N/A, 11/25/2019).     FAMILY HISTORY  family history includes  Cancer in his father and mother; Diabetes in his father and mother; Hypertension in his mother; No Known Problems in his brother, maternal aunt, maternal grandfather, maternal grandmother, maternal uncle, paternal aunt, paternal grandfather, paternal grandmother, paternal uncle, and sister.     SOCIAL HISTORY   reports that he has never smoked. He has never used smokeless tobacco. He reports that he does not drink alcohol and does not use drugs.     ALLERGIES   Review of patient's allergies indicates:   Allergen Reactions    Sulfa (sulfonamide antibiotics) Swelling        MEDICATIONS   Current Outpatient Medications on File Prior to Visit   Medication Sig Dispense Refill    amoxicillin-clavulanate 875-125mg (AUGMENTIN) 875-125 mg per tablet Take 1 tablet by mouth 2 (two) times daily. 14 tablet 0    atorvastatin (LIPITOR) 40 MG tablet Take 1 tablet (40 mg total) by mouth once daily. 90 tablet 3    azelastine (ASTELIN) 137 mcg (0.1 %) nasal spray 1 spray (137 mcg total) by Nasal route 2 (two) times daily. 30 mL 3    benzonatate (TESSALON) 200 MG capsule Take 1 capsule (200 mg total) by mouth 3 (three) times daily as needed for Cough. 30 capsule 0    blood sugar diagnostic (FREESTYLE LITE STRIPS) Strp Inject 1 each as directed once daily. 100 each 3    cetirizine (ZYRTEC) 10 MG tablet Take 1 tablet (10 mg total) by mouth once daily. 90 tablet 3    diclofenac (VOLTAREN) 50 MG EC tablet Take 1 tablet (50 mg total) by mouth 2 (two) times daily as needed (shoulder pain). 30 tablet 0    diclofenac sodium (VOLTAREN) 1 % Gel Apply 2 g topically 4 (four) times daily. 20 g 0    dulaglutide (TRULICITY) 0.75 mg/0.5 mL pen injector Inject 0.75 mg into the skin every 7 days. 12 pen 5    DULoxetine (CYMBALTA) 60 MG capsule Take 1 capsule by mouth once daily 90 capsule 2    fluticasone (FLONASE) 50 mcg/actuation nasal spray 1 spray (50 mcg total) by Each Nare route once daily. 16 g 2    fluticasone propionate (FLONASE) 50  "mcg/actuation nasal spray 1 spray (50 mcg total) by Each Nostril route 2 (two) times daily. 9.9 mL 0    fluticasone propionate (FLONASE) 50 mcg/actuation nasal spray 1 spray (50 mcg total) by Each Nostril route 2 (two) times daily. 18.2 mL 3    fluticasone propionate (FLONASE) 50 mcg/actuation nasal spray 1 spray (50 mcg total) by Each Nostril route 2 (two) times daily. 9.9 mL 0    ipratropium (ATROVENT) 21 mcg (0.03 %) nasal spray 2 sprays by Nasal route 3 (three) times daily. 30 mL 0    lancets (FREESTYLE LANCETS) 28 gauge Misc Inject 1 lancet as directed once daily. 100 each 3    losartan (COZAAR) 50 MG tablet Take 1 tablet (50 mg total) by mouth once daily. 90 tablet 1    meloxicam (MOBIC) 15 MG tablet Take 1 tablet (15 mg total) by mouth once daily. 30 tablet 3    metFORMIN (GLUCOPHAGE) 500 MG tablet TAKE 2 TABLETS BY MOUTH TWICE DAILY WITH BREAKFAST AND WITH SUPPER 360 tablet 0    tadalafiL (CIALIS) 5 MG tablet Take 1 tablet (5 mg total) by mouth once daily. 30 tablet 11     No current facility-administered medications on file prior to visit.          PHYSICAL EXAM   height is 5' 7" (1.702 m) and weight is 80.4 kg (177 lb 4 oz).   Body mass index is 27.76 kg/m².      All other systems deferred.  GENERAL:  No acute distress  HABITUS: Normal  GAIT: Antalgic  SKIN: Normal     HIP EXAM:    left:   ROM:   Flexion: 120 degrees    Internal Rotation: 10 degrees    External Rotation: 30 degrees    Abduction: 45 degrees    Adduction: 20 degrees  No apparent leg length discrepancy    Palpation: No tenderness over greater trochanter, SI joint, ilioposas, IT band, gluteal musculature   Pain is reproduced with IR or ER of the hip  Strength: 5/5 hip flexion, abduction, knee flexion and extension   Straight leg raise: Negative   Neurovascular Status: Sensation intact to light touch in Sural, saphenous, SPN, DPN, Tibial nerve distribution  2+ pulse DP/PT, normal capillary refill, foot has normal warmth    DATA:  Diagnostic " tests reviewed for today's visit:     AP pelvis, hip, and lateral left radiographs shows severe narrowing of the superior joint space with sclerosis and osteophyte formation. The femoral neck is in netural position. The femoral head is spherical at superior margin. There is no signficant evidence of acetabular dysplasia and the femoral head remains covered.

## 2024-05-17 NOTE — PROGRESS NOTES
"Subjective:      Patient ID: John Lemos is a 73 y.o. male.    Vitals:  height is 5' 7" (1.702 m) and weight is 80.3 kg (177 lb). His oral temperature is 98.9 °F (37.2 °C). His blood pressure is 146/75 (abnormal) and his pulse is 96. His respiration is 19 and oxygen saturation is 96%.     Chief Complaint: Cough     Pt is here for Congestion and cough. Pt symp started 3 days ago, cough gets worse at night. Pt treated with robitussin.   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 Next Thursday will be last day before summer break. 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 No fever or chills. No cp or SOB. No GI related symptoms, including, N/v/D or constipation. No anosmia or ageusia.        Cough  This is a new problem. Episode onset: 3 days yap. The problem has been unchanged. The problem occurs constantly. The cough is Productive of sputum. Associated symptoms include nasal congestion, postnasal drip, rhinorrhea and a sore throat. Pertinent negatives include no chest pain, chills, ear congestion, ear pain, fever, headaches, heartburn, hemoptysis, myalgias, rash, shortness of breath, sweats, weight loss or wheezing. Nothing aggravates the symptoms. He has tried OTC cough suppressant for the symptoms. The treatment provided no relief.     Constitution: Negative for activity change, appetite change, chills, sweating, fatigue, fever, unexpected weight change and generalized weakness.   HENT:  Positive for congestion, postnasal drip and sore throat. Negative for ear pain, nosebleeds, foreign body in nose, sinus pain, sinus pressure, trouble swallowing and voice change.    Cardiovascular:  Negative for chest pain, leg swelling and palpitations.   Respiratory:  Positive for cough. Negative for chest tightness, sputum production, bloody sputum, COPD, shortness of breath, stridor, wheezing and asthma.    Gastrointestinal:  Negative for heartburn.   Musculoskeletal:  Negative for " muscle ache.   Skin:  Negative for rash.   Allergic/Immunologic: Negative for asthma.   Neurological:  Negative for headaches.      Objective:     Physical Exam   Constitutional: He is oriented to person, place, and time. He appears well-developed.  Non-toxic appearance. He does not appear ill. No distress.   HENT:   Head: Normocephalic and atraumatic.   Ears:   Right Ear: External ear normal.   Left Ear: External ear normal.   Nose: Nose normal.   Mouth/Throat: Oropharynx is clear and moist.   Eyes: Conjunctivae, EOM and lids are normal. Pupils are equal, round, and reactive to light.   Neck: Trachea normal and phonation normal. Neck supple.   Pulmonary/Chest: Effort normal and breath sounds normal.   Musculoskeletal: Normal range of motion.         General: Normal range of motion.   Neurological: He is alert and oriented to person, place, and time.   Skin: Skin is warm, dry, intact and not diaphoretic.   Psychiatric: His speech is normal and behavior is normal. Judgment and thought content normal.   Nursing note and vitals reviewed.      Assessment:     1. Viral URI with cough    2. Cough, unspecified type        Plan:     Vss, lungs ctab, nad, otc meds reviewed    Discussed results/diagnosis/plan with patient in clinic. Strict precautions given to patient to monitor for worsening signs and symptoms. Advised to follow up with PCP or specialist.    Explained side effects of medications prescribed with patient and informed him/her to discontinue use if he/she has any side effects and to inform UC or PCP if this occurs. All questions answered. Strict ED verses clinic return precautions stressed and given in depth. Advised if symptoms worsens of fail to improve he/she should go to the Emergency Room. Discharge and follow-up instructions given verbally/printed with the patient who expressed understanding and willingness to comply with my recommendations. Patient voiced understanding and in agreement with current  treatment plan. Patient exits the exam room in no acute distress. Conversant and engaged during discharge discussion, verbalized understanding.      Viral URI with cough  -     guaiFENesin (MUCINEX) 600 mg 12 hr tablet; Take 2 tablets (1,200 mg total) by mouth 2 (two) times daily. for 10 days  Dispense: 40 tablet; Refill: 0  -     cetirizine (ZYRTEC) 10 MG tablet; Take 1 tablet (10 mg total) by mouth once daily.  Dispense: 30 tablet; Refill: 0  -     benzonatate (TESSALON) 200 MG capsule; Take 1 capsule (200 mg total) by mouth 3 (three) times daily as needed.  Dispense: 30 capsule; Refill: 0  -     promethazine-dextromethorphan (PROMETHAZINE-DM) 6.25-15 mg/5 mL Syrp; Take 5 mLs by mouth every 6 (six) hours as needed.  Dispense: 180 mL; Refill: 0    Cough, unspecified type  -     POCT COVID-19 Rapid Screening             Additional MDM:     Heart Failure Score:   COPD = No

## 2024-05-18 ENCOUNTER — LAB VISIT (OUTPATIENT)
Dept: LAB | Facility: HOSPITAL | Age: 74
End: 2024-05-18
Attending: INTERNAL MEDICINE
Payer: COMMERCIAL

## 2024-05-18 DIAGNOSIS — E11.42 TYPE 2 DIABETES MELLITUS WITH DIABETIC POLYNEUROPATHY, WITHOUT LONG-TERM CURRENT USE OF INSULIN: ICD-10-CM

## 2024-05-18 LAB
CHOLEST SERPL-MCNC: 131 MG/DL (ref 120–199)
CHOLEST/HDLC SERPL: 2.9 {RATIO} (ref 2–5)
ESTIMATED AVG GLUCOSE: 146 MG/DL (ref 68–131)
HBA1C MFR BLD: 6.7 % (ref 4–5.6)
HDLC SERPL-MCNC: 45 MG/DL (ref 40–75)
HDLC SERPL: 34.4 % (ref 20–50)
LDLC SERPL CALC-MCNC: 59 MG/DL (ref 63–159)
NONHDLC SERPL-MCNC: 86 MG/DL
TRIGL SERPL-MCNC: 135 MG/DL (ref 30–150)

## 2024-05-18 PROCEDURE — 36415 COLL VENOUS BLD VENIPUNCTURE: CPT | Mod: PO | Performed by: INTERNAL MEDICINE

## 2024-05-18 PROCEDURE — 80061 LIPID PANEL: CPT | Performed by: INTERNAL MEDICINE

## 2024-05-18 PROCEDURE — 83036 HEMOGLOBIN GLYCOSYLATED A1C: CPT | Performed by: INTERNAL MEDICINE

## 2024-06-01 DIAGNOSIS — M17.11 PRIMARY OSTEOARTHRITIS OF RIGHT KNEE: ICD-10-CM

## 2024-06-02 RX ORDER — MELOXICAM 15 MG/1
15 TABLET ORAL DAILY
Qty: 30 TABLET | Refills: 3 | Status: SHIPPED | OUTPATIENT
Start: 2024-06-02

## 2024-06-03 ENCOUNTER — HOSPITAL ENCOUNTER (OUTPATIENT)
Dept: RADIOLOGY | Facility: HOSPITAL | Age: 74
Discharge: HOME | End: 2024-06-03
Attending: INTERNAL MEDICINE
Payer: COMMERCIAL

## 2024-06-03 DIAGNOSIS — R91.1 SOLITARY PULMONARY NODULE: ICD-10-CM

## 2024-06-03 PROCEDURE — 71250 CT THORAX DX C-: CPT | Mod: 26,,, | Performed by: RADIOLOGY

## 2024-06-03 PROCEDURE — 71250 CT THORAX DX C-: CPT | Mod: TC

## 2024-06-15 NOTE — TELEPHONE ENCOUNTER
Care Due:                  Date            Visit Type   Department     Provider  --------------------------------------------------------------------------------                                [facility]                              PRIMARY      MED/ INTERNAL  Last Visit: 02-      CARE ([facility])   MED/ PEDS      Chan Estrada                              [facility]                              PRIMARY      MED/ INTERNAL  Next Visit: 08-      CARE ([facility])   MED/ PEDS      Chan Estrada                                                            Last  Test          Frequency    Reason                     Performed    Due Date  --------------------------------------------------------------------------------    CBC.........  12 months..  diclofenac...............  02- 02-    CMP.........  12 months..  atorvastatin, losartan...  Not Found    Overdue    [employer] Embedded Care Due Messages. Reference number: [id number].   6/15/2024 9:30:47 AM CDT

## 2024-06-17 RX ORDER — METFORMIN HYDROCHLORIDE 500 MG/1
TABLET ORAL
Qty: 360 TABLET | Refills: 0 | Status: SHIPPED | OUTPATIENT
Start: 2024-06-17

## 2024-06-17 NOTE — TELEPHONE ENCOUNTER
Provider Staff:  Action required for this patient    Requires labs      Please see care gap opportunities below in Care Due Message.    Thanks!  [facility]     Appointments      Date Provider   Last Visit   2/12/2024 Chan Estrada MD   Next Visit   8/12/2024 Chan Estrada MD     Refill Decision Note   John Lemos  is requesting a refill authorization.  Brief Assessment and Rationale for Refill:  Approve     Medication Therapy Plan:         Comments:     Note composed:9:38 AM 06/17/2024

## 2024-07-12 DIAGNOSIS — M25.562 PAIN IN BOTH KNEES, UNSPECIFIED CHRONICITY: Primary | ICD-10-CM

## 2024-07-12 DIAGNOSIS — M25.561 PAIN IN BOTH KNEES, UNSPECIFIED CHRONICITY: Primary | ICD-10-CM

## 2024-07-16 ENCOUNTER — OFFICE VISIT (OUTPATIENT)
Dept: ORTHOPEDICS | Facility: CLINIC | Age: 74
End: 2024-07-16
Attending: ORTHOPAEDIC SURGERY
Payer: COMMERCIAL

## 2024-07-16 VITALS — HEIGHT: 67 IN | WEIGHT: 177 LBS | BODY MASS INDEX: 27.78 KG/M2

## 2024-07-16 DIAGNOSIS — M17.0 PRIMARY OSTEOARTHRITIS OF BOTH KNEES: Primary | ICD-10-CM

## 2024-07-16 PROCEDURE — 99214 OFFICE O/P EST MOD 30 MIN: CPT | Mod: 25,S$GLB,, | Performed by: ORTHOPAEDIC SURGERY

## 2024-07-16 PROCEDURE — 3061F NEG MICROALBUMINURIA REV: CPT | Mod: CPTII,S$GLB,, | Performed by: ORTHOPAEDIC SURGERY

## 2024-07-16 PROCEDURE — 3008F BODY MASS INDEX DOCD: CPT | Mod: CPTII,S$GLB,, | Performed by: ORTHOPAEDIC SURGERY

## 2024-07-16 PROCEDURE — 3066F NEPHROPATHY DOC TX: CPT | Mod: CPTII,S$GLB,, | Performed by: ORTHOPAEDIC SURGERY

## 2024-07-16 PROCEDURE — 3044F HG A1C LEVEL LT 7.0%: CPT | Mod: CPTII,S$GLB,, | Performed by: ORTHOPAEDIC SURGERY

## 2024-07-16 PROCEDURE — 20610 DRAIN/INJ JOINT/BURSA W/O US: CPT | Mod: 50,S$GLB,, | Performed by: ORTHOPAEDIC SURGERY

## 2024-07-16 PROCEDURE — 4010F ACE/ARB THERAPY RXD/TAKEN: CPT | Mod: CPTII,S$GLB,, | Performed by: ORTHOPAEDIC SURGERY

## 2024-07-16 PROCEDURE — 1125F AMNT PAIN NOTED PAIN PRSNT: CPT | Mod: CPTII,S$GLB,, | Performed by: ORTHOPAEDIC SURGERY

## 2024-07-16 PROCEDURE — 1159F MED LIST DOCD IN RCRD: CPT | Mod: CPTII,S$GLB,, | Performed by: ORTHOPAEDIC SURGERY

## 2024-07-16 PROCEDURE — 99999 PR PBB SHADOW E&M-EST. PATIENT-LVL III: CPT | Mod: PBBFAC,,, | Performed by: ORTHOPAEDIC SURGERY

## 2024-07-16 RX ORDER — TRIAMCINOLONE ACETONIDE 40 MG/ML
40 INJECTION, SUSPENSION INTRA-ARTICULAR; INTRAMUSCULAR
Status: DISCONTINUED | OUTPATIENT
Start: 2024-07-16 | End: 2024-07-16 | Stop reason: HOSPADM

## 2024-07-16 RX ADMIN — TRIAMCINOLONE ACETONIDE 40 MG: 40 INJECTION, SUSPENSION INTRA-ARTICULAR; INTRAMUSCULAR at 11:07

## 2024-07-16 NOTE — PROCEDURES
Large Joint Aspiration/Injection: bilateral knee    Date/Time: 7/16/2024 11:00 AM    Performed by: Judson Steel MD  Authorized by: Judson Steel MD    Consent Done?:  Yes (Verbal)  Indications:  Arthritis and pain  Prep: patient was prepped and draped in usual sterile fashion      Local anesthesia used?: Yes    Anesthesia:  Local infiltration  Local anesthetic:  Topical anesthetic and lidocaine 1% without epinephrine    Details:  Needle Size:  22 G  Approach:  Anterolateral  Location:  Knee  Laterality:  Bilateral  Site:  Bilateral knee  Medications (Right):  40 mg triamcinolone acetonide 40 mg/mL  Medications (Left):  40 mg triamcinolone acetonide 40 mg/mL  Patient tolerance:  Patient tolerated the procedure well with no immediate complications

## 2024-07-16 NOTE — PROGRESS NOTES
EST PATIENT ORTHOPAEDIC: Knee    PRIMARY CARE PHYSICIAN: Chan Estrada MD   REFERRING PROVIDER: Judson Steel MD  [address] [place] [id number]     ASSESSMENT & PLAN:    Impression:  Bilateral Knee Severe Degenerative Osteoarthritis, Primary     Follow Up Plan: PRN     Injection:     John Lemos has physical exam evidence of above and wishes to pursue an injection. We discussed alternative non operative modalities including physical therapy, anti-inflammatories, bracing, maintenance of appropriate weight, rest, ambulatory devices. We discussed the risk, benefits, and expectations regarding injection. They have elected to proceed with injection. Should injections fail next step would be TKA. See procedure note for billing purposes.     Non operative care:    John Lemos has physical exam evidence of above and wishes to pursue an non-operative care. I am recommending the following: injection    Patient is known to me for severe left hip arthritis. This hip is doing well today, improved ROM and minimal pain. See previous notes regarding this.  He is seen me today regarding bilateral knee pain.  He has x-rays today that demonstrate severe end-stage arthritis of his bilateral knees.  He reports pain that is not constant but can be sharp and shooting at times.  He reports some swelling to his knee.  He mostly localizes the pain to the medial aspect of his knee.  He has had an injection previously over 2 years ago into his right knee by another provider.  That provided good relief.  He is interested in injections today.  If these injections provide greater than 3 months of relief can continue this treatment modality going forward.  If not may have to consider alternatives including knee replacements.    The patient has been ordered:  Office Intraarticular injection    CONSULTS:   None    ACTIVE PROBLEM LIST  Patient Active Problem List   Diagnosis    GERD (gastroesophageal reflux disease)    DDD  (degenerative disc disease), lumbar    ED (erectile dysfunction)    Dyslipidemia    Neuropathy    Type 2 diabetes mellitus with diabetic polyneuropathy    Primary osteoarthritis of right knee    Nuclear sclerosis of both eyes           SUBJECTIVE    CHIEF COMPLAINT: Knee Pain    HPI:   John Lemos is a 73 y.o. male here for evaluation and management of bilateral knee pain. There is not a specific incident that brought about this pain. he has had progressive problems with the knee(s) starting 3 months ago but is now progressing to interfere with activities which include: walking, functional household ADL's, and participating in family activities    Currently the pain in the joint is rated at moderate with activity. The pain is intermittent and is located in the knee, located medially and without radiation. The pain is described as aching, stiffness, and throbbing. Relieving factors include rest, over the counter medication , and repositioning.     There is associated Clicking and Popping.     John Lemos has no additional complaints.     PROGRESSIVE SYMPTOMS:  Pain worsened by weight bearing  Pain effecting living situation  Pain impacting work    FUNCTIONAL STATUS:   Climb a flight of stairs or walk up a hill     PREVIOUS TREATMENTS:  Medical: Tylenol and Steroid injections  Physical Therapy: Activities Modified   Previous Orthopaedic Surgery: None    REVIEW OF SYSTEMS:  PAIN ASSESSMENT:  See HPI.  MUSCULOSKELETAL: See HPI.  OTHER 10 point review of systems is negative except as stated in HPI above    PAST MEDICAL HISTORY   has a past medical history of Arthritis, Cataract, Diabetes mellitus (7 yrs), Diabetes mellitus type 2, controlled (9/8/2012), Eye injury, and Hyperlipidemia.     PAST SURGICAL HISTORY   has a past surgical history that includes Colonoscopy (N/A, 11/25/2019).     FAMILY HISTORY  family history includes Cancer in his father and mother; Diabetes in his father and mother; Hypertension in his  mother; No Known Problems in his brother, maternal aunt, maternal grandfather, maternal grandmother, maternal uncle, paternal aunt, paternal grandfather, paternal grandmother, paternal uncle, and sister.     SOCIAL HISTORY   reports that he has never smoked. He has never used smokeless tobacco. He reports that he does not drink alcohol and does not use drugs.     ALLERGIES   Review of patient's allergies indicates:   Allergen Reactions    Sulfa (sulfonamide antibiotics) Swelling        MEDICATIONS  Current Outpatient Medications on File Prior to Visit   Medication Sig Dispense Refill    amoxicillin-clavulanate 875-125mg (AUGMENTIN) 875-125 mg per tablet Take 1 tablet by mouth 2 (two) times daily. 14 tablet 0    atorvastatin (LIPITOR) 40 MG tablet Take 1 tablet (40 mg total) by mouth once daily. 90 tablet 3    azelastine (ASTELIN) 137 mcg (0.1 %) nasal spray 1 spray (137 mcg total) by Nasal route 2 (two) times daily. 30 mL 3    benzonatate (TESSALON) 200 MG capsule Take 1 capsule (200 mg total) by mouth 3 (three) times daily as needed for Cough. 30 capsule 0    blood sugar diagnostic (FREESTYLE LITE STRIPS) Strp Inject 1 each as directed once daily. 100 each 3    cetirizine (ZYRTEC) 10 MG tablet Take 1 tablet (10 mg total) by mouth once daily. 90 tablet 3    diclofenac (VOLTAREN) 50 MG EC tablet Take 1 tablet (50 mg total) by mouth 2 (two) times daily as needed (shoulder pain). 30 tablet 0    diclofenac sodium (VOLTAREN) 1 % Gel Apply 2 g topically 4 (four) times daily. 20 g 0    dulaglutide (TRULICITY) 0.75 mg/0.5 mL pen injector Inject 0.75 mg into the skin every 7 days. 12 pen 5    DULoxetine (CYMBALTA) 60 MG capsule Take 1 capsule by mouth once daily 90 capsule 2    fluticasone (FLONASE) 50 mcg/actuation nasal spray 1 spray (50 mcg total) by Each Nare route once daily. 16 g 2    fluticasone propionate (FLONASE) 50 mcg/actuation nasal spray 1 spray (50 mcg total) by Each Nostril route 2 (two) times daily. 9.9 mL 0  "   fluticasone propionate (FLONASE) 50 mcg/actuation nasal spray 1 spray (50 mcg total) by Each Nostril route 2 (two) times daily. 18.2 mL 3    fluticasone propionate (FLONASE) 50 mcg/actuation nasal spray 1 spray (50 mcg total) by Each Nostril route 2 (two) times daily. 9.9 mL 0    ipratropium (ATROVENT) 21 mcg (0.03 %) nasal spray 2 sprays by Nasal route 3 (three) times daily. 30 mL 0    lancets (FREESTYLE LANCETS) 28 gauge Misc Inject 1 lancet as directed once daily. 100 each 3    losartan (COZAAR) 50 MG tablet Take 1 tablet (50 mg total) by mouth once daily. 90 tablet 1    meloxicam (MOBIC) 15 MG tablet Take 1 tablet (15 mg total) by mouth once daily. 30 tablet 3    metFORMIN (GLUCOPHAGE) 500 MG tablet TAKE 2 TABLETS BY MOUTH TWICE DAILY WITH BREAKFAST AND WITH SUPPER 360 tablet 0    promethazine-dextromethorphan (PROMETHAZINE-DM) 6.25-15 mg/5 mL Syrp Take 5 mLs by mouth every 6 (six) hours as needed. 180 mL 0    tadalafiL (CIALIS) 5 MG tablet Take 1 tablet (5 mg total) by mouth once daily. 30 tablet 11     No current facility-administered medications on file prior to visit.          PHYSICAL EXAM   height is 5' 7" (1.702 m) and weight is 80.3 kg (177 lb 0.5 oz).   Body mass index is 27.73 kg/m².      All other systems deferred.  GENERAL:  No acute distress  HABITUS: Normal  GAIT: Non-antalgic  SKIN: Normal     KNEE EXAM:    bilateral:   Effusion: Minimal joint effusion  TTP: Yes over Medial Joint Line   No crepitus with passive knee ROM  Passive ROM: Extension 0, Flexion 130  No pain with manipulation of patella  Stable to varus/valgus stress. No increased laxity to anterior/posterior drawer testing  negative Yeison's test  No pain with IR/ER rotation of the hip  5/5 strength in knee flexion and extension, ankle plantarflexion and dorsiflexion  Neurovascular Status: Sensation intact to light touch in Sural, Saphenous, SPN, DPN, Tibial nerve distribution  2+ pulse DP/PT, normal capillary refill, foot has " normal warmth    DATA:  Diagnostic tests reviewed for today's visit:     4v of the bilateral knee reveal Severe degenerative changes of the Medial compartment. There is evidence of advanced osteoarthritis changes with Subchondral sclerosis, Osteophyte formation, and Joint space narrowing. The limb is in varus alignment. The patella is tracking midline.

## 2024-08-09 ENCOUNTER — TELEPHONE (OUTPATIENT)
Dept: FAMILY MEDICINE | Facility: CLINIC | Age: 74
End: 2024-08-09
Payer: COMMERCIAL

## 2024-08-12 ENCOUNTER — OFFICE VISIT (OUTPATIENT)
Dept: FAMILY MEDICINE | Facility: CLINIC | Age: 74
End: 2024-08-12
Payer: COMMERCIAL

## 2024-08-12 VITALS
HEIGHT: 67 IN | OXYGEN SATURATION: 96 % | WEIGHT: 172.06 LBS | DIASTOLIC BLOOD PRESSURE: 64 MMHG | BODY MASS INDEX: 27.01 KG/M2 | TEMPERATURE: 99 F | SYSTOLIC BLOOD PRESSURE: 120 MMHG | HEART RATE: 74 BPM

## 2024-08-12 DIAGNOSIS — I10 ESSENTIAL HYPERTENSION: ICD-10-CM

## 2024-08-12 DIAGNOSIS — E11.42 TYPE 2 DIABETES MELLITUS WITH DIABETIC POLYNEUROPATHY, WITHOUT LONG-TERM CURRENT USE OF INSULIN: ICD-10-CM

## 2024-08-12 DIAGNOSIS — Z00.00 ROUTINE ADULT HEALTH MAINTENANCE: Primary | ICD-10-CM

## 2024-08-12 PROCEDURE — 1101F PT FALLS ASSESS-DOCD LE1/YR: CPT | Mod: CPTII,S$GLB,, | Performed by: INTERNAL MEDICINE

## 2024-08-12 PROCEDURE — 3044F HG A1C LEVEL LT 7.0%: CPT | Mod: CPTII,S$GLB,, | Performed by: INTERNAL MEDICINE

## 2024-08-12 PROCEDURE — 3061F NEG MICROALBUMINURIA REV: CPT | Mod: CPTII,S$GLB,, | Performed by: INTERNAL MEDICINE

## 2024-08-12 PROCEDURE — 3008F BODY MASS INDEX DOCD: CPT | Mod: CPTII,S$GLB,, | Performed by: INTERNAL MEDICINE

## 2024-08-12 PROCEDURE — 3288F FALL RISK ASSESSMENT DOCD: CPT | Mod: CPTII,S$GLB,, | Performed by: INTERNAL MEDICINE

## 2024-08-12 PROCEDURE — 3074F SYST BP LT 130 MM HG: CPT | Mod: CPTII,S$GLB,, | Performed by: INTERNAL MEDICINE

## 2024-08-12 PROCEDURE — 4010F ACE/ARB THERAPY RXD/TAKEN: CPT | Mod: CPTII,S$GLB,, | Performed by: INTERNAL MEDICINE

## 2024-08-12 PROCEDURE — 3078F DIAST BP <80 MM HG: CPT | Mod: CPTII,S$GLB,, | Performed by: INTERNAL MEDICINE

## 2024-08-12 PROCEDURE — 1126F AMNT PAIN NOTED NONE PRSNT: CPT | Mod: CPTII,S$GLB,, | Performed by: INTERNAL MEDICINE

## 2024-08-12 PROCEDURE — 1159F MED LIST DOCD IN RCRD: CPT | Mod: CPTII,S$GLB,, | Performed by: INTERNAL MEDICINE

## 2024-08-12 PROCEDURE — 99397 PER PM REEVAL EST PAT 65+ YR: CPT | Mod: S$GLB,,, | Performed by: INTERNAL MEDICINE

## 2024-08-12 PROCEDURE — 99999 PR PBB SHADOW E&M-EST. PATIENT-LVL III: CPT | Mod: PBBFAC,,, | Performed by: INTERNAL MEDICINE

## 2024-08-12 PROCEDURE — 3066F NEPHROPATHY DOC TX: CPT | Mod: CPTII,S$GLB,, | Performed by: INTERNAL MEDICINE

## 2024-08-12 NOTE — PROGRESS NOTES
Subjective:     Chief Complaint   Patient presents with    Annual Exam       HPI  John Lemos is a 74 y.o. male with medical diagnoses as listed in the medical history and problem list that presents for above complaint(s).    DM  A1c 6.7%  No major symptoms  No neuropathy symptoms     Lumbar arthritis  Taking Mobic, Cymbalta    Patient Care Team:  Chan Estrada MD as PCP - General (Internal Medicine)  Chan Estrada MD as Diabetes Digital Medicine Responsible Provider (Internal Medicine)  Alessandra Sarabia PharmD as Diabetes Digital Medicine Clinician (Pharmacist)  Nikolai Abebe MA as Care Coordinator  Chan Estrada MD as Consulting Physician (Internal Medicine)  Amanda Lange as Digital Medicine Health       PAST MEDICAL HISTORY:  Past Medical History:   Diagnosis Date    Arthritis     Cataract     ou    Diabetes mellitus 7 yrs    lbs: 118 1 week ago    Diabetes mellitus type 2, controlled 9/8/2012    Eye injury     fb    Hyperlipidemia        PAST SURGICAL HISTORY:  Past Surgical History:   Procedure Laterality Date    COLONOSCOPY N/A 11/25/2019    Procedure: COLONOSCOPY;  Surgeon: Eddie Leslie MD;  Location: [facility];  Service: Endoscopy;  Laterality: N/A;       SOCIAL HISTORY:  Social History     Socioeconomic History    Marital status:    Occupational History    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 [email or web address]     Employer: [email or web address]   Tobacco Use    Smoking status: Never    Smokeless tobacco: Never   Substance and Sexual Activity    Alcohol use: No    Drug use: No    Sexual activity: Not Currently     Social Determinants of Health     Financial Resource Strain: Low Risk  (2/12/2024)    Overall Financial Resource Strain (CARDIA)     Difficulty of Paying Living Expenses: Not hard at all   Food Insecurity: No Food Insecurity (2/12/2024)    Hunger Vital Sign     Worried About Running Out of Food in the Last Year: Never true     Ran Out of  Food in the Last Year: Never true   Transportation Needs: No Transportation Needs (2/12/2024)    PRAPARE - Transportation     Lack of Transportation (Medical): No     Lack of Transportation (Non-Medical): No   Physical Activity: Insufficiently Active (2/12/2024)    Exercise Vital Sign     Days of Exercise per Week: 5 days     Minutes of Exercise per Session: 20 min   Stress: No Stress Concern Present (2/12/2024)    [employer] [place] [employer] - Occupational Stress Questionnaire     Feeling of Stress : Not at all   Housing Stability: Low Risk  (2/12/2024)    Housing Stability Vital Sign     Unable to Pay for Housing in the Last Year: No     Number of Places Lived in the Last Year: 1     Unstable Housing in the Last Year: No       FAMILY HISTORY:  Family History   Problem Relation Name Age of Onset    Diabetes Mother      Cancer Mother      Hypertension Mother      Diabetes Father      Cancer Father      No Known Problems Sister      No Known Problems Brother      No Known Problems Maternal Aunt      No Known Problems Maternal Uncle      No Known Problems Paternal Aunt      No Known Problems Paternal Uncle      No Known Problems Maternal Grandmother      No Known Problems Maternal Grandfather      No Known Problems Paternal Grandmother      No Known Problems Paternal Grandfather      Amblyopia Neg Hx      Blindness Neg Hx      Cataracts Neg Hx      Glaucoma Neg Hx      Macular degeneration Neg Hx      Retinal detachment Neg Hx      Strabismus Neg Hx      Stroke Neg Hx      Thyroid disease Neg Hx         ALLERGIES AND MEDICATIONS: updated and reviewed.  Review of patient's allergies indicates:   Allergen Reactions    Sulfa (sulfonamide antibiotics) Swelling     Current Outpatient Medications   Medication Sig Dispense Refill    atorvastatin (LIPITOR) 40 MG tablet Take 1 tablet (40 mg total) by mouth once daily. 90 tablet 3    azelastine (ASTELIN) 137 mcg (0.1 %) nasal spray 1 spray (137 mcg total) by Nasal  "route 2 (two) times daily. 30 mL 3    blood sugar diagnostic (FREESTYLE LITE STRIPS) Strp Inject 1 each as directed once daily. 100 each 3    cetirizine (ZYRTEC) 10 MG tablet Take 1 tablet (10 mg total) by mouth once daily. 90 tablet 3    diclofenac (VOLTAREN) 50 MG EC tablet Take 1 tablet (50 mg total) by mouth 2 (two) times daily as needed (shoulder pain). 30 tablet 0    diclofenac sodium (VOLTAREN) 1 % Gel Apply 2 g topically 4 (four) times daily. 20 g 0    dulaglutide (TRULICITY) 0.75 mg/0.5 mL pen injector Inject 0.75 mg into the skin every 7 days. 12 pen 5    DULoxetine (CYMBALTA) 60 MG capsule Take 1 capsule by mouth once daily 90 capsule 2    fluticasone (FLONASE) 50 mcg/actuation nasal spray 1 spray (50 mcg total) by Each Nare route once daily. 16 g 2    ipratropium (ATROVENT) 21 mcg (0.03 %) nasal spray 2 sprays by Nasal route 3 (three) times daily. 30 mL 0    lancets (FREESTYLE LANCETS) 28 gauge Misc Inject 1 lancet as directed once daily. 100 each 3    losartan (COZAAR) 50 MG tablet Take 1 tablet (50 mg total) by mouth once daily. 90 tablet 1    meloxicam (MOBIC) 15 MG tablet Take 1 tablet (15 mg total) by mouth once daily. 30 tablet 3    metFORMIN (GLUCOPHAGE) 500 MG tablet TAKE 2 TABLETS BY MOUTH TWICE DAILY WITH BREAKFAST AND WITH SUPPER 360 tablet 0    tadalafiL (CIALIS) 5 MG tablet Take 1 tablet (5 mg total) by mouth once daily. 30 tablet 11     No current facility-administered medications for this visit.         Objective:       Physical Exam  Vitals:    08/12/24 1503   BP: 120/64   Pulse: 74   Temp: 98.6 °F (37 °C)   TempSrc: Oral   SpO2: 96%   Weight: 78 kg (172 lb 1.1 oz)   Height: 5' 7" (1.702 m)    Body mass index is 26.95 kg/m².  Weight: 78 kg (172 lb 1.1 oz)   Height: 5' 7" (170.2 cm)   Physical Exam  Vitals reviewed.   Constitutional:       Appearance: He is well-developed.   HENT:      Head: Normocephalic and atraumatic.   Eyes:      Conjunctiva/sclera: Conjunctivae normal. "   Cardiovascular:      Rate and Rhythm: Normal rate.      Heart sounds: No murmur heard.     No gallop.   Pulmonary:      Effort: Pulmonary effort is normal.      Breath sounds: No wheezing or rales.   Musculoskeletal:      Cervical back: Normal range of motion and neck supple.   Lymphadenopathy:      Cervical: No cervical adenopathy.   Skin:     General: Skin is warm and dry.      Findings: No rash.   Neurological:      Mental Status: He is alert.      Cranial Nerves: No cranial nerve deficit.   Psychiatric:         Mood and Affect: Mood normal.         Behavior: Behavior normal.             Assessment:     1. Routine adult health maintenance    2. Type 2 diabetes mellitus with diabetic polyneuropathy, without long-term current use of insulin    3. Essential hypertension      Plan:     John was seen today for annual exam.    Diagnoses and all orders for this visit:    CPE  Discussed healthy diet, regular exercise, necessary labs, age appropriate cancer screening, and routine vaccinations.      Type 2 diabetes mellitus with diabetic polyneuropathy, without long-term current use of insulin  The current medical regimen is effective;  continue present plan and medications.    Essential hypertension  BP goal <130/80 per ADA guidelines        Health Maintenance reviewed, addressed as per orders    F/u in 6 months      1. The patient indicates understanding of these issues and agrees with the plan. Brief care plan is updated and reviewed with the patient as applicable.   2. The patient is given an After Visit Summary that lists all medications with directions, allergies, orders placed during this encounter and follow-up instructions.   3. I have reviewed the patient's medical information including past medical, family, and social history sections including the medications and allergies.   4. We discussed the patient's current medications. I reconciled the patient's medication list and prepared and supplied needed  refills.       Chan Estrada MD  Internal Medicine-Pediatrics

## 2024-09-15 ENCOUNTER — OFFICE VISIT (OUTPATIENT)
Dept: URGENT CARE | Facility: CLINIC | Age: 74
End: 2024-09-15
Payer: COMMERCIAL

## 2024-09-15 VITALS
OXYGEN SATURATION: 97 % | HEART RATE: 88 BPM | DIASTOLIC BLOOD PRESSURE: 74 MMHG | RESPIRATION RATE: 14 BRPM | BODY MASS INDEX: 27 KG/M2 | SYSTOLIC BLOOD PRESSURE: 167 MMHG | TEMPERATURE: 98 F | HEIGHT: 67 IN | WEIGHT: 172 LBS

## 2024-09-15 DIAGNOSIS — R31.9 HEMATURIA, UNSPECIFIED TYPE: Primary | ICD-10-CM

## 2024-09-15 LAB
BILIRUBIN, UA POC OHS: ABNORMAL
BLOOD, UA POC OHS: ABNORMAL
CLARITY, UA POC OHS: ABNORMAL
COLOR, UA POC OHS: YELLOW
GLUCOSE, UA POC OHS: 500
KETONES, UA POC OHS: NEGATIVE
LEUKOCYTES, UA POC OHS: NEGATIVE
NITRITE, UA POC OHS: NEGATIVE
PH, UA POC OHS: 5
PROTEIN, UA POC OHS: 30
SPECIFIC GRAVITY, UA POC OHS: >=1.03
UROBILINOGEN, UA POC OHS: 0.2

## 2024-09-15 PROCEDURE — 99213 OFFICE O/P EST LOW 20 MIN: CPT | Mod: S$GLB,,, | Performed by: FAMILY MEDICINE

## 2024-09-15 PROCEDURE — 81003 URINALYSIS AUTO W/O SCOPE: CPT | Mod: QW,S$GLB,, | Performed by: FAMILY MEDICINE

## 2024-09-15 PROCEDURE — 87086 URINE CULTURE/COLONY COUNT: CPT | Performed by: FAMILY MEDICINE

## 2024-09-15 RX ORDER — NITROFURANTOIN 25; 75 MG/1; MG/1
100 CAPSULE ORAL 2 TIMES DAILY
Qty: 14 CAPSULE | Refills: 0 | Status: SHIPPED | OUTPATIENT
Start: 2024-09-15 | End: 2024-09-22

## 2024-09-15 NOTE — PROGRESS NOTES
"Subjective:      Patient ID: John Lemos is a 74 y.o. male.    Vitals:  height is 5' 7" (1.702 m) and weight is 78 kg (172 lb). His oral temperature is 97.6 °F (36.4 °C). His blood pressure is 167/74 (abnormal) and his pulse is 88. His respiration is 14 and oxygen saturation is 97%.     Chief Complaint: Hematuria    Pt is here today for hematuria and dysuria X 3 days.     Hematuria  This is a new problem. The current episode started in the past 7 days. The problem is unchanged. He describes the hematuria as gross hematuria. He reports no clotting in his urine stream. His pain is at a severity of 0/10. He is experiencing no pain. He describes his urine color as bright red. Irritative symptoms do not include frequency or urgency. Associated symptoms include dysuria and flank pain.       Genitourinary:  Positive for dysuria, flank pain and hematuria. Negative for frequency and urgency.      Objective:     Physical Exam   Constitutional: He is oriented to person, place, and time. He does not appear ill.   HENT:   Head: Normocephalic.   Ears:   Right Ear: External ear normal.   Left Ear: External ear normal.   Nose: Nose normal.   Mouth/Throat: Mucous membranes are moist.   Eyes: Conjunctivae are normal.   Cardiovascular: Normal rate.   Pulmonary/Chest: Effort normal.   Abdominal: Soft. There is no abdominal tenderness.   Musculoskeletal: Normal range of motion.         General: Normal range of motion.   Neurological: He is alert and oriented to person, place, and time.   Skin: Skin is dry and not diaphoretic.   Psychiatric: His behavior is normal.       Assessment:     1. Hematuria, unspecified type        Plan:       Hematuria, unspecified type  -     POCT Urinalysis(Instrument)  -     nitrofurantoin, macrocrystal-monohydrate, (MACROBID) 100 MG capsule; Take 1 capsule (100 mg total) by mouth 2 (two) times daily. for 7 days  Dispense: 14 capsule; Refill: 0  -     CULTURE, URINE      74-year-old male past medical " history hematuria.  Is followed by Urology.  States that typically when he has episodes of more hematuria than usual it is usually due to urinary tract infection.  Because of this we are going to go ahead and treat him with some antibiotics send a urine for culture and of course have him follow up with Urology.  Patient agrees with plan.

## 2024-09-16 LAB — BACTERIA UR CULT: NO GROWTH

## 2024-09-17 DIAGNOSIS — N52.9 ERECTILE DYSFUNCTION, UNSPECIFIED ERECTILE DYSFUNCTION TYPE: ICD-10-CM

## 2024-09-17 DIAGNOSIS — E11.9 CONTROLLED TYPE 2 DIABETES MELLITUS WITHOUT COMPLICATION, WITHOUT LONG-TERM CURRENT USE OF INSULIN: Chronic | ICD-10-CM

## 2024-09-17 NOTE — TELEPHONE ENCOUNTER
Care Due:                  Date            Visit Type   Department     Provider  --------------------------------------------------------------------------------                                EP [facility]                              PRIMARY      MED/ INTERNAL  Last Visit: 08-      CARE ([facility])   MED/ PEDS      Chan Estrada  Next Visit: None Scheduled  None         None Found                                                            Last  Test          Frequency    Reason                     Performed    Due Date  --------------------------------------------------------------------------------    CBC.........  12 months..  diclofenac...............  02- 02-    CMP.........  12 months..  DULoxetine, atorvastatin,   Not Found    Overdue                             diclofenac, losartan,                             metFORMIN................    HBA1C.......  6 months...  dulaglutide, metFORMIN...  05- 11-    [facility] Embedded Care Due Messages. Reference number: [id number].   9/17/2024 5:09:46 AM CDT

## 2024-09-24 RX ORDER — LOSARTAN POTASSIUM 50 MG/1
50 TABLET ORAL DAILY
Qty: 90 TABLET | Refills: 1 | Status: SHIPPED | OUTPATIENT
Start: 2024-09-24

## 2024-09-24 RX ORDER — TADALAFIL 5 MG/1
5 TABLET ORAL DAILY
Qty: 30 TABLET | Refills: 11 | Status: SHIPPED | OUTPATIENT
Start: 2024-09-24 | End: 2025-09-24

## 2024-09-26 ENCOUNTER — OFFICE VISIT (OUTPATIENT)
Dept: UROLOGY | Facility: CLINIC | Age: 74
End: 2024-09-26
Payer: COMMERCIAL

## 2024-09-26 VITALS — BODY MASS INDEX: 27.3 KG/M2 | WEIGHT: 174.25 LBS

## 2024-09-26 DIAGNOSIS — R81 GLUCOSURIA: ICD-10-CM

## 2024-09-26 DIAGNOSIS — R31.0 HEMATURIA, GROSS: Primary | ICD-10-CM

## 2024-09-26 DIAGNOSIS — R30.0 DYSURIA: ICD-10-CM

## 2024-09-26 DIAGNOSIS — Z87.440 HISTORY OF UTI: ICD-10-CM

## 2024-09-26 DIAGNOSIS — R91.1 LUNG NODULE: ICD-10-CM

## 2024-09-26 PROCEDURE — 3066F NEPHROPATHY DOC TX: CPT | Mod: CPTII,S$GLB,, | Performed by: UROLOGY

## 2024-09-26 PROCEDURE — 1159F MED LIST DOCD IN RCRD: CPT | Mod: CPTII,S$GLB,, | Performed by: UROLOGY

## 2024-09-26 PROCEDURE — 1101F PT FALLS ASSESS-DOCD LE1/YR: CPT | Mod: CPTII,S$GLB,, | Performed by: UROLOGY

## 2024-09-26 PROCEDURE — 3288F FALL RISK ASSESSMENT DOCD: CPT | Mod: CPTII,S$GLB,, | Performed by: UROLOGY

## 2024-09-26 PROCEDURE — 3044F HG A1C LEVEL LT 7.0%: CPT | Mod: CPTII,S$GLB,, | Performed by: UROLOGY

## 2024-09-26 PROCEDURE — 99999 PR PBB SHADOW E&M-EST. PATIENT-LVL III: CPT | Mod: PBBFAC,,, | Performed by: UROLOGY

## 2024-09-26 PROCEDURE — 99214 OFFICE O/P EST MOD 30 MIN: CPT | Mod: S$GLB,,, | Performed by: UROLOGY

## 2024-09-26 PROCEDURE — 3061F NEG MICROALBUMINURIA REV: CPT | Mod: CPTII,S$GLB,, | Performed by: UROLOGY

## 2024-09-26 PROCEDURE — 3008F BODY MASS INDEX DOCD: CPT | Mod: CPTII,S$GLB,, | Performed by: UROLOGY

## 2024-09-26 PROCEDURE — 1126F AMNT PAIN NOTED NONE PRSNT: CPT | Mod: CPTII,S$GLB,, | Performed by: UROLOGY

## 2024-09-26 PROCEDURE — 1160F RVW MEDS BY RX/DR IN RCRD: CPT | Mod: CPTII,S$GLB,, | Performed by: UROLOGY

## 2024-09-26 PROCEDURE — 4010F ACE/ARB THERAPY RXD/TAKEN: CPT | Mod: CPTII,S$GLB,, | Performed by: UROLOGY

## 2024-09-26 NOTE — PROGRESS NOTES
Subjective:       John Lemos is a 74 y.o. male who is an established patient who was referred by Dr. Chan Estrada  for evaluation of dysuria.      He reports dysuria. Prior UTIs in 12/2022. Recent UCx and GC/CT negative. Dysuria present for months, improved but still present with each void. Denies change in urinary symptoms. No new supplements. New med - trulicity. Normally drinks 40oz water daily. Coffee in AM. +++glucose in urine today.     Gross hematuria noted a few months ago. No known h/o nephrolithiasis.     PSA 2/23 - 0.98    CT uro 9/23 - normal . +1.3cm RLL solid lung nodule. Discussed importance of follow up. Offered ordering CT chest. He will f/u with PCP re: next steps (seeing PCP now).     Cysto 10/23 - Normal cystoscopy. Two thin urethral strictures, formal dilation not required.     Still with occasional dysuria but less bothersome. No gross hematuria.     9/26/2024  Returns now with recurrent dysuria and hematuria. He was seen in urgent care recently with dysuria and gross hematuria. Hematuria x 1 day. Given Macrobid. UCx negative at that time.     UCx:  11/22 - 10-49k Enterobacter  12/22 - >100k Enterobacter  7/23 - neg  7/23 - neg  9/23 - neg  9/24 - neg      The following portions of the patient's history were reviewed and updated as appropriate: allergies, current medications, past family history, past medical history, past social history, past surgical history and problem list.    Review of Systems  Twelve point review of systems completed. Pertinent positive and negatives listed in HPI.      Objective:    Vitals: Wt 79.1 kg (174 lb 4.4 oz)   BMI 27.30 kg/m²     Physical Exam   General: well developed, well nourished in no acute distress  Head: normocephalic, atraumatic  Neck: no obvious enlargement of thyroid  HEENT: EOMI, mucus membranes moist, sclera anicteric, no hearing impairment  Lungs: symmetric expansion, non-labored breathing  Neuro: alert and oriented x 3, no gross  "deficits  Psych: normal judgment and insight, normal mood/affect and non-anxious  Genitourinary: deferred   NATHALIA: deferred      Lab Review   Urine analysis today in clinic shows - trace protein, 50 glucose    Lab Results   Component Value Date    WBC 5.89 02/28/2023    HGB 13.2 (L) 02/28/2023    HCT 43.2 02/28/2023    MCV 94 02/28/2023     02/28/2023     Lab Results   Component Value Date    CREATININE 0.8 09/25/2023    BUN 11 11/29/2022     Lab Results   Component Value Date    PSA 0.98 02/28/2023     No results found for: "PSADIAG"    Imaging  CT 2010  Reports and images were personally reviewed by me and discussed with the patient today         Assessment/Plan:      1. Dysuria    - UCx, atypical PCR - negative   - Increase hydration   - Glucosuria may be worsening   - Cystoscopy - essentially normal, thin strictures passed with scope     2. Hematuria, gross    - Discussed etiology and workup of hematuria   - UCx - neg   - Cytology - not indicated   - CT urogram - normal    - Office cystoscopy 10/23 - thin strictures     - Recurrent gross hematuria - will repeat CT uro/cysto     3. Glucosuria    - Hydration     4. H/o UTI   - CT / cysto - no  cause of UTI noted      Follow up for cysto             "

## 2024-10-01 ENCOUNTER — TELEPHONE (OUTPATIENT)
Dept: PHARMACY | Facility: CLINIC | Age: 74
End: 2024-10-01
Payer: COMMERCIAL

## 2024-10-01 ENCOUNTER — HOSPITAL ENCOUNTER (OUTPATIENT)
Dept: RADIOLOGY | Facility: HOSPITAL | Age: 74
Discharge: HOME | End: 2024-10-01
Attending: UROLOGY
Payer: COMMERCIAL

## 2024-10-01 DIAGNOSIS — R31.0 HEMATURIA, GROSS: ICD-10-CM

## 2024-10-01 PROCEDURE — 25500020 PHARM REV CODE 255: Performed by: UROLOGY

## 2024-10-01 PROCEDURE — 74178 CT ABD&PLV WO CNTR FLWD CNTR: CPT | Mod: TC

## 2024-10-01 PROCEDURE — 74178 CT ABD&PLV WO CNTR FLWD CNTR: CPT | Mod: 26,,, | Performed by: RADIOLOGY

## 2024-10-01 RX ADMIN — IOHEXOL 125 ML: 350 INJECTION, SOLUTION INTRAVENOUS at 04:10

## 2024-10-01 NOTE — TELEPHONE ENCOUNTER
[facility]/Population Health Chart Review & Patient Outreach Details For Medication Adherence Project    Reason for Outreach Encounter: 3rd Party payor non-compliance report ([employer], [employer], C, etc)  2.  Patient Outreach Method: Reviewed patient chart   3.   Medication in question:   Hypertension Medications               losartan (COZAAR) 50 MG tablet Take 1 tablet (50 mg total) by mouth once daily.               LAST FILLED: 9/24/24 for 90 day supply  4.  Reviewed and or Updates Made To: Patient Chart  5. Outreach Outcomes and/or actions taken: Patient filled medication and is on track to be adherent  Additional Notes:

## 2024-10-10 ENCOUNTER — PROCEDURE VISIT (OUTPATIENT)
Dept: UROLOGY | Facility: CLINIC | Age: 74
End: 2024-10-10
Payer: COMMERCIAL

## 2024-10-10 VITALS — WEIGHT: 174.19 LBS | BODY MASS INDEX: 27.28 KG/M2

## 2024-10-10 DIAGNOSIS — R31.0 HEMATURIA, GROSS: ICD-10-CM

## 2024-10-10 PROCEDURE — 52000 CYSTOURETHROSCOPY: CPT | Mod: S$GLB,,, | Performed by: UROLOGY

## 2024-10-10 NOTE — PROCEDURES
Cystoscopy    Date/Time: 10/10/2024 3:20 PM    Performed by: Ashley Quinonez MD  Authorized by: Ashley Quinonez MD    Consent Done?:  Yes (Written)  Timeout: prior to procedure the correct patient, procedure, and site was verified    Prep: patient was prepped and draped in usual sterile fashion    Anesthesia:  Lidocaine jelly  Indications: hematuria    Position:  Supine  Anesthesia:  Lidocaine jelly  Patient sedated?: No    Preparation: Patient was prepped and draped in usual sterile fashion    Scope type:  Flexible cystoscope  Stent inserted: No    Stent removed: No    External exam normal: Yes    Digital exam performed: No    Urethra normal: No    Urethral Internal Findings:  Stricture (Few thin flimsy strictures in distal penile urethra, easily passed with scope)  Prostate normal: No     Hyperplasia   Bilobar  Bladder neck normal: Yes    Bladder normal: Yes     patient tolerated the procedure well with no immediate complications  Comments:        Cysto - mild thin strictures, easily bypassed with scope. Bladder normal without tumor.    CT uro 10/24 - no  pathology (no mass/stone/hydro).  Likely tiny renal cysts. Pulmonary nodule being followed, encouraged continued f/u of this (prior CT chest)    RTC 6mths

## 2024-10-15 RX ORDER — METFORMIN HYDROCHLORIDE 500 MG/1
TABLET ORAL
Qty: 360 TABLET | Refills: 0 | Status: SHIPPED | OUTPATIENT
Start: 2024-10-15

## 2024-10-15 NOTE — TELEPHONE ENCOUNTER
No care due was identified.  [facility] Embedded Care Due Messages. Reference number: [id number].   10/15/2024 6:32:44 AM CDT

## 2024-10-15 NOTE — TELEPHONE ENCOUNTER
Refill Decision Note   John Lemos  is requesting a refill authorization.  Brief Assessment and Rationale for Refill:  Approve     Medication Therapy Plan:         Comments:     Note composed:10:18 AM 10/15/2024

## 2024-11-04 DIAGNOSIS — M17.11 PRIMARY OSTEOARTHRITIS OF RIGHT KNEE: ICD-10-CM

## 2024-11-04 RX ORDER — MELOXICAM 15 MG/1
15 TABLET ORAL DAILY
Qty: 30 TABLET | Refills: 3 | Status: SHIPPED | OUTPATIENT
Start: 2024-11-04

## 2024-11-06 DIAGNOSIS — M51.369 DDD (DEGENERATIVE DISC DISEASE), LUMBAR: ICD-10-CM

## 2024-11-06 NOTE — TELEPHONE ENCOUNTER
No care due was identified.  [facility] Embedded Care Due Messages. Reference number: [id number].   11/06/2024 1:54:13 PM CST

## 2024-11-08 RX ORDER — DULOXETIN HYDROCHLORIDE 60 MG/1
60 CAPSULE, DELAYED RELEASE ORAL DAILY
Qty: 90 CAPSULE | Refills: 3 | Status: SHIPPED | OUTPATIENT
Start: 2024-11-08

## 2024-11-08 NOTE — TELEPHONE ENCOUNTER
Refill Routing Note   Medication(s) are not appropriate for processing by [facility] for the following reason(s):        Required vitals abnormal    [facility] action(s):  Defer             Appointments  past 12m or future 3m with PCP    Date Provider   Last Visit   8/12/2024 Chan Estrada MD   Next Visit   11/8/2024 Chan Estrada MD   ED visits in past 90 days: 0        Note composed:8:34 AM 11/08/2024

## 2024-11-29 ENCOUNTER — OFFICE VISIT (OUTPATIENT)
Dept: OPTOMETRY | Facility: CLINIC | Age: 74
End: 2024-11-29
Payer: COMMERCIAL

## 2024-11-29 DIAGNOSIS — H43.391 VITREOUS FLOATERS OF RIGHT EYE: ICD-10-CM

## 2024-11-29 DIAGNOSIS — H25.13 NUCLEAR SCLEROSIS OF BOTH EYES: ICD-10-CM

## 2024-11-29 DIAGNOSIS — H52.7 REFRACTIVE ERROR: ICD-10-CM

## 2024-11-29 DIAGNOSIS — E11.36 TYPE 2 DIABETES MELLITUS WITH DIABETIC CATARACT, WITHOUT LONG-TERM CURRENT USE OF INSULIN: Primary | ICD-10-CM

## 2024-11-29 PROCEDURE — 99999 PR PBB SHADOW E&M-EST. PATIENT-LVL III: CPT | Mod: PBBFAC,,, | Performed by: OPTOMETRIST

## 2024-11-29 NOTE — PROGRESS NOTES
Subjective:       Patient ID: John Lemos is a 74 y.o. male      Chief Complaint   Patient presents with    Diabetic Eye Exam    Concerns About Ocular Health     History of Present Illness    DLS: 5/15/2024    Pt here for Diabetic Eye Exam;  Pt states no eye pain but does still notice floaters, denies any flashes or diplopia. Pt states vision seems to be doing okay with his current glasses.     Meds;  NO GTTS    Hemoglobin A1C       Date                     Value               Ref Range             Status                05/18/2024               6.7 (H)             4.0 - 5.6 %           Final                  10/21/2023               7.0 (H)             4.0 - 5.6 %           Final                  08/07/2023               9.0 (H)             4.0 - 5.6 %           Final                 Assessment/Plan:     1. Type 2 diabetes mellitus with diabetic cataract, without long-term current use of insulin (Primary)  No diabetic retinopathy. Discussed with pt the effects of diabetes on vision, importance of good blood sugar control, compliance with meds, and follow up care with PCP. Return in 1 year for dilated eye exam, sooner PRN.    2. Nuclear sclerosis of both eyes  Educated pt on presence of cataracts and effects on vision. No surgery at this time. Recheck in one year, sooner PRN.    3. Vitreous floaters of right eye  Stable. monitor.     4. Refractive error  Educated patient on refractive error and discussed lens options. Dispensed updated spectacle Rx. Educated about adaptation period to new specs.    Eyeglass Final Rx       Eyeglass Final Rx         Sphere Cylinder Axis Add    Right -2.00 +1.25 020 +2.50    Left -1.75 +0.50 170 +2.50      Expiration Date: 11/29/2025                      Follow up in about 1 year (around 11/29/2025) for Diabetic Eye Exam.

## 2024-12-12 DIAGNOSIS — E78.5 HYPERLIPIDEMIA, UNSPECIFIED HYPERLIPIDEMIA TYPE: ICD-10-CM

## 2024-12-12 RX ORDER — ATORVASTATIN CALCIUM 40 MG/1
40 TABLET, FILM COATED ORAL DAILY
Qty: 90 TABLET | Refills: 3 | Status: SHIPPED | OUTPATIENT
Start: 2024-12-12

## 2024-12-12 NOTE — TELEPHONE ENCOUNTER
Care Due:                  Date            Visit Type   Department     Provider  --------------------------------------------------------------------------------                                [facility]                              PRIMARY      MED/ INTERNAL  Last Visit: 08-      CARE ([facility])   MED/ PEDS      Chan Estrada  Next Visit: None Scheduled  None         None Found                                                            Last  Test          Frequency    Reason                     Performed    Due Date  --------------------------------------------------------------------------------    CBC.........  12 months..  diclofenac...............  02- 02-    CMP.........  12 months..  atorvastatin, losartan...  Not Found    Overdue    HBA1C.......  6 months...  dulaglutide, metFORMIN...  05- 11-    Health [facility] Embedded Care Due Messages. Reference number: [id number].   12/12/2024 5:22:33 AM CST

## 2024-12-14 ENCOUNTER — PATIENT MESSAGE (OUTPATIENT)
Dept: FAMILY MEDICINE | Facility: CLINIC | Age: 74
End: 2024-12-14
Payer: COMMERCIAL

## 2024-12-14 DIAGNOSIS — E11.42 TYPE 2 DIABETES MELLITUS WITH DIABETIC POLYNEUROPATHY, WITHOUT LONG-TERM CURRENT USE OF INSULIN: Primary | ICD-10-CM

## 2024-12-18 ENCOUNTER — LAB VISIT (OUTPATIENT)
Dept: LAB | Facility: HOSPITAL | Age: 74
End: 2024-12-18
Attending: INTERNAL MEDICINE
Payer: COMMERCIAL

## 2024-12-18 DIAGNOSIS — E11.42 TYPE 2 DIABETES MELLITUS WITH DIABETIC POLYNEUROPATHY, WITHOUT LONG-TERM CURRENT USE OF INSULIN: ICD-10-CM

## 2024-12-18 LAB
ESTIMATED AVG GLUCOSE: 128 MG/DL (ref 68–131)
HBA1C MFR BLD: 6.1 % (ref 4–5.6)

## 2024-12-18 PROCEDURE — 83036 HEMOGLOBIN GLYCOSYLATED A1C: CPT | Performed by: INTERNAL MEDICINE

## 2024-12-18 PROCEDURE — 36415 COLL VENOUS BLD VENIPUNCTURE: CPT | Mod: PO | Performed by: INTERNAL MEDICINE

## 2024-12-27 ENCOUNTER — PATIENT MESSAGE (OUTPATIENT)
Dept: OPTOMETRY | Facility: CLINIC | Age: 74
End: 2024-12-27
Payer: COMMERCIAL

## 2025-01-28 ENCOUNTER — OFFICE VISIT (OUTPATIENT)
Dept: URGENT CARE | Facility: CLINIC | Age: 75
End: 2025-01-28
Payer: COMMERCIAL

## 2025-01-28 VITALS
HEART RATE: 84 BPM | TEMPERATURE: 98 F | DIASTOLIC BLOOD PRESSURE: 79 MMHG | SYSTOLIC BLOOD PRESSURE: 152 MMHG | WEIGHT: 174 LBS | BODY MASS INDEX: 27.31 KG/M2 | OXYGEN SATURATION: 97 % | HEIGHT: 67 IN | RESPIRATION RATE: 18 BRPM

## 2025-01-28 DIAGNOSIS — E11.42 TYPE 2 DIABETES MELLITUS WITH DIABETIC POLYNEUROPATHY, WITHOUT LONG-TERM CURRENT USE OF INSULIN: ICD-10-CM

## 2025-01-28 DIAGNOSIS — G62.9 NEUROPATHY: Primary | ICD-10-CM

## 2025-01-28 PROCEDURE — 99213 OFFICE O/P EST LOW 20 MIN: CPT | Mod: S$GLB,,, | Performed by: NURSE PRACTITIONER

## 2025-01-28 NOTE — PROGRESS NOTES
"Subjective:      Patient ID: John Lemos is a 74 y.o. male.    Vitals:  height is 5' 7" (1.702 m) and weight is 78.9 kg (174 lb). His oral temperature is 97.5 °F (36.4 °C). His blood pressure is 152/79 (abnormal) and his pulse is 84. His respiration is 18 and oxygen saturation is 97%.     Chief Complaint: Numbness    Pt is here for numbness in fingers that onset a week ago. Pt states no pain. He hasn't tired any treatment. He states he feels it after he used his finger or when he thinks about it.     Hand Pain   The incident occurred more than 1 week ago. The incident occurred at home. The injury mechanism is unknown. The pain is present in the left fingers and right fingers. The pain does not radiate. The pain has been Constant since the incident. Associated symptoms include numbness. He has tried nothing for the symptoms.       Constitution: Negative for fatigue and fever.   Respiratory:  Negative for chest tightness.    Neurological:  Positive for numbness. Negative for dizziness, passing out, facial drooping, headaches, altered mental status and tingling.   Psychiatric/Behavioral:  Negative for altered mental status.       Objective:     Physical Exam   Constitutional: He is oriented to person, place, and time. He appears well-developed. He is cooperative. No distress.   HENT:   Head: Normocephalic and atraumatic.   Nose: Nose normal.   Mouth/Throat: Oropharynx is clear and moist and mucous membranes are normal.   Eyes: Conjunctivae and lids are normal.   Neck: Trachea normal and phonation normal. Neck supple.   Cardiovascular: Normal rate, regular rhythm, normal heart sounds and normal pulses.   Pulmonary/Chest: Effort normal and breath sounds normal.   Abdominal: Normal appearance and bowel sounds are normal. He exhibits no mass. Soft.   Musculoskeletal:         General: No deformity.   Neurological: He is alert and oriented to person, place, and time. He has normal strength and normal reflexes. No sensory " deficit.   Skin: Skin is warm, dry, intact and not diaphoretic.   Psychiatric: His speech is normal and behavior is normal. Judgment and thought content normal.   Nursing note and vitals reviewed.      Assessment:     1. Neuropathy    2. Type 2 diabetes mellitus with diabetic polyneuropathy, without long-term current use of insulin        Plan:       Neuropathy    Type 2 diabetes mellitus with diabetic polyneuropathy, without long-term current use of insulin           Discussed with patient that the symptoms he is having is most like neuropathy related to his diabetes. He states he thought that was what it was but wanted to make sure. Offered to start him on gabapentin before he follows up with PCP but he declines. States he saw the side effects his wife and sister had while taking it so he does not want to go through it as well.

## 2025-02-05 ENCOUNTER — PATIENT MESSAGE (OUTPATIENT)
Dept: ORTHOPEDICS | Facility: CLINIC | Age: 75
End: 2025-02-05
Payer: COMMERCIAL

## 2025-02-05 ENCOUNTER — PATIENT MESSAGE (OUTPATIENT)
Dept: FAMILY MEDICINE | Facility: CLINIC | Age: 75
End: 2025-02-05
Payer: COMMERCIAL

## 2025-02-06 ENCOUNTER — PATIENT MESSAGE (OUTPATIENT)
Dept: FAMILY MEDICINE | Facility: CLINIC | Age: 75
End: 2025-02-06
Payer: COMMERCIAL

## 2025-02-09 DIAGNOSIS — M17.11 PRIMARY OSTEOARTHRITIS OF RIGHT KNEE: ICD-10-CM

## 2025-02-10 ENCOUNTER — HOSPITAL ENCOUNTER (OUTPATIENT)
Dept: RADIOLOGY | Facility: HOSPITAL | Age: 75
Discharge: HOME | End: 2025-02-10
Attending: FAMILY MEDICINE
Payer: COMMERCIAL

## 2025-02-10 ENCOUNTER — OFFICE VISIT (OUTPATIENT)
Dept: FAMILY MEDICINE | Facility: CLINIC | Age: 75
End: 2025-02-10
Payer: COMMERCIAL

## 2025-02-10 VITALS
SYSTOLIC BLOOD PRESSURE: 130 MMHG | WEIGHT: 171.75 LBS | BODY MASS INDEX: 26.96 KG/M2 | HEART RATE: 75 BPM | OXYGEN SATURATION: 97 % | DIASTOLIC BLOOD PRESSURE: 70 MMHG | HEIGHT: 67 IN

## 2025-02-10 DIAGNOSIS — M54.12 CERVICAL RADICULOPATHY: ICD-10-CM

## 2025-02-10 DIAGNOSIS — M54.12 CERVICAL RADICULOPATHY: Primary | ICD-10-CM

## 2025-02-10 PROCEDURE — 3008F BODY MASS INDEX DOCD: CPT | Mod: CPTII,S$GLB,, | Performed by: FAMILY MEDICINE

## 2025-02-10 PROCEDURE — 1159F MED LIST DOCD IN RCRD: CPT | Mod: CPTII,S$GLB,, | Performed by: FAMILY MEDICINE

## 2025-02-10 PROCEDURE — 72040 X-RAY EXAM NECK SPINE 2-3 VW: CPT | Mod: TC,FY,PO

## 2025-02-10 PROCEDURE — 1126F AMNT PAIN NOTED NONE PRSNT: CPT | Mod: CPTII,S$GLB,, | Performed by: FAMILY MEDICINE

## 2025-02-10 PROCEDURE — 3072F LOW RISK FOR RETINOPATHY: CPT | Mod: CPTII,S$GLB,, | Performed by: FAMILY MEDICINE

## 2025-02-10 PROCEDURE — 3288F FALL RISK ASSESSMENT DOCD: CPT | Mod: CPTII,S$GLB,, | Performed by: FAMILY MEDICINE

## 2025-02-10 PROCEDURE — 1101F PT FALLS ASSESS-DOCD LE1/YR: CPT | Mod: CPTII,S$GLB,, | Performed by: FAMILY MEDICINE

## 2025-02-10 PROCEDURE — 99999 PR PBB SHADOW E&M-EST. PATIENT-LVL V: CPT | Mod: PBBFAC,,, | Performed by: FAMILY MEDICINE

## 2025-02-10 PROCEDURE — 99213 OFFICE O/P EST LOW 20 MIN: CPT | Mod: S$GLB,,, | Performed by: FAMILY MEDICINE

## 2025-02-10 PROCEDURE — 3078F DIAST BP <80 MM HG: CPT | Mod: CPTII,S$GLB,, | Performed by: FAMILY MEDICINE

## 2025-02-10 PROCEDURE — 1160F RVW MEDS BY RX/DR IN RCRD: CPT | Mod: CPTII,S$GLB,, | Performed by: FAMILY MEDICINE

## 2025-02-10 PROCEDURE — 72040 X-RAY EXAM NECK SPINE 2-3 VW: CPT | Mod: 26,,, | Performed by: RADIOLOGY

## 2025-02-10 PROCEDURE — 3075F SYST BP GE 130 - 139MM HG: CPT | Mod: CPTII,S$GLB,, | Performed by: FAMILY MEDICINE

## 2025-02-10 RX ORDER — MELOXICAM 15 MG/1
15 TABLET ORAL DAILY
Qty: 30 TABLET | Refills: 3 | Status: SHIPPED | OUTPATIENT
Start: 2025-02-10

## 2025-02-10 NOTE — PROGRESS NOTES
Assessment & Plan  Problem List Items Addressed This Visit    None  Visit Diagnoses       Cervical radiculopathy    -  Primary    Relevant Orders    Ambulatory referral/consult to Neurology    X-Ray Cervical Spine 2 or 3 Views (Completed)           Assessment & Plan  1. Numbness and cold sensation in fingers.  The symptoms of numbness and coldness in all fingers suggest a potential issue higher up in the cervical spine rather than carpal tunnel syndrome, which typically affects the first 3-1/2 fingers. The bilateral nature of the symptoms and the presence of degenerative disc disease indicate possible impingement in the cervical spine. The cold sensation could be due to inappropriate blood flow, and the shriveled appearance of the hands raises concerns about muscle atrophy. An x-ray of the neck will be ordered today. A referral to a neurologist will be made to evaluate the need for an EMG. He is advised to keep his hands warm.    2. Arthritis.  He reports having arthritis in his hips and knees. He is scheduled to receive knee injections tomorrow with the orthopedist. He is also planning to schedule a hip replacement over the summer due to significant discomfort affecting his mobility. He has requested a refill of his meloxicam, which was confirmed to be sent in today at a dosage of 15 mg. He inquired about increasing the dosage, but 15 mg is the maximum dose for meloxicam. If the current dosage is insufficient, a different anti-inflammatory agent may be considered.    Results        Health Maintenance reviewed.      ______________________________________________________________________    Chief Complaint  Chief Complaint   Patient presents with    Numbness     In both hands       HPI  John Lemos is a 74 y.o. male with multiple medical diagnoses as listed in the medical history and problem list that presents for numbness.  Pt is new to me.   History of Present Illness  The patient presents for evaluation of  numbness and cold sensation in his fingers.    He sought medical attention at an urgent care facility last week due to persistent coldness and numbness in his fingers, a condition that was attributed to his type 2 diabetes. The onset of these symptoms was approximately 2 weeks ago, initially presenting as coldness before progressing to numbness. He reports no previous history of similar symptoms. The numbness is bilateral, affecting all fingers, including the little finger. He describes the sensation as akin to the feeling of keeping his hands in water for an extended period, resulting in a shriveled appearance. He has been experiencing difficulty in handling objects, often dropping them. He reports no history of neck injuries or arthritis but mentions a tingling sensation radiating up and down the right side of his neck. He has not sought any pharmacological intervention for his symptoms.    He has a known diagnosis of degenerative disc disease. Additionally, he suffers from arthritis, affecting both knees and one hip. He is scheduled to receive knee injections from his orthopedist tomorrow and is planning to undergo hip replacement surgery in the summer due to severe hip pain that impedes his mobility. He has requested a refill of his meloxicam prescription, which he finds effective in managing his knee pain. He is considering an increase in the dosage from 15 mg to 20 mg.    MEDICATIONS  Meloxicam           PAST MEDICAL HISTORY:  Past Medical History:   Diagnosis Date    Arthritis     Cataract     ou    Diabetes mellitus 7 yrs    lbs: 118 1 week ago    Diabetes mellitus type 2, controlled 9/8/2012    Eye injury     fb    Hyperlipidemia        PAST SURGICAL HISTORY:  Past Surgical History:   Procedure Laterality Date    COLONOSCOPY N/A 11/25/2019    Procedure: COLONOSCOPY;  Surgeon: Eddie Leslie MD;  Location: [facility];  Service: Endoscopy;  Laterality: N/A;       SOCIAL HISTORY:  Social History      Socioeconomic History    Marital status:    Occupational History    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     Employer: [employer]   Tobacco Use    Smoking status: Never    Smokeless tobacco: Never   Substance and Sexual Activity    Alcohol use: No    Drug use: No    Sexual activity: Not Currently     Social Drivers of Health     Financial Resource Strain: Low Risk  (2/12/2024)    Overall Financial Resource Strain (CARDIA)     Difficulty of Paying Living Expenses: Not hard at all   Food Insecurity: No Food Insecurity (2/12/2024)    Hunger Vital Sign     Worried About Running Out of Food in the Last Year: Never true     Ran Out of Food in the Last Year: Never true   Transportation Needs: No Transportation Needs (2/12/2024)    PRAPARE - Transportation     Lack of Transportation (Medical): No     Lack of Transportation (Non-Medical): No   Physical Activity: Insufficiently Active (2/12/2024)    Exercise Vital Sign     Days of Exercise per Week: 5 days     Minutes of Exercise per Session: 20 min   Stress: No Stress Concern Present (2/12/2024)    [employer] [place] [employer] - Occupational Stress Questionnaire     Feeling of Stress : Not at all   Housing Stability: Low Risk  (2/12/2024)    Housing Stability Vital Sign     Unable to Pay for Housing in the Last Year: No     Number of Places Lived in the Last Year: 1     Unstable Housing in the Last Year: No       FAMILY HISTORY:  Family History   Problem Relation Name Age of Onset    Diabetes Mother      Cancer Mother      Hypertension Mother      Diabetes Father      Cancer Father      No Known Problems Sister      No Known Problems Brother      No Known Problems Maternal Aunt      No Known Problems Maternal Uncle      No Known Problems Paternal Aunt      No Known Problems Paternal Uncle      No Known Problems Maternal Grandmother      No Known Problems Maternal Grandfather      No Known Problems Paternal  Grandmother      No Known Problems Paternal Grandfather      Amblyopia Neg Hx      Blindness Neg Hx      Cataracts Neg Hx      Glaucoma Neg Hx      Macular degeneration Neg Hx      Retinal detachment Neg Hx      Strabismus Neg Hx      Stroke Neg Hx      Thyroid disease Neg Hx         ALLERGIES AND MEDICATIONS: updated and reviewed.  Review of patient's allergies indicates:   Allergen Reactions    Sulfa (sulfonamide antibiotics) Swelling     Current Outpatient Medications   Medication Sig Dispense Refill    atorvastatin (LIPITOR) 40 MG tablet Take 1 tablet (40 mg total) by mouth once daily. 90 tablet 3    azelastine (ASTELIN) 137 mcg (0.1 %) nasal spray 1 spray (137 mcg total) by Nasal route 2 (two) times daily. 30 mL 3    blood sugar diagnostic (FREESTYLE LITE STRIPS) Strp Inject 1 each as directed once daily. 100 each 3    cetirizine (ZYRTEC) 10 MG tablet Take 1 tablet (10 mg total) by mouth once daily. 90 tablet 3    diclofenac (VOLTAREN) 50 MG EC tablet Take 1 tablet (50 mg total) by mouth 2 (two) times daily as needed (shoulder pain). 30 tablet 0    diclofenac sodium (VOLTAREN) 1 % Gel Apply 2 g topically 4 (four) times daily. 20 g 0    dulaglutide (TRULICITY) 0.75 mg/0.5 mL pen injector Inject 0.75 mg into the skin every 7 days. 12 pen 5    DULoxetine (CYMBALTA) 60 MG capsule Take 1 capsule (60 mg total) by mouth once daily. 90 capsule 3    fluticasone (FLONASE) 50 mcg/actuation nasal spray 1 spray (50 mcg total) by Each Nare route once daily. 16 g 2    ipratropium (ATROVENT) 21 mcg (0.03 %) nasal spray 2 sprays by Nasal route 3 (three) times daily. 30 mL 0    lancets (FREESTYLE LANCETS) 28 gauge Misc Inject 1 lancet as directed once daily. 100 each 3    losartan (COZAAR) 50 MG tablet Take 1 tablet (50 mg total) by mouth once daily. 90 tablet 1    meloxicam (MOBIC) 15 MG tablet Take 1 tablet (15 mg total) by mouth once daily. 30 tablet 3    metFORMIN (GLUCOPHAGE) 500 MG tablet TAKE 2 TABLETS BY MOUTH TWICE  "DAILY WITH BREAKFAST AND WITH SUPPER 360 tablet 0    tadalafiL (CIALIS) 5 MG tablet Take 1 tablet (5 mg total) by mouth once daily. 30 tablet 11     No current facility-administered medications for this visit.         ROS  Review of Systems   Constitutional:  Negative for activity change, appetite change, fatigue, fever and unexpected weight change.   HENT:  Negative for congestion and facial swelling.    Eyes:  Negative for visual disturbance.   Respiratory:  Negative for chest tightness, shortness of breath, wheezing and stridor.    Cardiovascular:  Negative for chest pain, palpitations and leg swelling.   Gastrointestinal:  Negative for abdominal distention, abdominal pain, constipation, diarrhea, nausea and vomiting.   Endocrine: Negative for cold intolerance, heat intolerance, polydipsia and polyuria.   Musculoskeletal:  Positive for arthralgias.   Skin: Negative.    Allergic/Immunologic: Negative.    Neurological:  Negative for dizziness, light-headedness, numbness and headaches.   Psychiatric/Behavioral:  Negative for agitation and decreased concentration.            Physical Exam  Vitals:    02/10/25 0820   BP: 130/70   Pulse: 75   SpO2: 97%   Weight: 77.9 kg (171 lb 11.8 oz)   Height: 5' 7" (1.702 m)    Body mass index is 26.9 kg/m².  Weight: 77.9 kg (171 lb 11.8 oz)   Height: 5' 7" (170.2 cm)   Physical Exam  Vitals reviewed.   Constitutional:       Appearance: Normal appearance. He is well-developed.   HENT:      Head: Normocephalic and atraumatic.      Right Ear: External ear normal.      Left Ear: External ear normal.      Nose: Nose normal.   Eyes:      Extraocular Movements: Extraocular movements intact.      Conjunctiva/sclera: Conjunctivae normal.      Pupils: Pupils are equal, round, and reactive to light.   Cardiovascular:      Rate and Rhythm: Normal rate and regular rhythm.   Pulmonary:      Effort: Pulmonary effort is normal.   Musculoskeletal:      Cervical back: Normal range of motion. "   Skin:     General: Skin is warm and dry.   Neurological:      Mental Status: He is alert and oriented to person, place, and time.      Motor: Weakness and atrophy present.      Comments: Most findings noted bilaterally.    Psychiatric:         Behavior: Behavior normal.        Physical Exam  Neck was examined. Carotid arteries are normal.  Back was examined.         Health Maintenance         Date Due Completion Date    Foot Exam 12/26/2024 12/26/2023    Override on 11/11/2021: Done    Override on 8/20/2020: Done    Override on 7/15/2019: Done    Override on 1/16/2018: Done    Override on 11/25/2015: Done    Lipid Panel 05/18/2025 5/18/2024    Diabetes Urine Screening 05/21/2025 5/21/2024    Hemoglobin A1c 06/18/2025 12/18/2024    Override on 11/25/2015: Done    Diabetic Eye Exam 11/29/2025 11/29/2024    Low Dose Statin 02/10/2026 2/10/2025    TETANUS VACCINE 02/19/2026 2/19/2016    Colorectal Cancer Screening 11/25/2029 11/25/2019                Patient note was created using [employer].  Any errors in syntax or even information may not have been identified and edited on initial review prior to signing this note.

## 2025-02-11 ENCOUNTER — OFFICE VISIT (OUTPATIENT)
Dept: ORTHOPEDICS | Facility: CLINIC | Age: 75
End: 2025-02-11
Payer: COMMERCIAL

## 2025-02-11 VITALS — HEIGHT: 67 IN | WEIGHT: 171.75 LBS | BODY MASS INDEX: 26.96 KG/M2

## 2025-02-11 DIAGNOSIS — M17.0 PRIMARY OSTEOARTHRITIS OF BOTH KNEES: Primary | ICD-10-CM

## 2025-02-11 PROCEDURE — 99999 PR PBB SHADOW E&M-EST. PATIENT-LVL III: CPT | Mod: PBBFAC,,, | Performed by: ORTHOPAEDIC SURGERY

## 2025-02-11 RX ORDER — TRIAMCINOLONE ACETONIDE 40 MG/ML
40 INJECTION, SUSPENSION INTRA-ARTICULAR; INTRAMUSCULAR
Status: DISCONTINUED | OUTPATIENT
Start: 2025-02-11 | End: 2025-02-11 | Stop reason: HOSPADM

## 2025-02-11 RX ADMIN — TRIAMCINOLONE ACETONIDE 40 MG: 40 INJECTION, SUSPENSION INTRA-ARTICULAR; INTRAMUSCULAR at 03:02

## 2025-02-11 NOTE — PROGRESS NOTES
EST PATIENT ORTHOPAEDIC: Knee    PRIMARY CARE PHYSICIAN: Chan Estrada MD   REFERRING PROVIDER: No referring provider defined for this encounter.     ASSESSMENT & PLAN:    Impression:  Bilateral Knee Severe Degenerative Osteoarthritis, Primary   Left Hip Severe Osteoarthritis, Primary    Follow Up Plan: PRN     Injection:     John Lemos has physical exam evidence of above and wishes to pursue an injection. We discussed alternative non operative modalities including physical therapy, anti-inflammatories, bracing, maintenance of appropriate weight, rest, ambulatory devices. We discussed the risk, benefits, and expectations regarding injection. They have elected to proceed with injection. Should injections fail next step would be TKA. See procedure note for billing purposes.     Non operative care:    John Lemos has physical exam evidence of above and wishes to pursue an non-operative care. I am recommending the following: injection    Patient is known to me for severe left hip arthritis. This hip is doing fair today, improved ROM and some pain. May consider TAL in future if more problematic. See previous notes regarding this.  He is seen me today regarding bilateral knee pain.  He has x-rays today that demonstrate severe end-stage arthritis of his bilateral knees.  He reports pain that is not constant but can be sharp and shooting at times.  He reports some swelling to his knee.  He mostly localizes the pain to the medial aspect of his knee.  He has had an injection previously about 6 months ago. He would like repeat injections. If these injections provide greater than 3 months of relief can continue this treatment modality going forward.  If not may have to consider alternatives including knee replacements.    The patient has been ordered:  Office Intraarticular injection    CONSULTS:   None    ACTIVE PROBLEM LIST  Patient Active Problem List   Diagnosis    GERD (gastroesophageal reflux disease)    DDD  (degenerative disc disease), lumbar    ED (erectile dysfunction)    Dyslipidemia    Neuropathy    Type 2 diabetes mellitus with diabetic polyneuropathy    Primary osteoarthritis of right knee    Nuclear sclerosis of both eyes    Refractive error           SUBJECTIVE    CHIEF COMPLAINT: Knee Pain    HPI:   John Lemos is a 74 y.o. male here for evaluation and management of bilateral knee pain. There is not a specific incident that brought about this pain. he has had progressive problems with the knee(s) starting 3 months ago but is now progressing to interfere with activities which include: walking, functional household ADL's, and participating in family activities    Currently the pain in the joint is rated at moderate with activity. The pain is intermittent and is located in the knee, located medially and without radiation. The pain is described as aching, stiffness, and throbbing. Relieving factors include rest, over the counter medication , and repositioning.     There is associated Clicking and Popping.     John Lemos has no additional complaints.     2/11/25:  Patient here today with bilateral knee pain.  Last injection we did was over 6 months ago.  Those provided good relief.  He has been intermittently taking Mobic for breakthrough pain control.  He is also having some more residual issues with his known left hip arthritis.  May consider surgery for his hip in the upcoming months.    PROGRESSIVE SYMPTOMS:  Pain worsened by weight bearing  Pain effecting living situation  Pain impacting work    FUNCTIONAL STATUS:   Climb a flight of stairs or walk up a hill     PREVIOUS TREATMENTS:  Medical: Tylenol and Steroid injections  Physical Therapy: Activities Modified   Previous Orthopaedic Surgery: None    REVIEW OF SYSTEMS:  PAIN ASSESSMENT:  See HPI.  MUSCULOSKELETAL: See HPI.  OTHER 10 point review of systems is negative except as stated in HPI above    PAST MEDICAL HISTORY   has a past medical history  of Arthritis, Cataract, Diabetes mellitus (7 yrs), Diabetes mellitus type 2, controlled (9/8/2012), Eye injury, and Hyperlipidemia.     PAST SURGICAL HISTORY   has a past surgical history that includes Colonoscopy (N/A, 11/25/2019).     FAMILY HISTORY  family history includes Cancer in his father and mother; Diabetes in his father and mother; Hypertension in his mother; No Known Problems in his brother, maternal aunt, maternal grandfather, maternal grandmother, maternal uncle, paternal aunt, paternal grandfather, paternal grandmother, paternal uncle, and sister.     SOCIAL HISTORY   reports that he has never smoked. He has never used smokeless tobacco. He reports that he does not drink alcohol and does not use drugs.     ALLERGIES   Review of patient's allergies indicates:   Allergen Reactions    Sulfa (sulfonamide antibiotics) Swelling        MEDICATIONS  Current Outpatient Medications on File Prior to Visit   Medication Sig Dispense Refill    atorvastatin (LIPITOR) 40 MG tablet Take 1 tablet (40 mg total) by mouth once daily. 90 tablet 3    azelastine (ASTELIN) 137 mcg (0.1 %) nasal spray 1 spray (137 mcg total) by Nasal route 2 (two) times daily. 30 mL 3    blood sugar diagnostic (FREESTYLE LITE STRIPS) Strp Inject 1 each as directed once daily. 100 each 3    cetirizine (ZYRTEC) 10 MG tablet Take 1 tablet (10 mg total) by mouth once daily. 90 tablet 3    diclofenac (VOLTAREN) 50 MG EC tablet Take 1 tablet (50 mg total) by mouth 2 (two) times daily as needed (shoulder pain). 30 tablet 0    diclofenac sodium (VOLTAREN) 1 % Gel Apply 2 g topically 4 (four) times daily. 20 g 0    dulaglutide (TRULICITY) 0.75 mg/0.5 mL pen injector Inject 0.75 mg into the skin every 7 days. 12 pen 5    DULoxetine (CYMBALTA) 60 MG capsule Take 1 capsule (60 mg total) by mouth once daily. 90 capsule 3    fluticasone (FLONASE) 50 mcg/actuation nasal spray 1 spray (50 mcg total) by Each Nare route once daily. 16 g 2    ipratropium  "(ATROVENT) 21 mcg (0.03 %) nasal spray 2 sprays by Nasal route 3 (three) times daily. 30 mL 0    lancets (FREESTYLE LANCETS) 28 gauge Misc Inject 1 lancet as directed once daily. 100 each 3    losartan (COZAAR) 50 MG tablet Take 1 tablet (50 mg total) by mouth once daily. 90 tablet 1    meloxicam (MOBIC) 15 MG tablet Take 1 tablet (15 mg total) by mouth once daily. 30 tablet 3    metFORMIN (GLUCOPHAGE) 500 MG tablet TAKE 2 TABLETS BY MOUTH TWICE DAILY WITH BREAKFAST AND WITH SUPPER 360 tablet 0    tadalafiL (CIALIS) 5 MG tablet Take 1 tablet (5 mg total) by mouth once daily. 30 tablet 11     No current facility-administered medications on file prior to visit.          PHYSICAL EXAM   height is 5' 7" (1.702 m) and weight is 77.9 kg (171 lb 11.8 oz).   Body mass index is 26.9 kg/m².      All other systems deferred.  GENERAL:  No acute distress  HABITUS: Normal  GAIT: Non-antalgic  SKIN: Normal     KNEE EXAM:    bilateral:   Effusion: Minimal joint effusion  TTP: Yes over Medial Joint Line   No crepitus with passive knee ROM  Passive ROM: Extension 0, Flexion 130  No pain with manipulation of patella  Stable to varus/valgus stress. No increased laxity to anterior/posterior drawer testing  negative Yeison's test  No pain with IR/ER rotation of the hip  5/5 strength in knee flexion and extension, ankle plantarflexion and dorsiflexion  Neurovascular Status: Sensation intact to light touch in Sural, Saphenous, SPN, DPN, Tibial nerve distribution  2+ pulse DP/PT, normal capillary refill, foot has normal warmth    DATA:  Diagnostic tests reviewed for today's visit:     4v of the bilateral knee reveal Severe degenerative changes of the Medial compartment. There is evidence of advanced osteoarthritis changes with Subchondral sclerosis, Osteophyte formation, and Joint space narrowing. The limb is in varus alignment. The patella is tracking midline.    "

## 2025-02-11 NOTE — PROCEDURES
Large Joint Aspiration/Injection: bilateral knee    Date/Time: 2/11/2025 3:20 PM    Performed by: Judson Steel MD  Authorized by: Judson Steel MD    Consent Done?:  Yes (Verbal)  Indications:  Arthritis and pain  Prep: patient was prepped and draped in usual sterile fashion      Local anesthesia used?: Yes    Anesthesia:  Local infiltration  Local anesthetic:  Topical anesthetic and lidocaine 1% without epinephrine    Details:  Needle Size:  22 G  Approach:  Anterolateral  Location:  Knee  Laterality:  Bilateral  Site:  Bilateral knee  Medications (Right):  40 mg triamcinolone acetonide 40 mg/mL  Medications (Left):  40 mg triamcinolone acetonide 40 mg/mL  Patient tolerance:  Patient tolerated the procedure well with no immediate complications

## 2025-02-16 NOTE — TELEPHONE ENCOUNTER
Care Due:                  Date            Visit Type   Department     Provider  --------------------------------------------------------------------------------                                SAME DAY -   [facility]                              ESTABLISHED   MED/ INTERNAL  Last Visit: 02-      PATIENT      MED/ PEDS      Oma Carrizales  Next Visit: None Scheduled  None         None Found                                                            Last  Test          Frequency    Reason                     Performed    Due Date  --------------------------------------------------------------------------------    CBC.........  12 months..  diclofenac...............  02- 02-    CMP.........  12 months..  atorvastatin, losartan...  Not Found    Overdue    Lipid Panel.  12 months..  atorvastatin.............  05- 05-    [facility] Embedded Care Due Messages. Reference number: [id number].   2/16/2025 1:48:41 PM CST

## 2025-02-17 RX ORDER — METFORMIN HYDROCHLORIDE 500 MG/1
TABLET ORAL
Qty: 360 TABLET | Refills: 0 | Status: SHIPPED | OUTPATIENT
Start: 2025-02-17

## 2025-02-19 ENCOUNTER — OFFICE VISIT (OUTPATIENT)
Dept: NEUROLOGY | Facility: CLINIC | Age: 75
End: 2025-02-19
Payer: COMMERCIAL

## 2025-02-19 ENCOUNTER — LAB VISIT (OUTPATIENT)
Dept: LAB | Facility: HOSPITAL | Age: 75
End: 2025-02-19
Payer: COMMERCIAL

## 2025-02-19 VITALS
DIASTOLIC BLOOD PRESSURE: 73 MMHG | SYSTOLIC BLOOD PRESSURE: 138 MMHG | BODY MASS INDEX: 26.75 KG/M2 | WEIGHT: 170.44 LBS | HEIGHT: 67 IN | HEART RATE: 84 BPM | OXYGEN SATURATION: 98 %

## 2025-02-19 DIAGNOSIS — R20.2 PARESTHESIAS: ICD-10-CM

## 2025-02-19 DIAGNOSIS — M54.12 CERVICAL RADICULOPATHY: Primary | ICD-10-CM

## 2025-02-19 DIAGNOSIS — M54.2 CERVICALGIA: ICD-10-CM

## 2025-02-19 DIAGNOSIS — M43.6 NECK STIFFNESS: ICD-10-CM

## 2025-02-19 LAB — TSH SERPL DL<=0.005 MIU/L-ACNC: 0.77 UIU/ML (ref 0.4–4)

## 2025-02-19 PROCEDURE — 84443 ASSAY THYROID STIM HORMONE: CPT

## 2025-02-19 PROCEDURE — 84207 ASSAY OF VITAMIN B-6: CPT

## 2025-02-19 PROCEDURE — 1159F MED LIST DOCD IN RCRD: CPT | Mod: CPTII,S$GLB,,

## 2025-02-19 PROCEDURE — 3075F SYST BP GE 130 - 139MM HG: CPT | Mod: CPTII,S$GLB,,

## 2025-02-19 PROCEDURE — 3288F FALL RISK ASSESSMENT DOCD: CPT | Mod: CPTII,S$GLB,,

## 2025-02-19 PROCEDURE — 84165 PROTEIN E-PHORESIS SERUM: CPT

## 2025-02-19 PROCEDURE — 84165 PROTEIN E-PHORESIS SERUM: CPT | Mod: 26,,, | Performed by: PATHOLOGY

## 2025-02-19 PROCEDURE — 1101F PT FALLS ASSESS-DOCD LE1/YR: CPT | Mod: CPTII,S$GLB,,

## 2025-02-19 PROCEDURE — 82746 ASSAY OF FOLIC ACID SERUM: CPT

## 2025-02-19 PROCEDURE — 1160F RVW MEDS BY RX/DR IN RCRD: CPT | Mod: CPTII,S$GLB,,

## 2025-02-19 PROCEDURE — 1126F AMNT PAIN NOTED NONE PRSNT: CPT | Mod: CPTII,S$GLB,,

## 2025-02-19 PROCEDURE — 82607 VITAMIN B-12: CPT

## 2025-02-19 PROCEDURE — 3072F LOW RISK FOR RETINOPATHY: CPT | Mod: CPTII,S$GLB,,

## 2025-02-19 PROCEDURE — 3008F BODY MASS INDEX DOCD: CPT | Mod: CPTII,S$GLB,,

## 2025-02-19 PROCEDURE — 86334 IMMUNOFIX E-PHORESIS SERUM: CPT

## 2025-02-19 PROCEDURE — 3078F DIAST BP <80 MM HG: CPT | Mod: CPTII,S$GLB,,

## 2025-02-19 PROCEDURE — 36415 COLL VENOUS BLD VENIPUNCTURE: CPT

## 2025-02-19 PROCEDURE — 84425 ASSAY OF VITAMIN B-1: CPT

## 2025-02-19 PROCEDURE — 86334 IMMUNOFIX E-PHORESIS SERUM: CPT | Mod: 26,,, | Performed by: PATHOLOGY

## 2025-02-19 RX ORDER — DICLOFENAC SODIUM 10 MG/G
2 GEL TOPICAL 4 TIMES DAILY PRN
Qty: 100 G | Refills: 2 | Status: SHIPPED | OUTPATIENT
Start: 2025-02-19

## 2025-02-19 NOTE — PROGRESS NOTES
[facility] VISIT    Chief Complaint and Duration     Chief Complaint   Patient presents with    Neck Pain    Numbness    for 1 month.    History of Present Illness     John Lemos is a 74 y.o. right handed male with a history of multiple medical diagnoses as listed below that presents for paresthesias and neck pain.     Referral received from primary care    Patient has significant past medical history of lumbar degenerative disease, neuropathy, dyslipidemia, diabetes, OA, GERD    Presents to clinic visit alone    Patient reports an acute onset paresthesias presenting 1 month ago in which he sought out care at .  He is also followed up with his primary care provider who place referral to Neurology.  Location of symptoms are bilateral upper extremities to include fingertips right greater than left.  He states since onset progression has become worsening as it is extended to mid palm.  All fingers are involved frequency is constant intensity range 5-6 on 0-10 scale.  Daily paresthesias are characterized as numbness, tingling, abnormal temperature sensation of coldness.  Associated symptoms includes occasional weakness where patient reports he has dropped things.  He denies any provocation or aggravating factors.  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  He has not tried any bracing, maneuvers, pharmacological and non pharmacological treatments.  He denies nighttime symptoms.  Patient does report chronic neck stiffness and tightness with a tingling/numbing sensation right greater than left radiating along the paraspinous muscles.  Has a positive history of diabetes denies vascular insufficiency strokes cardiac surgery, neurologic disease or trauma.  He also mentions he can occasionally have numbness and tingling to bilateral toes intermittently not daily with  mid foot muscle cramping.  Patient has known history of OA affecting his left hip requiring him to have a hip replacement.  He has not been scheduled for such states he has to complete physical therapy treatments first.    Pertinent negatives include no deficits in memory, vision, ambulation, speech, painful cramping, excessive sweating, heat intolerance, early satiety, dizziness/fainting, changes in hair or nails, muscle atrophy or involuntary muscle movements.    Review of patient's allergies indicates:   Allergen Reactions    Sulfa (sulfonamide antibiotics) Swelling     Current Medications[1]    Medical History     Past Medical History:   Diagnosis Date    Arthritis     Cataract     ou    Diabetes mellitus 7 yrs    lbs: 118 1 week ago    Diabetes mellitus type 2, controlled 9/8/2012    Eye injury     fb    Hyperlipidemia      Past Surgical History:   Procedure Laterality Date    COLONOSCOPY N/A 11/25/2019    Procedure: COLONOSCOPY;  Surgeon: Eddie Leslie MD;  Location: [facility];  Service: Endoscopy;  Laterality: N/A;     Family History   Problem Relation Name Age of Onset    Diabetes Mother      Cancer Mother      Hypertension Mother      Diabetes Father      Cancer Father      No Known Problems Sister      No Known Problems Brother      No Known Problems Maternal Aunt      No Known Problems Maternal Uncle      No Known Problems Paternal Aunt      No Known Problems Paternal Uncle      No Known Problems Maternal Grandmother      No Known Problems Maternal Grandfather      No Known Problems Paternal Grandmother      No Known Problems Paternal Grandfather      Amblyopia Neg Hx      Blindness Neg Hx      Cataracts Neg Hx      Glaucoma Neg Hx      Macular degeneration Neg Hx      Retinal detachment Neg Hx      Strabismus Neg Hx      Stroke Neg Hx      Thyroid disease Neg Hx       Social History[2]    Exam     Vitals:    02/19/25 1302   BP: 138/73   Pulse: 84      Physical Exam:  General: Not in acute distress. Not  ill-appearing.   HENT: Normocephalic and atraumatic. Moist mucous membranes.  Eyes: Conjunctivae normal.   Pulmonary: Pulmonary effort is normal.   Skin: Skin is warm and dry. No rashes.   Psychiatric: Mood normal.        Neurologic Exam   Mental status: oriented to person, place, and time  Attention: Normal. Concentration: normal.  Speech: speech is normal.  Cranial Nerves: EOMI intact, V1-V3 Facial sensation intact. Symmetric facies. Hearing grossly intact. Palate and uvula midline, symmetric. No tongue deviation. Trapezius strength intact.     Motor exam: bulk and tone normal. Strength 5/5 in bilateral upper extremities: deltoids, biceps, triceps, wrist flexion/extension, finger abduction/adduction. Strength 5/5 in bilateral lower extremities: hip flexion/extension, thigh adduction/abduction, knee flexion/extension, dorsiflexion/plantarflexion, foot eversion/inversion.    Reflexes: 2+ in bilateral upper extremities: biceps and brachiaradialis, 2+ in bilateral lower extremities: patellar and achilles  Plantar reflex: normal  Calvin's/Clonus: negative    Sensory exam: pin prick and light touch intact   Gait exam:  Offloading left leg limping gait.  Romberg: negative  Coordination: normal    MSK:  Limited range of motion cervical spine, left and right lateral    Tremor: none  Cogwheel rigidity: none  Phalen's:  Negative  Tinel's sign:  Negative    Labs and Imaging     Labs: reviewed  No results found for this or any previous visit (from the past 24 hours).    HgA1C%:  6.1  LDL:  59.0    Imaging:   I have personally reviewed the images performed.     Xray Cervical Spine 2/3 View 2/10/25  Cervical spine vertebral body heights appear maintained. modest-mild marginal spurring with maintained disc heights.      Other procedures: reviewed    Assessment and Plan     Problem List Items Addressed This Visit    None  Visit Diagnoses         Cervical radiculopathy    -  Primary    Relevant Medications    diclofenac sodium  (VOLTAREN) 1 % Gel    Other Relevant Orders    MRI Cervical Spine Without Contrast    EMG W/ ULTRASOUND AND NERVE CONDUCTION TEST 4 Extremities      Paresthesias        Relevant Orders    Folate    Immunofixation Electrophoresis    Protein Electrophoresis, Serum    TSH    Vitamin B1    Vitamin B12    Vitamin B6    EMG W/ ULTRASOUND AND NERVE CONDUCTION TEST 4 Extremities      Neck stiffness        Relevant Medications    diclofenac sodium (VOLTAREN) 1 % Gel    Other Relevant Orders    MRI Cervical Spine Without Contrast      Cervicalgia            1. Numbness and cold sensation in fingers.  The symptoms of numbness and coldness in all fingers suggest a potential issue higher up in the cervical spine rather than carpal tunnel syndrome, which typically affects the first 3-1/2 fingers. The bilateral nature of the symptoms and the presence of degenerative disc disease indicate possible impingement in the cervical spine. The cold sensation could be due to inappropriate blood flow, and the shriveled appearance of the hands raises concerns about muscle atrophy. An x-ray of the neck will be ordered today. A referral to a neurologist will be made to evaluate the need for an EMG. He is advised to keep his hands warm.    Mr. Lemos is a 74-year-old male new to me who presents for evaluation and recommendations of paresthesias.  Symptoms presented acute nature 1 month ago requiring patient to seek care at an .  He also has follow up with primary care provider who placed neurology referral.  Symptoms paresthesia presents daily constant lessening in intensity while patient is distracted.  Paresthesias are characterized as numbness, tingling, abnormal temperature sensation of coldness with occasional associated symptom of weakness where he has dropped things.  He denies any provocation or aggravating factors he is a .  Symptoms do not negate his ability to independently practice his ADLs, IADLs  full-time work or driving.  Patient does have a history of diabetes.  Denies any cardiovascular or neurological surgeries or illnesses.  He also mentions he can have occasional numbness and tingling to bilateral toes with mid foot cramping.  He does have a known degenerative oa affecting hip and knees patient will be attending physical therapy for left hip prior to possible required hip replacement.  Patient also reports he can have changes in the buoyancy of his skin in fingertips as well.  Symptom negative for autonomic, axonal or demyelinating features.  Physical examination patient with limited range of motion cervical spine right and left lateral movement, limping gait painful left hip, Phalen's Tinel's sign negative.  Remaining physical examination unremarkable.    Discussed multifactorial nature of neuropathy, patient has bilateral distribution of symptoms, no exercise, diabetes, vascular risk factors, and chronic osteoarthritis multi joint and radiculopathy pain. We will order Labs to evaluate if there is an underlying reversible cause of the patients complaint, MRI cervical spine given x-rays identify mild to modest bone spurring and EMG 4  limb to assess changes that may be contributing to patients paresthesia to include length dependency given patient's history of diabetes. Otherwise lifestyle modifications including diet, sleep hygiene, and exercise were discussed.  Explained possible causes of patients paresthesias including but not limited to diabetes vs DDD cervical pathology vs MSK, vs ergonomics. Prescribed diclofenac 1% topical cream for neck pain, MSK tightness.  Discussed over the counter pain creams: Biofreeze, Bengay, tiger balm, two old goat, lidocaine gel,  Absorbine Veterinary Liniment Gel Topical Analgesic Sore Muscle and Joint Pain reliever.     Questions and concerns were sought and answered to the patient's stated verbal satisfaction. The patient verbalizes understanding and agreement  with the above stated treatment plan.      Thank you for allowing me to assist in MrAngela Lemos 's care. If you have any questions, please contact clinic @ [phone] or use of portal messaging services via electronic medical record.    AMINA Gottlieb  [facility]  [facility]     [phone]    Follow-up: Follow up in about 4 weeks (around 3/19/2025).  For MRI imaging and blood work    Time spent on this encounter: 85 minutes. This includes face to face time and non-face to face time preparing to see the patient (eg, review of tests), obtaining and/or reviewing separately obtained history, documenting clinical information in the electronic or other health record, independently interpreting results and communicating results to the patient/family/caregiver, or care coordinator.     This note was created by combination of typed  and M-Modal dictation. Transcription and phonetic errors may be present.  If there are any questions, please contact me.         [1]   Current Outpatient Medications   Medication Sig Dispense Refill    atorvastatin (LIPITOR) 40 MG tablet Take 1 tablet (40 mg total) by mouth once daily. 90 tablet 3    azelastine (ASTELIN) 137 mcg (0.1 %) nasal spray 1 spray (137 mcg total) by Nasal route 2 (two) times daily. 30 mL 3    blood sugar diagnostic (FREESTYLE LITE STRIPS) Strp Inject 1 each as directed once daily. 100 each 3    cetirizine (ZYRTEC) 10 MG tablet Take 1 tablet (10 mg total) by mouth once daily. 90 tablet 3    dulaglutide (TRULICITY) 0.75 mg/0.5 mL pen injector Inject 0.75 mg into the skin every 7 days. 12 pen 5    DULoxetine (CYMBALTA) 60 MG capsule Take 1 capsule (60 mg total) by mouth once daily. 90 capsule 3    fluticasone (FLONASE) 50 mcg/actuation nasal spray 1 spray (50 mcg total) by Each Nare route once daily. 16 g 2    ipratropium (ATROVENT) 21 mcg (0.03 %) nasal spray 2 sprays by Nasal route 3 (three) times  daily. 30 mL 0    lancets (FREESTYLE LANCETS) 28 gauge Misc Inject 1 lancet as directed once daily. 100 each 3    losartan (COZAAR) 50 MG tablet Take 1 tablet (50 mg total) by mouth once daily. 90 tablet 1    meloxicam (MOBIC) 15 MG tablet Take 1 tablet (15 mg total) by mouth once daily. 30 tablet 3    metFORMIN (GLUCOPHAGE) 500 MG tablet TAKE 2 TABLETS BY MOUTH TWICE DAILY WITH BREAKFAST AND WITH SUPPER 360 tablet 0    tadalafiL (CIALIS) 5 MG tablet Take 1 tablet (5 mg total) by mouth once daily. 30 tablet 11    diclofenac sodium (VOLTAREN) 1 % Gel Apply 2 g topically 4 (four) times daily as needed (neck pain/stiffness). 100 g 2     No current facility-administered medications for this visit.   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     Employer: [employer]   Tobacco Use    Smoking status: Never    Smokeless tobacco: Never   Substance and Sexual Activity    Alcohol use: No    Drug use: No    Sexual activity: Not Currently     Social Drivers of Health     Financial Resource Strain: Low Risk  (2/12/2024)    Overall Financial Resource Strain (CARDIA)     Difficulty of Paying Living Expenses: Not hard at all   Food Insecurity: No Food Insecurity (2/12/2024)    Hunger Vital Sign     Worried About Running Out of Food in the Last Year: Never true     Ran Out of Food in the Last Year: Never true   Transportation Needs: No Transportation Needs (2/12/2024)    PRAPARE - Transportation     Lack of Transportation (Medical): No     Lack of Transportation (Non-Medical): No   Physical Activity: Insufficiently Active (2/12/2024)    Exercise Vital Sign     Days of Exercise per Week: 5 days     Minutes of Exercise per Session: 20 min   Stress: No Stress Concern Present (2/12/2024)    [employer] - Occupational Stress Questionnaire     Feeling of Stress : Not at all    Housing Stability: Low Risk  (2/12/2024)    Housing Stability Vital Sign     Unable to Pay for Housing in the Last Year: No     Number of Places Lived in the Last Year: 1     Unstable Housing in the Last Year: No

## 2025-02-20 LAB
FOLATE SERPL-MCNC: 11 NG/ML (ref 4–24)
VIT B12 SERPL-MCNC: 338 PG/ML (ref 210–950)

## 2025-02-21 LAB
ALBUMIN SERPL ELPH-MCNC: 4.26 G/DL (ref 3.35–5.55)
ALPHA1 GLOB SERPL ELPH-MCNC: 0.27 G/DL (ref 0.17–0.41)
ALPHA2 GLOB SERPL ELPH-MCNC: 0.72 G/DL (ref 0.43–0.99)
B-GLOBULIN SERPL ELPH-MCNC: 0.96 G/DL (ref 0.5–1.1)
GAMMA GLOB SERPL ELPH-MCNC: 1.39 G/DL (ref 0.67–1.58)
INTERPRETATION SERPL IFE-IMP: NORMAL
PATHOLOGIST INTERPRETATION IFE: NORMAL
PATHOLOGIST INTERPRETATION SPE: NORMAL
PROT SERPL-MCNC: 7.6 G/DL (ref 6–8.4)

## 2025-02-24 ENCOUNTER — PATIENT MESSAGE (OUTPATIENT)
Dept: ORTHOPEDICS | Facility: CLINIC | Age: 75
End: 2025-02-24
Payer: COMMERCIAL

## 2025-02-25 DIAGNOSIS — E11.42 TYPE 2 DIABETES MELLITUS WITH DIABETIC POLYNEUROPATHY, WITHOUT LONG-TERM CURRENT USE OF INSULIN: ICD-10-CM

## 2025-02-25 RX ORDER — DULAGLUTIDE 0.75 MG/.5ML
0.75 INJECTION, SOLUTION SUBCUTANEOUS
Qty: 12 PEN | Refills: 1 | Status: SHIPPED | OUTPATIENT
Start: 2025-02-25

## 2025-02-25 NOTE — TELEPHONE ENCOUNTER
No care due was identified.  [facility] Embedded Care Due Messages. Reference number: [id number].   2/25/2025 7:02:43 AM CST

## 2025-02-25 NOTE — TELEPHONE ENCOUNTER
Refill Decision Note   John Lemos  is requesting a refill authorization.  Brief Assessment and Rationale for Refill:  Approve     Medication Therapy Plan:         Comments:     Note composed:8:11 AM 02/25/2025

## 2025-02-26 LAB
PYRIDOXAL SERPL-MCNC: 2 UG/L (ref 5–50)
VIT B1 BLD-MCNC: 56 UG/L (ref 38–122)

## 2025-03-03 ENCOUNTER — HOSPITAL ENCOUNTER (OUTPATIENT)
Dept: RADIOLOGY | Facility: HOSPITAL | Age: 75
Discharge: HOME | End: 2025-03-03
Payer: COMMERCIAL

## 2025-03-03 DIAGNOSIS — M43.6 NECK STIFFNESS: ICD-10-CM

## 2025-03-03 DIAGNOSIS — M54.12 CERVICAL RADICULOPATHY: ICD-10-CM

## 2025-03-03 PROCEDURE — 72141 MRI NECK SPINE W/O DYE: CPT | Mod: 26,,, | Performed by: RADIOLOGY

## 2025-03-03 PROCEDURE — 72141 MRI NECK SPINE W/O DYE: CPT | Mod: TC

## 2025-03-15 ENCOUNTER — TELEPHONE (OUTPATIENT)
Dept: PHARMACY | Facility: CLINIC | Age: 75
End: 2025-03-15
Payer: COMMERCIAL

## 2025-03-15 NOTE — TELEPHONE ENCOUNTER
[facility]/Population Health Chart Review & Patient Outreach Details For Medication Adherence Project    Reason for Outreach Encounter: 3rd Party payor non-compliance report ([employer], [employer], C, etc)  2.  Patient Outreach Method: Reviewed patient chart   3.   Medication in question:    Hypertension Medications              losartan (COZAAR) 50 MG tablet Take 1 tablet (50 mg total) by mouth once daily.                 losartan  last filled  12/26 for 90 day supply      4.  Reviewed and or Updates Made To: Patient Chart  5. Outreach Outcomes and/or actions taken: Patient filled medication and is on track to be adherent  Additional Notes:

## 2025-03-17 ENCOUNTER — OFFICE VISIT (OUTPATIENT)
Dept: NEUROLOGY | Facility: CLINIC | Age: 75
End: 2025-03-17
Payer: COMMERCIAL

## 2025-03-17 VITALS
HEART RATE: 68 BPM | BODY MASS INDEX: 26.57 KG/M2 | SYSTOLIC BLOOD PRESSURE: 152 MMHG | WEIGHT: 169.31 LBS | HEIGHT: 67 IN | DIASTOLIC BLOOD PRESSURE: 76 MMHG

## 2025-03-17 DIAGNOSIS — E11.42 TYPE 2 DIABETES MELLITUS WITH DIABETIC POLYNEUROPATHY, WITHOUT LONG-TERM CURRENT USE OF INSULIN: ICD-10-CM

## 2025-03-17 DIAGNOSIS — M48.02 CERVICAL SPINAL STENOSIS: ICD-10-CM

## 2025-03-17 DIAGNOSIS — M50.30 DDD (DEGENERATIVE DISC DISEASE), CERVICAL: Primary | ICD-10-CM

## 2025-03-17 DIAGNOSIS — M54.12 CERVICAL RADICULOPATHY: ICD-10-CM

## 2025-03-17 PROCEDURE — 1159F MED LIST DOCD IN RCRD: CPT | Mod: CPTII,S$GLB,,

## 2025-03-17 PROCEDURE — 3072F LOW RISK FOR RETINOPATHY: CPT | Mod: CPTII,S$GLB,,

## 2025-03-17 PROCEDURE — 1101F PT FALLS ASSESS-DOCD LE1/YR: CPT | Mod: CPTII,S$GLB,,

## 2025-03-17 PROCEDURE — 3288F FALL RISK ASSESSMENT DOCD: CPT | Mod: CPTII,S$GLB,,

## 2025-03-17 PROCEDURE — 3078F DIAST BP <80 MM HG: CPT | Mod: CPTII,S$GLB,,

## 2025-03-17 PROCEDURE — 99999 PR PBB SHADOW E&M-EST. PATIENT-LVL IV: CPT | Mod: PBBFAC,,,

## 2025-03-17 PROCEDURE — 1125F AMNT PAIN NOTED PAIN PRSNT: CPT | Mod: CPTII,S$GLB,,

## 2025-03-17 PROCEDURE — 99215 OFFICE O/P EST HI 40 MIN: CPT | Mod: S$GLB,,,

## 2025-03-17 PROCEDURE — 3008F BODY MASS INDEX DOCD: CPT | Mod: CPTII,S$GLB,,

## 2025-03-17 PROCEDURE — 3077F SYST BP >= 140 MM HG: CPT | Mod: CPTII,S$GLB,,

## 2025-03-17 RX ORDER — PREGABALIN 25 MG/1
25 CAPSULE ORAL NIGHTLY
Qty: 30 CAPSULE | Refills: 0 | Status: SHIPPED | OUTPATIENT
Start: 2025-03-17 | End: 2025-04-16

## 2025-03-17 NOTE — PROGRESS NOTES
[facility] VISIT    Chief Complaint and Duration     Chief Complaint   Patient presents with    Follow-up     Mri,lab results     for 1 month.    History of Present Illness     John Lemos is a 74 y.o. right handed male with a history of multiple medical diagnoses as listed below that presents for paresthesias and neck pain.     Referral received from primary care    Patient has significant past medical history of lumbar degenerative disease, neuropathy, dyslipidemia, diabetes, OA, GERD    Presents to clinic visit alone    Initial Encounter 2/19/25  Patient reports an acute onset paresthesias presenting 1 month ago in which he sought out care at .  He is also followed up with his primary care provider who place referral to Neurology.  Location of symptoms are bilateral upper extremities to include fingertips right greater than left.  He states since onset progression has become worsening as it is extended to mid palm.  All fingers are involved frequency is constant intensity range 5-6 on 0-10 scale.  Daily paresthesias are characterized as numbness, tingling, abnormal temperature sensation of coldness.  Associated symptoms includes occasional weakness where patient reports he has dropped things.  He denies any provocation or aggravating factors.  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  He has not tried any bracing, maneuvers, pharmacological and non pharmacological treatments.  He denies nighttime symptoms.  Patient does report chronic neck stiffness and tightness with a tingling/numbing sensation right greater than left radiating along the paraspinous muscles.  Has a positive history of diabetes denies vascular insufficiency strokes cardiac surgery, neurologic disease or trauma.  He also mentions he can occasionally have numbness and tingling to bilateral  toes intermittently not daily with mid foot muscle cramping.  Patient has known history of OA affecting his left hip requiring him to have a hip replacement.  He has not been scheduled for such states he has to complete physical therapy treatments first.    Pertinent negatives include no deficits in memory, vision, ambulation, speech, painful cramping, excessive sweating, heat intolerance, early satiety, dizziness/fainting, changes in hair or nails, muscle atrophy or involuntary muscle movements.    Interval history 3/17/25:    Patient presents to clinic for follow-up of paresthesia episodes after MRI imaging, blood work, EMG pending. At the time of today's visit, the patient denies new or worsening focal neurologic symptoms or any symptoms concerning for a new undocumented episodes/events. Patient is doing well overall with mild improvements.  Symptoms continue bilateral upper extremities fingertips right greater than left.  Patient used diclofenac cream received a mild relief but states it is cumbersome to use.  Constant intensity ranging 5/6 on 0-10 scale.  Paresthesias present with the associated symptoms of occasional weakness.  He denies nighttime symptoms.  Chronic neck stiffness and tightness continues to be present.  Patient is independent in all basic ADLs and IADLs.      Review of patient's allergies indicates:   Allergen Reactions    Sulfa (sulfonamide antibiotics) Swelling     Current Medications[1]    Medical History     Past Medical History:   Diagnosis Date    Arthritis     Cataract     ou    Diabetes mellitus 7 yrs    lbs: 118 1 week ago    Diabetes mellitus type 2, controlled 9/8/2012    Eye injury     fb    Hyperlipidemia      Past Surgical History:   Procedure Laterality Date    COLONOSCOPY N/A 11/25/2019    Procedure: COLONOSCOPY;  Surgeon: Eddie Leslie MD;  Location: [facility];  Service: Endoscopy;  Laterality: N/A;     Family History   Problem Relation Name Age of Onset    Diabetes Mother       Cancer Mother      Hypertension Mother      Diabetes Father      Cancer Father      No Known Problems Sister      No Known Problems Brother      No Known Problems Maternal Aunt      No Known Problems Maternal Uncle      No Known Problems Paternal Aunt      No Known Problems Paternal Uncle      No Known Problems Maternal Grandmother      No Known Problems Maternal Grandfather      No Known Problems Paternal Grandmother      No Known Problems Paternal Grandfather      Amblyopia Neg Hx      Blindness Neg Hx      Cataracts Neg Hx      Glaucoma Neg Hx      Macular degeneration Neg Hx      Retinal detachment Neg Hx      Strabismus Neg Hx      Stroke Neg Hx      Thyroid disease Neg Hx       Social History[2]    Exam     Vitals:    03/17/25 1113   BP: (!) 152/76   Pulse: 68      Physical Exam:  General: Not in acute distress. Not ill-appearing.   HENT: Normocephalic and atraumatic. Moist mucous membranes.  Eyes: Conjunctivae normal.   Pulmonary: Pulmonary effort is normal.   Skin: Skin is warm and dry. No rashes.   Psychiatric: Mood normal.        Neurologic Exam   Mental status: oriented to person, place, and time  Attention: Normal. Concentration: normal.  Speech: speech is normal.  Cranial Nerves: EOMI intact, V1-V3 Facial sensation intact. Symmetric facies. Hearing grossly intact. Palate and uvula midline, symmetric. No tongue deviation. Trapezius strength intact.     Motor exam: bulk and tone normal. Strength 5/5 in bilateral upper extremities: deltoids, biceps, triceps, wrist flexion/extension, finger abduction/adduction. Strength 5/5 in bilateral lower extremities: hip flexion/extension, thigh adduction/abduction, knee flexion/extension, dorsiflexion/plantarflexion, foot eversion/inversion.    Reflexes: 2+ in bilateral upper extremities: biceps and brachiaradialis, 2+ in bilateral lower extremities: patellar and achilles  Plantar reflex: normal  Calvin's/Clonus: negative    Sensory exam: pin prick and light touch  intact   Gait exam:  Offloading left leg limping gait.  Romberg: negative  Coordination: normal    MSK:  Limited range of motion cervical spine, left and right lateral    Tremor: none  Cogwheel rigidity: none  Phalen's:  Negative  Tinel's sign:  Negative    Labs and Imaging     Labs: reviewed  No results found for this or any previous visit (from the past 24 hours).    HgA1C%:  6.1  LDL:  59.0    ALESSIA, SPE: WNL  Folate:  11.0  TSH:  WNL  Vit B1:  56  Vit B6:  2*  Vit B12: 338*    Imaging:   I have personally reviewed the images performed.     MRI cervical spine without contrast 03/03/2025  C2-C3 disc bulge shallow left paracentral disc protrusion  C3-C4 disc bulge spinal canal stenosis mild flattening of neuroforaminal narrowing bilateral neuroforaminal narrowing  C4-C5 disc bulge flattens the thecal sac bilateral neuroforaminal narrowing  C5-C6 disc bulge left paracentral disc protrusion that abuts the cord Bilateral neuroforaminal narrowing   C6-C7 diffuse disc bulge flattens the anterior thecal sac bilateral neuroforaminal narrowing  C7-T1 disc bulge neural foramina patent    Xray Cervical Spine 2/3 View 2/10/25  Cervical spine vertebral body heights appear maintained. modest-mild marginal spurring with maintained disc heights.      Other procedures: reviewed    Assessment and Plan     Problem List Items Addressed This Visit          Endocrine    Type 2 diabetes mellitus with diabetic polyneuropathy    Overview   June 2019 A1c 8.9%          Other Visit Diagnoses         DDD (degenerative disc disease), cervical    -  Primary    Relevant Medications    pregabalin (LYRICA) 25 MG capsule    Other Relevant Orders    Ambulatory referral/consult to Physical/Occupational Therapy      Cervical radiculopathy        Relevant Medications    pregabalin (LYRICA) 25 MG capsule    Other Relevant Orders    Ambulatory referral/consult to Physical/Occupational Therapy      Cervical spinal stenosis        Relevant Medications     pregabalin (LYRICA) 25 MG capsule    Other Relevant Orders    Ambulatory referral/consult to Physical/Occupational Therapy          Mr. Lemos is a 74-year-old male known to me who presents to clinic for follow-up of paresthesia episodes presenting 2 months ago after MRI imaging, blood work, EMG pending. At the time of today's visit, the patient denies new or worsening focal neurologic symptoms or any symptoms concerning for a new undocumented episodes/events. Patient is doing well overall with mild improvements.  Symptoms continue bilateral upper extremities fingertips right greater than left.  Patient used diclofenac cream received a mild relief but states it is cumbersome to use.  Constant intensity ranging 5/6 on 0-10 scale.  Paresthesias present with the associated symptoms of occasional weakness.  He denies nighttime symptoms.  Chronic neck stiffness and tightness continues to be present.  Patient is independent in all basic ADLs and IADLs. He denies any provocation or aggravating factors he is a .  Symptoms do not negate his ability to independently practice his ADLs, IADLs full-time work or driving.  Patient does have a history of diabetes. He does have a known degenerative oa affecting hip and knees patient will be attending physical therapy for left hip prior to possible required hip replacement. Patient also reports he can have changes in the buoyancy of his skin in fingertips as well.  Symptom negative for autonomic, axonal or demyelinating features.  Physical examination patient with limited range of motion cervical spine right and left lateral movement, limping gait painful left hip, Phalen's Tinel's sign negative.  Remaining physical examination unremarkable.    Discussed multifactorial nature of neuropathy, patient has bilateral distribution of symptoms, no exercise, diabetes, vascular risk factors, and chronic osteoarthritis multi joint and radiculopathy pain.  Labs with  the finding of subtherapeutic levels of vitamin B6 and B12, recommended oral repletion over-the-counter.  Additionally reviewed MRI cervical spine without contrast multilevel disc degenerative changes with neuroforaminal narrowing and stenosis.  Discuss MRI findings as contributing factors to patient's complaint.  This has conservative management of current symptoms.  We will additionally prescribed pregabalin 25 mg nightly 30 day trial.  Patient continue to use diclofenac topical cream as needed when convenient.  Place ambulatory referral to physical therapy outpatient basis.  EMG pending 4 limb for length dependency given patient's history of diabetes.  Patient will follow up in clinic after EMG. Explained contributing factors of patients paresthesias including but not limited to diabetes vs DDD cervical pathology vs MSK, vs ergonomics. Continue Prescribed diclofenac 1% topical cream for neck pain, MSK tightness.  Discussed over the counter pain creams: Biofreeze, Bengay, tiger balm, two old goat, lidocaine gel,  Absorbine Veterinary Liniment Gel Topical Analgesic Sore Muscle and Joint Pain reliever.     Questions and concerns were sought and answered to the patient's stated verbal satisfaction. The patient verbalizes understanding and agreement with the above stated treatment plan.      Thank you for allowing me to assist in Mr. John Lemos 's care. If you have any questions, please contact clinic @ [phone] or use of portal messaging services via electronic medical record.    ROBI GottliebC  [facility]  Department of Neurology- [facility]     [phone]    Follow-up: Follow up in about 4 weeks (around 4/14/2025).  For EMG results and medication management.  Patient to additionally follow up with outpatient physical therapy    Time spent on this encounter: 50 minutes. This includes face to face time and non-face to face time preparing to see the patient (eg, review of tests),  obtaining and/or reviewing separately obtained history, documenting clinical information in the electronic or other health record, independently interpreting results and communicating results to the patient/family/caregiver, or care coordinator.     This note was created by combination of typed  and M-Modal dictation. Transcription and phonetic errors may be present.  If there are any questions, please contact me.           [1]   Current Outpatient Medications   Medication Sig Dispense Refill    atorvastatin (LIPITOR) 40 MG tablet Take 1 tablet (40 mg total) by mouth once daily. 90 tablet 3    azelastine (ASTELIN) 137 mcg (0.1 %) nasal spray 1 spray (137 mcg total) by Nasal route 2 (two) times daily. 30 mL 3    blood sugar diagnostic (FREESTYLE LITE STRIPS) Strp Inject 1 each as directed once daily. 100 each 3    cetirizine (ZYRTEC) 10 MG tablet Take 1 tablet (10 mg total) by mouth once daily. 90 tablet 3    diclofenac sodium (VOLTAREN) 1 % Gel Apply 2 g topically 4 (four) times daily as needed (neck pain/stiffness). 100 g 2    DULoxetine (CYMBALTA) 60 MG capsule Take 1 capsule (60 mg total) by mouth once daily. 90 capsule 3    fluticasone (FLONASE) 50 mcg/actuation nasal spray 1 spray (50 mcg total) by Each Nare route once daily. 16 g 2    ipratropium (ATROVENT) 21 mcg (0.03 %) nasal spray 2 sprays by Nasal route 3 (three) times daily. 30 mL 0    lancets (FREESTYLE LANCETS) 28 gauge Misc Inject 1 lancet as directed once daily. 100 each 3    losartan (COZAAR) 50 MG tablet Take 1 tablet (50 mg total) by mouth once daily. 90 tablet 1    meloxicam (MOBIC) 15 MG tablet Take 1 tablet (15 mg total) by mouth once daily. 30 tablet 3    metFORMIN (GLUCOPHAGE) 500 MG tablet TAKE 2 TABLETS BY MOUTH TWICE DAILY WITH BREAKFAST AND WITH SUPPER 360 tablet 0    tadalafiL (CIALIS) 5 MG tablet Take 1 tablet (5 mg total) by mouth once daily. 30 tablet 11    TRULICITY 0.75 mg/0.5 mL pen injector INJECT 0.75 MG INTO THE SKIN  EVERY 7 DAYS 12 pen 1    pregabalin (LYRICA) 25 MG capsule Take 1 capsule (25 mg total) by mouth nightly. 30 capsule 0     No current facility-administered medications for this visit.   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     Employer: [employer]   Tobacco Use    Smoking status: Never    Smokeless tobacco: Never   Substance and Sexual Activity    Alcohol use: No    Drug use: No    Sexual activity: Not Currently     Social Drivers of Health     Financial Resource Strain: Low Risk  (3/17/2025)    Overall Financial Resource Strain (CARDIA)     Difficulty of Paying Living Expenses: Not hard at all   Food Insecurity: No Food Insecurity (3/17/2025)    Hunger Vital Sign     Worried About Running Out of Food in the Last Year: Never true     Ran Out of Food in the Last Year: Never true   Transportation Needs: No Transportation Needs (3/17/2025)    PRAPARE - Transportation     Lack of Transportation (Medical): No     Lack of Transportation (Non-Medical): No   Physical Activity: Insufficiently Active (3/17/2025)    Exercise Vital Sign     Days of Exercise per Week: 2 days     Minutes of Exercise per Session: 20 min   Stress: No Stress Concern Present (3/17/2025)    [employer] [facility] [employer] - Occupational Stress Questionnaire     Feeling of Stress : Only a little   Housing Stability: Low Risk  (3/17/2025)    Housing Stability Vital Sign     Unable to Pay for Housing in the Last Year: No     Homeless in the Last Year: No

## 2025-03-18 ENCOUNTER — PATIENT MESSAGE (OUTPATIENT)
Dept: ORTHOPEDICS | Facility: CLINIC | Age: 75
End: 2025-03-18
Payer: COMMERCIAL

## 2025-03-19 DIAGNOSIS — M16.12 PRIMARY OSTEOARTHRITIS OF LEFT HIP: Primary | ICD-10-CM

## 2025-03-19 RX ORDER — DICLOFENAC SODIUM 75 MG/1
75 TABLET, DELAYED RELEASE ORAL 2 TIMES DAILY
Qty: 60 TABLET | Refills: 1 | Status: SHIPPED | OUTPATIENT
Start: 2025-03-19

## 2025-03-21 DIAGNOSIS — E11.9 CONTROLLED TYPE 2 DIABETES MELLITUS WITHOUT COMPLICATION, WITHOUT LONG-TERM CURRENT USE OF INSULIN: Chronic | ICD-10-CM

## 2025-03-21 RX ORDER — LOSARTAN POTASSIUM 50 MG/1
50 TABLET ORAL
Qty: 90 TABLET | Refills: 0 | OUTPATIENT
Start: 2025-03-21

## 2025-03-21 NOTE — TELEPHONE ENCOUNTER
Refill Routing Note   Medication(s) are not appropriate for processing by [facility] for the following reason(s):        Required labs outdated  Required vitals abnormal    [facility] action(s):  Defer   Requires labs : Yes             Appointments  past 12m or future 3m with PCP    Date Provider   Last Visit   8/12/2024 Chan Estrada MD   Next Visit   Visit date not found Chan Estrada MD   ED visits in past 90 days: 0        Note composed:7:24 AM 03/21/2025

## 2025-03-21 NOTE — TELEPHONE ENCOUNTER
Care Due:                  Date            Visit Type   Department     Provider  --------------------------------------------------------------------------------                                SAME DAY -   [facility]                              ESTABLISHED   MED/ INTERNAL  Last Visit: 02-      PATIENT      MED/ PEDS      Oma Carrizales  Next Visit: None Scheduled  None         None Found                                                            Last  Test          Frequency    Reason                     Performed    Due Date  --------------------------------------------------------------------------------    HBA1C.......  6 months...  TRULICITY, metFORMIN.....  12- 06-    [facility] Embedded Care Due Messages. Reference number: [id number].   3/21/2025 7:02:19 AM CDT

## 2025-03-28 ENCOUNTER — OFFICE VISIT (OUTPATIENT)
Dept: ORTHOPEDICS | Facility: CLINIC | Age: 75
End: 2025-03-28
Payer: COMMERCIAL

## 2025-03-28 ENCOUNTER — ANESTHESIA EVENT (OUTPATIENT)
Dept: SURGERY | Facility: HOSPITAL | Age: 75
End: 2025-03-28
Payer: COMMERCIAL

## 2025-03-28 ENCOUNTER — APPOINTMENT (OUTPATIENT)
Dept: RADIOLOGY | Facility: HOSPITAL | Age: 75
End: 2025-03-28
Attending: ORTHOPAEDIC SURGERY
Payer: COMMERCIAL

## 2025-03-28 VITALS — BODY MASS INDEX: 26.57 KG/M2 | WEIGHT: 169.31 LBS | HEIGHT: 67 IN

## 2025-03-28 DIAGNOSIS — M16.12 PRIMARY OSTEOARTHRITIS OF LEFT HIP: ICD-10-CM

## 2025-03-28 DIAGNOSIS — M16.12 PRIMARY OSTEOARTHRITIS OF LEFT HIP: Primary | ICD-10-CM

## 2025-03-28 PROCEDURE — 99999 PR PBB SHADOW E&M-EST. PATIENT-LVL IV: CPT | Mod: PBBFAC,,, | Performed by: ORTHOPAEDIC SURGERY

## 2025-03-28 PROCEDURE — 73502 X-RAY EXAM HIP UNI 2-3 VIEWS: CPT | Mod: 26,LT,, | Performed by: STUDENT IN AN ORGANIZED HEALTH CARE EDUCATION/TRAINING PROGRAM

## 2025-03-28 PROCEDURE — 73502 X-RAY EXAM HIP UNI 2-3 VIEWS: CPT | Mod: TC,FY,PN,LT

## 2025-03-28 NOTE — PROGRESS NOTES
Justification of Rolling Walker:  The mobility limitation can not be sufficiently resolved by the use of a cane.  Patient's functional mobility deficit can be sufficiently resolved with the use of a rolling walker.  Patient has mobility limitation significantly impairs their ability to participate in 1 or more activities of daily living.  The use of the rolling walker we will significantly improve the patient's ability to participate in MRADLS and the patient will use it on a regular basis in the home.      EST PATIENT ORTHOPAEDIC: Knee    PRIMARY CARE PHYSICIAN: Chan Estrada MD   REFERRING PROVIDER: No referring provider defined for this encounter.     ASSESSMENT & PLAN:    Impression:  Bilateral Knee Severe Degenerative Osteoarthritis, Primary   Left Hip Severe Osteoarthritis, Primary    Follow Up Plan: PATRICECARLOS ALBERTO Lemos has radiograph and physical exam evidence of the aforementioned impression and wishes to pursue surgery. This patient appears to have sufficient symptoms to warrant surgical intervention and is an appropriate candidate for LEFT Primary Total Hip Arthroplasty as evidenced by months of unsuccessful non-operative treatment as outlined in the HPI below and progressive symptoms.    We had a lengthy discussion regarding the risk and benefit of surgery, the alternatives, limitations and personnel involved. These included but were not limited to infection, persistent pain, instability, nerve injury, blood clots, dislocation, loosening, leg length inequality and medical complications. We also discussed the pre-operative course, surgery itself and rehabilitation.    Anne-Marie-operative blood management and transfusion issues were discussed, and options clearly outlined. The patient has consented to the use of the banked allogenic blood if medically necessary.    The patient has elected to schedule surgery at this time or intends to call the office with a surgical date. Shared decision making occurred  while obtaining informed consent. The patient will be scheduled for a pre-operative education class at which time they will have their nasal swab completed and will be given CHG cloths along with the verbal and written instructions for their use.    We will plan for use of Yobany components.  Tentative surgery date April 14th.  Possible same-day.    To review:  Patient is known to me for severe left hip arthritis. He has had severe left hip pain over the last few months.  I obtained repeat radiographs that demonstrate progressive arthritis with femoral head flattening and deformity.  I think he would benefit greatly from a hip replacement at this stage.    Also seen for bilateral knee pain.  He has x-rays today that demonstrate severe end-stage arthritis of his bilateral knees.  He reports pain that is not constant but can be sharp and shooting at times.  He reports some swelling to his knee.  He mostly localizes the pain to the medial aspect of his knee.  He has had an injection previously about 6 months ago. He would like repeat injections. If these injections provide greater than 3 months of relief can continue this treatment modality going forward.  If not may have to consider alternatives including knee replacements.    The patient has been ordered:  OMAIRA CT    CONSULTS:   Anesthesia for optimization    ACTIVE PROBLEM LIST  Patient Active Problem List   Diagnosis    GERD (gastroesophageal reflux disease)    DDD (degenerative disc disease), lumbar    ED (erectile dysfunction)    Dyslipidemia    Neuropathy    Type 2 diabetes mellitus with diabetic polyneuropathy    Primary osteoarthritis of right knee    Nuclear sclerosis of both eyes    Refractive error           SUBJECTIVE    CHIEF COMPLAINT: Knee Pain    HPI:   John Lemos is a 74 y.o. male here for evaluation and management of bilateral knee pain. There is not a specific incident that brought about this pain. he has had progressive problems with the  knee(s) starting 3 months ago but is now progressing to interfere with activities which include: walking, functional household ADL's, and participating in family activities    Currently the pain in the joint is rated at moderate with activity. The pain is intermittent and is located in the knee, located medially and without radiation. The pain is described as aching, stiffness, and throbbing. Relieving factors include rest, over the counter medication , and repositioning.     There is associated Clicking and Popping.     John Lemos has no additional complaints.     2/11/25:  Patient here today with bilateral knee pain.  Last injection we did was over 6 months ago.  Those provided good relief.  He has been intermittently taking Mobic for breakthrough pain control.  He is also having some more residual issues with his known left hip arthritis.  May consider surgery for his hip in the upcoming months.    3/28/25:  Patient with progressive severe left hip pain in the setting of known arthritis.  It is becoming difficult to sleep at night.  Has difficulty ambulating.    PROGRESSIVE SYMPTOMS:  Pain worsened by weight bearing  Pain effecting living situation  Pain impacting work    FUNCTIONAL STATUS:   Climb a flight of stairs or walk up a hill     PREVIOUS TREATMENTS:  Medical: Tylenol and Steroid injections  Physical Therapy: Activities Modified   Previous Orthopaedic Surgery: None    REVIEW OF SYSTEMS:  PAIN ASSESSMENT:  See HPI.  MUSCULOSKELETAL: See HPI.  OTHER 10 point review of systems is negative except as stated in HPI above    PAST MEDICAL HISTORY   has a past medical history of Arthritis, Cataract, Diabetes mellitus (7 yrs), Diabetes mellitus type 2, controlled (9/8/2012), Eye injury, and Hyperlipidemia.     PAST SURGICAL HISTORY   has a past surgical history that includes Colonoscopy (N/A, 11/25/2019).     FAMILY HISTORY  family history includes Cancer in his father and mother; Diabetes in his father and  mother; Hypertension in his mother; No Known Problems in his brother, maternal aunt, maternal grandfather, maternal grandmother, maternal uncle, paternal aunt, paternal grandfather, paternal grandmother, paternal uncle, and sister.     SOCIAL HISTORY   reports that he has never smoked. He has never used smokeless tobacco. He reports that he does not drink alcohol and does not use drugs.     ALLERGIES   Review of patient's allergies indicates:   Allergen Reactions    Sulfa (sulfonamide antibiotics) Swelling        MEDICATIONS  Current Outpatient Medications on File Prior to Visit   Medication Sig Dispense Refill    atorvastatin (LIPITOR) 40 MG tablet Take 1 tablet (40 mg total) by mouth once daily. 90 tablet 3    azelastine (ASTELIN) 137 mcg (0.1 %) nasal spray 1 spray (137 mcg total) by Nasal route 2 (two) times daily. 30 mL 3    blood sugar diagnostic (FREESTYLE LITE STRIPS) Strp Inject 1 each as directed once daily. 100 each 3    cetirizine (ZYRTEC) 10 MG tablet Take 1 tablet (10 mg total) by mouth once daily. 90 tablet 3    diclofenac (VOLTAREN) 75 MG EC tablet Take 1 tablet (75 mg total) by mouth 2 (two) times daily. 60 tablet 1    diclofenac sodium (VOLTAREN) 1 % Gel Apply 2 g topically 4 (four) times daily as needed (neck pain/stiffness). 100 g 2    DULoxetine (CYMBALTA) 60 MG capsule Take 1 capsule (60 mg total) by mouth once daily. 90 capsule 3    fluticasone (FLONASE) 50 mcg/actuation nasal spray 1 spray (50 mcg total) by Each Nare route once daily. 16 g 2    ipratropium (ATROVENT) 21 mcg (0.03 %) nasal spray 2 sprays by Nasal route 3 (three) times daily. 30 mL 0    lancets (FREESTYLE LANCETS) 28 gauge Misc Inject 1 lancet as directed once daily. 100 each 3    losartan (COZAAR) 50 MG tablet Take 1 tablet (50 mg total) by mouth once daily. 90 tablet 1    metFORMIN (GLUCOPHAGE) 500 MG tablet TAKE 2 TABLETS BY MOUTH TWICE DAILY WITH BREAKFAST AND WITH SUPPER 360 tablet 0    pregabalin (LYRICA) 25 MG capsule  "Take 1 capsule (25 mg total) by mouth nightly. 30 capsule 0    tadalafiL (CIALIS) 5 MG tablet Take 1 tablet (5 mg total) by mouth once daily. 30 tablet 11    TRULICITY 0.75 mg/0.5 mL pen injector INJECT 0.75 MG INTO THE SKIN EVERY 7 DAYS 12 pen 1     No current facility-administered medications on file prior to visit.          PHYSICAL EXAM   height is 5' 7" (1.702 m) and weight is 76.8 kg (169 lb 5 oz).   Body mass index is 26.52 kg/m².      All other systems deferred.  GENERAL:  No acute distress  HABITUS: Normal  GAIT: Non-antalgic  SKIN: Normal   Hip EXAM:    Left hip:  Severe pain with with IR/ER rotation of the hip  IR to 10 degrees, ER to 30 degrees  5/5 strength in knee flexion and extension, ankle plantarflexion and dorsiflexion  Neurovascular Status: Sensation intact to light touch in Sural, Saphenous, SPN, DPN, Tibial nerve distribution  2+ pulse DP/PT, normal capillary refill, foot has normal warmth    DATA:  Diagnostic tests reviewed for today's visit:     Three views of the left hip demonstrate severe end-stage arthritis of his left hip.  Compared to previous films from a year ago his femoral head has completely flattened.  His leg is shorter.  Complete joint space loss.    4v of the bilateral knee reveal Severe degenerative changes of the Medial compartment. There is evidence of advanced osteoarthritis changes with Subchondral sclerosis, Osteophyte formation, and Joint space narrowing. The limb is in varus alignment. The patella is tracking midline.      "

## 2025-03-31 ENCOUNTER — PATIENT MESSAGE (OUTPATIENT)
Dept: UROLOGY | Facility: CLINIC | Age: 75
End: 2025-03-31
Payer: COMMERCIAL

## 2025-04-02 ENCOUNTER — HOSPITAL ENCOUNTER (OUTPATIENT)
Dept: RADIOLOGY | Facility: HOSPITAL | Age: 75
Discharge: HOME | End: 2025-04-02
Attending: ORTHOPAEDIC SURGERY
Payer: COMMERCIAL

## 2025-04-02 ENCOUNTER — HOSPITAL ENCOUNTER (OUTPATIENT)
Dept: PREADMISSION TESTING | Facility: HOSPITAL | Age: 75
Discharge: HOME | End: 2025-04-02
Attending: ORTHOPAEDIC SURGERY
Payer: COMMERCIAL

## 2025-04-02 VITALS
HEIGHT: 68 IN | HEART RATE: 68 BPM | TEMPERATURE: 98 F | RESPIRATION RATE: 16 BRPM | BODY MASS INDEX: 25.07 KG/M2 | OXYGEN SATURATION: 99 % | WEIGHT: 165.44 LBS | DIASTOLIC BLOOD PRESSURE: 72 MMHG | SYSTOLIC BLOOD PRESSURE: 140 MMHG

## 2025-04-02 DIAGNOSIS — M16.12 PRIMARY OSTEOARTHRITIS OF LEFT HIP: ICD-10-CM

## 2025-04-02 DIAGNOSIS — Z01.818 PREOP TESTING: Primary | ICD-10-CM

## 2025-04-02 LAB
ABORH RETYPE: NORMAL
ABSOLUTE EOSINOPHIL (OHS): 0.19 K/UL
ABSOLUTE MONOCYTE (OHS): 0.73 K/UL (ref 0.3–1)
ABSOLUTE NEUTROPHIL COUNT (OHS): 3.81 K/UL (ref 1.8–7.7)
ALBUMIN SERPL BCP-MCNC: 4 G/DL (ref 3.5–5.2)
ALP SERPL-CCNC: 126 UNIT/L (ref 40–150)
ALT SERPL W/O P-5'-P-CCNC: 31 UNIT/L (ref 10–44)
ANION GAP (OHS): 9 MMOL/L (ref 8–16)
APTT PPP: 25.9 SECONDS (ref 21–32)
AST SERPL-CCNC: 20 UNIT/L (ref 11–45)
BASOPHILS # BLD AUTO: 0.04 K/UL
BASOPHILS NFR BLD AUTO: 0.6 %
BILIRUB SERPL-MCNC: 0.5 MG/DL (ref 0.1–1)
BUN SERPL-MCNC: 11 MG/DL (ref 8–23)
CALCIUM SERPL-MCNC: 9.7 MG/DL (ref 8.7–10.5)
CHLORIDE SERPL-SCNC: 105 MMOL/L (ref 95–110)
CO2 SERPL-SCNC: 26 MMOL/L (ref 23–29)
CREAT SERPL-MCNC: 0.8 MG/DL (ref 0.5–1.4)
ERYTHROCYTE [DISTWIDTH] IN BLOOD BY AUTOMATED COUNT: 13.4 % (ref 11.5–14.5)
GFR SERPLBLD CREATININE-BSD FMLA CKD-EPI: >60 ML/MIN/1.73/M2
GLUCOSE SERPL-MCNC: 76 MG/DL (ref 70–110)
HCT VFR BLD AUTO: 43.3 % (ref 40–54)
HGB BLD-MCNC: 13.7 GM/DL (ref 14–18)
HOLD SPECIMEN: NORMAL
IMM GRANULOCYTES # BLD AUTO: 0.02 K/UL (ref 0–0.04)
IMM GRANULOCYTES NFR BLD AUTO: 0.3 % (ref 0–0.5)
INR PPP: 1 (ref 0.8–1.2)
LYMPHOCYTES # BLD AUTO: 1.74 K/UL (ref 1–4.8)
MCH RBC QN AUTO: 29.5 PG (ref 27–31)
MCHC RBC AUTO-ENTMCNC: 31.6 G/DL (ref 32–36)
MCV RBC AUTO: 93 FL (ref 82–98)
NUCLEATED RBC (/100WBC) (OHS): 0 /100 WBC
OHS QRS DURATION: 76 MS
OHS QTC CALCULATION: 425 MS
PLATELET # BLD AUTO: 356 K/UL (ref 150–450)
PMV BLD AUTO: 9.8 FL (ref 9.2–12.9)
POTASSIUM SERPL-SCNC: 4.1 MMOL/L (ref 3.5–5.1)
PROT SERPL-MCNC: 8.1 GM/DL (ref 6–8.4)
PROTHROMBIN TIME: 10.9 SECONDS (ref 9–12.5)
RBC # BLD AUTO: 4.65 M/UL (ref 4.6–6.2)
RELATIVE EOSINOPHIL (OHS): 2.9 %
RELATIVE LYMPHOCYTE (OHS): 26.6 % (ref 18–48)
RELATIVE MONOCYTE (OHS): 11.2 % (ref 4–15)
RELATIVE NEUTROPHIL (OHS): 58.4 % (ref 38–73)
SODIUM SERPL-SCNC: 140 MMOL/L (ref 136–145)
WBC # BLD AUTO: 6.53 K/UL (ref 3.9–12.7)

## 2025-04-02 PROCEDURE — 73700 CT LOWER EXTREMITY W/O DYE: CPT | Mod: TC,LT

## 2025-04-02 PROCEDURE — 80053 COMPREHEN METABOLIC PANEL: CPT

## 2025-04-02 PROCEDURE — 85610 PROTHROMBIN TIME: CPT

## 2025-04-02 PROCEDURE — 85730 THROMBOPLASTIN TIME PARTIAL: CPT

## 2025-04-02 PROCEDURE — 85025 COMPLETE CBC W/AUTO DIFF WBC: CPT

## 2025-04-02 PROCEDURE — 93005 ELECTROCARDIOGRAM TRACING: CPT

## 2025-04-02 PROCEDURE — 93010 ELECTROCARDIOGRAM REPORT: CPT | Mod: ,,, | Performed by: INTERNAL MEDICINE

## 2025-04-02 PROCEDURE — 83036 HEMOGLOBIN GLYCOSYLATED A1C: CPT

## 2025-04-02 PROCEDURE — 36415 COLL VENOUS BLD VENIPUNCTURE: CPT

## 2025-04-02 PROCEDURE — 73700 CT LOWER EXTREMITY W/O DYE: CPT | Mod: 26,LT,, | Performed by: INTERNAL MEDICINE

## 2025-04-02 NOTE — DISCHARGE INSTRUCTIONS
YOUR PROCEDURE WILL BE AT [facility] at [address] [place] [address]                 Enter through the Main Entrance facing [address].      Your procedure  is scheduled for ___4/14/2025_______.    Call [phone] between 2pm and 5pm on __4/11/2025_____to find out your arrival time for the day of surgery.    You may have up to three visitors.  No children under 18 years old.     You will be going to the Same Day Surgery Unit on the 2nd floor of the hospital.    Report to the Same Day Surgery Registration Desk in the hallway.(Just beside the Same Day Surgery Unit)    Important instructions:  Do not eat anything after midnight.  You may have plain water, non carbonated.  You may also have Gatorade or Powerade after midnight.    Stop all fluids 2 hours before your surgery.    It is okay to brush your teeth.  Do not have gum, candy or mints.    SEE MEDICATION SHEET.   TAKE MEDICATIONS AS DIRECTED.    Do not take any diabetic medication on the morning of surgery unless instructed to do so by your doctor or pre op nurse.    All GLP-1 weekly diabetic/weight loss medications must not be taken for one week before your surgery, or your surgery could be canceled.      STOP taking for 7 days before surgery:    Aspirin           Voltaren (Diclofenac)  Ibuprofen  (Advil, Motrin)                Indomethocin  Mobic (meloxicam, celebrex)      Etodolac   Aleve (naproxen)          Toradol (ketoralac)  Fish oil, Krill oil and Vitamin E  Headache Powders (BC Powder, Goody's Powder, Stanback)                           You may take Tylenol if needed which is not a blood thinner.    Please shower the night before and the morning of your surgery.      Follow any Prep Instructions given by your surgeon.               Use Chlorhexidine soap as instructed by your pre op nurse.   Please place clean linens on your bed the night before surgery. Please wear fresh clean clothing after each  shower.    No shaving of procedural area at least 4-5 days before surgery due to increased risk of skin irritation and/or possible infection.    Female patients may be asked for a urine specimen on the morning of the surgery.  Please check with your nurse before using the restroom.    Contact lenses and removable denture work may not be worn during your procedure.    You may wear deodorant only. If you are having breast surgery, do not wear deodorant on the operative side.    Do not wear powder, body lotion, perfume/cologne or make-up.    Do not wear any jewelry or have any metal on your body.    You will be asked to remove any dentures or partials for the procedure.    If you are going home on the same day of surgery, you must arrange for a family member or a friend to drive you home.  Public transportation is prohibited.  You will not be able to drive home if you were given anesthesia or sedation.    Patients who want to have their Post-op prescriptions filled from our in-house [facility], bring a Credit/Debit Card or cash with you. A co-pay may be required.  The pharmacy closes at 5:30 pm.    Wear loose fitting clothes allowing for bandages.    Please leave money and valuables home.      You may bring your cell phone.    Call the doctor if fever or illness should occur before your surgery.    Call [phone] to contact us here if needed.                            CLOTHES ON DAY OF SURGERY    SHOULDER surgery:  you must have a very oversized shirt.  Very, Very large.  You will probably have a large sling on with your arm strapped to your chest.  You will not be able to put the arm of the operated shoulder into a sleeve.  You can put the arm of the un-operated shoulder into the sleeve, but the shirt will need to be draped over the operated shoulder.       ARM or HAND surgery:  make sure that your sleeves are large and loose enough to pass over large dressings or cast.      BREAST or UNDERARM surgery:  wear a  loose, button down shirt so that you can dress without raising your arms over your head.    ABDOMINAL surgery:  wear loose, comfortable clothing.  Nothing tight around the abdomen.  NO JEANS    PENIS or SCROTAL surgery:  loose comfortable clothing.  Large sweat pants, pajama pants or a robe.  ABSOLUTELY NO JEANS      LEG or FOOT surgery:  wear large loose pants that are able to pass over any large dressings or casts.  You could also wear loose shorts or a skirt.

## 2025-04-02 NOTE — ANESTHESIA PREPROCEDURE EVALUATION
04/02/2025  John Lemos is a 74 y.o., male  To undergo Procedure(s) (LRB):  ARTHROPLASTY, HIP, TOTAL, USING COMPUTER-ASSISTED NAVIGATION (Left)     Denies CP/SOB/GERD/MI/CVA/URI symptoms.  METS > 4  NPO > 8    Past Medical History:  Past Medical History:   Diagnosis Date    Arthritis     Cataract     ou    Diabetes mellitus 7 yrs    lbs: 118 1 week ago    Diabetes mellitus type 2, controlled 9/8/2012    Eye injury     fb    Hyperlipidemia        Past Surgical History:  Past Surgical History:   Procedure Laterality Date    COLONOSCOPY N/A 11/25/2019    Procedure: COLONOSCOPY;  Surgeon: Eddie Leslie MD;  Location: [facility];  Service: Endoscopy;  Laterality: N/A;       Social History:  Social History[1]    Medications:  Medications Ordered Prior to Encounter[2]    Allergies:  Review of patient's allergies indicates:   Allergen Reactions    Sulfa (sulfonamide antibiotics) Swelling       Active Problems:  Problem List[3]    Diagnostic Studies:   Latest Reference Range & Units 04/02/25 12:52   WBC 3.90 - 12.70 K/uL 6.53   RBC 4.60 - 6.20 M/uL 4.65   Hemoglobin 14.0 - 18.0 gm/dL 13.7 (L)   Hematocrit 40.0 - 54.0 % 43.3   MCV 82 - 98 fL 93   MCH 27.0 - 31.0 pg 29.5   MCHC 32.0 - 36.0 g/dL 31.6 (L)   RDW 11.5 - 14.5 % 13.4   Platelet Count 150 - 450 K/uL 356      Latest Reference Range & Units 04/02/25 12:52   Sodium 136 - 145 mmol/L 140   Potassium 3.5 - 5.1 mmol/L 4.1   Chloride 95 - 110 mmol/L 105   CO2 23 - 29 mmol/L 26   Anion Gap 8 - 16 mmol/L 9   BUN 8 - 23 mg/dL 11   Creatinine 0.5 - 1.4 mg/dL 0.8   eGFR >60 mL/min/1.73/m2 >60     EKG (4/2/25):  NSR    24 Hour Vitals:      See Nursing Charting For Additional Vitals      Pre-op Assessment    I have reviewed the Patient Summary Reports.     I have reviewed the Nursing Notes. I have reviewed the NPO Status.   I have reviewed the Medications.     Review of Systems  Anesthesia Hx:  No problems with previous Anesthesia             Denies Family Hx of Anesthesia  complications.    Denies Personal Hx of Anesthesia complications.                    Social:  Non-Smoker, No Alcohol Use       Cardiovascular:  Exercise tolerance: good   Hypertension           hyperlipidemia   ECG has been reviewed.                            Pulmonary:  Pulmonary Normal                       Hepatic/GI:      Denies GERD.    Taking GLP-1 Agonists Instructed to Hold for 7 Days No Reported GI Symptoms          Musculoskeletal:  Arthritis   OA L hip       Spine Disorders: lumbar Disc disease and Degenerative disease           Neurological:  Neurology Normal                                      Endocrine:  Diabetes               Physical Exam  General: Well nourished and Cooperative    Airway:  Mallampati: II   Mouth Opening: Normal  TM Distance: Normal    Dental:  Intact    Chest/Lungs:  Clear to auscultation, Normal Respiratory Rate    Heart:  Rate: Normal  Rhythm: Regular Rhythm        Anesthesia Plan  Type of Anesthesia, risks & benefits discussed:    Anesthesia Type: Spinal, Gen ETT, Regional  Intra-op Monitoring Plan: Standard ASA Monitors  Post Op Pain Control Plan: multimodal analgesia and IV/PO Opioids PRN  Induction:  IV  Informed Consent: Informed consent signed with the Patient and all parties understand the risks and agree with anesthesia plan.  All questions answered. Patient consented to blood products? Yes  ASA Score: 2  Anesthesia Plan Notes:   L PENG PNB  Spinal Anesthesia  Standard ASA monitors  Recovery in PACU    Ready For Surgery From Anesthesia Perspective.     .           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     Employer: [employer]   Tobacco Use    Smoking status: Never    Smokeless tobacco: Never   Substance and Sexual Activity    Alcohol use: No    Drug use: No    Sexual activity: Not Currently     Social Drivers of Health      Financial Resource Strain: Low Risk  (3/17/2025)    Overall Financial Resource Strain (CARDIA)     Difficulty of Paying Living Expenses: Not hard at all   Food Insecurity: No Food Insecurity (3/17/2025)    Hunger Vital Sign     Worried About Running Out of Food in the Last Year: Never true     Ran Out of Food in the Last Year: Never true   Transportation Needs: No Transportation Needs (3/17/2025)    PRAPARE - Transportation     Lack of Transportation (Medical): No     Lack of Transportation (Non-Medical): No   Physical Activity: Insufficiently Active (3/17/2025)    Exercise Vital Sign     Days of Exercise per Week: 2 days     Minutes of Exercise per Session: 20 min   Stress: No Stress Concern Present (3/17/2025)    [employer] [place] [employer] - Occupational Stress Questionnaire     Feeling of Stress : Only a little   Housing Stability: Low Risk  (3/17/2025)    Housing Stability Vital Sign     Unable to Pay for Housing in the Last Year: No     Homeless in the Last Year: No   [2]   No current facility-administered medications on file prior to encounter.     Current Outpatient Medications on File Prior to Encounter   Medication Sig Dispense Refill    atorvastatin (LIPITOR) 40 MG tablet Take 1 tablet (40 mg total) by mouth once daily. 90 tablet 3    blood sugar diagnostic (FREESTYLE LITE STRIPS) Strp Inject 1 each as directed once daily. 100 each 3    diclofenac (VOLTAREN) 75 MG EC tablet Take 1 tablet (75 mg total) by mouth 2 (two) times daily. 60 tablet 1    DULoxetine (CYMBALTA) 60 MG capsule Take 1 capsule (60 mg total) by mouth once daily. 90 capsule 3    lancets (FREESTYLE LANCETS) 28 gauge Misc Inject 1 lancet as directed once daily. 100 each 3    losartan (COZAAR) 50 MG tablet Take 1 tablet (50 mg total) by mouth once daily. 90 tablet 1    metFORMIN (GLUCOPHAGE) 500 MG tablet TAKE 2 TABLETS BY MOUTH TWICE DAILY WITH BREAKFAST AND WITH SUPPER 360 tablet 0    pregabalin (LYRICA) 25 MG capsule Take  1 capsule (25 mg total) by mouth nightly. 30 capsule 0    tadalafiL (CIALIS) 5 MG tablet Take 1 tablet (5 mg total) by mouth once daily. 30 tablet 11    TRULICITY 0.75 mg/0.5 mL pen injector INJECT 0.75 MG INTO THE SKIN EVERY 7 DAYS 12 pen 1   [3]   Patient Active Problem List  Diagnosis    GERD (gastroesophageal reflux disease)    DDD (degenerative disc disease), lumbar    ED (erectile dysfunction)    Dyslipidemia    Neuropathy    Type 2 diabetes mellitus with diabetic polyneuropathy    Primary osteoarthritis of right knee    Nuclear sclerosis of both eyes    Refractive error

## 2025-04-03 ENCOUNTER — PATIENT MESSAGE (OUTPATIENT)
Dept: ORTHOPEDICS | Facility: CLINIC | Age: 75
End: 2025-04-03
Payer: COMMERCIAL

## 2025-04-03 ENCOUNTER — PATIENT MESSAGE (OUTPATIENT)
Dept: FAMILY MEDICINE | Facility: CLINIC | Age: 75
End: 2025-04-03
Payer: COMMERCIAL

## 2025-04-03 LAB
EAG (OHS): 146 MG/DL (ref 68–131)
HBA1C MFR BLD: 6.7 % (ref 4–5.6)

## 2025-04-04 DIAGNOSIS — M16.12 PRIMARY OSTEOARTHRITIS OF LEFT HIP: Primary | ICD-10-CM

## 2025-04-09 DIAGNOSIS — Z96.642 STATUS POST LEFT HIP REPLACEMENT: Primary | ICD-10-CM

## 2025-04-10 ENCOUNTER — LAB VISIT (OUTPATIENT)
Dept: LAB | Facility: HOSPITAL | Age: 75
End: 2025-04-10
Attending: ORTHOPAEDIC SURGERY
Payer: COMMERCIAL

## 2025-04-10 DIAGNOSIS — Z01.818 PREOP TESTING: ICD-10-CM

## 2025-04-10 LAB
INDIRECT COOMBS: NORMAL
RH BLD: NORMAL
SPECIMEN OUTDATE: NORMAL

## 2025-04-10 PROCEDURE — 86901 BLOOD TYPING SEROLOGIC RH(D): CPT | Performed by: ORTHOPAEDIC SURGERY

## 2025-04-10 PROCEDURE — 36415 COLL VENOUS BLD VENIPUNCTURE: CPT

## 2025-04-11 ENCOUNTER — TELEPHONE (OUTPATIENT)
Dept: SURGERY | Facility: HOSPITAL | Age: 75
End: 2025-04-11
Payer: COMMERCIAL

## 2025-04-11 ENCOUNTER — TELEPHONE (OUTPATIENT)
Dept: ORTHOPEDICS | Facility: CLINIC | Age: 75
End: 2025-04-11
Payer: COMMERCIAL

## 2025-04-11 ENCOUNTER — PROCEDURE VISIT (OUTPATIENT)
Dept: NEUROLOGY | Facility: CLINIC | Age: 75
End: 2025-04-11
Payer: COMMERCIAL

## 2025-04-11 ENCOUNTER — TELEPHONE (OUTPATIENT)
Dept: NEUROLOGY | Facility: CLINIC | Age: 75
End: 2025-04-11

## 2025-04-11 ENCOUNTER — PATIENT MESSAGE (OUTPATIENT)
Dept: ORTHOPEDICS | Facility: CLINIC | Age: 75
End: 2025-04-11
Payer: COMMERCIAL

## 2025-04-11 DIAGNOSIS — M54.12 CERVICAL RADICULOPATHY: ICD-10-CM

## 2025-04-11 DIAGNOSIS — R20.2 PARESTHESIAS: ICD-10-CM

## 2025-04-11 DIAGNOSIS — G56.03 CARPAL TUNNEL SYNDROME ON BOTH SIDES: ICD-10-CM

## 2025-04-11 DIAGNOSIS — G62.9 POLYNEUROPATHY: Primary | ICD-10-CM

## 2025-04-11 RX ORDER — LIDOCAINE HYDROCHLORIDE 10 MG/ML
1 INJECTION, SOLUTION EPIDURAL; INFILTRATION; INTRACAUDAL; PERINEURAL ONCE
OUTPATIENT
Start: 2025-04-11 | End: 2025-04-11

## 2025-04-11 NOTE — TELEPHONE ENCOUNTER
Couldn't find patient wallet  was notified as well ----- Message from Yasmine sent at 4/11/2025 10:26 AM CDT -----  Regarding: wallet  Type:  Patient Returning CallName of who is calling:ptWhat is request in detail:pt is requesting a call back in regards to a wallet he may have left in exam room after his EMG this morning?Can clinic reply by [facility]:noWhat number to call back [facility][phone]

## 2025-04-11 NOTE — PROCEDURES
EMG and nerve conduction studies performed of both upper and both lower extremities.  Please see formal report.

## 2025-04-11 NOTE — TELEPHONE ENCOUNTER
Spoke with patient. Completed pre-surgery patient IQ surveys.     Linda Dumont MS, ATC, OTC  Clinical/Surgical Assistant - Dr. Nicole Eckert  Orthopedics  Phone: [phone]

## 2025-04-14 ENCOUNTER — HOSPITAL ENCOUNTER (OUTPATIENT)
Facility: HOSPITAL | Age: 75
Discharge: HOME HEALTH | End: 2025-04-15
Attending: ORTHOPAEDIC SURGERY | Admitting: ORTHOPAEDIC SURGERY
Payer: COMMERCIAL

## 2025-04-14 ENCOUNTER — ANESTHESIA (OUTPATIENT)
Dept: SURGERY | Facility: HOSPITAL | Age: 75
End: 2025-04-14
Payer: COMMERCIAL

## 2025-04-14 DIAGNOSIS — M16.12 PRIMARY OSTEOARTHRITIS OF LEFT HIP: Primary | ICD-10-CM

## 2025-04-14 DIAGNOSIS — M16.12 ARTHRITIS OF LEFT HIP: ICD-10-CM

## 2025-04-14 DIAGNOSIS — E11.42 TYPE 2 DIABETES MELLITUS WITH DIABETIC POLYNEUROPATHY, WITHOUT LONG-TERM CURRENT USE OF INSULIN: ICD-10-CM

## 2025-04-14 PROCEDURE — 63600175 PHARM REV CODE 636 W HCPCS: Performed by: ORTHOPAEDIC SURGERY

## 2025-04-14 PROCEDURE — 0055T BONE SRGRY CMPTR CT/MRI IMAG: CPT | Mod: ,,, | Performed by: ORTHOPAEDIC SURGERY

## 2025-04-14 PROCEDURE — 97162 PT EVAL MOD COMPLEX 30 MIN: CPT

## 2025-04-14 PROCEDURE — C1776 JOINT DEVICE (IMPLANTABLE): HCPCS | Performed by: ORTHOPAEDIC SURGERY

## 2025-04-14 PROCEDURE — 27130 TOTAL HIP ARTHROPLASTY: CPT | Mod: LT,,, | Performed by: ORTHOPAEDIC SURGERY

## 2025-04-14 PROCEDURE — 71000033 HC RECOVERY, INTIAL HOUR: Performed by: ORTHOPAEDIC SURGERY

## 2025-04-14 PROCEDURE — 71000039 HC RECOVERY, EACH ADD'L HOUR: Performed by: ORTHOPAEDIC SURGERY

## 2025-04-14 PROCEDURE — D9220A PRA ANESTHESIA: Mod: ANES,,, | Performed by: ANESTHESIOLOGY

## 2025-04-14 PROCEDURE — 25000003 PHARM REV CODE 250: Performed by: ORTHOPAEDIC SURGERY

## 2025-04-14 PROCEDURE — 27201423 OPTIME MED/SURG SUP & DEVICES STERILE SUPPLY: Performed by: ORTHOPAEDIC SURGERY

## 2025-04-14 PROCEDURE — D9220A PRA ANESTHESIA: Mod: CRNA,,, | Performed by: NURSE ANESTHETIST, CERTIFIED REGISTERED

## 2025-04-14 PROCEDURE — 27200671 HC STIMUCATH NEEDLE/ CATHETER: Performed by: ANESTHESIOLOGY

## 2025-04-14 PROCEDURE — C1713 ANCHOR/SCREW BN/BN,TIS/BN: HCPCS | Performed by: ORTHOPAEDIC SURGERY

## 2025-04-14 PROCEDURE — 63600175 PHARM REV CODE 636 W HCPCS: Performed by: ANESTHESIOLOGY

## 2025-04-14 PROCEDURE — 36000713 HC OR TIME LEV V EA ADD 15 MIN: Performed by: ORTHOPAEDIC SURGERY

## 2025-04-14 PROCEDURE — 25000003 PHARM REV CODE 250: Performed by: NURSE ANESTHETIST, CERTIFIED REGISTERED

## 2025-04-14 PROCEDURE — 63600175 PHARM REV CODE 636 W HCPCS: Performed by: STUDENT IN AN ORGANIZED HEALTH CARE EDUCATION/TRAINING PROGRAM

## 2025-04-14 PROCEDURE — 64450 NJX AA&/STRD OTHER PN/BRANCH: CPT | Performed by: ANESTHESIOLOGY

## 2025-04-14 PROCEDURE — 25000003 PHARM REV CODE 250: Performed by: STUDENT IN AN ORGANIZED HEALTH CARE EDUCATION/TRAINING PROGRAM

## 2025-04-14 PROCEDURE — 37000008 HC ANESTHESIA 1ST 15 MINUTES: Performed by: ORTHOPAEDIC SURGERY

## 2025-04-14 PROCEDURE — 63600175 PHARM REV CODE 636 W HCPCS: Performed by: NURSE ANESTHETIST, CERTIFIED REGISTERED

## 2025-04-14 PROCEDURE — 37000009 HC ANESTHESIA EA ADD 15 MINS: Performed by: ORTHOPAEDIC SURGERY

## 2025-04-14 PROCEDURE — 64473 LWR XTR FSCL PLN BLK UNI NJX: CPT | Mod: 59,LT,, | Performed by: ANESTHESIOLOGY

## 2025-04-14 PROCEDURE — 36000712 HC OR TIME LEV V 1ST 15 MIN: Performed by: ORTHOPAEDIC SURGERY

## 2025-04-14 DEVICE — 6.5MM LOW PROFILE HEX SCREW 25MM
Type: IMPLANTABLE DEVICE | Site: HIP | Status: FUNCTIONAL
Brand: TRIDENT II

## 2025-04-14 DEVICE — TRIDENT X3 0 DEGREE POLYETHYLENE INSERT
Type: IMPLANTABLE DEVICE | Site: HIP | Status: FUNCTIONAL
Brand: TRIDENT X3 INSERT

## 2025-04-14 DEVICE — CERAMIC V40 FEMORAL HEAD
Type: IMPLANTABLE DEVICE | Site: HIP | Status: FUNCTIONAL
Brand: BIOLOX

## 2025-04-14 DEVICE — 127 DEGREE NECK ANGLE HIP STEM
Type: IMPLANTABLE DEVICE | Site: HIP | Status: FUNCTIONAL
Brand: ACCOLADE

## 2025-04-14 DEVICE — TRIDENT II TRITANIUM CLUSTER 52E
Type: IMPLANTABLE DEVICE | Site: HIP | Status: FUNCTIONAL
Brand: TRIDENT II

## 2025-04-14 RX ORDER — SODIUM CHLORIDE, SODIUM LACTATE, POTASSIUM CHLORIDE, CALCIUM CHLORIDE 600; 310; 30; 20 MG/100ML; MG/100ML; MG/100ML; MG/100ML
INJECTION, SOLUTION INTRAVENOUS CONTINUOUS
Status: DISCONTINUED | OUTPATIENT
Start: 2025-04-14 | End: 2025-04-15 | Stop reason: HOSPADM

## 2025-04-14 RX ORDER — ONDANSETRON HYDROCHLORIDE 2 MG/ML
4 INJECTION, SOLUTION INTRAVENOUS EVERY 8 HOURS PRN
Status: DISCONTINUED | OUTPATIENT
Start: 2025-04-14 | End: 2025-04-15 | Stop reason: HOSPADM

## 2025-04-14 RX ORDER — PROPOFOL 10 MG/ML
VIAL (ML) INTRAVENOUS
Status: DISCONTINUED | OUTPATIENT
Start: 2025-04-14 | End: 2025-04-14

## 2025-04-14 RX ORDER — POLYETHYLENE GLYCOL 3350 17 G/17G
17 POWDER, FOR SOLUTION ORAL DAILY
Status: DISCONTINUED | OUTPATIENT
Start: 2025-04-14 | End: 2025-04-15 | Stop reason: HOSPADM

## 2025-04-14 RX ORDER — ROCURONIUM BROMIDE 10 MG/ML
INJECTION, SOLUTION INTRAVENOUS
Status: DISCONTINUED | OUTPATIENT
Start: 2025-04-14 | End: 2025-04-14

## 2025-04-14 RX ORDER — FENTANYL CITRATE 50 UG/ML
INJECTION, SOLUTION INTRAMUSCULAR; INTRAVENOUS
Status: DISCONTINUED | OUTPATIENT
Start: 2025-04-14 | End: 2025-04-14

## 2025-04-14 RX ORDER — OXYCODONE HYDROCHLORIDE 5 MG/1
10 TABLET ORAL
Status: DISCONTINUED | OUTPATIENT
Start: 2025-04-14 | End: 2025-04-15 | Stop reason: HOSPADM

## 2025-04-14 RX ORDER — ROPIVACAINE HYDROCHLORIDE 5 MG/ML
INJECTION, SOLUTION EPIDURAL; INFILTRATION; PERINEURAL
Status: COMPLETED | OUTPATIENT
Start: 2025-04-14 | End: 2025-04-14

## 2025-04-14 RX ORDER — PROCHLORPERAZINE EDISYLATE 5 MG/ML
5 INJECTION INTRAMUSCULAR; INTRAVENOUS EVERY 6 HOURS PRN
Status: DISCONTINUED | OUTPATIENT
Start: 2025-04-14 | End: 2025-04-15 | Stop reason: HOSPADM

## 2025-04-14 RX ORDER — TRANEXAMIC ACID 10 MG/ML
1000 INJECTION, SOLUTION INTRAVENOUS
Status: COMPLETED | OUTPATIENT
Start: 2025-04-14 | End: 2025-04-14

## 2025-04-14 RX ORDER — CELECOXIB 100 MG/1
200 CAPSULE ORAL DAILY
Status: DISCONTINUED | OUTPATIENT
Start: 2025-04-14 | End: 2025-04-15 | Stop reason: HOSPADM

## 2025-04-14 RX ORDER — CELECOXIB 100 MG/1
400 CAPSULE ORAL ONCE
Status: COMPLETED | OUTPATIENT
Start: 2025-04-14 | End: 2025-04-14

## 2025-04-14 RX ORDER — CELECOXIB 200 MG/1
200 CAPSULE ORAL 2 TIMES DAILY
Qty: 14 CAPSULE | Refills: 0 | Status: SHIPPED | OUTPATIENT
Start: 2025-04-14

## 2025-04-14 RX ORDER — MUPIROCIN 20 MG/G
1 OINTMENT TOPICAL 2 TIMES DAILY
Status: DISCONTINUED | OUTPATIENT
Start: 2025-04-14 | End: 2025-04-15 | Stop reason: HOSPADM

## 2025-04-14 RX ORDER — ACETAMINOPHEN 500 MG
1000 TABLET ORAL EVERY 8 HOURS PRN
Qty: 42 TABLET | Refills: 0 | Status: SHIPPED | OUTPATIENT
Start: 2025-04-14

## 2025-04-14 RX ORDER — LIDOCAINE HYDROCHLORIDE 10 MG/ML
1 INJECTION, SOLUTION EPIDURAL; INFILTRATION; INTRACAUDAL; PERINEURAL
Status: DISCONTINUED | OUTPATIENT
Start: 2025-04-14 | End: 2025-04-14 | Stop reason: HOSPADM

## 2025-04-14 RX ORDER — ACETAMINOPHEN 500 MG
1000 TABLET ORAL EVERY 6 HOURS
Status: DISCONTINUED | OUTPATIENT
Start: 2025-04-14 | End: 2025-04-15 | Stop reason: HOSPADM

## 2025-04-14 RX ORDER — ASPIRIN 81 MG/1
81 TABLET ORAL 2 TIMES DAILY
Status: DISCONTINUED | OUTPATIENT
Start: 2025-04-14 | End: 2025-04-15 | Stop reason: HOSPADM

## 2025-04-14 RX ORDER — NALOXONE HCL 0.4 MG/ML
0.02 VIAL (ML) INJECTION
Status: DISCONTINUED | OUTPATIENT
Start: 2025-04-14 | End: 2025-04-15 | Stop reason: HOSPADM

## 2025-04-14 RX ORDER — OXYCODONE HYDROCHLORIDE 5 MG/1
5-10 TABLET ORAL EVERY 4 HOURS PRN
Qty: 40 TABLET | Refills: 0 | Status: SHIPPED | OUTPATIENT
Start: 2025-04-14

## 2025-04-14 RX ORDER — MORPHINE SULFATE 4 MG/ML
2 INJECTION, SOLUTION INTRAMUSCULAR; INTRAVENOUS
Status: DISCONTINUED | OUTPATIENT
Start: 2025-04-14 | End: 2025-04-15 | Stop reason: HOSPADM

## 2025-04-14 RX ORDER — CEFAZOLIN 2 G/1
2 INJECTION, POWDER, FOR SOLUTION INTRAMUSCULAR; INTRAVENOUS
Status: COMPLETED | OUTPATIENT
Start: 2025-04-14 | End: 2025-04-14

## 2025-04-14 RX ORDER — SODIUM CHLORIDE 0.9 % (FLUSH) 0.9 %
10 SYRINGE (ML) INJECTION
Status: DISCONTINUED | OUTPATIENT
Start: 2025-04-14 | End: 2025-04-14 | Stop reason: HOSPADM

## 2025-04-14 RX ORDER — PREGABALIN 75 MG/1
75 CAPSULE ORAL 2 TIMES DAILY
Qty: 28 CAPSULE | Refills: 0 | Status: SHIPPED | OUTPATIENT
Start: 2025-04-14 | End: 2025-04-28

## 2025-04-14 RX ORDER — METHOCARBAMOL 500 MG/1
500 TABLET, FILM COATED ORAL 4 TIMES DAILY
Qty: 40 TABLET | Refills: 0 | Status: SHIPPED | OUTPATIENT
Start: 2025-04-14 | End: 2025-04-24

## 2025-04-14 RX ORDER — CEFAZOLIN 2 G/1
2 INJECTION, POWDER, FOR SOLUTION INTRAMUSCULAR; INTRAVENOUS
Status: COMPLETED | OUTPATIENT
Start: 2025-04-14 | End: 2025-04-15

## 2025-04-14 RX ORDER — ROPIVACAINE/EPI/CLONIDINE/KET 2.46-0.005
SYRINGE (ML) INJECTION
Status: DISCONTINUED | OUTPATIENT
Start: 2025-04-14 | End: 2025-04-14 | Stop reason: HOSPADM

## 2025-04-14 RX ORDER — SODIUM CHLORIDE 9 MG/ML
INJECTION, SOLUTION INTRAVENOUS CONTINUOUS
Status: DISCONTINUED | OUTPATIENT
Start: 2025-04-14 | End: 2025-04-15 | Stop reason: HOSPADM

## 2025-04-14 RX ORDER — FAMOTIDINE 20 MG/1
20 TABLET, FILM COATED ORAL 2 TIMES DAILY
Status: DISCONTINUED | OUTPATIENT
Start: 2025-04-14 | End: 2025-04-15 | Stop reason: HOSPADM

## 2025-04-14 RX ORDER — PROCHLORPERAZINE EDISYLATE 5 MG/ML
5 INJECTION INTRAMUSCULAR; INTRAVENOUS EVERY 30 MIN PRN
Status: DISCONTINUED | OUTPATIENT
Start: 2025-04-14 | End: 2025-04-14 | Stop reason: HOSPADM

## 2025-04-14 RX ORDER — SODIUM CHLORIDE 9 MG/ML
INJECTION, SOLUTION INTRAVENOUS
Status: DISCONTINUED | OUTPATIENT
Start: 2025-04-14 | End: 2025-04-14 | Stop reason: HOSPADM

## 2025-04-14 RX ORDER — HYDROMORPHONE HYDROCHLORIDE 2 MG/ML
0.2 INJECTION, SOLUTION INTRAMUSCULAR; INTRAVENOUS; SUBCUTANEOUS EVERY 5 MIN PRN
Status: DISCONTINUED | OUTPATIENT
Start: 2025-04-14 | End: 2025-04-14 | Stop reason: HOSPADM

## 2025-04-14 RX ORDER — OXYCODONE HYDROCHLORIDE 5 MG/1
5 TABLET ORAL
Status: DISCONTINUED | OUTPATIENT
Start: 2025-04-14 | End: 2025-04-15 | Stop reason: HOSPADM

## 2025-04-14 RX ORDER — TRANEXAMIC ACID 10 MG/ML
1000 INJECTION, SOLUTION INTRAVENOUS
Status: DISCONTINUED | OUTPATIENT
Start: 2025-04-14 | End: 2025-04-14 | Stop reason: HOSPADM

## 2025-04-14 RX ORDER — METHOCARBAMOL 750 MG/1
750 TABLET, FILM COATED ORAL 3 TIMES DAILY
Status: DISCONTINUED | OUTPATIENT
Start: 2025-04-14 | End: 2025-04-15 | Stop reason: HOSPADM

## 2025-04-14 RX ORDER — GLUCAGON 1 MG
1 KIT INJECTION
Status: DISCONTINUED | OUTPATIENT
Start: 2025-04-14 | End: 2025-04-14 | Stop reason: HOSPADM

## 2025-04-14 RX ORDER — DOCUSATE SODIUM 100 MG/1
100 CAPSULE, LIQUID FILLED ORAL 2 TIMES DAILY
Qty: 30 CAPSULE | Refills: 0 | Status: SHIPPED | OUTPATIENT
Start: 2025-04-14

## 2025-04-14 RX ORDER — ONDANSETRON HYDROCHLORIDE 2 MG/ML
4 INJECTION, SOLUTION INTRAVENOUS DAILY PRN
Status: DISCONTINUED | OUTPATIENT
Start: 2025-04-14 | End: 2025-04-14 | Stop reason: HOSPADM

## 2025-04-14 RX ORDER — LABETALOL HYDROCHLORIDE 5 MG/ML
INJECTION, SOLUTION INTRAVENOUS
Status: DISCONTINUED | OUTPATIENT
Start: 2025-04-14 | End: 2025-04-14

## 2025-04-14 RX ORDER — ASPIRIN 81 MG/1
81 TABLET ORAL 2 TIMES DAILY
Qty: 60 TABLET | Refills: 0 | Status: SHIPPED | OUTPATIENT
Start: 2025-04-14 | End: 2025-05-14

## 2025-04-14 RX ORDER — MUPIROCIN 20 MG/G
1 OINTMENT TOPICAL
Status: COMPLETED | OUTPATIENT
Start: 2025-04-14 | End: 2025-04-14

## 2025-04-14 RX ORDER — FENTANYL CITRATE 50 UG/ML
25 INJECTION, SOLUTION INTRAMUSCULAR; INTRAVENOUS EVERY 5 MIN PRN
Status: DISCONTINUED | OUTPATIENT
Start: 2025-04-14 | End: 2025-04-14 | Stop reason: HOSPADM

## 2025-04-14 RX ORDER — MIDAZOLAM HYDROCHLORIDE 1 MG/ML
INJECTION INTRAMUSCULAR; INTRAVENOUS
Status: DISCONTINUED | OUTPATIENT
Start: 2025-04-14 | End: 2025-04-14

## 2025-04-14 RX ORDER — ONDANSETRON HYDROCHLORIDE 2 MG/ML
INJECTION, SOLUTION INTRAVENOUS
Status: DISCONTINUED | OUTPATIENT
Start: 2025-04-14 | End: 2025-04-14

## 2025-04-14 RX ORDER — AMOXICILLIN 250 MG
1 CAPSULE ORAL 2 TIMES DAILY
Status: DISCONTINUED | OUTPATIENT
Start: 2025-04-14 | End: 2025-04-15 | Stop reason: HOSPADM

## 2025-04-14 RX ORDER — ACETAMINOPHEN 500 MG
1000 TABLET ORAL
Status: COMPLETED | OUTPATIENT
Start: 2025-04-14 | End: 2025-04-14

## 2025-04-14 RX ORDER — HYDRALAZINE HYDROCHLORIDE 20 MG/ML
INJECTION INTRAMUSCULAR; INTRAVENOUS
Status: COMPLETED
Start: 2025-04-14 | End: 2025-04-14

## 2025-04-14 RX ORDER — LIDOCAINE HYDROCHLORIDE 20 MG/ML
INJECTION INTRAVENOUS
Status: DISCONTINUED | OUTPATIENT
Start: 2025-04-14 | End: 2025-04-14

## 2025-04-14 RX ORDER — HYDRALAZINE HYDROCHLORIDE 20 MG/ML
INJECTION INTRAMUSCULAR; INTRAVENOUS
Status: DISCONTINUED | OUTPATIENT
Start: 2025-04-14 | End: 2025-04-14

## 2025-04-14 RX ADMIN — ONDANSETRON 4 MG: 2 INJECTION, SOLUTION INTRAMUSCULAR; INTRAVENOUS at 01:04

## 2025-04-14 RX ADMIN — FENTANYL CITRATE 25 MCG: 50 INJECTION, SOLUTION INTRAMUSCULAR; INTRAVENOUS at 03:04

## 2025-04-14 RX ADMIN — CELECOXIB 400 MG: 100 CAPSULE ORAL at 09:04

## 2025-04-14 RX ADMIN — TRANEXAMIC ACID 1000 MG: 10 INJECTION, SOLUTION INTRAVENOUS at 02:04

## 2025-04-14 RX ADMIN — PROPOFOL 30 MG: 10 INJECTION, EMULSION INTRAVENOUS at 01:04

## 2025-04-14 RX ADMIN — LABETALOL HYDROCHLORIDE 5 MG: 5 INJECTION, SOLUTION INTRAVENOUS at 03:04

## 2025-04-14 RX ADMIN — SODIUM CHLORIDE: 9 INJECTION, SOLUTION INTRAVENOUS at 04:04

## 2025-04-14 RX ADMIN — FENTANYL CITRATE 50 MCG: 50 INJECTION, SOLUTION INTRAMUSCULAR; INTRAVENOUS at 01:04

## 2025-04-14 RX ADMIN — MUPIROCIN 1 G: 20 OINTMENT TOPICAL at 09:04

## 2025-04-14 RX ADMIN — HYDRALAZINE HYDROCHLORIDE 10 MG: 20 INJECTION, SOLUTION INTRAMUSCULAR; INTRAVENOUS at 03:04

## 2025-04-14 RX ADMIN — PROPOFOL 80 MG: 10 INJECTION, EMULSION INTRAVENOUS at 01:04

## 2025-04-14 RX ADMIN — SODIUM CHLORIDE: 0.9 INJECTION, SOLUTION INTRAVENOUS at 02:04

## 2025-04-14 RX ADMIN — ROCURONIUM BROMIDE 20 MG: 10 INJECTION, SOLUTION INTRAVENOUS at 01:04

## 2025-04-14 RX ADMIN — SODIUM CHLORIDE, SODIUM LACTATE, POTASSIUM CHLORIDE, AND CALCIUM CHLORIDE: .6; .31; .03; .02 INJECTION, SOLUTION INTRAVENOUS at 12:04

## 2025-04-14 RX ADMIN — LIDOCAINE HYDROCHLORIDE 100 MG: 20 INJECTION, SOLUTION INTRAVENOUS at 01:04

## 2025-04-14 RX ADMIN — FAMOTIDINE 20 MG: 20 TABLET, FILM COATED ORAL at 08:04

## 2025-04-14 RX ADMIN — GLYCOPYRROLATE 0.1 MG: 0.2 INJECTION, SOLUTION INTRAMUSCULAR; INTRAVITREAL at 01:04

## 2025-04-14 RX ADMIN — FENTANYL CITRATE 50 MCG: 50 INJECTION, SOLUTION INTRAMUSCULAR; INTRAVENOUS at 02:04

## 2025-04-14 RX ADMIN — SUGAMMADEX 200 MG: 100 INJECTION, SOLUTION INTRAVENOUS at 03:04

## 2025-04-14 RX ADMIN — ACETAMINOPHEN 1000 MG: 500 TABLET ORAL at 04:04

## 2025-04-14 RX ADMIN — ROPIVACAINE HYDROCHLORIDE 15 ML: 5 INJECTION, SOLUTION EPIDURAL; INFILTRATION; PERINEURAL at 12:04

## 2025-04-14 RX ADMIN — CEFAZOLIN 2 G: 2 INJECTION, POWDER, FOR SOLUTION INTRAMUSCULAR; INTRAVENOUS at 08:04

## 2025-04-14 RX ADMIN — CELECOXIB 200 MG: 100 CAPSULE ORAL at 04:04

## 2025-04-14 RX ADMIN — MUPIROCIN 1 G: 20 OINTMENT TOPICAL at 08:04

## 2025-04-14 RX ADMIN — PREGABALIN 75 MG: 50 CAPSULE ORAL at 09:04

## 2025-04-14 RX ADMIN — ACETAMINOPHEN 1000 MG: 500 TABLET ORAL at 09:04

## 2025-04-14 RX ADMIN — FENTANYL CITRATE 50 MCG: 50 INJECTION, SOLUTION INTRAMUSCULAR; INTRAVENOUS at 03:04

## 2025-04-14 RX ADMIN — TRANEXAMIC ACID 1000 MG: 10 INJECTION, SOLUTION INTRAVENOUS at 01:04

## 2025-04-14 RX ADMIN — MIDAZOLAM HYDROCHLORIDE 2 MG: 1 INJECTION INTRAMUSCULAR; INTRAVENOUS at 12:04

## 2025-04-14 RX ADMIN — PREGABALIN 75 MG: 25 CAPSULE ORAL at 08:04

## 2025-04-14 RX ADMIN — CEFAZOLIN 2 G: 2 INJECTION, POWDER, FOR SOLUTION INTRAMUSCULAR; INTRAVENOUS at 01:04

## 2025-04-14 RX ADMIN — METHOCARBAMOL 750 MG: 750 TABLET ORAL at 08:04

## 2025-04-14 RX ADMIN — ASPIRIN 81 MG: 81 TABLET, COATED ORAL at 08:04

## 2025-04-14 RX ADMIN — ROCURONIUM BROMIDE 50 MG: 10 INJECTION, SOLUTION INTRAVENOUS at 01:04

## 2025-04-14 RX ADMIN — SENNOSIDES AND DOCUSATE SODIUM 1 TABLET: 50; 8.6 TABLET ORAL at 08:04

## 2025-04-14 RX ADMIN — METHOCARBAMOL 750 MG: 750 TABLET ORAL at 04:04

## 2025-04-14 RX ADMIN — ACETAMINOPHEN 1000 MG: 500 TABLET ORAL at 11:04

## 2025-04-14 NOTE — OP NOTE
OPERATIVE NOTE     PATIENT NAME: John Lemos   MRN: [id number]      Surgery Date: 4/14/25  Surgeon(s) and Assistant(s): Judson Steel MD. Alyson Khan CFA      Procedure(s): left Primary Total Hip Arthroplasty     BMI:  Body mass index is 25.19 kg/m².      Anesthesia:  General        Attestation:   I was present for all of the critical portions of the operation and performed all critical portions.  The medical assistant performed the superficial closure under supervision, and assisted during the operation. I was immediately available for the duration of the entire case.      Preoperative Diagnosis:  left  Hip Arthritis     Postoperative Diagnosis: Same     Findings:  See Full Operative Report    Complications: None     EBL: 200cc     Implants:   Implant Name Type Inv. Item Serial No.  Lot No. LRB No. Used Action   SHELL TRIDENT II ACET E 52MM - [id number]  SHELL TRIDENT II ACET E 52MM  [employer]. [id number] Left 1 Implanted   INSERT TRIDENT X3 ID 36MM 0 DE - [id number]  INSERT TRIDENT X3 ID 36MM 0 DE  [employer]. [id number] Left 1 Implanted   SCREW TRIDENT II LP HEX 6.5X25 - [id number]  SCREW TRIDENT II LP HEX 6.5X25  [employer]. [id number] Left 1 Implanted   STEM ACC II 27 DEG SZ 5 - [id number]  STEM ACC II 27 DEG SZ 5  [employer]. [id number] Left 1 Implanted   HEAD V40 BIOLOX 36MM -2.5 - [id number]  HEAD V40 BIOLOX 36MM -2.5  [employer]. [id number] Left 1 Implanted       Drains: None     Problem List:   Problem List Items Addressed This Visit          Endocrine    Type 2 diabetes mellitus with diabetic polyneuropathy     Other Visit Diagnoses         Primary osteoarthritis of left hip    -  Primary    Relevant Medications    oxyCODONE (ROXICODONE) 5 MG immediate release tablet    pregabalin (LYRICA) 75 MG capsule    Other Relevant Orders    Place in Outpatient  (Completed)    Vital signs    Notify Physician - Potential Need of Opioid Reversal    Notify Anesthesiologist if patient on home insulin pump    Vital signs - notify MD    Diet NPO    Transfer patient    Vital signs    Assess Sedation Scale, Pain Scale, Bromage Scale    Orthostatic blood pressure PRN prior to ambulation    Pulse checks PRN prior to ambulation    Perform Conti Agitation Sedation Scale (RASS)    Neurovascular checks:  LLE    Activity as tolerated    Lifting restrictions    Weight bearing as tolerated    No driving, operating heavy equipment or signing legal documents while taking pain medication    Call MD for:  temperature >100.4    Call MD for:  persistent nausea and vomiting    Call MD for:  severe uncontrolled pain    Call MD for:  difficulty breathing, headache or visual disturbances    Call MD for:  redness, tenderness, or signs of infection (pain, swelling, redness, odor or green/yellow discharge around incision site)    Call MD for:  hives    Call MD for:  persistent dizziness or light-headedness    Call MD for:  extreme fatigue    Insert peripheral IV    FOOT COMPRESSION PUMP    Place foot compression device    Chlorhexidine (CHG) 2% Wipes    Ortho Pathway Patient    Place KODAK hose    Place sequential compression device    X-Ray Pelvis Routine AP    No x-ray needed    WALKER FOR HOME USE    DISCHARGE PATIENT      Arthritis of left hip                   OPERATIVE INDICATIONS: The patient has a history of progressive left hip pain and arthritis. Their hip pain is severe with activity and has progressed significantly. X-rays reveal eburnation of articular cartilage on the superior weight-bearing surface of the hip with joint space narrowing and acetabular and femoral neck osteophytes consistent with advanced hip osteoarthritis. Non-operative treatment has been attempted, but has not improved or controlled symptoms during normal daily activities. Motion has become limited and rotation severely  restricted. A total hip arthroplasty was recommended at this time. The risks, benefits and potential complications of the arthroplasty surgery were discussed with the patient in detail. Specific details of the procedure, hospitalization, recovery, rehabilitation, and long-term precautions were also provided. Pre-operative teaching was provided. Implant/prosthesis selection was outlined, and the many options available were explained; the final choice will be made at the time of the procedure to match the anatomy and condition of the bone, ligaments, tendons, and muscles. Understanding of all topics was conveyed to me by the patient, and consent was given to proceed with a total hip arthroplasty.      The patient was seen by Internal Medicine/Anesthesia for pre-operative optimization. Anne-Marie-operative blood management and the potential for blood transfusion were discussed with risks and options clearly outlined. We discussed the risks of the procedure in detail, which included but is not limited to infection, bleeding, scarring, injury to blood vessels, nerves, muscular structures. We discussed specifically instability/dislocation, fracture, and leg-length complications.      OPERATIVE PROCEDURE: The patient was identified and brought into the Operating Room by the anesthesia and nursing team. Spinal anesthesia was successfully performed.  Intravenous antibiotic of Cefazolin prophylaxis dosing was confirmed. The patient was then positioned in the lateral decubitus on the hip pegboard. An axillary roll was placed, and all other pressure points were checked and padded. The hip was then examined and restrictions noted. A relative leg length assessment was carried out and markers were placed for intra-operative assessment. The leg was then prepped and draped in the usual sterile fashion.     A surgical time-out was performed immediately preceding the incision with all personnel in the operating room; the patient identity  was again confirmed, the surgical site and extremity were identified and confirmed, X-rays were reviewed, and availability of the appropriate surgical equipment was established.      The left hip was then exposed through a limited skin incision centered over the greater trochanter. Dissection was carried down through skin and subcutaneous tissue to the gluteal fascia. The gluteus essie was split posteriorly over the trochanter in the direction of its fibers preserving the ITB.  Bleeders were controlled with electrocautery. A direct superior approach to the hip was accomplished, reflecting the piriformis.  A T-capsulotomy was performed for later repair.  The remainder of the capsule was not disrupted. The labrum was split and the femoral head mobilized. An in situ neck cut was made and the femoral head was removed. Assessment of the femoral head revealed severe eburnation of articular cartilage with complete loss of weight bearing chondral surface and formation of cysts in the head and neck. Marginal osteophytes were present surrounding the femoral neck.     OMAIRA pins were placed superiorly. Array placed. Registration completed. Single reaming with planned cup was completed. The reamers created an excellent hemispherical bed of bleeding cancellous bone.  The cup was inserted with an excellent press fit. The press-fit was firm, stable, and apically seated. One  screws were used for additional support of the fixation. The Polyethylene bearing/liner was then impacted into place and checked for stability. OMAIRA pins were removed.      Attention was then turned to the femur. The leg was positioned so access did not result in soft-tissue injury. The medullary cavity of the femur was entered and opened with hand reamers. broaches were then employed in an incremental fashion up to the final size. The final broach was then fully seated, had good rotational and axial stability, and was seated at the appropriate height in  relation to the greater trochanter and the template. Trial reduction was done. Excellent stability and range of motion was achieved without impingement at any position. Leg lengths were re-created within millimeters based on the markers and relative measurement. The trials were then dislocated and removed. The wound was copiously irrigated, and the permanent femoral stem was then impacted down in approximately 20 degrees of anteversion. The press-fit was firm, and stable to axial and rotational force in all planes. The permanent femoral head was then impacted on the clean trunion. The socket and wound were irrigated, suctioned, and inspected for debris. The final reduction was performed, and again the hip was stable in all planes without impingement when stressed to the extremes.      The capsule was repaired. The wound was irrigated. Instrument and sponge count was completed and confirmed correct. The gluteal fascia was closed with interrupted Stratafix suture. Deep subcutaneous tissue was closed with a running #1 Vicryl, and more superficial with interrupted 2-0 Vicryl. A 3-0 Monocryl stitch was used for the skin. Dermabond adhesive was applied. A sterile silver-impregnated dressing was placed. PAS stockings were placed. The patient was then returned to the supine position on the operating room table. After stability was confirmed they were transferred to a hospital bed and taken to Recovery/PACU.       POST-OPERATIVE PLAN:  Patient will be transferred to the floor once in stable condition and after radiographs confirm no immediate complications. The patient will be treated with multimodal pain management protocols. They will be placed on anticoagulation of ASA 81 mg to start on POD1. The patient will be weight bearing as tolerated with posterior hip precautions for 6 weeks. They will work with physical therapy, have a graduated diet, and once medially stable and safe for home can be discharged. Patient will  follow up with me 3 weeks post operatively. Dressing should be removed by patient 7 days post operatively.

## 2025-04-14 NOTE — DISCHARGE SUMMARY
"[facility] - Surgery  Discharge Note  Short Stay    Procedure(s) (LRB):  ARTHROPLASTY, HIP, TOTAL, USING COMPUTER-ASSISTED NAVIGATION (Left)      OUTCOME: Patient tolerated treatment/procedure well without complication and is now ready for discharge.    [place] [facility] [place]    FINAL DIAGNOSIS:  <principal problem not specified>    FOLLOWUP: In clinic    DISCHARGE INSTRUCTIONS:    Discharge Procedure Orders   WALKER FOR HOME USE     Order Specific Question Answer Comments   Type of Walker: Adult (5'4"-6'6")    With wheels? Yes    Height: 5'8    Weight: 75.2 kg (165 lb 11.2 oz)    Length of need (1-99 months): 99    Please check all that apply: Walker will be used for gait training.      Activity as tolerated     Lifting restrictions   Order Comments: No strenuous exercise or lifting of > 10 lbs     Weight bearing as tolerated     No driving, operating heavy equipment or signing legal documents while taking pain medication     Call MD for:  temperature >100.4     Call MD for:  persistent nausea and vomiting     Call MD for:  severe uncontrolled pain     Call MD for:  difficulty breathing, headache or visual disturbances     Call MD for:  redness, tenderness, or signs of infection (pain, swelling, redness, odor or green/yellow discharge around incision site)     Call MD for:  hives     Call MD for:  persistent dizziness or light-headedness     Call MD for:  extreme fatigue        TIME SPENT ON DISCHARGE: 20 minutes  "

## 2025-04-14 NOTE — ANESTHESIA PROCEDURE NOTES
Peripheral Block    Patient location during procedure: pre-op   Block not for primary anesthetic.  Reason for block: at surgeon's request and post-op pain management   Post-op Pain Location: L hip   Start time: 4/14/2025 12:11 PM  Timeout: 4/14/2025 12:10 PM   End time: 4/14/2025 12:21 PM    Staffing  Authorizing Provider: Juan Sams MD  Performing Provider: Juan Sams MD    Staffing  Performed by: Juan Sams MD  Authorized by: Juan Sams MD    Preanesthetic Checklist  Completed: patient identified, IV checked, site marked, risks and benefits discussed, surgical consent, monitors and equipment checked, pre-op evaluation and timeout performed  Peripheral Block  Patient position: supine  Prep: ChloraPrep  Patient monitoring: heart rate, cardiac monitor, continuous pulse ox and frequent blood pressure checks  Block type: PENG  Laterality: left  Injection technique: single shot  Needle  Needle type: Stimuplex   Needle gauge: 22 G  Needle length: 4 in  Needle localization: ultrasound guidance   -ultrasound image captured on disc.  Assessment  Injection assessment: negative aspiration, negative parasthesia and local visualized surrounding nerve  Paresthesia pain: none  Heart rate change: no  Slow fractionated injection: yes  Pain Tolerance: comfortable throughout block and no complaints  Medications:    Medications: ropivacaine (NAROPIN) injection 0.5% - Perineural   15 mL - 4/14/2025 12:21:00 PM

## 2025-04-14 NOTE — H&P
Justification of Rolling Walker:  The mobility limitation can not be sufficiently resolved by the use of a cane.  Patient's functional mobility deficit can be sufficiently resolved with the use of a rolling walker.  Patient has mobility limitation significantly impairs their ability to participate in 1 or more activities of daily living.  The use of the rolling walker we will significantly improve the patient's ability to participate in MRADLS and the patient will use it on a regular basis in the home.      EST PATIENT ORTHOPAEDIC: Knee    PRIMARY CARE PHYSICIAN: Chan Estrada MD   REFERRING PROVIDER: Judson Steel MD  [address] [place] [address]  [place] [address]     ASSESSMENT & PLAN:    Impression:  Bilateral Knee Severe Degenerative Osteoarthritis, Primary   Left Hip Severe Osteoarthritis, Primary    Follow Up Plan: YAHAIRA     John Lemos has radiograph and physical exam evidence of the aforementioned impression and wishes to pursue surgery. This patient appears to have sufficient symptoms to warrant surgical intervention and is an appropriate candidate for LEFT Primary Total Hip Arthroplasty as evidenced by months of unsuccessful non-operative treatment as outlined in the HPI below and progressive symptoms.    We had a lengthy discussion regarding the risk and benefit of surgery, the alternatives, limitations and personnel involved. These included but were not limited to infection, persistent pain, instability, nerve injury, blood clots, dislocation, loosening, leg length inequality and medical complications. We also discussed the pre-operative course, surgery itself and rehabilitation.    Anne-Marie-operative blood management and transfusion issues were discussed, and options clearly outlined. The patient has consented to the use of the banked allogenic blood if medically necessary.    The patient has elected to schedule surgery at this time or intends to call the office with a surgical date. Shared decision making  occurred while obtaining informed consent. The patient will be scheduled for a pre-operative education class at which time they will have their nasal swab completed and will be given CHG cloths along with the verbal and written instructions for their use.    We will plan for use of Yobany components.  Tentative surgery date April 14th.  Possible same-day.    To review:  Patient is known to me for severe left hip arthritis. He has had severe left hip pain over the last few months.  I obtained repeat radiographs that demonstrate progressive arthritis with femoral head flattening and deformity.  I think he would benefit greatly from a hip replacement at this stage.    Also seen for bilateral knee pain.  He has x-rays today that demonstrate severe end-stage arthritis of his bilateral knees.  He reports pain that is not constant but can be sharp and shooting at times.  He reports some swelling to his knee.  He mostly localizes the pain to the medial aspect of his knee.  He has had an injection previously about 6 months ago. He would like repeat injections. If these injections provide greater than 3 months of relief can continue this treatment modality going forward.  If not may have to consider alternatives including knee replacements.    The patient has been ordered:  OMAIRA CT    CONSULTS:   Anesthesia for optimization    ACTIVE PROBLEM LIST  Patient Active Problem List   Diagnosis    GERD (gastroesophageal reflux disease)    DDD (degenerative disc disease), lumbar    ED (erectile dysfunction)    Dyslipidemia    Neuropathy    Type 2 diabetes mellitus with diabetic polyneuropathy    Primary osteoarthritis of right knee    Nuclear sclerosis of both eyes    Refractive error           SUBJECTIVE    CHIEF COMPLAINT: Knee Pain    HPI:   John Lemos is a 74 y.o. male here for evaluation and management of bilateral knee pain. There is not a specific incident that brought about this pain. he has had progressive problems with  the knee(s) starting 3 months ago but is now progressing to interfere with activities which include: walking, functional household ADL's, and participating in family activities    Currently the pain in the joint is rated at moderate with activity. The pain is intermittent and is located in the knee, located medially and without radiation. The pain is described as aching, stiffness, and throbbing. Relieving factors include rest, over the counter medication , and repositioning.     There is associated Clicking and Popping.     John Lemos has no additional complaints.     2/11/25:  Patient here today with bilateral knee pain.  Last injection we did was over 6 months ago.  Those provided good relief.  He has been intermittently taking Mobic for breakthrough pain control.  He is also having some more residual issues with his known left hip arthritis.  May consider surgery for his hip in the upcoming months.    3/28/25:  Patient with progressive severe left hip pain in the setting of known arthritis.  It is becoming difficult to sleep at night.  Has difficulty ambulating.    PROGRESSIVE SYMPTOMS:  Pain worsened by weight bearing  Pain effecting living situation  Pain impacting work    FUNCTIONAL STATUS:   Climb a flight of stairs or walk up a hill     PREVIOUS TREATMENTS:  Medical: Tylenol and Steroid injections  Physical Therapy: Activities Modified   Previous Orthopaedic Surgery: None    REVIEW OF SYSTEMS:  PAIN ASSESSMENT:  See HPI.  MUSCULOSKELETAL: See HPI.  OTHER 10 point review of systems is negative except as stated in HPI above    PAST MEDICAL HISTORY   has a past medical history of Arthritis, Cataract, Diabetes mellitus (7 yrs), Diabetes mellitus type 2, controlled (9/8/2012), Eye injury, and Hyperlipidemia.     PAST SURGICAL HISTORY   has a past surgical history that includes Colonoscopy (N/A, 11/25/2019).     FAMILY HISTORY  family history includes Cancer in his father and mother; Diabetes in his father  and mother; Hypertension in his mother; No Known Problems in his brother, maternal aunt, maternal grandfather, maternal grandmother, maternal uncle, paternal aunt, paternal grandfather, paternal grandmother, paternal uncle, and sister.     SOCIAL HISTORY   reports that he has never smoked. He has never used smokeless tobacco. He reports that he does not drink alcohol and does not use drugs.     ALLERGIES   Review of patient's allergies indicates:   Allergen Reactions    Sulfa (sulfonamide antibiotics) Swelling        MEDICATIONS  No current facility-administered medications on file prior to encounter.     Current Outpatient Medications on File Prior to Encounter   Medication Sig Dispense Refill    atorvastatin (LIPITOR) 40 MG tablet Take 1 tablet (40 mg total) by mouth once daily. 90 tablet 3    blood sugar diagnostic (FREESTYLE LITE STRIPS) Strp Inject 1 each as directed once daily. 100 each 3    diclofenac (VOLTAREN) 75 MG EC tablet Take 1 tablet (75 mg total) by mouth 2 (two) times daily. 60 tablet 1    DULoxetine (CYMBALTA) 60 MG capsule Take 1 capsule (60 mg total) by mouth once daily. 90 capsule 3    lancets (FREESTYLE LANCETS) 28 gauge Misc Inject 1 lancet as directed once daily. 100 each 3    losartan (COZAAR) 50 MG tablet Take 1 tablet (50 mg total) by mouth once daily. 90 tablet 1    metFORMIN (GLUCOPHAGE) 500 MG tablet TAKE 2 TABLETS BY MOUTH TWICE DAILY WITH BREAKFAST AND WITH SUPPER 360 tablet 0    pregabalin (LYRICA) 25 MG capsule Take 1 capsule (25 mg total) by mouth nightly. 30 capsule 0    tadalafiL (CIALIS) 5 MG tablet Take 1 tablet (5 mg total) by mouth once daily. 30 tablet 11    TRULICITY 0.75 mg/0.5 mL pen injector INJECT 0.75 MG INTO THE SKIN EVERY 7 DAYS 12 pen 1          PHYSICAL EXAM   weight is 75.2 kg (165 lb 11.2 oz). His oral temperature is 98 °F (36.7 °C). His blood pressure is 165/78 (abnormal) and his pulse is 66. His respiration is 16 and oxygen saturation is 99%.   Body mass index  is 25.19 kg/m².      All other systems deferred.  GENERAL:  No acute distress  HABITUS: Normal  GAIT: Non-antalgic  SKIN: Normal   Hip EXAM:    Left hip:  Severe pain with with IR/ER rotation of the hip  IR to 10 degrees, ER to 30 degrees  5/5 strength in knee flexion and extension, ankle plantarflexion and dorsiflexion  Neurovascular Status: Sensation intact to light touch in Sural, Saphenous, SPN, DPN, Tibial nerve distribution  2+ pulse DP/PT, normal capillary refill, foot has normal warmth    DATA:  Diagnostic tests reviewed for today's visit:     Three views of the left hip demonstrate severe end-stage arthritis of his left hip.  Compared to previous films from a year ago his femoral head has completely flattened.  His leg is shorter.  Complete joint space loss.    4v of the bilateral knee reveal Severe degenerative changes of the Medial compartment. There is evidence of advanced osteoarthritis changes with Subchondral sclerosis, Osteophyte formation, and Joint space narrowing. The limb is in varus alignment. The patella is tracking midline.

## 2025-04-14 NOTE — OR NURSING
1226 - Time out done for Block per Dr Sams  Pt on monitors   1230 - Block complete - pt tolerated well

## 2025-04-14 NOTE — NURSING
[facility] [place]  Nurses Note -- 4 Eyes      4/14/2025       Skin assessed on: Q Shift      [] No Pressure Injuries Present    []Prevention Measures Documented    [] Yes LDA  for Pressure Injury Previously documented     [] Yes New Pressure Injury Discovered   [] LDA for New Pressure Injury Added      Attending RN:  Isabella Llamas RN     Second RN:  Mary Anne

## 2025-04-14 NOTE — DISCHARGE INSTRUCTIONS
Post-Operative Discharge Instructions: Dr. Steel    Physical Therapy  You will have home health/physical therapy working with you 2-3x a week for the first two weeks. After this, it will be necessary to begin formal outpatient physical therapy for an additional 2 weeks for hip replacements and about 4-6 weeks for knee replacements.   Knee replacements can get stiff and as such the post operative therapy is critical after a knee replacement. Range of motion exercises are not limited to therapy sessions and should be done every 1-2 hours on your own.   Hip replacements your therapy for the first month is mostly walking and gradually returning to activities as tolerated. However, you will have to wean off assistive devices and maintain hip precautions for 6 weeks post operatively.     Pain Management  Some degree of pain is normal and expected. You will improve gradually as your muscles become stronger and healing continues. This speed of recovery is different for every patient and you should not compare your recovery to another friend or relative.   You will be discharged with pain medications. You should wean off of these as tolerated by 4-6 weeks. but it is expected you will need them in the immediate post operative period and for therapy.   Pain medications can have common side effects of constipation which you should take a laxative, nausea which you should take medication with food, itching which can be alleviated with Benadryl, and confusion which you should stop taking pain medications and call our office if this occurs.   Be aware of your ingestion of Tylenol if your pain medication has this in it, you should not exceed 3000mg of Acetaminophen as this can damage your liver.   If you require a pain medication refill, you will need to contact our office at least 3 days in advance to prescription running out. Prescriptions cannot be called into a pharmacy. You will need to  a prescription in one of our  office locations depending on our clinic availability.     Swelling  You will have swelling of the post operative leg. Swelling may get worse with activity. It will likely get worse in the 2-3 days after discharge from the hospital. Typically swelling is correlated to pain. It can be helpful to elevate your extremity above the level of your heart and consistent use of ice. Elevating your extremity should not be done as to violate your hip precautions after a hip replacement, and no pillows should be placed under the knee after knee replacement surgery.   You will have compression stockings that should remain on for 3 weeks following surgery. They should only be removed for hygiene purposes. These will help with the swelling.       Anticoagulation  You will be placed on a blood thinner to prevent blood clots. You will need to take this as directed.        Wound Care  Your dressing should be changed after 7 days and every few days thereafter. You may shower over this dressing but it should be covered or kept dry (joelle wrap or garbage bag). We will remove a portion of your bottom glue/gauze dressing at your follow up appointment. You can continue to shower but NO scrubbing the incision, should pat dry. NO soaking the wound in a bathtub, hot tub, pool, ocean etc. for at least 6 weeks after surgery. Your incision will likely be glued on the skin and no sutures should need to be removed post operatively.  You can begin putting on Vitamin E based lotions on the wound around 6-8 weeks post operatively when there is no remaining scabbing of the incision.   If there is any drainage post operatively you should contact our office immediately    Antibiotics  You will need to be placed on prophylactic antibiotics prior to any dental procedure or cleanings, any colonoscopy, cystoscopy, prostate or bladder surgery, kidney surgery or endoscopy. This will avoid risk of subsequent infection of your replacement. We prefer Amoxicillin  2g one hour prior to procedure. This can be given by our office or your dentist. Although controversial, we prefer lifetime dental prophylaxis.     Other Considerations  No additional anti-inflammatories should be used for 3 weeks following surgery  No driving for about 2-4 weeks following surgery on the left leg and about 4-6 weeks after surgery on the right leg. You will need to be off pain medications completely. You will need to be able to perform evasive driving maneuvers without hesitation.    You can fly about 2 weeks post-operatively. However, we may recommend alterative blood thinners if this will take place.   Return to work is patient specific. If a desk job, it may be 2-4 weeks for motivated individuals. Patients in strenuous or physical jobs may be out for 12 weeks.     Follow-up appointments  Your first post operative visit will be about 3 weeks after surgery. You will have x-rays at this appointment  Your second post operative visit will be about 3 months after surgery. This will be for a clinical check only unless a reason arises for an x-ray  Your third post operative visit will be about 1 year after surgery. X-rays are taken at this time.

## 2025-04-14 NOTE — ANESTHESIA PROCEDURE NOTES
Intubation    Date/Time: 4/14/2025 1:35 PM    Performed by: Dru Thurston CRNA  Authorized by: Juan Sams MD    Intubation:     Induction:  Intravenous    Intubated:  Postinduction    Mask Ventilation:  Easy with oral airway    Attempts:  1    Attempted By:  CRNA    Method of Intubation:  Video laryngoscopy    Blade:  Beverly 3 (X3 Blade)    Laryngeal View Grade: Grade I - full view of cords      Difficult Airway Encountered?: No      Complications:  None    Airway Device:  Oral endotracheal tube    Airway Device Size:  7.5    Style/Cuff Inflation:  Cuffed (inflated to minimal occlusive pressure)    Tube secured:  23    Secured at:  The lips    Placement Verified By:  Capnometry    Complicating Factors:  Anterior larynx    Findings Post-Intubation:  BS equal bilateral and atraumatic/condition of teeth unchanged

## 2025-04-14 NOTE — TRANSFER OF CARE
Anesthesia Transfer of Care Note    Patient: John Lemos    Procedure(s) Performed: Procedure(s) (LRB):  ARTHROPLASTY, HIP, TOTAL, USING COMPUTER-ASSISTED NAVIGATION (Left)    Patient location: PACU    Anesthesia Type: general    Transport from OR: Transported from OR on 6-10 L/min O2 by face mask with adequate spontaneous ventilation    Post pain: adequate analgesia    Post assessment: no apparent anesthetic complications and tolerated procedure well    Post vital signs: stable    Level of consciousness: awake    Nausea/Vomiting: no nausea/vomiting    Complications: none    Transfer of care protocol was followed      Last vitals: Visit Vitals  BP (!) 195/82 (BP Location: Right arm, Patient Position: Lying)   Pulse 68   Temp 36.4 °C (97.5 °F) (Temporal)   Resp 16   Wt 75.2 kg (165 lb 11.2 oz)   SpO2 100%   BMI 25.19 kg/m²

## 2025-04-14 NOTE — PLAN OF CARE
Discharge Recommendations: Low intensity      Problem: Physical Therapy  Goal: Physical Therapy Goal  Description: Pt unable to ambulate secondary to severe drowsiness      Goals to be met by: 2025     Patient will increase functional independence with mobility by performin. Sit to stand transfer with Modified [place]  2. Bed to chair transfer with Modified [place] using Rolling Walker  3. Gait  x 150 feet with Modified [place] using Rolling Walker.     Outcome: Progressing

## 2025-04-14 NOTE — PLAN OF CARE
RW previously delivered    [facility]  acepted    All information placed on AVS.         04/14/25 1020   Final Note   Assessment Type Final Discharge Note   Anticipated Discharge Disposition Home-Health   Hospital Resources/Appts/Education Provided Appointments scheduled and added to AVS   Post-Acute Status   Post-Acute Authorization Home Health   Home Health Status Set-up Complete/Auth obtained   Discharge Delays None known at this time

## 2025-04-15 VITALS
HEART RATE: 94 BPM | TEMPERATURE: 98 F | WEIGHT: 181.44 LBS | OXYGEN SATURATION: 94 % | DIASTOLIC BLOOD PRESSURE: 69 MMHG | RESPIRATION RATE: 20 BRPM | HEIGHT: 67 IN | BODY MASS INDEX: 28.48 KG/M2 | SYSTOLIC BLOOD PRESSURE: 151 MMHG

## 2025-04-15 DIAGNOSIS — Z96.642 STATUS POST LEFT HIP REPLACEMENT: Primary | ICD-10-CM

## 2025-04-15 PROCEDURE — 97535 SELF CARE MNGMENT TRAINING: CPT

## 2025-04-15 PROCEDURE — 25000003 PHARM REV CODE 250: Performed by: ORTHOPAEDIC SURGERY

## 2025-04-15 PROCEDURE — 63600175 PHARM REV CODE 636 W HCPCS: Performed by: ORTHOPAEDIC SURGERY

## 2025-04-15 PROCEDURE — 97116 GAIT TRAINING THERAPY: CPT

## 2025-04-15 PROCEDURE — 97165 OT EVAL LOW COMPLEX 30 MIN: CPT

## 2025-04-15 RX ADMIN — METHOCARBAMOL 750 MG: 750 TABLET ORAL at 03:04

## 2025-04-15 RX ADMIN — SODIUM CHLORIDE: 9 INJECTION, SOLUTION INTRAVENOUS at 12:04

## 2025-04-15 RX ADMIN — ACETAMINOPHEN 1000 MG: 500 TABLET ORAL at 05:04

## 2025-04-15 RX ADMIN — MUPIROCIN 1 G: 20 OINTMENT TOPICAL at 09:04

## 2025-04-15 RX ADMIN — POLYETHYLENE GLYCOL 3350 17 G: 17 POWDER, FOR SOLUTION ORAL at 09:04

## 2025-04-15 RX ADMIN — OXYCODONE 10 MG: 5 TABLET ORAL at 10:04

## 2025-04-15 RX ADMIN — SENNOSIDES AND DOCUSATE SODIUM 1 TABLET: 50; 8.6 TABLET ORAL at 09:04

## 2025-04-15 RX ADMIN — SODIUM CHLORIDE: 9 INJECTION, SOLUTION INTRAVENOUS at 08:04

## 2025-04-15 RX ADMIN — ACETAMINOPHEN 1000 MG: 500 TABLET ORAL at 12:04

## 2025-04-15 RX ADMIN — CEFAZOLIN 2 G: 2 INJECTION, POWDER, FOR SOLUTION INTRAMUSCULAR; INTRAVENOUS at 05:04

## 2025-04-15 RX ADMIN — CELECOXIB 200 MG: 100 CAPSULE ORAL at 09:04

## 2025-04-15 RX ADMIN — METHOCARBAMOL 750 MG: 750 TABLET ORAL at 09:04

## 2025-04-15 RX ADMIN — FAMOTIDINE 20 MG: 20 TABLET, FILM COATED ORAL at 09:04

## 2025-04-15 RX ADMIN — ASPIRIN 81 MG: 81 TABLET, COATED ORAL at 09:04

## 2025-04-15 NOTE — NURSING
[facility]  Nurses Note -- 4 Eyes      4/14/2025      Skin assessed on: Q Shift      [x] No Pressure Injuries Present    [x]Prevention Measures Documented    [] Yes LDA  for Pressure Injury Previously documented     [] Yes New Pressure Injury Discovered   [] LDA for New Pressure Injury Added      Attending RN:  Ebonie Hung RN     Second RN:  RICARDO Mason

## 2025-04-15 NOTE — PLAN OF CARE
Pt alert able to make needs known, tolerates medications well by mouth, IV fluids remain in progress, no signs or symptoms of adverse reactions noted, weight shifting, pain controlled by PRN pain medication, plan of care explained, diet tolerated, pt denies n,v,d this shift, remains free from falls and hospital acquired pressure injuries, safety maintained. Will continue following plan of care.      Problem: Adult Inpatient Plan of Care  Goal: Plan of Care Review  Outcome: Progressing  Goal: Patient-Specific Goal (Individualized)  Outcome: Progressing  Goal: Absence of Hospital-Acquired Illness or Injury  Outcome: Progressing  Goal: Optimal Comfort and Wellbeing  Outcome: Progressing  Goal: Readiness for Transition of Care  Outcome: Progressing     Problem: Diabetes Comorbidity  Goal: Blood Glucose Level Within Targeted Range  Outcome: Progressing     Problem: Wound  Goal: Optimal Coping  Outcome: Progressing  Goal: Optimal Functional Ability  Outcome: Progressing  Goal: Absence of Infection Signs and Symptoms  Outcome: Progressing  Goal: Improved Oral Intake  Outcome: Progressing  Goal: Optimal Pain Control and Function  Outcome: Progressing  Goal: Skin Health and Integrity  Outcome: Progressing  Goal: Optimal Wound Healing  Outcome: Progressing

## 2025-04-15 NOTE — DISCHARGE SUMMARY
[facility]  Orthopedics  Discharge Summary      Patient Name: John Lemos  MRN: [id number]  Admission Date: 4/14/2025  Hospital Length of Stay: 0 days  Discharge Date and Time: 04/15/2025 8:16 AM  Attending Physician: Jduson Steel MD   Discharging Provider: Kim Blair PA-C  Primary Care Provider: Chan Estrada MD    HPI: Left hip OA    Procedure(s) (LRB):  ARTHROPLASTY, HIP, TOTAL, USING COMPUTER-ASSISTED NAVIGATION (Left)      Hospital Course: Hospital Course    The patient is a 75 y/o male who has been followed in clinic for left hip arthritis. It was determined he would benefit from surgery. The procedure, its risks, benefits, and potential complications were discussed in detail with the patient prior to surgery. Understanding of all topics was conveyed by the patient, and consent was given for surgery. The patient was admitted to [facility] on 4/14/2025 for left hip arthroplasty.    The procedure was tolerated well and he was sent to the post-operative recovery room in stable condition, where he also did well. Post-operative x-rays in the recovery room showed well-aligned components without evidence of complication or fracture. He was subsequently sent to his hospital room for postoperative management.  ?  Once on the floor hispostoperative course  was unremarkable and he did well. his diet was advanced which was tolerated. his pain was well controlled on multimodal pain medication; this was eventually transitioned to oral medication alone prior to discharge. He worked with Physical Therapy starting on POD#0 who recommended he be discharged home. Their dressing remained clean dry and intact throughout the hospital stay. He remained afebrile with stable vital signs throughout the stay. He/she was stable for discharge on POD#1.     Consults (From admission, onward)          Status Ordering Provider     Inpatient consult to Social Work  Once        Provider:  (Not yet assigned)     "Completed JJ HERMAN            Significant Diagnostic Studies: No pertinent studies.    Pending Diagnostic Studies:       None          There are no hospital problems to display for this patient.     Discharged Condition: good    Disposition: Home-Health Care [facility]    Follow Up:   Follow-up Information       [place] - Follow up.    Specialty: Home Health Services  Why: Home Health Someone will call you to schedule your frist visit  Contact information:  [address]  [address]  [place] [address]  [phone]               [facility] Follow up.    Why: DME- call for questions or concerns regarding equipment  Contact information:  [address] [place] [address]  [phone]                         Patient Instructions:   Justification of Rolling Walker:  The mobility limitation can not be sufficiently resolved by the use of a cane.  Patient's functional mobility deficit can be sufficiently resolved with the use of a rolling walker.  Patient has mobility limitation significantly impairs their ability to participate in 1 or more activities of daily living.  The use of the rolling walker we will significantly improve the patient's ability to participate in MRADLS and the patient will use it on a regular basis in the home.      WALKER FOR HOME USE     Order Specific Question Answer Comments   Type of Walker: Adult (5'4"-6'6")    With wheels? Yes    Height: 5'8    Weight: 75.2 kg (165 lb 11.2 oz)    Length of need (1-99 months): 99    Please check all that apply: Walker will be used for gait training.      Activity as tolerated     Lifting restrictions   Order Comments: No strenuous exercise or lifting of > 10 lbs     Weight bearing as tolerated     No driving, operating heavy equipment or signing legal documents while taking pain medication     Call MD for:  temperature >100.4     Call MD for:  persistent nausea and vomiting     Call MD for:  severe " uncontrolled pain     Call MD for:  difficulty breathing, headache or visual disturbances     Call MD for:  redness, tenderness, or signs of infection (pain, swelling, redness, odor or green/yellow discharge around incision site)     Call MD for:  hives     Call MD for:  persistent dizziness or light-headedness     Call MD for:  extreme fatigue     Medications:  Reconciled Home Medications:      Medication List        START taking these medications      acetaminophen 500 MG tablet  Commonly known as: TYLENOL  Take 2 tablets (1,000 mg total) by mouth every 8 (eight) hours as needed for Pain.     aspirin 81 MG EC tablet  Commonly known as: ECOTRIN  Take 1 tablet (81 mg total) by mouth 2 (two) times a day.     celecoxib 200 MG capsule  Commonly known as: CeleBREX  Take 1 capsule (200 mg total) by mouth 2 (two) times daily.     docusate sodium 100 MG capsule  Commonly known as: COLACE  Take 1 capsule (100 mg total) by mouth 2 (two) times daily.     methocarbamoL 500 MG Tab  Commonly known as: Robaxin  Take 1 tablet (500 mg total) by mouth 4 (four) times daily. for 10 days     oxyCODONE 5 MG immediate release tablet  Commonly known as: ROXICODONE  Take 1-2 tablets (5-10 mg total) by mouth every 4 (four) hours as needed (pain).            CHANGE how you take these medications      pregabalin 75 MG capsule  Commonly known as: LYRICA  Take 1 capsule (75 mg total) by mouth 2 (two) times daily. for 14 days  What changed:   medication strength  how much to take  when to take this            CONTINUE taking these medications      atorvastatin 40 MG tablet  Commonly known as: LIPITOR  Take 1 tablet (40 mg total) by mouth once daily.     blood sugar diagnostic Strp  Commonly known as: FREESTYLE LITE STRIPS  Inject 1 each as directed once daily.     DULoxetine 60 MG capsule  Commonly known as: CYMBALTA  Take 1 capsule (60 mg total) by mouth once daily.     lancets 28 gauge Misc  Commonly known as: FREESTYLE LANCETS  Inject 1  lancet as directed once daily.     losartan 50 MG tablet  Commonly known as: COZAAR  Take 1 tablet (50 mg total) by mouth once daily.     metFORMIN 500 MG tablet  Commonly known as: GLUCOPHAGE  TAKE 2 TABLETS BY MOUTH TWICE DAILY WITH BREAKFAST AND WITH SUPPER     tadalafiL 5 MG tablet  Commonly known as: CIALIS  Take 1 tablet (5 mg total) by mouth once daily.     TRULICITY 0.75 mg/0.5 mL pen injector  Generic drug: dulaglutide  INJECT 0.75 MG INTO THE SKIN EVERY 7 DAYS            STOP taking these medications      diclofenac 75 MG EC tablet  Commonly known as: VOLTDOUG Blair PA-C  Orthopedics  [facility] [place] - Med Surg

## 2025-04-15 NOTE — PLAN OF CARE
Problem: Occupational Therapy  Goal: Occupational Therapy Goal  4/15/2025 1109 by Lena Dueñas OT  Outcome: Adequate for Care Transition      REC: Please see PT RECs for home, Pt states having BSC and Hip Kit for self care and verbalized understanding

## 2025-04-15 NOTE — NURSING
AVS virtually reviewed with patient in its entirety with emphasis on diet, medications, follow-up appointments and reasons to return to the ED. Patient also encouraged to utilize their patient portal. Ease and convenience of use reiterated. Education complete and patient voiced understanding. All questions answered. Discharge teaching complete.

## 2025-04-15 NOTE — ANESTHESIA POSTPROCEDURE EVALUATION
Anesthesia Post Evaluation    Patient: John Lemos    Procedure(s) Performed: Procedure(s) (LRB):  ARTHROPLASTY, HIP, TOTAL, USING COMPUTER-ASSISTED NAVIGATION (Left)    Final Anesthesia Type: general      Patient location during evaluation: PACU  Patient participation: Yes- Able to Participate  Level of consciousness: awake and alert, oriented and awake  Post-procedure vital signs: reviewed and stable  Airway patency: patent    PONV status at discharge: No PONV  Anesthetic complications: no      Cardiovascular status: blood pressure returned to baseline  Respiratory status: unassisted, spontaneous ventilation and room air  Hydration status: euvolemic  Follow-up not needed.              Vitals Value Taken Time   /74 04/15/25 06:59   Temp 36.7 °C (98.1 °F) 04/15/25 06:59   Pulse 74 04/15/25 06:59   Resp 18 04/15/25 06:59   SpO2 93 % 04/15/25 06:59         Event Time   Out of Recovery 18:02:00         Pain/Kory Score: Pain Rating Prior to Med Admin: 2 (4/15/2025  5:25 AM)  Pain Rating Post Med Admin: 0 (4/15/2025  6:20 AM)  Kory Score: 10 (4/14/2025  6:00 PM)

## 2025-04-15 NOTE — PT/OT/SLP PROGRESS
Physical Therapy Treatment    Patient Name:  John Lemos   MRN:  [id number]    Recommendations:     Discharge Recommendations: Low Intensity Therapy  Discharge Equipment Recommendations: none  Barriers to discharge: None    Assessment:     John Lemos is a 74 y.o. male admitted with a medical diagnosis of <principal problem not specified>.  He presents with the following impairments/functional limitations: weakness, impaired endurance, impaired self care skills, impaired functional mobility, gait instability, pain, decreased safety awareness, impaired balance, decreased lower extremity function, decreased ROM, impaired muscle length .    Rehab Prognosis: Good; patient would benefit from acute skilled PT services to address these deficits and reach maximum level of function.    Recent Surgery: Procedure(s) (LRB):  ARTHROPLASTY, HIP, TOTAL, USING COMPUTER-ASSISTED NAVIGATION (Left) 1 Day Post-Op    Plan:     During this hospitalization, patient to be seen 5 x/week to address the identified rehab impairments via gait training, therapeutic activities, therapeutic exercises, neuromuscular re-education and progress toward the following goals:    Plan of Care Expires:       Subjective     Chief Complaint: Pt is without new complaints  Patient/Family Comments/goals: Ready to return home  Pain/Comfort:  Pain Rating 1: 0/10  Location 1: hip      Objective:     Communicated with nursing prior to session.  Patient found up in chair with telemetry, peripheral IV upon PT entry to room.     General Precautions: Standard, fall  Orthopedic Precautions: LLE posterior precautions (reviewed with pt)  Braces:    Respiratory Status: Room air     Functional Mobility:  Transfers:     Sit to Stand:  stand by assistance and max vc for hip precautions with rolling walker  Gait: 80 ft, RW, CGA pt is mildly unsteady on feet with decreased rio, decreased clearance of L+ LE with swing, increased difficulty with turning and requires vc for  safety - pt encouraged to have assist from family when walking in home until HH PT works with pt  Balance: Static Sit Good, Static Stand Fair (+)      AM-PAC 6 CLICK MOBILITY  Turning over in bed (including adjusting bedclothes, sheets and blankets)?: 3  Sitting down on and standing up from a chair with arms (e.g., wheelchair, bedside commode, etc.): 4  Moving from lying on back to sitting on the side of the bed?: 3  Moving to and from a bed to a chair (including a wheelchair)?: 3  Need to walk in hospital room?: 3  Climbing 3-5 steps with a railing?: 2  Basic Mobility Total Score: 18       Treatment & Education:  GT 1:1 x 10 min see above     Patient left up in chair with all lines intact, call button in reach, nursing notified, and OT present..    GOALS:   Multidisciplinary Problems       Physical Therapy Goals          Problem: Physical Therapy    Goal Priority Disciplines Outcome Interventions   Physical Therapy Goal     PT, PT/OT Progressing    Description: Goals to be met by: 2025     Patient will increase functional independence with mobility by performin. Sit to stand transfer with Modified [place]  2. Bed to chair transfer with Modified [place] using Rolling Walker  3. Gait  x 150 feet with Modified [place] using Rolling Walker.                          DME Justifications:  No DME recommended requiring DME justifications    Time Tracking:     PT Received On:    PT Start Time: 1030     PT Stop Time: 1042  PT Total Time (min): 12 min     Billable Minutes: Gait Training 1    Treatment Type: Treatment  PT/PTA: PT     Number of PTA visits since last PT visit: 0     04/15/2025

## 2025-04-15 NOTE — PT/OT/SLP EVAL
Occupational Therapy   Evaluation and Discharge Note    Name: John Lemos  MRN: [id number]  Admitting Diagnosis: <principal problem not specified>  Recent Surgery: Procedure(s) (LRB):  ARTHROPLASTY, HIP, TOTAL, USING COMPUTER-ASSISTED NAVIGATION (Left) 1 Day Post-Op    Recommendations:     Discharge Recommendations: No Therapy Indicated  Discharge Equipment Recommendations: none  Barriers to discharge:  None    Assessment:     John Lemos is a 74 y.o. male with a medical diagnosis of <principal problem not specified>. At this time, patient is functioning at their prior level of function and does not require further acute OT services.     Plan:     During this hospitalization, patient does not require further acute OT services.  Please re-consult if situation changes.    Plan of Care Reviewed with: patient    Subjective     Chief Complaint: Burning sensation in hip  Patient/Family Comments/goals: to get out of the bed    Occupational Profile:  Living Environment: Pt resides with wife, son and grandchildren   Previous level of function: Independent prior to adm  Roles and Routines: drives to all appointments  Equipment Used at home:  (Pt has RW, Hip Kit and BSC but did not need previously)  Assistance upon Discharge: family able to assist    Pain/Comfort:  Pain Rating 1: 0/10 (Just feels a little burning)    Patients cultural, spiritual, [employer] conflicts given the current situation:      Objective:     Communicated with: nurse prior to session.  Patient found supine with peripheral IV, SCD upon OT entry to room.    General Precautions: Standard, fall  Orthopedic Precautions: LLE weight bearing as tolerated  Braces: N/A  Respiratory Status: Room air     Occupational Performance:    Bed Mobility:    Patient completed Scooting/Bridging with minimum assistance  Patient completed Supine to Sit with minimum assistance    Functional Mobility/Transfers:  Patient completed Sit <> Stand Transfer with contact guard  assistance  with  rolling walker   Patient completed Toilet Transfer Stand Pivot technique with contact guard assistance with  rolling walker  Functional Mobility: Pt ambulate to bathroom with c/g assist for balance and education    Activities of Daily Living:  Grooming: supervision    Upper Body Dressing: contact guard assistance    Toileting: supervision      Cognitive/Visual Perceptual:  Cognitive/Psychosocial Skills:     -       Oriented to: Person, Place, Time, and Situation   -       Follows Commands/attention:Follows two-step commands  -       Communication: clear/fluent  -       Memory: No Deficits noted  -       Safety awareness/insight to disability: intact   -       Mood/Affect/Coping skills/emotional control: Appropriate to situation  Visual/Perceptual:      -Intact      Physical Exam:  Upper Extremity Range of Motion:     -       Right Upper Extremity: WFL  -       Left Upper Extremity: WFL    AMPAC 6 Click ADL:  AMPAC Total Score: 20    Treatment & Education:  -safety with mobility  -Dressing skills using equipment  -Hip Precautions and positioning during sleep    Patient left up in chair with all lines intact and call button in reach    GOALS:   Multidisciplinary Problems       Occupational Therapy Goals          Problem: Occupational Therapy    Goal Priority Disciplines Outcome Interventions   Occupational Therapy Goal     OT, PT/OT Adequate for Care Transition                          History:     Past Medical History:   Diagnosis Date    Arthritis     Cataract     ou    Diabetes mellitus 7 yrs    lbs: 118 1 week ago    Diabetes mellitus type 2, controlled 9/8/2012    Eye injury     fb    Hyperlipidemia          Past Surgical History:   Procedure Laterality Date    COLONOSCOPY N/A 11/25/2019    Procedure: COLONOSCOPY;  Surgeon: Eddie Leslie MD;  Location: [facility];  Service: Endoscopy;  Laterality: N/A;       Time Tracking:     OT Date of Treatment: 04/15/25  OT Start Time: 1030  OT Stop Time:  1102  OT Total Time (min): 32 min    Billable Minutes:Evaluation 9  Self Care/Home Management 23    4/15/2025

## 2025-04-15 NOTE — PT/OT/SLP EVAL
Physical Therapy Evaluation    Patient Name:  John Lemos   MRN:  [id number]    Recommendations:     Discharge Recommendations: Low Intensity Therapy   Discharge Equipment Recommendations: none   Barriers to discharge: None    Assessment:     John Lemos is a 74 y.o. male admitted with a medical diagnosis of <principal problem not specified>.  He presents with the following impairments/functional limitations: weakness, impaired endurance, impaired self care skills, impaired functional mobility, gait instability, impaired balance, decreased safety awareness, decreased lower extremity function, decreased ROM, impaired muscle length.    Pt unable to ambulate due to severe drowsiness causing decreased stability with gait. Attempted 2x's and unsuccessful. After placing pt back into supine position, he was asleep before PT unable to walk away from bay.     Rehab Prognosis: Good; patient would benefit from acute skilled PT services to address these deficits and reach maximum level of function.    Recent Surgery: Procedure(s) (LRB):  ARTHROPLASTY, HIP, TOTAL, USING COMPUTER-ASSISTED NAVIGATION (Left) 1 Day Post-Op    Plan:     During this hospitalization, patient to be seen 5 x/week to address the identified rehab impairments via gait training, therapeutic activities, therapeutic exercises, neuromuscular re-education and progress toward the following goals:    Plan of Care Expires:       Subjective     Chief Complaint: Pt states that he is very woozy, not dizzy, but he just feels weak everywhere.   Patient/Family Comments/goals: Return home  Pain/Comfort:       Patients cultural, spiritual, [employer] conflicts given the current situation:      Living Environment:  Lives with wife, son, and grandchildren who are all able to assist pt. 1 Threshold to enter   Prior to admission, patients level of function was independent.  Equipment used at home: walker, rolling.  DME owned (not currently used): rolling walker.  Upon  discharge, patient will have assistance from family.    Objective:     Communicated with nursing prior to session.  Patient found HOB elevated with telemetry, pulse ox (continuous), peripheral IV  upon PT entry to room.    General Precautions: Standard, fall  Orthopedic Precautions:    Braces:    Respiratory Status: Room air    Exams:  Cognitive Exam:  Patient is oriented to Person, Place, Time, and Situation  Gross Motor Coordination:  WFL  RLE ROM: WFL  RLE Strength: WFL  LLE ROM: Deficits: hip all directions  LLE Strength: grossly 3/5    Functional Mobility:  Bed Mobility:     Scooting: minimum assistance  Bridging: minimum assistance  Supine to Sit: minimum assistance  Sit to Supine: moderate assistance and this is for assist with B+ LE and uncontrolled trunk  descent  Transfers:     Sit to Stand:  minimum assistance with rolling walker  Gait: 2 steps forward with LOB and 2 steps sideways with loss of balance due to unsteady when on feet, RW min to mod assist for recovery   Balance: Static Sit Good, Static Stand Fair (-)        AM-PAC 6 CLICK MOBILITY  Total Score:        Treatment & Education:  Eval    Patient left HOB elevated with all lines intact, call button in reach, and nursing notified.    GOALS:   Multidisciplinary Problems       Physical Therapy Goals          Problem: Physical Therapy    Goal Priority Disciplines Outcome Interventions   Physical Therapy Goal     PT, PT/OT Progressing    Description: Goals to be met by: 2025     Patient will increase functional independence with mobility by performin. Sit to stand transfer with Modified [place]  2. Bed to chair transfer with Modified [place] using Rolling Walker  3. Gait  x 150 feet with Modified [place] using Rolling Walker.                          DME Justifications:  No DME recommended requiring DME justifications    History:     Past Medical History:   Diagnosis Date    Arthritis     Cataract     ou    Diabetes mellitus  7 yrs    lbs: 118 1 week ago    Diabetes mellitus type 2, controlled 9/8/2012    Eye injury     fb    Hyperlipidemia        Past Surgical History:   Procedure Laterality Date    COLONOSCOPY N/A 11/25/2019    Procedure: COLONOSCOPY;  Surgeon: Eddie Leslie MD;  Location: [facility];  Service: Endoscopy;  Laterality: N/A;       Time Tracking:     PT Received On:    PT Start Time: 1645     PT Stop Time: 1705  PT Total Time (min): 20 min     Billable Minutes: Evaluation 1      04/15/2025

## 2025-04-16 PROCEDURE — G0180 MD CERTIFICATION HHA PATIENT: HCPCS | Mod: ,,, | Performed by: ORTHOPAEDIC SURGERY

## 2025-04-17 ENCOUNTER — TELEPHONE (OUTPATIENT)
Dept: PHARMACY | Facility: CLINIC | Age: 75
End: 2025-04-17
Payer: COMMERCIAL

## 2025-04-17 NOTE — TELEPHONE ENCOUNTER
[facility]/Population Health Chart Review & Patient Outreach Details For Medication Adherence Project    Reason for Outreach Encounter: 3rd Party payor non-compliance report ([employer], [employer], [facility], etc)  2.  Patient Outreach Method: MyChart message  3.   Medication in question: losartan    LAST FILLED: 12/26/24 for 90 day supply  Hypertension Medications              losartan (COZAAR) 50 MG tablet Take 1 tablet (50 mg total) by mouth once daily.              4.  Reviewed and or Updates Made To: Patient Chart  5. Outreach Outcomes and/or actions taken: PCP states needs OV before can approve refills

## 2025-04-18 ENCOUNTER — PATIENT MESSAGE (OUTPATIENT)
Dept: ORTHOPEDICS | Facility: CLINIC | Age: 75
End: 2025-04-18
Payer: COMMERCIAL

## 2025-04-23 ENCOUNTER — CLINICAL SUPPORT (OUTPATIENT)
Dept: REHABILITATION | Facility: HOSPITAL | Age: 75
End: 2025-04-23
Payer: COMMERCIAL

## 2025-04-23 DIAGNOSIS — R26.89 BALANCE PROBLEMS: ICD-10-CM

## 2025-04-23 DIAGNOSIS — M50.30 DDD (DEGENERATIVE DISC DISEASE), CERVICAL: ICD-10-CM

## 2025-04-23 DIAGNOSIS — Z74.09 DECREASED FUNCTIONAL MOBILITY AND ENDURANCE: ICD-10-CM

## 2025-04-23 DIAGNOSIS — M48.02 CERVICAL SPINAL STENOSIS: ICD-10-CM

## 2025-04-23 DIAGNOSIS — M54.12 CERVICAL RADICULOPATHY: ICD-10-CM

## 2025-04-23 DIAGNOSIS — R29.898 WEAKNESS OF BOTH LOWER EXTREMITIES: Primary | ICD-10-CM

## 2025-04-23 DIAGNOSIS — Z96.642 STATUS POST LEFT HIP REPLACEMENT: ICD-10-CM

## 2025-04-23 PROCEDURE — 97110 THERAPEUTIC EXERCISES: CPT | Mod: PN

## 2025-04-23 PROCEDURE — 97161 PT EVAL LOW COMPLEX 20 MIN: CPT | Mod: PN

## 2025-04-24 NOTE — PROGRESS NOTES
Outpatient Rehab    Physical Therapy Evaluation    Patient Name: John Lemos  MRN: [id number]  YOB: 1950  Encounter Date: 4/23/2025    Therapy Diagnosis:   Encounter Diagnoses   Name Primary?    DDD (degenerative disc disease), cervical     Cervical radiculopathy     Cervical spinal stenosis     Status post left hip replacement     Weakness of both lower extremities Yes    Balance problems     Decreased functional mobility and endurance      Physician: Brenda Llamas NP    Physician Orders: Eval and Treat  Medical Diagnosis: DDD (degenerative disc disease), cervical  Cervical radiculopathy  Cervical spinal stenosis  Status post left hip replacement    Visit # / Visits Authorized:  1 / 1  Insurance Authorization Period: 3/17/2025 to 3/17/2026  Date of Evaluation: 4/23/2025  Plan of Care Certification: 4/23/2025 to 10/30/25     Time In: 0400   Time Out: 0500  Total Time: 60   Total Billable Time:  60 minutes     Intake Outcome Measure for FOTO Survey    Therapist reviewed FOTO scores for John Lemos on 4/23/2025.   FOTO report - see Media section or FOTO account episode details.     Intake Score:  %    Precautions  Right Upper Extremity Weight-Bearing Status: Weight-bearing as tolerated  No hip flexion beyond 90 degrees, no hip internal rotation beyond neutral and no hip adduction beyond neutral for 6 weeks       Subjective   History of Present Illness  John is a 74 y.o. male who reports to physical therapy with a chief concern of L hip pain post total hip replacement.     The patient reports a medical diagnosis of Z96.642 (ICD-10-CM) - Status post left hip replacement.    Diagnostic tests related to this condition: X-ray.   X-Ray Details: FINDINGS:  Single-view pelvis.     Right hip arthroplasty has been placed, femoral component and acetabular component are well positioned and well aligned.  Please see op note.    History of Present Condition/Illness: Pt presents to PT with c/o L hip pain  that began approximately 1 year ago. Pain became unbearable to the point where he had to hold onto a wall to walk, which led to undergoing surgery to improve his pain. Pt reports difficulty walking even with a walker due to limping and difficulty standing for long periods. Pain is localized to the L hip but is currently not too bad. Pt states he was told he is WBAT. He reports being instructed on post-op precautions, including avoiding hip flexion past 90°, crossing his legs, and fall prevention. Pt rates pain as 4/10 at worst, 0/10 at rest. Describes the pain as burning. Pt states he had a minor fall while trying to get out of bed-his grippy hospital socks caught on the bed, causing the fall. He reports no injury from the incident. Patient reports that he was on the floor for about 30 minutes and could not get up by himself because of bilateral UE weakness. Pt goals include: return to normal function.     Activities of Daily Living  Social history was obtained from Patient.          Patient Responsibilities: Community mobility, Driving, Home management, Laundry, Meal prep, Shopping, Personal ADL, Yard work    Previously independent with activities of daily living? Yes     Currently independent with activities of daily living? No  Activities currently needing assistance include Dressing - lower body, Functional mobility, and Transfers.        Previously independent with instrumental activities of daily living? Yes     Currently independent with instrumental activities of daily living? No  Activities currently needing assistance include: Driving and Safety and emergency maintenance.            Treatment History  Treatments  Previously Received Treatments: Yes  Previous Treatments: Physical therapy  Currently Receiving Treatments: No  Additional Treatment Details: Home health PT for the first week.     Living Arrangements  Living Situation  Living Arrangements: Spouse/significant other  Support Systems: Family  members, Spouse/significant other    Home Setup  Home Access: Stairs with rails  Entrance Stairs - Number of Steps: 20  Entrance Stairs - Rails: Both  Patient is able to live on main floor of home: Yes  Primary Bedroom: 1st floor        Employment  Patient reports: Does the patient's condition impact their ability to work?  Employment Status: On leave   Special . Plans on going back to work after healing from his hip surgery.       Past Medical History/Physical Systems Review:   John Lemos  has a past medical history of Arthritis, Cataract, Diabetes mellitus, Diabetes mellitus type 2, controlled, Eye injury, and Hyperlipidemia.    John Lemos  has a past surgical history that includes Colonoscopy (N/A, 11/25/2019) and Total replacement of hip joint using computer-assisted navigation (Left, 4/14/2025).    John has a current medication list which includes the following prescription(s): acetaminophen, aspirin, atorvastatin, blood sugar diagnostic, celecoxib, docusate sodium, duloxetine, lancets, losartan, metformin, oxycodone, pregabalin, tadalafil, and trulicity.    Review of patient's allergies indicates:   Allergen Reactions    Sulfa (sulfonamide antibiotics) Swelling        Objective   Posture              Discrepancy is Anatomical.  L leg is longer than the R leg.     Hip Observations  Right Hip Observations  Present: Incision  Right Hip Incision Observations  Present: Clean and Dry  Not Present: Drainage          Knee Observations  Left Knee Observations  Present: Straight Leg Raise Extensor Lag            Lower Extremity Sensation  Right Lumbar/Lower Extremity Sensation  Intact: Light Touch       Left Lumbar/Lower Extremity Sensation  Intact: Light Touch                 Hip Range of Motion   Right Hip   Active (deg) Passive (deg) Pain   Flexion   135     Extension         ABduction         ADduction         External Rotation 90/90   30     External Rotation Prone         Internal Rotation  90/90   25     Internal Rotation Prone             Left Hip   Active (deg) Passive (deg) Pain   Flexion 90       Extension         ABduction         ADduction         External Rotation 90/90         External Rotation Prone         Internal Rotation 90/90         Internal Rotation Prone             Hip ER and IR not assessed as patient is only 1 week post-op.              Hip Strength - Planes of Motion   Right Strength Right Pain Left Strength Left  Pain   Flexion (L2) 4+         Extension           ABduction           ADduction           Internal Rotation 4+         External Rotation 4+             Knee Strength   Right Strength Right Pain Left Strength Left  Pain   Flexion (S2) 5   3+     Prone Flexion           Extension (L3) 5   4+       Knee Extensor Lag  Lag Present: Left       Ankle/Foot Strength - Planes of Motion   Right Strength Right Pain Left Strength Left  Pain   Dorsiflexion (L4) 5   5     Plantar Flexion (S1)           Inversion           Eversion           Great Toe Flexion           Great Toe Extension (L5)           Lesser Toes Flexion           Lesser Toes Extension                     Fall Risk  Functional mobility test results suggest the patient is: At Risk for Falls  Sit to Stand Testing      The patient completed 4 repetitions of a sit to stand transfer in 30 seconds. with UE support and CGA using gait belt          Gait Analysis  Gait Pattern: Antalgic  Walking Speed: Decreased         Left Side Walking Observations  Increased: Swing Time  Decreased: Stance Time and Step Length  Left Foot Contact Pattern: Heel to toe  Trunk Observations During Gait: Forward lean    Pelvis Observations During Gait  Bilateral: Decreased Transverse Plane Rotation  Hip Observations During Gait  Left: Decreased Hip Extension and Decreased Hip Flexion  Gait Analysis Details  Patient ambulates with a supinated foot bilaterally, excessive femoral ER, and decreased L knee extension likely secondary to leg length  discrepancy and reported L knee problems.         Treatment:  Therapeutic Exercise  TE 1: Supine SAQ  TE 2: Supine gluteal sets  TE 3: Supine heel slides  TE 4: Supine hip abduction slide /c sheet  TE 5: Supine bent knee fallouts  TE 6: Supine ankle pumps (with LEs elevated)      Time Entry(in minutes):  PT Evaluation (Low) Time Entry: 36  Therapeutic Exercise Time Entry: 24    Assessment & Plan   Assessment  John presents with a condition of Low complexity.   Presentation of Symptoms: Stable  Will Comorbidities Impact Care: No       Functional Limitations: Activity tolerance, Disrupted sleep pattern, Driving, Pain with ADLs/IADLs, Painful locomotion/ambulation, Sitting tolerance, Bed mobility, Functional mobility, Participating in leisure activities, Squatting, Gait limitations, Standing tolerance, Getting off the floor, Performing household chores, Transfers, Completing self-care activities, Increased risk of fall, Range of motion, Maintaining balance, Decreased ambulation distance/endurance  Impairments: Impaired physical strength, Safety issue, Abnormal gait, Activity intolerance, Lack of appropriate home exercise program, Weight-bearing intolerance, Impaired balance, Pain with functional activity  Personal Factors Affecting Prognosis: Other (Comment)  Other Personal Factors Affecting Prognosis: Impulsivity, and frequent falls    Prognosis: Good  Assessment Details: John referred to outpatient Physical Therapy with a medical diagnosis of Z96.642 (ICD-10-CM) - Status post left hip replacement. Upon evaluation, patient presents with decreased L hip ROM, muscle strength, flexibility, and joint mobility secondary to being 1 week post-op. Patient also presents with slight pain, stiffness, as well as impaired posture, balance, and gait pattern. During the 30-second chair stand assessment, patient was only able to complete 4 repetitions within 30 seconds with CGA and bilateral UE support. Patient lost his balance and  sustained a fall during the 30-second chair stand assessment but was safely lowered to the ground by PT, minimizing impact. Patient did not break any hip precautions (no hip flexion beyond 90 degrees, no hip IR beyond neutral, and no hip adduction beyond neutral), as he fell forward in slow motion with PT quick assist. He did hit his knee, but the impact was low due to PT's assist. Patient demonstrated increased difficulty getting up from the floor due to bilateral UE weakness. However, with PT cueing to use his stronger R UE and assisting through the bilateral axilla, patient was able to get up without violating any hip precautions. Patient's surgical L hip was assessed in open chain and closed chain following the fall. He reported no new pain and was able to WB and perform a 6-minute walk test after. During the 6MWT, patient ambulated ~400 ft with noted bilateral foot supination, excessive femoral ER, and decreased L knee extension, likely secondary to leg length discrepancy and chronic L knee issues. Patient is extremely weak in the L LE and remains a fall risk. PT will focus on addressing the patient's impairments to enhance function and assist in returning the patient to [facility].     Plan  From a physical therapy perspective, the patient would benefit from: Skilled Rehab Services    Planned therapy interventions include: Therapeutic exercise, Therapeutic activities, Neuromuscular re-education, Manual therapy, ADLs/IADLs, Gait training, and Community/work reintegration.    Planned modalities to include: Biofeedback, Cryotherapy (cold pack), Electrical stimulation - passive/unattended, and Thermotherapy (hot pack).        Visit Frequency: 2 times Per Week for 12 Weeks.       This plan was discussed with Patient.   Discussion participants: Agreed Upon Plan of Care             Patient's spiritual, cultural, and educational needs considered and patient agreeable to plan of care and goals.     Education  Education was  done with Patient. The patient's learning style includes Demonstration and Listening. The patient Demonstrates understanding and Verbalizes understanding.         Patient education on nature of current condition and PHYSICAL THERAPY POC. Written HOME EXERCISE PROGRAM provided, reviewed, patient demo understanding.       Goals:   Active       Ambulation/movement       Patient will ambulate at least 3 laps around the therapy gym (600 ft distance) in 6 minutes using least restrictive AD with supervision       Start:  04/25/25    Expected End:  10/30/25            Patient will ambulate with close normal gait pattern with least restrictive AD       Start:  04/25/25    Expected End:  10/30/25               Functional outcome       Patient will show a significant change in FOTO patient-reported outcome tool to demonstrate subjective improvement       Start:  04/25/25    Expected End:  10/30/25            Patient will demonstrate independence in home program for support of progression       Start:  04/25/25    Expected End:  10/30/25               Pain       Patient will report pain of 1/10 demonstrating a reduction of overall pain       Start:  04/25/25    Expected End:  10/30/25               Range of Motion       Patient will achieve left hip flexion  degrees or greater       Start:  04/25/25    Expected End:  10/30/25            Patient will achieve left hip extension ROM 15 degrees or greater       Start:  04/25/25    Expected End:  10/30/25            Patient will achieve left hip internal rotation ROM 30 degrees or greater in 90 degrees hip flexion       Start:  04/25/25    Expected End:  10/30/25            Patient will achieve left hip external rotation ROM 30 degrees or greater in 90 degrees hip flexion       Start:  04/25/25    Expected End:  10/30/25               Strength       Patient will achieve left hip flexion strength of 4+/5       Start:  04/25/25    Expected End:  10/30/25            Patient will  achieve left hip extension strength of 4/5       Start:  04/25/25    Expected End:  10/30/25            Patient will achieve left hip abduction strength of 4/5       Start:  04/25/25    Expected End:  10/30/25            Patient will achieve left hip adduction strength of 4+/5       Start:  04/25/25    Expected End:  10/30/25            Patient will achieve left hip external rotation strength of 4/5 in 90 degrees hip flexion       Start:  04/25/25    Expected End:  10/30/25            Patient will achieve left hip internal rotation strength of 4/5 in 90 degrees hip flexion       Start:  04/25/25    Expected End:  10/30/25            Patient will achieve left knee flexion strength of 5/5       Start:  04/25/25    Expected End:  10/30/25            Patient will achieve left knee extension strength of 5/5       Start:  04/25/25    Expected End:  10/30/25               Transferring       Patient will perform at least 8 reps of sit to stand with supervision and no more than 1 UE support within 30 seconds to demonstrate improved functional activity tolerance and balance.       Start:  04/25/25    Expected End:  10/30/25                Dain Donovan PT, DPT

## 2025-04-25 ENCOUNTER — CLINICAL SUPPORT (OUTPATIENT)
Dept: REHABILITATION | Facility: HOSPITAL | Age: 75
End: 2025-04-25
Payer: COMMERCIAL

## 2025-04-25 DIAGNOSIS — R29.898 WEAKNESS OF BOTH LOWER EXTREMITIES: Primary | ICD-10-CM

## 2025-04-25 DIAGNOSIS — R26.89 BALANCE PROBLEMS: ICD-10-CM

## 2025-04-25 DIAGNOSIS — Z74.09 DECREASED FUNCTIONAL MOBILITY AND ENDURANCE: ICD-10-CM

## 2025-04-25 PROCEDURE — 97110 THERAPEUTIC EXERCISES: CPT | Mod: PN

## 2025-04-25 PROCEDURE — 97116 GAIT TRAINING THERAPY: CPT | Mod: PN

## 2025-04-25 NOTE — PROGRESS NOTES
Outpatient Rehab    Physical Therapy Visit    Patient Name: John Lemos  MRN: [id number]  YOB: 1950  Encounter Date: 4/25/2025    Therapy Diagnosis:   Encounter Diagnoses   Name Primary?    Weakness of both lower extremities Yes    Balance problems     Decreased functional mobility and endurance      Physician: Brenda Llamas NP    Physician Orders: Eval and Treat  Medical Diagnosis: Status post left hip replacement    Visit # / Visits Authorized:  1 / 20  Insurance Authorization Period: 4/25/2025 to 12/31/2025  Date of Evaluation: 4/23/2025  Plan of Care Certification: 4/23/2025 to 10/30/25      PT/PTA:     Number of PTA visits since last PT visit:   Time In: 1000   Time Out: 1115  Total Time: 75   Total Billable Time:  75 minutes     FOTO:  Intake Score:  %  Survey Score 1:  %  Survey Score 2:  %         Subjective   Patient reports that he has been doing really well. He states that he does not have pain, just some soreness after performing his HEP multiple times at home. He states that he has also been walking with his walker at home..  Pain reported as 1/10.      Objective            Treatment:  Therapeutic Exercise  TE 1: Supine SAQ 3x10  TE 2: Supine hooklying hip adduction /c ball 2x10, 5 sec hold  TE 3: Supine heel slides (stops at 90 degree hip flexion) 3x10  TE 4: Supine hip abduction slide /c sliding board and sheet 3x10  TE 5: Supine bent knee fallouts 3x10  TE 6: Seated LAQ 3x10  TE 7: Sit to stand 2x10 (/c CGA using gait belt and TC for L quad activation), 10x /c close supervision  Gait Training  GT 1: Standing lateral weight-shift 3x10  GT 2: Standing anterior weight-shift (L foot foward) 2x10  GT 3: Standing posterior weight-shift (L foot bacward) 2x10  GT 4: Gait on level surface for 400 ft /c RW (gait belt done for safety), close SBA as pt is a fall risk    Time Entry(in minutes):  Gait Training Time Entry: 15  Therapeutic Exercise Time Entry: 60    Assessment & Plan    Assessment: Patient presents to the clinic with very mild symptom irritability pre-session, reporting very mild discomfort walking from the waiting room to the therapy gym, but no pain and no irritability post-session. Patient ambulates with a RW with noted kyphotic posture, a supinated foot bilaterally, excessive femoral ER, and decreased L knee extension likely secondary to leg length discrepancy and reported L knee problems. Patient is aware of his posterior hip precautions, as he was able to verbalize them to PT. Patient demonstrates ability to achieve 90° hip flexion during supine heel slide. However, L hip strength is severely impaired, resulting in increased muscle fatigue during exercises, decreased balance, and decreased activity tolerance. Patient required mod VC, TC, and CGA during sit-to-stand for proper technique to prevent breaking posterior hip precautions and to fully extend his L knee in stance for improved quad activation and balance. Patient demonstrated good carryover after cueing during sit-to-stand and was able to complete 10 reps with close supervision. Patient continues to be a fall risk. Will continue to work on improving patients L hip and quad strength to improve his balance and help him return to his PLOF.  Evaluation/Treatment Tolerance: Patient tolerated treatment well    Patient will continue to benefit from skilled outpatient physical therapy to address the deficits listed in the problem list box on initial evaluation, provide pt/family education and to maximize pt's level of independence in the home and community environment.     Patient's spiritual, cultural, and educational needs considered and patient agreeable to plan of care and goals.           Plan: Continue to work on improving L hip strength and ROM within posterior approach precautions.    Goals:   Active       Ambulation/movement       Patient will ambulate at least 3 laps around the therapy gym (600 ft distance) in 6  minutes using least restrictive AD with supervision       Start:  04/25/25    Expected End:  10/30/25            Patient will ambulate with close normal gait pattern with least restrictive AD       Start:  04/25/25    Expected End:  10/30/25               Functional outcome       Patient will show a significant change in FOTO patient-reported outcome tool to demonstrate subjective improvement       Start:  04/25/25    Expected End:  10/30/25            Patient will demonstrate independence in home program for support of progression       Start:  04/25/25    Expected End:  10/30/25               Pain       Patient will report pain of 1/10 demonstrating a reduction of overall pain       Start:  04/25/25    Expected End:  10/30/25               Range of Motion       Patient will achieve left hip flexion  degrees or greater       Start:  04/25/25    Expected End:  10/30/25            Patient will achieve left hip extension ROM 15 degrees or greater       Start:  04/25/25    Expected End:  10/30/25            Patient will achieve left hip internal rotation ROM 30 degrees or greater in 90 degrees hip flexion       Start:  04/25/25    Expected End:  10/30/25            Patient will achieve left hip external rotation ROM 30 degrees or greater in 90 degrees hip flexion       Start:  04/25/25    Expected End:  10/30/25               Strength       Patient will achieve left hip flexion strength of 4+/5       Start:  04/25/25    Expected End:  10/30/25            Patient will achieve left hip extension strength of 4/5       Start:  04/25/25    Expected End:  10/30/25            Patient will achieve left hip abduction strength of 4/5       Start:  04/25/25    Expected End:  10/30/25            Patient will achieve left hip adduction strength of 4+/5       Start:  04/25/25    Expected End:  10/30/25            Patient will achieve left hip external rotation strength of 4/5 in 90 degrees hip flexion       Start:  04/25/25     Expected End:  10/30/25            Patient will achieve left hip internal rotation strength of 4/5 in 90 degrees hip flexion       Start:  04/25/25    Expected End:  10/30/25            Patient will achieve left knee flexion strength of 5/5       Start:  04/25/25    Expected End:  10/30/25            Patient will achieve left knee extension strength of 5/5       Start:  04/25/25    Expected End:  10/30/25               Transferring       Patient will perform at least 8 reps of sit to stand with supervision and no more than 1 UE support within 30 seconds to demonstrate improved functional activity tolerance and balance.       Start:  04/25/25    Expected End:  10/30/25                Dain Donovan PT, KVNGT

## 2025-04-28 ENCOUNTER — PATIENT MESSAGE (OUTPATIENT)
Dept: NEUROLOGY | Facility: CLINIC | Age: 75
End: 2025-04-28
Payer: COMMERCIAL

## 2025-04-28 ENCOUNTER — HOSPITAL ENCOUNTER (EMERGENCY)
Facility: HOSPITAL | Age: 75
Discharge: HOME | End: 2025-04-28
Attending: EMERGENCY MEDICINE
Payer: COMMERCIAL

## 2025-04-28 VITALS
TEMPERATURE: 98 F | DIASTOLIC BLOOD PRESSURE: 66 MMHG | OXYGEN SATURATION: 98 % | HEART RATE: 86 BPM | RESPIRATION RATE: 16 BRPM | BODY MASS INDEX: 25.01 KG/M2 | SYSTOLIC BLOOD PRESSURE: 137 MMHG | WEIGHT: 165 LBS | HEIGHT: 68 IN

## 2025-04-28 DIAGNOSIS — R20.2 NUMBNESS AND TINGLING IN BOTH HANDS: ICD-10-CM

## 2025-04-28 DIAGNOSIS — R20.0 NUMBNESS AND TINGLING IN BOTH HANDS: ICD-10-CM

## 2025-04-28 DIAGNOSIS — M25.512 ACUTE PAIN OF LEFT SHOULDER: Primary | ICD-10-CM

## 2025-04-28 LAB
GLUCOSE SERPL-MCNC: 92 MG/DL (ref 70–110)
POCT GLUCOSE: 92 MG/DL (ref 70–110)

## 2025-04-28 PROCEDURE — 99284 EMERGENCY DEPT VISIT MOD MDM: CPT | Mod: 25

## 2025-04-28 PROCEDURE — 82962 GLUCOSE BLOOD TEST: CPT

## 2025-04-28 RX ORDER — METHYLPREDNISOLONE 4 MG/1
TABLET ORAL
Qty: 1 EACH | Refills: 0 | Status: SHIPPED | OUTPATIENT
Start: 2025-04-28 | End: 2025-05-19

## 2025-04-28 NOTE — DISCHARGE INSTRUCTIONS
You were seen in the emergency department today for multiple complaints.  Follow up with Neurology.  Please take all medications as prescribed and as we discussed.  Follow-up with specialist if instructed to do so.  It is important to remember that some problems are difficult to diagnose and may not be found during your Emergency Department visit. Be sure to follow up with your primary care doctor and review all labs/imaging/tests that were performed during this visit with them. Some labs/tests may be outside of the normal range and require non-emergent follow-up and further investigation to help diagnose/exclude/prevent complications or other medical conditions. Return to the emergency department for any new or worsening symptoms. Thank you for allowing me to care for you today, it was my pleasure. I hope you get to feeling better soon!

## 2025-04-28 NOTE — ED PROVIDER NOTES
Encounter Date: 4/28/2025       History     Chief Complaint   Patient presents with    Shoulder Pain     Pt presents to the ED today for loss of range of motion to right shoulder. Pt reports having L hip surgery about 2 weeks ago, states his ROM to his shoulder has slowly decreased since. Pt reports his post-up appointment is not until next week and was concerned. Pt also reports numbness to all digits of his upper extremities for > 1 month. PT reports seeing a neurologist for this ongoing problem. Pt denies trauma/injury to extremity.       Patient is a 74-year-old male with a past medical history of hyperlipidemia, diabetes who presents to the emergency department for evaluation of decreased range of motion in his left arm x 2 weeks.  He reports having surgery to his left hip 2 weeks ago, and states range of motion in his arm has slowly decreased over time.  He also reports tingling in both of his hands.  States he has had this for over a month.  He is currently being followed by Neurology.  He denies any falls other traumas.  Denies swelling, redness.  Denies fever, chills, nausea, vomiting.  Denies any issues with speech, facial drooping, weakness. Just states can only move his left arm up so far. No headache, lightheadedness, dizziness, chest pain, shortness on breath.  No further complaints today.    The history is provided by the patient.     Review of patient's allergies indicates:   Allergen Reactions    Sulfa (sulfonamide antibiotics) Swelling     Past Medical History:   Diagnosis Date    Arthritis     Cataract     ou    Diabetes mellitus 7 yrs    lbs: 118 1 week ago    Diabetes mellitus type 2, controlled 9/8/2012    Eye injury     fb    Hyperlipidemia      Past Surgical History:   Procedure Laterality Date    COLONOSCOPY N/A 11/25/2019    Procedure: COLONOSCOPY;  Surgeon: Eddie Leslie MD;  Location: [facility];  Service: Endoscopy;  Laterality: N/A;    TOTAL REPLACEMENT OF HIP JOINT USING  COMPUTER-ASSISTED NAVIGATION Left 4/14/2025    Procedure: ARTHROPLASTY, HIP, TOTAL, USING COMPUTER-ASSISTED NAVIGATION;  Surgeon: Judson Steel MD;  Location: [facility];  Service: Orthopedics;  Laterality: Left;  Yobany SantosKhai Notified- NC---T/S--done  RN PREOP 4/2/2025---H/P--INCOMPLETE---     Family History   Problem Relation Name Age of Onset    Diabetes Mother      Cancer Mother      Hypertension Mother      Diabetes Father      Cancer Father      No Known Problems Sister      No Known Problems Brother      No Known Problems Maternal Aunt      No Known Problems Maternal Uncle      No Known Problems Paternal Aunt      No Known Problems Paternal Uncle      No Known Problems Maternal Grandmother      No Known Problems Maternal Grandfather      No Known Problems Paternal Grandmother      No Known Problems Paternal Grandfather      Amblyopia Neg Hx      Blindness Neg Hx      Cataracts Neg Hx      Glaucoma Neg Hx      Macular degeneration Neg Hx      Retinal detachment Neg Hx      Strabismus Neg Hx      Stroke Neg Hx      Thyroid disease Neg Hx       Social History[1]  Review of Systems   Constitutional:  Negative for chills and fever.   Respiratory:  Negative for shortness of breath.    Cardiovascular:  Negative for chest pain.   Gastrointestinal:  Negative for abdominal pain, diarrhea, nausea and vomiting.   Genitourinary:  Negative for decreased urine volume.   Musculoskeletal:  Positive for arthralgias. Negative for back pain, neck pain and neck stiffness.   Neurological:  Positive for numbness. Negative for dizziness, tremors, syncope, speech difficulty, weakness, light-headedness and headaches.       Physical Exam     Initial Vitals [04/28/25 1336]   BP Pulse Resp Temp SpO2   137/66 86 16 98.3 °F (36.8 °C) 98 %      MAP       --         Physical Exam    Nursing note and vitals reviewed.  Constitutional: He appears well-developed and well-nourished.   HENT:   Head: Normocephalic and  atraumatic.   Right Ear: External ear normal.   Left Ear: External ear normal.   Eyes: Conjunctivae and EOM are normal. Pupils are equal, round, and reactive to light.   Neck: Carotid bruit is not present.   Normal range of motion.  Cardiovascular:  Normal rate, regular rhythm, normal heart sounds and intact distal pulses.     Exam reveals no gallop and no friction rub.       No murmur heard.  Pulmonary/Chest: Breath sounds normal. No respiratory distress. He has no wheezes. He has no rhonchi. He has no rales.   Abdominal: Abdomen is soft. Bowel sounds are normal. He exhibits no distension. There is no abdominal tenderness. There is no rebound and no guarding.   Musculoskeletal:         General: Normal range of motion.      Cervical back: Normal range of motion.      Comments: Patient is only able to abduct left upper extremity to 90°.  I am able to passively rotate the left shoulder without any pain.  No obvious deformity, swelling, color change.  2+ peripheral pulses.  2+ equal and symmetric strength in the upper and lower extremities bilaterally.  Reassuring neurological exam.  Patient with walker since recent surgery.     Neurological: He is alert and oriented to person, place, and time. He has normal strength. No cranial nerve deficit or sensory deficit. GCS score is 15. GCS eye subscore is 4. GCS verbal subscore is 5. GCS motor subscore is 6.   Psychiatric: He has a normal mood and affect.         ED Course   Procedures  Labs Reviewed   POCT GLUCOSE MONITORING CONTINUOUS       Result Value    POC Glucose 92            Imaging Results              US Upper Extremity Veins Left (Final result)  Result time 04/28/25 15:55:50      Final result by Kasi Ozuna MD (04/28/25 15:55:50)                   Impression:      No thrombus in central veins of the left upper extremity.      Electronically signed by: Kasi Oznua MD  Date:    04/28/2025  Time:    15:55               Narrative:    EXAMINATION:  US UPPER  EXTREMITY VEINS LEFT    CLINICAL HISTORY:  left arm pain;    TECHNIQUE:  Duplex and color flow Doppler evaluation and dynamic compression was performed of the left upper extremity veins.    COMPARISON:  None    FINDINGS:  Central veins: The internal jugular, subclavian, and axillary veins are patent and free of thrombus.    Arm veins: The brachial, basilic, and cephalic veins are patent and compressible.    Contralateral subclavian/internal jugular veins: The right subclavian and internal jugular veins are patent and free of thrombus.    Other findings: None.                                       CT Head Without Contrast (Final result)  Result time 04/28/25 15:08:18      Final result by Kasi Ozuna MD (04/28/25 15:08:18)                   Impression:      No acute intracranial process.  Consideration for MRI of the brain, as clinically warranted.      Electronically signed by: Kasi Ozuna MD  Date:    04/28/2025  Time:    15:08               Narrative:    EXAMINATION:  CT HEAD WITHOUT CONTRAST    CLINICAL HISTORY:  tingling in hands;    TECHNIQUE:  Low dose axial images were obtained through the head.  Coronal and sagittal reformations were also performed. Contrast was not administered.    COMPARISON:  None.    FINDINGS:  The subcutaneous tissue unremarkable.  The bony calvarium is intact.  The paranasal sinuses are unremarkable.  The mastoid air cells are clear.  The orbits and intraorbital contents are within normal limits.    The craniocervical junction is intact.  The sellar and parasellar structures are unremarkable.  There are no extra-axial fluid collections.  There is no evidence of intracranial hemorrhage.    The ventricles and sulci are within normal limits.  The cisterns are unremarkable.  The gray-white differentiation is maintained.  There is no dense vessel sign.  There is no evidence of mass effect.                                       Medications - No data to display  Medical Decision  Making  This is an emergent evaluation of a  74-year-old male with a past medical history of hyperlipidemia, diabetes who presents to the emergency department for evaluation of decreased range of motion in his left arm x 2 weeks.  He reports having surgery to his left hip 2 weeks ago, and states range of motion in his arm has slowly decreased over time.  He also reports tingling in both of his hands.  States he has had this for over a month.  He is currently being followed by Neurology.    Physical exam as above.    Differential diagnosis includes but is not limited to muscular strain, DVT, neuropathy, less likely CVA.    Workup initiated with ultrasound, CT scan.  Vital signs, chart, labs, and/or imaging were all reviewed.  See ED course below and interpretations above. My overall impression is chronic numbness and tingling in hands, left shoulder pain. Will discharge home with Medrol Dosepak with Neurology follow-up.  He has established care and symptoms are chronic. Vital signs are reassuring. Patient/Caregiver is stable for discharge at this time.  Patient/Caregiver was informed of results, plan of care, and are comfortable with this.  All questions and concerns were addressed. Discussed strict return precautions with the patient/caregiver. Instructed follow up with primary care provider within 1 week.      Elmer Haines PA-C    DISCLAIMER: This note was prepared with [employer] voice recognition transcription software. Garbled syntax, mangled pronouns, and other bizarre constructions may be attributed to that software system.       Amount and/or Complexity of Data Reviewed  Labs:  Decision-making details documented in ED Course.  Radiology: ordered. Decision-making details documented in ED Course.    Risk  Prescription drug management.               ED Course as of 04/28/25 1605   Mon Apr 28, 2025   1340 BP: 137/66 [TM]   1340 MAP (mmHg): 95 [TM]   1340 Temp: 98.3 °F (36.8 °C) [TM]   1340 Pulse: 86 [TM]   1341  Resp: 16 [TM]   1341 SpO2: 98 % [TM]   1511 CT Head Without Contrast  No acute intracranial process.  Consideration for MRI of the brain, as clinically warranted.      [TM]   1544 POCT glucose  92 [TM]   1558 US Upper Extremity Veins Left    No thrombus in central veins of the left upper extremity.    [TM]   1603 Informed patient of results.  Will discharge home with Medrol Dosepak, and follow up with his neurologist.  Strict return precautions given to patient. [TM]   1603 Patient is currently stable for discharge. I see no indication of an emergent process beyond that addressed during our encounter but have duly counseled the patient/family regarding the need for prompt follow-up as well as the indications that should prompt immediate return to the emergency room should new or worrisome developments occur. I discussed the ED work up and diagnostic findings with the patient/family. The patient/family has been provided with verbal and printed direction regarding our final diagnosis(es) as well as instructions regarding use of OTC and/or Rx medications intended to manage the patient's aforementioned conditions. The patient/family verbalized an understanding. The patient/family is asked if there are any questions or concerns. We discuss the case, until all issues are addressed to the patient/family's satisfaction. Patient/family understands and is agreeable to the plan.    [TM]      ED Course User Index  [TM] Elmer Haines PA-C                           Clinical Impression:  Final diagnoses:  [M25.512] Acute pain of left shoulder (Primary)  [R20.0, R20.2] Numbness and tingling in both hands          ED Disposition Condition    Discharge Stable          ED Prescriptions       Medication Sig Dispense Start Date End Date Auth. Provider    methylPREDNISolone (MEDROL DOSEPACK) 4 mg tablet Follow directions on packaging 1 each 4/28/2025 5/19/2025 Elmer Haines PA-C          Follow-up Information       Follow up With  Specialties Details Why Contact Info    Brenda Llamas NP Neurology   [address]  [facility] [address]  [phone]                 [1]   Social History  Tobacco Use    Smoking status: Never    Smokeless tobacco: Never   Substance Use Topics    Alcohol use: No    Drug use: No        Elmer Haines PARacquelC  04/28/25 0819

## 2025-05-05 ENCOUNTER — PATIENT MESSAGE (OUTPATIENT)
Dept: NEUROLOGY | Facility: CLINIC | Age: 75
End: 2025-05-05
Payer: COMMERCIAL

## 2025-05-06 ENCOUNTER — APPOINTMENT (OUTPATIENT)
Dept: RADIOLOGY | Facility: HOSPITAL | Age: 75
End: 2025-05-06
Attending: ORTHOPAEDIC SURGERY
Payer: COMMERCIAL

## 2025-05-06 ENCOUNTER — OFFICE VISIT (OUTPATIENT)
Dept: ORTHOPEDICS | Facility: CLINIC | Age: 75
End: 2025-05-06
Attending: ORTHOPAEDIC SURGERY
Payer: COMMERCIAL

## 2025-05-06 VITALS — WEIGHT: 164.88 LBS | BODY MASS INDEX: 24.99 KG/M2 | HEIGHT: 68 IN

## 2025-05-06 DIAGNOSIS — Z96.642 STATUS POST LEFT HIP REPLACEMENT: Primary | ICD-10-CM

## 2025-05-06 DIAGNOSIS — Z96.642 STATUS POST LEFT HIP REPLACEMENT: ICD-10-CM

## 2025-05-06 PROCEDURE — 3044F HG A1C LEVEL LT 7.0%: CPT | Mod: CPTII,S$GLB,, | Performed by: ORTHOPAEDIC SURGERY

## 2025-05-06 PROCEDURE — 3072F LOW RISK FOR RETINOPATHY: CPT | Mod: CPTII,S$GLB,, | Performed by: ORTHOPAEDIC SURGERY

## 2025-05-06 PROCEDURE — 73502 X-RAY EXAM HIP UNI 2-3 VIEWS: CPT | Mod: TC,FY,PN,LT

## 2025-05-06 PROCEDURE — 1159F MED LIST DOCD IN RCRD: CPT | Mod: CPTII,S$GLB,, | Performed by: ORTHOPAEDIC SURGERY

## 2025-05-06 PROCEDURE — 3288F FALL RISK ASSESSMENT DOCD: CPT | Mod: CPTII,S$GLB,, | Performed by: ORTHOPAEDIC SURGERY

## 2025-05-06 PROCEDURE — 99024 POSTOP FOLLOW-UP VISIT: CPT | Mod: S$GLB,,, | Performed by: ORTHOPAEDIC SURGERY

## 2025-05-06 PROCEDURE — 73502 X-RAY EXAM HIP UNI 2-3 VIEWS: CPT | Mod: 26,LT,, | Performed by: RADIOLOGY

## 2025-05-06 PROCEDURE — 1126F AMNT PAIN NOTED NONE PRSNT: CPT | Mod: CPTII,S$GLB,, | Performed by: ORTHOPAEDIC SURGERY

## 2025-05-06 PROCEDURE — 99999 PR PBB SHADOW E&M-EST. PATIENT-LVL III: CPT | Mod: PBBFAC,,, | Performed by: ORTHOPAEDIC SURGERY

## 2025-05-06 PROCEDURE — 1101F PT FALLS ASSESS-DOCD LE1/YR: CPT | Mod: CPTII,S$GLB,, | Performed by: ORTHOPAEDIC SURGERY

## 2025-05-06 NOTE — PROGRESS NOTES
"Ortho Hip Follow Up Note    PCP: Chan Estrada MD   Referring Provider: Judson Steel MD  [address]  [place] [address]     Assessment:  74 y.o. male status post left Total Hip Primary completed on 4/14/25.  Patient doing well with regard to his hip. Ambulating well with the use of a walker.  However did present to the emergency room about a week ago for decreased range of motion and numbness and pain in his left arm that is being followed by Neurology.  Otherwise no pain in his hip.  Not taking pain medications.       Plan:  Follow up 3 months   Future Radiographs Indicated at next visit: No    Pain Management: Continue current pain management  Anticoagulation: Continue ASA for total of 4 weeks duration post operatively  Wound Care: Ok to shower. No scrubbing incision. No soaking in pools or tubs for 6 weeks post operative.     Patient Reassurance:   Post-operative course discussed with patient. Patient reassured and supported. All questions answered.    ACTIVE PROBLEM LIST  Problem List[1]      HPI:  John Lemos presents today for a post-op visit.    STATUS POST:  left Total Hip Primary  BMI: Body mass index is 25.07 kg/m².    Post operative recovery was complicated by: None    Patient rates his condition as improving. Pleased with surgical outcome to date.     Functional Assessment:  Started Outpatient PT  Functional Difficulties:  Interferes with sleep  Pain Medication:  Non-Narcotic  Anticoagulation: Aspirin     EXAM: POST OP HIP    left POST-OPERATIVE HIP    Ht 5' 8" (1.727 m)   Wt 74.8 kg (164 lb 14.5 oz)   BMI 25.07 kg/m²     Skin:  Appropriate post op appearance, No evidence of erythema, warmth, discharge, or drainage, and Incision clean/dry/intact  Range of Motion: Pain Free  Neurovascular Status: Sensation intact in Sural, Saphenous, SPN, DPN and Tibial nerve distribution. 5 out of 5 strength in hip flexion, knee flexion/extension, ankle plantarflexion/dorsiflexion. 2+ dorsalis " pedis    Hip Imaging:  Implants are well fixed. There is no evidence of loosening. No dislocation.   Estimated Anteversion: 24  Estimate Abduction: 41  Estimated LLD: 2mm              [1]   Patient Active Problem List  Diagnosis    GERD (gastroesophageal reflux disease)    DDD (degenerative disc disease), lumbar    ED (erectile dysfunction)    Dyslipidemia    Neuropathy    Type 2 diabetes mellitus with diabetic polyneuropathy    Primary osteoarthritis of right knee    Nuclear sclerosis of both eyes    Refractive error    Weakness of both lower extremities    Balance problems    Decreased functional mobility and endurance

## 2025-05-07 ENCOUNTER — OFFICE VISIT (OUTPATIENT)
Dept: NEUROLOGY | Facility: CLINIC | Age: 75
End: 2025-05-07
Payer: COMMERCIAL

## 2025-05-07 VITALS
DIASTOLIC BLOOD PRESSURE: 69 MMHG | HEART RATE: 83 BPM | WEIGHT: 166.88 LBS | BODY MASS INDEX: 25.38 KG/M2 | SYSTOLIC BLOOD PRESSURE: 134 MMHG

## 2025-05-07 DIAGNOSIS — M54.12 CERVICAL RADICULOPATHY: ICD-10-CM

## 2025-05-07 DIAGNOSIS — G56.03 CARPAL TUNNEL SYNDROME ON BOTH SIDES: Primary | ICD-10-CM

## 2025-05-07 DIAGNOSIS — G62.9 POLYNEUROPATHY: ICD-10-CM

## 2025-05-07 DIAGNOSIS — E11.42 TYPE 2 DIABETES MELLITUS WITH DIABETIC POLYNEUROPATHY, WITHOUT LONG-TERM CURRENT USE OF INSULIN: ICD-10-CM

## 2025-05-07 PROCEDURE — 1125F AMNT PAIN NOTED PAIN PRSNT: CPT | Mod: CPTII,S$GLB,,

## 2025-05-07 PROCEDURE — 3044F HG A1C LEVEL LT 7.0%: CPT | Mod: CPTII,S$GLB,,

## 2025-05-07 PROCEDURE — G2211 COMPLEX E/M VISIT ADD ON: HCPCS | Mod: S$GLB,,,

## 2025-05-07 PROCEDURE — 3008F BODY MASS INDEX DOCD: CPT | Mod: CPTII,S$GLB,,

## 2025-05-07 PROCEDURE — 1159F MED LIST DOCD IN RCRD: CPT | Mod: CPTII,S$GLB,,

## 2025-05-07 PROCEDURE — 99999 PR PBB SHADOW E&M-EST. PATIENT-LVL III: CPT | Mod: PBBFAC,,,

## 2025-05-07 PROCEDURE — 3078F DIAST BP <80 MM HG: CPT | Mod: CPTII,S$GLB,,

## 2025-05-07 PROCEDURE — 3075F SYST BP GE 130 - 139MM HG: CPT | Mod: CPTII,S$GLB,,

## 2025-05-07 PROCEDURE — 99215 OFFICE O/P EST HI 40 MIN: CPT | Mod: S$GLB,,,

## 2025-05-07 PROCEDURE — 1101F PT FALLS ASSESS-DOCD LE1/YR: CPT | Mod: CPTII,S$GLB,,

## 2025-05-07 PROCEDURE — 3288F FALL RISK ASSESSMENT DOCD: CPT | Mod: CPTII,S$GLB,,

## 2025-05-07 PROCEDURE — 3072F LOW RISK FOR RETINOPATHY: CPT | Mod: CPTII,S$GLB,,

## 2025-05-07 RX ORDER — PREGABALIN 100 MG/1
100 CAPSULE ORAL 2 TIMES DAILY
Qty: 60 CAPSULE | Refills: 2 | Status: SHIPPED | OUTPATIENT
Start: 2025-05-07 | End: 2025-08-05

## 2025-05-07 NOTE — PROGRESS NOTES
Outpatient Rehab    Physical Therapy Visit    Patient Name: John Lemos  MRN: [id number]  YOB: 1950  Encounter Date: 5/8/2025    Therapy Diagnosis:   No diagnosis found.    Physician: Brenda Llamas NP    Physician Orders: Eval and Treat  Medical Diagnosis: Status post left hip replacement    Visit # / Visits Authorized:  2 / 20  Insurance Authorization Period: 4/25/2025 to 12/31/2025  Date of Evaluation: 4/23/2025  Plan of Care Certification: 4/23/2025 to 10/30/25      PT/PTA: PTA   Number of PTA visits since last PT visit:1  Time In: 0900   Time Out: 1000  Total Time: 60   Total Billable Time:  75 minutes     FOTO:  Intake Score:  %  Survey Score 1:  %  Survey Score 2:  %         Subjective   He's been doing the exercises at home and they've been going well. No pain in the hip at all and he's still paying attention to his precautions.  He uses the cane at home and feels safe, but he uses the walker when he's out and about..         Objective            Treatment:  Therapeutic Exercise  TE 1: Supine SAQ +2# 3x10  TE 2: Supine hooklying hip adduction /c ball +3x10, 5 sec hold  TE 4: Supine hip abduction slide /c sliding board and sheet 3x10  TE 5: Supine bent knee fallouts 3x10  TE 7: Sit to stand +3x10 (/c CGA using gait belt and TC for L quad activation), 10x /c close supervision  Gait Training  GT 1: Standing lateral weight-shift 3x10  GT 2: Standing anterior weight-shift (L foot foward) +3x10  GT 3: Standing posterior weight-shift (L foot bacward) +3x10      Time Entry(in minutes):       Assessment & Plan   Assessment: Mr. Lopez presented to PT today 3 weeks/3 days post op, ambulating with 2WW, reporting compliance with HEP and post op hip precautions.  He continues to display moderate LLE weakness, denoted by visible mm fatigue and fasiculations while performing exercises.  Progressed number of sets to multiple exercises for improved glute and quad mm strength and endurance, which was  challenging for him but he was able to complete. He displayed slight LOB during sit to stands but was able to regain balance with PTA assistance. Provided consistant CGA for most exercises, as Mr. Lopez is a fall risk.  He completed remainder of session without LOB or complaints of left hip pain or discomfort.  Will continue to progress per opst op protocol.       Patient will continue to benefit from skilled outpatient physical therapy to address the deficits listed in the problem list box on initial evaluation, provide pt/family education and to maximize pt's level of independence in the home and community environment.     Patient's spiritual, cultural, and educational needs considered and patient agreeable to plan of care and goals.           Plan:      Goals:         Priya Chen, PTA, DPT

## 2025-05-07 NOTE — PROGRESS NOTES
[facility] VISIT    Chief Complaint and Duration     Chief Complaint   Patient presents with    Follow-up     F/u emg     for pain and paresthesias present 4+ months.    History of Present Illness     John Lemos is a 74 y.o. right handed male with a history of multiple medical diagnoses as listed below that presents for paresthesias and neck pain.     Referral received from primary care    Patient has significant past medical history of lumbar degenerative disease, neuropathy, dyslipidemia, diabetes, OA, GERD    Presents to clinic visit alone    Initial Encounter 2/19/25  Patient reports an acute onset paresthesias presenting 1 month ago in which he sought out care at .  He is also followed up with his primary care provider who place referral to Neurology.  Location of symptoms are bilateral upper extremities to include fingertips right greater than left.  He states since onset progression has become worsening as it is extended to mid palm.  All fingers are involved frequency is constant intensity range 5-6 on 0-10 scale.  Daily paresthesias are characterized as numbness, tingling, abnormal temperature sensation of coldness.  Associated symptoms includes occasional weakness where patient reports he has dropped things.  He denies any provocation or aggravating factors.  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  He has not tried any bracing, maneuvers, pharmacological and non pharmacological treatments.  He denies nighttime symptoms.  Patient does report chronic neck stiffness and tightness with a tingling/numbing sensation right greater than left radiating along the paraspinous muscles.  Has a positive history of diabetes denies vascular insufficiency strokes cardiac surgery, neurologic disease or trauma.  He also mentions he can occasionally have numbness  and tingling to bilateral toes intermittently not daily with mid foot muscle cramping.  Patient has known history of OA affecting his left hip requiring him to have a hip replacement.  He has not been scheduled for such states he has to complete physical therapy treatments first.    Pertinent negatives include no deficits in memory, vision, ambulation, speech, painful cramping, excessive sweating, heat intolerance, early satiety, dizziness/fainting, changes in hair or nails, muscle atrophy or involuntary muscle movements.    Interval history 3/17/2025:    Patient presents to clinic for follow-up of paresthesia episodes after MRI imaging, blood work, EMG pending. At the time of today's visit, the patient denies new or worsening focal neurologic symptoms or any symptoms concerning for a new undocumented episodes/events. Patient is doing well overall with mild improvements.  Symptoms continue bilateral upper extremities fingertips right greater than left.  Patient used diclofenac cream received a mild relief but states it is cumbersome to use.  Constant intensity ranging 5/6 on 0-10 scale.  Paresthesias present with the associated symptoms of occasional weakness.  He denies nighttime symptoms.  Chronic neck stiffness and tightness continues to be present.  Patient is independent in all basic ADLs and IADLs.      Interval history 5/7/2025:    Patient presents to clinic for follow-up of pain, cervical radiculopathy and paresthesias episodes after EMG. At the time of today's visit, the patient denies new or worsening focal neurologic symptoms or any symptoms concerning for new new undocumented episodes/events. Patient is doing well overall with continued symptoms most intense in left upper extremity.  Patient was seen in emergency room for these symptoms since last clinic visit.  EMG performed.  Patient also underwent left total hip replacement 04/14/2025.  He does report moderate relief while on pregabalin trial over 30  days.  He additionally used diclofenac cream.  Symptoms are constant intensity range 8 on 0-10 scale paresthesias are present with associated weakness, endorses nighttime symptoms.  Chronic neck stiffness and tightness continues to be present patient states he is unable to start outpatient therapy for this complaint given he is currently in therapy for lip hip replacement.  He is requiring assistance with driving but is independent with all other basic ADLs and IADLs. Symptoms to bilateral lower extremity are not as frequent.    Review of patient's allergies indicates:   Allergen Reactions    Sulfa (sulfonamide antibiotics) Swelling     Current Medications[1]    Medical History     Past Medical History:   Diagnosis Date    Arthritis     Cataract     ou    Diabetes mellitus 7 yrs    lbs: 118 1 week ago    Diabetes mellitus type 2, controlled 9/8/2012    Eye injury     fb    Hyperlipidemia      Past Surgical History:   Procedure Laterality Date    COLONOSCOPY N/A 11/25/2019    Procedure: COLONOSCOPY;  Surgeon: Eddie Leslie MD;  Location: [facility] END[facility];  Service: Endoscopy;  Laterality: N/A;    TOTAL REPLACEMENT OF HIP JOINT USING COMPUTER-ASSISTED NAVIGATION Left 4/14/2025    Procedure: ARTHROPLASTY, HIP, TOTAL, USING COMPUTER-ASSISTED NAVIGATION;  Surgeon: Judson Steel MD;  Location: [facility] OR;  Service: Orthopedics;  Laterality: Left;  Allen  Yobany Church Notified- NC---T/S--done  RN PREOP 4/2/2025---H/P--INCOMPLETE---     Family History   Problem Relation Name Age of Onset    Diabetes Mother      Cancer Mother      Hypertension Mother      Diabetes Father      Cancer Father      No Known Problems Sister      No Known Problems Brother      No Known Problems Maternal Aunt      No Known Problems Maternal Uncle      No Known Problems Paternal Aunt      No Known Problems Paternal Uncle      No Known Problems Maternal Grandmother      No Known Problems Maternal Grandfather      No Known Problems Paternal  Grandmother      No Known Problems Paternal Grandfather      Amblyopia Neg Hx      Blindness Neg Hx      Cataracts Neg Hx      Glaucoma Neg Hx      Macular degeneration Neg Hx      Retinal detachment Neg Hx      Strabismus Neg Hx      Stroke Neg Hx      Thyroid disease Neg Hx       Social History[2]    Exam     Vitals:    05/07/25 1051   BP: 134/69   Pulse: 83        Physical Exam:  General: Not in acute distress. Not ill-appearing.   HENT: Normocephalic and atraumatic. Moist mucous membranes.  Eyes: Conjunctivae normal.   Pulmonary: Pulmonary effort is normal.   Skin: Skin is warm and dry. No rashes.   Psychiatric: Mood normal.        Neurologic Exam   Mental status: oriented to person, place, and time  Attention: Normal. Concentration: normal.  Speech: speech is normal.  Cranial Nerves: EOMI intact, V1-V3 Facial sensation intact. Symmetric facies. Hearing grossly intact. Palate and uvula midline, symmetric. No tongue deviation. Trapezius strength intact.     Motor exam: +bulk and tone     Reflexes: 2+ in bilateral upper extremities: biceps and brachiaradialis, 2+ in bilateral lower extremities: patellar and achilles  Plantar reflex: normal  Calvin's/Clonus: negative    Sensory exam: pin prick and light touch intact   Gait exam:  Offloading left leg limping gait.  Use of rolling walker  Romberg: negative  Coordination: normal    MSK:  Limited range of motion cervical spine, left and right lateral    Tremor: none  Cogwheel rigidity: none  Phalen's: Negative  Tinel's sign: Negative    Labs and Imaging     Labs: reviewed  No results found for this or any previous visit (from the past 24 hours).    HgA1C%:  6.1  LDL:  59.0    ALESSIA, SPE: WNL  Folate:  11.0  TSH:  WNL  Vit B1:  56  Vit B6:  2*  Vit B12: 338*    Imaging:   I have personally reviewed the images performed.     MRI cervical spine without contrast 03/03/2025  C2-C3 disc bulge shallow left paracentral disc protrusion  C3-C4 disc bulge spinal canal stenosis  mild flattening of neuroforaminal narrowing bilateral neuroforaminal narrowing  C4-C5 disc bulge flattens the thecal sac bilateral neuroforaminal narrowing  C5-C6 disc bulge left paracentral disc protrusion that abuts the cord Bilateral neuroforaminal narrowing   C6-C7 diffuse disc bulge flattens the anterior thecal sac bilateral neuroforaminal narrowing  C7-T1 disc bulge neural foramina patent    Xray Cervical Spine 2/3 View 2/10/25  Cervical spine vertebral body heights appear maintained. modest-mild marginal spurring with maintained disc heights.      Other procedures: reviewed    EMG/NCS 4/11/2025  BUE chronic sensorimotor carpal tunnel syndrome more pronounced on the L  L chronic sensorimotor ulnar neuropathy at the wrist  Sensory polyneuropathy BLE > CABRERA w/primarily slowing of median and ulnar sensory potentials   Impression: Bilateral chronic upper and lower polyradiculopathy     Assessment and Plan     Problem List Items Addressed This Visit          Endocrine    Type 2 diabetes mellitus with diabetic polyneuropathy    Overview   June 2019 A1c 8.9%          Other Visit Diagnoses         Carpal tunnel syndrome on both sides [G56.03]    -  Primary    Relevant Medications    pregabalin (LYRICA) 100 MG capsule    Other Relevant Orders    Ambulatory referral/consult to Hand Surgery      Polyneuropathy [G62.9]        Relevant Medications    pregabalin (LYRICA) 100 MG capsule      Cervical radiculopathy        Relevant Medications    pregabalin (LYRICA) 100 MG capsule          Mr. Lemos is a 74-year-old male known to me who presents to clinic for follow-up of pain, cervical radiculopathy and paresthesias episodes after EMG. At the time of today's visit, the patient denies new or worsening focal neurologic symptoms or any symptoms concerning for new new undocumented episodes/events. Patient is doing well overall with continued symptoms most intense in left upper extremity.  Patient was seen in emergency room for  these symptoms since last clinic visit.  EMG performed.  Patient also underwent left total hip replacement 04/14/2025.  He does report moderate relief while on pregabalin trial over 30 days.  He additionally used diclofenac cream.  Symptoms are constant intensity range 8 on 0-10 scale paresthesias are present with associated weakness, endorses nighttime symptoms.  Chronic neck stiffness and tightness continues to be present patient states he is unable to start outpatient therapy for this complaint given he is currently in therapy for lip hip replacement.  He is requiring assistance with driving but is independent with all other basic ADLs and IADLs. Symptoms to bilateral lower extremity are not as frequent. Symptom negative for autonomic, axonal or demyelinating features. Physical examination patient with limited range of motion cervical spine right and left lateral movement, limping gait painful left hip, ambulating gait use of rolling walker.     EMG/NCS BUE chronic sensorimotor carpal tunnel syndrome more pronounced on the Left with Left chronic sensorimotor ulnar neuropathy at the wrist, Sensory polyneuropathy BLE > CABRERA w/primarily slowing of median and ulnar sensory potentials. Bilateral chronic upper and lower polyradiculopathy.  Discuss these findings with the patient additionally MRI findings from previous visit.  Patient endorse vitamin-B repletion B6 and B12 at this time.  Place ambulatory referral to hand Clinic for further evaluation and management of patient's symptoms.  We will prescribed pregabalin 100 mg b.i.d., as patient was placed on pregabalin 75 mg b.i.d. from ER visit and received moderate relief.  Given nighttime symptoms and new presentation discussed with the patient use of bracing.  We will contact outpatient therapies discuss initiation of previously see ordered physical therapy to run concurrently with current hip therapy.  Explained diagnosis of bilateral CTS in regards to patient's  symptom presentation and management.  Patient to continue Prescribed diclofenac 1% topical cream for neck pain, MSK tightness.  Discussed over the counter pain creams: Biofreeze, Bengay, tiger balm, two old goat, lidocaine gel,  Absorbine Veterinary Liniment Gel Topical Analgesic Sore Muscle and Joint Pain reliever.     Questions and concerns were sought and answered to the patient's stated verbal satisfaction. The patient verbalizes understanding and agreement with the above stated treatment plan.      Thank you for allowing me to assist in Mr. John Lemos 's care. If you have any questions, please contact clinic @ [phone] or use of portal messaging services via electronic medical record.    AMINA Gottlieb  [facility]  Department of Neurology- [facility]     [phone]    Follow-up: Follow up in about 3 months (around 8/7/2025).  After physical therapy and evaluation at hand clinic     Time spent on this encounter: 65 minutes. This includes face to face time and non-face to face time preparing to see the patient (eg, review of tests), obtaining and/or reviewing separately obtained history, documenting clinical information in the electronic or other health record, independently interpreting results and communicating results to the patient/family/caregiver, or care coordinator.     This note was created by combination of typed  and M-Modal dictation. Transcription and phonetic errors may be present.  If there are any questions, please contact me.             [1]   Current Outpatient Medications   Medication Sig Dispense Refill    acetaminophen (TYLENOL) 500 MG tablet Take 2 tablets (1,000 mg total) by mouth every 8 (eight) hours as needed for Pain. 42 tablet 0    aspirin (ECOTRIN) 81 MG EC tablet Take 1 tablet (81 mg total) by mouth 2 (two) times a day. 60 tablet 0    atorvastatin (LIPITOR) 40 MG tablet Take 1 tablet (40 mg total) by mouth once daily. 90 tablet 3     blood sugar diagnostic (FREESTYLE LITE STRIPS) Strp Inject 1 each as directed once daily. 100 each 3    celecoxib (CELEBREX) 200 MG capsule Take 1 capsule (200 mg total) by mouth 2 (two) times daily. 14 capsule 0    docusate sodium (COLACE) 100 MG capsule Take 1 capsule (100 mg total) by mouth 2 (two) times daily. 30 capsule 0    DULoxetine (CYMBALTA) 60 MG capsule Take 1 capsule (60 mg total) by mouth once daily. 90 capsule 3    lancets (FREESTYLE LANCETS) 28 gauge Misc Inject 1 lancet as directed once daily. 100 each 3    losartan (COZAAR) 50 MG tablet Take 1 tablet (50 mg total) by mouth once daily. 90 tablet 1    metFORMIN (GLUCOPHAGE) 500 MG tablet TAKE 2 TABLETS BY MOUTH TWICE DAILY WITH BREAKFAST AND WITH SUPPER 360 tablet 1    methylPREDNISolone (MEDROL DOSEPACK) 4 mg tablet Follow directions on packaging 1 each 0    oxyCODONE (ROXICODONE) 5 MG immediate release tablet Take 1-2 tablets (5-10 mg total) by mouth every 4 (four) hours as needed (pain). 40 tablet 0    tadalafiL (CIALIS) 5 MG tablet Take 1 tablet (5 mg total) by mouth once daily. 30 tablet 11    TRULICITY 0.75 mg/0.5 mL pen injector INJECT 0.75 MG INTO THE SKIN EVERY 7 DAYS 12 pen 1    pregabalin (LYRICA) 100 MG capsule Take 1 capsule (100 mg total) by mouth 2 (two) times daily. 60 capsule 2     No current facility-administered medications for this visit.   [2]   Social History  Socioeconomic History    Marital status:    Occupational History    Occupation: teacher - middle school - [employer]     Employer: [employer]   Tobacco Use    Smoking status: Never    Smokeless tobacco: Never   Substance and Sexual Activity    Alcohol use: No    Drug use: No    Sexual activity: Not Currently     Social Drivers of Health     Financial Resource Strain: Low Risk  (4/14/2025)    Overall Financial Resource Strain (CARDIA)     Difficulty of Paying Living Expenses: Not very hard   Food Insecurity: No Food Insecurity  (4/14/2025)    Hunger Vital Sign     Worried About Running Out of Food in the Last Year: Never true     Ran Out of Food in the Last Year: Never true   Transportation Needs: No Transportation Needs (4/14/2025)    PRAPARE - Transportation     Lack of Transportation (Medical): No     Lack of Transportation (Non-Medical): No   Physical Activity: Insufficiently Active (3/17/2025)    Exercise Vital Sign     Days of Exercise per Week: 2 days     Minutes of Exercise per Session: 20 min   Stress: No Stress Concern Present (4/14/2025)    [employer] [place] - Occupational Stress Questionnaire     Feeling of Stress : Not at all   Housing Stability: Low Risk  (4/14/2025)    Housing Stability Vital Sign     Unable to Pay for Housing in the Last Year: No     Homeless in the Last Year: No

## 2025-05-08 ENCOUNTER — CLINICAL SUPPORT (OUTPATIENT)
Dept: REHABILITATION | Facility: HOSPITAL | Age: 75
End: 2025-05-08
Payer: COMMERCIAL

## 2025-05-08 DIAGNOSIS — Z96.642 STATUS POST LEFT HIP REPLACEMENT: Primary | ICD-10-CM

## 2025-05-08 PROCEDURE — 97110 THERAPEUTIC EXERCISES: CPT | Mod: PN,CQ

## 2025-05-12 ENCOUNTER — PATIENT MESSAGE (OUTPATIENT)
Dept: NEUROLOGY | Facility: CLINIC | Age: 75
End: 2025-05-12
Payer: COMMERCIAL

## 2025-05-13 ENCOUNTER — CLINICAL SUPPORT (OUTPATIENT)
Dept: REHABILITATION | Facility: HOSPITAL | Age: 75
End: 2025-05-13
Payer: COMMERCIAL

## 2025-05-13 DIAGNOSIS — Z74.09 DECREASED FUNCTIONAL MOBILITY AND ENDURANCE: ICD-10-CM

## 2025-05-13 DIAGNOSIS — R26.89 BALANCE PROBLEMS: ICD-10-CM

## 2025-05-13 DIAGNOSIS — R29.898 WEAKNESS OF BOTH LOWER EXTREMITIES: Primary | ICD-10-CM

## 2025-05-13 PROCEDURE — 97110 THERAPEUTIC EXERCISES: CPT | Mod: PN

## 2025-05-13 PROCEDURE — 97116 GAIT TRAINING THERAPY: CPT | Mod: PN

## 2025-05-13 NOTE — PROGRESS NOTES
Outpatient Rehab    Physical Therapy Visit    Patient Name: John Lemos  MRN: [id number]  YOB: 1950  Encounter Date: 5/13/2025    Therapy Diagnosis:   Encounter Diagnoses   Name Primary?    Weakness of both lower extremities Yes    Balance problems     Decreased functional mobility and endurance      Physician: Brenda Llamas NP    Physician Orders: Eval and Treat  Medical Diagnosis: Status post left hip replacement    Visit # / Visits Authorized:  3 / 20  Insurance Authorization Period: 4/25/2025 to 12/31/2025  Date of Evaluation: 4/23/2025  Plan of Care Certification:       PT/PTA:     Number of PTA visits since last PT visit:   Time In: 1000   Time Out: 1100  Total Time (in minutes): 60   Total Billable Time (in minutes):  60 minutes     FOTO:  Intake Score:  %  Survey Score 2:  %  Survey Score 3:  %    Precautions:       Subjective   Patient reports that he has not been having any pain. He states that his saw his surgeon last week and was told everything is looking good. He states that his surgeon told him he needs to hurry and get rid of the RW but his balnce is not good yet..  Pain reported as 0/10.      Objective            Treatment:  Therapeutic Exercise  TE 1: Supine SAQ +3# 3x10  TE 2: Supine hooklying hip adduction /c ball +3x10, 5 sec hold  TE 3: Seated LAQ 3 lbs, 3x10  TE 4: Supine hip abduction slide /c sliding board and sheet 3x10  TE 5: Supine bent knee fallouts /c YTB 3x10  TE 6: +R SL clamshells (no resistance) 3x10  TE 7: Sit to stand from 20 in hi-low or box +3x10 (/c CGA using gait belt and TC for L quad activation), 10x /c close supervision  TE 8: Stanidng heel raises 3x10 (UE support on hi-low)  TE 9: standing hip abduction (UE support on hi-low) 3x10  Gait Training  GT 4: Gait on level surface for 400 ft /c SBQC (gait belt done for safety), CGA 10 minutes (mod ceuing for heel-toe, step-through pattern)    Time Entry(in minutes):  Gait Training Time Entry: 15  Therapeutic  Exercise Time Entry: 45    Assessment & Plan   Assessment: Mr. Lopez presented to PT today 4 weeks/1 day post op, ambulating with a RW, reporting compliance with HEP and post op hip precautions.  He continues to display moderate LLE weakness, denoted by visible mm fatigue while performing exercises. Added SL clamshells today which pt demo decreased ROM likely secondary to decreased hip abductor strength. Introduced gait training with SBQC. Patient required CGA using gait belt and mod ceuing for shaggy-toe and step through pattern. He was able to acrry over gait pattern with intermittent ceuing. HE did seems unstable during gait but was safe with CGA. Will continue to progress per opst op protocol.  Evaluation/Treatment Tolerance: Patient tolerated treatment well    Patient will continue to benefit from skilled outpatient physical therapy to address the deficits listed in the problem list box on initial evaluation, provide pt/family education and to maximize pt's level of independence in the home and community environment.     Patient's spiritual, cultural, and educational needs considered and patient agreeable to plan of care and goals.           Plan: Continue to work on improving L hip strength and ROM within posterior approach precautions, and balance.    Goals:   Active       Ambulation/movement       Patient will ambulate at least 3 laps around the therapy gym (600 ft distance) in 6 minutes using least restrictive AD with supervision       Start:  04/25/25    Expected End:  10/30/25            Patient will ambulate with close normal gait pattern with least restrictive AD       Start:  04/25/25    Expected End:  10/30/25               Functional outcome       Patient will show a significant change in FOTO patient-reported outcome tool to demonstrate subjective improvement       Start:  04/25/25    Expected End:  10/30/25            Patient will demonstrate independence in home program for support of progression        Start:  04/25/25    Expected End:  10/30/25               Pain       Patient will report pain of 1/10 demonstrating a reduction of overall pain       Start:  04/25/25    Expected End:  10/30/25               Range of Motion       Patient will achieve left hip flexion  degrees or greater       Start:  04/25/25    Expected End:  10/30/25            Patient will achieve left hip extension ROM 15 degrees or greater       Start:  04/25/25    Expected End:  10/30/25            Patient will achieve left hip internal rotation ROM 30 degrees or greater in 90 degrees hip flexion       Start:  04/25/25    Expected End:  10/30/25            Patient will achieve left hip external rotation ROM 30 degrees or greater in 90 degrees hip flexion       Start:  04/25/25    Expected End:  10/30/25               Strength       Patient will achieve left hip flexion strength of 4+/5       Start:  04/25/25    Expected End:  10/30/25            Patient will achieve left hip extension strength of 4/5       Start:  04/25/25    Expected End:  10/30/25            Patient will achieve left hip abduction strength of 4/5       Start:  04/25/25    Expected End:  10/30/25            Patient will achieve left hip adduction strength of 4+/5       Start:  04/25/25    Expected End:  10/30/25            Patient will achieve left hip external rotation strength of 4/5 in 90 degrees hip flexion       Start:  04/25/25    Expected End:  10/30/25            Patient will achieve left hip internal rotation strength of 4/5 in 90 degrees hip flexion       Start:  04/25/25    Expected End:  10/30/25            Patient will achieve left knee flexion strength of 5/5       Start:  04/25/25    Expected End:  10/30/25            Patient will achieve left knee extension strength of 5/5       Start:  04/25/25    Expected End:  10/30/25               Transferring       Patient will perform at least 8 reps of sit to stand with supervision and no more than 1 UE  support within 30 seconds to demonstrate improved functional activity tolerance and balance.       Start:  04/25/25    Expected End:  10/30/25                Dain Donovan PT, DPT

## 2025-05-14 ENCOUNTER — PATIENT MESSAGE (OUTPATIENT)
Dept: ORTHOPEDICS | Facility: CLINIC | Age: 75
End: 2025-05-14
Payer: COMMERCIAL

## 2025-05-14 DIAGNOSIS — G56.03 CARPAL TUNNEL SYNDROME ON BOTH SIDES: Primary | ICD-10-CM

## 2025-05-14 NOTE — PROGRESS NOTES
Outpatient Rehab    Physical Therapy Visit    Patient Name: John Lemos  MRN: [id number]  YOB: 1950  Encounter Date: 5/15/2025    Therapy Diagnosis:   Encounter Diagnoses   Name Primary?    Weakness of both lower extremities Yes    Balance problems     Decreased functional mobility and endurance        Physician: Brenda Llamas NP    Physician Orders: Eval and Treat  Medical Diagnosis: Status post left hip replacement    Visit # / Visits Authorized:  4 / 20  Insurance Authorization Period: 4/25/2025 to 12/31/2025  Date of Evaluation: 4/23/2025  Plan of Care Certification:       PT/PTA: PTA   Number of PTA visits since last PT visit:1  Time In: 0900   Time Out: 1000  Total Time (in minutes): 60   Total Billable Time (in minutes):  60 minutes     FOTO:  Intake Score:  %  Survey Score 2:  %  Survey Score 3:  %    Precautions:       Subjective   He feels like he's getting stronger overall and the balance is a little bit better..         Objective            Treatment:  Therapeutic Exercise  TE 1: Supine SAQ 3# 3x10  TE 2: Supine hooklying hip adduction /c ball 3x10, 5 sec hold  TE 5: Supine bent knee fallouts /c RTB 3x10  TE 6: R SL clamshells +YTB  (no resistance) 3x10  TE 7: Sit to stand from 20 in hi-low or box 3x10 (/c CGA using gait belt and TC for L quad activation), 10x /c close supervision  TE 8: Stanidng heel raises 3x10 (UE support on hi-low)      Time Entry(in minutes):  Therapeutic Exercise Time Entry: 54    Assessment & Plan   Assessment: Mr. Lopez presented to PT today 4 weeks/3 days post op, reporting overall improvements with left hip mm strenght and slight improvements with balance.  Introduced resistance to SL clamshells, which Mr. Lopez was able to tolerate and perform with proper form and mm activation.  He displayed slight imrpovements with balance in standing but continued deficits with ambulation, requiring CGA with gait belt.  He completed today's session without LOB or left  hip pain, but reporting overall mm fatigue.       Patient will continue to benefit from skilled outpatient physical therapy to address the deficits listed in the problem list box on initial evaluation, provide pt/family education and to maximize pt's level of independence in the home and community environment.     Patient's spiritual, cultural, and educational needs considered and patient agreeable to plan of care and goals.           Plan: Continue to work on improving L hip strength and ROM within posterior approach precautions, and balance.    Goals:   Active       Ambulation/movement       Patient will ambulate at least 3 laps around the therapy gym (600 ft distance) in 6 minutes using least restrictive AD with supervision       Start:  04/25/25    Expected End:  10/30/25            Patient will ambulate with close normal gait pattern with least restrictive AD       Start:  04/25/25    Expected End:  10/30/25               Functional outcome       Patient will show a significant change in FOTO patient-reported outcome tool to demonstrate subjective improvement       Start:  04/25/25    Expected End:  10/30/25            Patient will demonstrate independence in home program for support of progression       Start:  04/25/25    Expected End:  10/30/25               Pain       Patient will report pain of 1/10 demonstrating a reduction of overall pain       Start:  04/25/25    Expected End:  10/30/25               Range of Motion       Patient will achieve left hip flexion  degrees or greater       Start:  04/25/25    Expected End:  10/30/25            Patient will achieve left hip extension ROM 15 degrees or greater       Start:  04/25/25    Expected End:  10/30/25            Patient will achieve left hip internal rotation ROM 30 degrees or greater in 90 degrees hip flexion       Start:  04/25/25    Expected End:  10/30/25            Patient will achieve left hip external rotation ROM 30 degrees or greater in  90 degrees hip flexion       Start:  04/25/25    Expected End:  10/30/25               Strength       Patient will achieve left hip flexion strength of 4+/5       Start:  04/25/25    Expected End:  10/30/25            Patient will achieve left hip extension strength of 4/5       Start:  04/25/25    Expected End:  10/30/25            Patient will achieve left hip abduction strength of 4/5       Start:  04/25/25    Expected End:  10/30/25            Patient will achieve left hip adduction strength of 4+/5       Start:  04/25/25    Expected End:  10/30/25            Patient will achieve left hip external rotation strength of 4/5 in 90 degrees hip flexion       Start:  04/25/25    Expected End:  10/30/25            Patient will achieve left hip internal rotation strength of 4/5 in 90 degrees hip flexion       Start:  04/25/25    Expected End:  10/30/25            Patient will achieve left knee flexion strength of 5/5       Start:  04/25/25    Expected End:  10/30/25            Patient will achieve left knee extension strength of 5/5       Start:  04/25/25    Expected End:  10/30/25               Transferring       Patient will perform at least 8 reps of sit to stand with supervision and no more than 1 UE support within 30 seconds to demonstrate improved functional activity tolerance and balance.       Start:  04/25/25    Expected End:  10/30/25                  Priya Chen PTA, DPT

## 2025-05-15 ENCOUNTER — TELEPHONE (OUTPATIENT)
Dept: PHARMACY | Facility: CLINIC | Age: 75
End: 2025-05-15
Payer: COMMERCIAL

## 2025-05-15 ENCOUNTER — CLINICAL SUPPORT (OUTPATIENT)
Dept: REHABILITATION | Facility: HOSPITAL | Age: 75
End: 2025-05-15
Payer: COMMERCIAL

## 2025-05-15 DIAGNOSIS — R26.89 BALANCE PROBLEMS: ICD-10-CM

## 2025-05-15 DIAGNOSIS — R29.898 WEAKNESS OF BOTH LOWER EXTREMITIES: Primary | ICD-10-CM

## 2025-05-15 DIAGNOSIS — Z74.09 DECREASED FUNCTIONAL MOBILITY AND ENDURANCE: ICD-10-CM

## 2025-05-15 PROCEDURE — 97110 THERAPEUTIC EXERCISES: CPT | Mod: PN,CQ

## 2025-05-15 NOTE — TELEPHONE ENCOUNTER
[facility]/Population Health Chart Review & Patient Outreach Details For Medication Adherence Project    Reason for Outreach Encounter: 3rd Party payor non-compliance report ([employer], [employer], [facility], etc)  2.  Patient Outreach Method: [email or web address] message  3.   Medication in question: losartan    LAST FILLED: 12/26/25 for 90 day supply  Hypertension Medications              losartan (COZAAR) 50 MG tablet Take 1 tablet (50 mg total) by mouth once daily.              4.  Reviewed and or Updates Made To: Patient Chart  5. Outreach Outcomes and/or actions taken: Sent inquiry to patient: Waiting for response.

## 2025-05-20 ENCOUNTER — CLINICAL SUPPORT (OUTPATIENT)
Dept: REHABILITATION | Facility: HOSPITAL | Age: 75
End: 2025-05-20
Payer: COMMERCIAL

## 2025-05-20 DIAGNOSIS — Z96.642 STATUS POST LEFT HIP REPLACEMENT: Primary | ICD-10-CM

## 2025-05-20 PROCEDURE — 97110 THERAPEUTIC EXERCISES: CPT | Mod: PN,CQ

## 2025-05-20 NOTE — PROGRESS NOTES
"  Outpatient Rehab    Physical Therapy Visit    Patient Name: John Lemos  MRN: [id number]  YOB: 1950  Encounter Date: 5/20/2025    Therapy Diagnosis:   No diagnosis found.      Physician: Brenda Llamas NP    Physician Orders: Eval and Treat  Medical Diagnosis: Status post left hip replacement    Visit # / Visits Authorized:  5 / 20  Insurance Authorization Period: 4/25/2025 to 12/31/2025  Date of Evaluation:   Plan of Care Certification:       PT/PTA: PTA   Number of PTA visits since last PT visit:2  Time In: 1000   Time Out: 1100  Total Time (in minutes): 60   Total Billable Time (in minutes): 3060 minutes     FOTO:  Intake Score:  %  Survey Score 2:  %  Survey Score 3:  %    Precautions:       Subjective   He's definitly getting stronger.  He still doesn't have great balance but it has gotten better..         Objective            Treatment:  Therapeutic Exercise  TE 1: Supine SAQ 3# 3x10  TE 2: Supine hooklying hip adduction /c ball 3x10, 5 sec hold  TE 3: Seated LAQ 3 lbs, 3x10  TE 5: Supine bent knee fallouts /c +GTB 3x10  TE 6: R SL clamshells YTB  (no resistance) 3x10  TE 7: Sit to stand from 20 in hi-low or box 3x10 (/c CGA using gait belt and TC for L quad activation), 10x /c close supervision  Balance/Neuromuscular Re-Education  NMR 1: Staggered stance 2 rounds, 30" hold  NMR 2: Standing on foam, feet together, 3 rounds, 30" hold      Time Entry(in minutes):       Assessment & Plan   Assessment: Mr. Lopez presented to PT today 5 weeks/1 day post op, ambulating with 2WW, reporting continued left hip mm strength and slight improvements with balance. Progressed resistance to BKFO for improved glute mm strenght, which was challenging for him but he was able to complete. No other progressions today, as Mr. Lopez was challenged with current level of difficulty and still within hip precaution timeline. Introduced additional balance exercises for improved safety, which was challenging for Mr. " John but he was able to complete without  LOB. All standing exercises were performed with gait belt and CGA for safety.  Will continue to progress as tolerated.       Patient will continue to benefit from skilled outpatient physical therapy to address the deficits listed in the problem list box on initial evaluation, provide pt/family education and to maximize pt's level of independence in the home and community environment.     Patient's spiritual, cultural, and educational needs considered and patient agreeable to plan of care and goals.           Plan: Continue to work on improving L hip strength and ROM within posterior approach precautions, and balance.    Goals:           Priya Chen, PTA

## 2025-05-22 ENCOUNTER — CLINICAL SUPPORT (OUTPATIENT)
Dept: REHABILITATION | Facility: HOSPITAL | Age: 75
End: 2025-05-22
Payer: COMMERCIAL

## 2025-05-22 DIAGNOSIS — R29.898 WEAKNESS OF BOTH LOWER EXTREMITIES: Primary | ICD-10-CM

## 2025-05-22 DIAGNOSIS — R26.89 BALANCE PROBLEMS: ICD-10-CM

## 2025-05-22 DIAGNOSIS — Z74.09 DECREASED FUNCTIONAL MOBILITY AND ENDURANCE: ICD-10-CM

## 2025-05-22 PROCEDURE — 97112 NEUROMUSCULAR REEDUCATION: CPT | Mod: PN

## 2025-05-22 PROCEDURE — 97110 THERAPEUTIC EXERCISES: CPT | Mod: PN

## 2025-05-23 ENCOUNTER — PATIENT MESSAGE (OUTPATIENT)
Dept: ORTHOPEDICS | Facility: CLINIC | Age: 75
End: 2025-05-23
Payer: COMMERCIAL

## 2025-05-23 DIAGNOSIS — G56.03 CARPAL TUNNEL SYNDROME ON BOTH SIDES: ICD-10-CM

## 2025-05-23 DIAGNOSIS — M54.12 CERVICAL RADICULOPATHY: ICD-10-CM

## 2025-05-23 DIAGNOSIS — G62.9 POLYNEUROPATHY: ICD-10-CM

## 2025-05-23 DIAGNOSIS — G56.03 CARPAL TUNNEL SYNDROME ON BOTH SIDES: Primary | ICD-10-CM

## 2025-05-23 RX ORDER — PREGABALIN 100 MG/1
100 CAPSULE ORAL 2 TIMES DAILY
Qty: 60 CAPSULE | Refills: 2 | OUTPATIENT
Start: 2025-05-23 | End: 2025-08-21

## 2025-05-23 RX ORDER — DICLOFENAC SODIUM 75 MG/1
75 TABLET, DELAYED RELEASE ORAL 2 TIMES DAILY
Qty: 60 TABLET | Refills: 0 | Status: SHIPPED | OUTPATIENT
Start: 2025-05-23

## 2025-05-23 NOTE — TELEPHONE ENCOUNTER
Refills have been requested for the following medications:         pregabalin (LYRICA) 100 MG capsule [Brenda Llamas NP]     Preferred pharmacy: [address] [place] [id number] [facility]  Delivery method: Pickup

## 2025-05-26 ENCOUNTER — DOCUMENTATION ONLY (OUTPATIENT)
Dept: REHABILITATION | Facility: HOSPITAL | Age: 75
End: 2025-05-26
Payer: COMMERCIAL

## 2025-05-26 NOTE — PROGRESS NOTES
[facility]                           Canceled Therapy Appointment     John Lemos  MRN: [id number]    Patient canceled today's therapy appt on 5/26/2025 due to transportation issues.    Madisyn Herring, PTA  5/26/2025

## 2025-05-29 ENCOUNTER — CLINICAL SUPPORT (OUTPATIENT)
Dept: REHABILITATION | Facility: HOSPITAL | Age: 75
End: 2025-05-29
Payer: COMMERCIAL

## 2025-05-29 DIAGNOSIS — R26.89 BALANCE PROBLEMS: ICD-10-CM

## 2025-05-29 DIAGNOSIS — R29.898 WEAKNESS OF BOTH LOWER EXTREMITIES: Primary | ICD-10-CM

## 2025-05-29 DIAGNOSIS — Z74.09 DECREASED FUNCTIONAL MOBILITY AND ENDURANCE: ICD-10-CM

## 2025-05-29 PROCEDURE — 97112 NEUROMUSCULAR REEDUCATION: CPT | Mod: PN

## 2025-05-29 NOTE — PROGRESS NOTES
"  Outpatient Rehab    Physical Therapy Visit    Patient Name: John Lemos  MRN: [id number]  YOB: 1950  Encounter Date: 5/29/2025    Therapy Diagnosis:   Encounter Diagnoses   Name Primary?    Weakness of both lower extremities Yes    Balance problems     Decreased functional mobility and endurance      Physician: Brenda Llamas NP    Physician Orders: Eval and Treat  Medical Diagnosis: Status post left hip replacement  Surgical Diagnosis: Not applicable for this Episode   Surgical Date: Not applicable for this Episode  Days Since Last Surgery: Not applicable for this Episode    Visit # / Visits Authorized:  16 / 20  Insurance Authorization Period: 4/25/2025 to 12/31/2025  Date of Evaluation: 4/23/2025  Plan of Care Certification: 4/25/2025 to 10/30/2025      PT/PTA:     Number of PTA visits since last PT visit:   Time In: 1000   Time Out: 1100  Total Time (in minutes): 60   Total Billable Time (in minutes):      FOTO:  Intake Score:  %  Survey Score 2:  %  Survey Score 3:  %    Precautions:       Subjective   Patient reports feeling ok, no hip pain. But his balance continues to be off..  Pain reported as 0/10.      Objective            Treatment:  Therapeutic Exercise  TE 7: Sit to stand from 18 in hi-low or box 3x10 (/c CGA using gait belt)  TE 8: Standing heel raises 3x10 (UE support on hi-low)  TE 9: Supine hamstring stretch /c strap 3x30 sec ea (pt was able to hold/pull his leg up without assistance)  TE 10: +Nustep 10 minutes  Balance/Neuromuscular Re-Education  NMR 1: Ball Squeeze 3x10x3"  NMR 2: Bridges RTB 3x10  NMR 3: SL Clamshell RTB 3x10x3"  NMR 5: Seated marches 4 lbs on R LE, 2 lbs on L LE 3x10 ea  NMR 6: Seated LAQ 4 lbs, 3x10x3"  NMR 7: Standing hip abduction /c YTB 3x10 (noted L knee netta at times, CGA/Min Assist for recovery and for balance support) (Pt performed in   today)  NMR 8: Standing hip extension /c YTB 2x10  NMR 9: Standing hip Flex /c YTB 3x10 (Pt performed in " Supine today)    Time Entry(in minutes):  Neuromuscular Re-Education Time Entry: 40  Therapeutic Exercise Time Entry: 20    Assessment & Plan   Assessment: Pt presents ambulating with a FWW, flexed posture, decreased stance on left LE. No new progressions made today. Cueing for proper form and muscle activation. No c/o increased discomfort with prescribed activities. He continues to demo decreased hip strength and balance. But slowly improving with PT services. Will progress pt as tolerated.  Evaluation/Treatment Tolerance: Patient tolerated treatment well    The patient will continue to benefit from skilled outpatient physical therapy in order to address the deficits listed in the problem list on the initial evaluation, provide patient and family education, and maximize the patients level of independence in the home and community environments.     The patient's spiritual, cultural, and educational needs were considered, and the patient is agreeable to the plan of care and goals.           Plan: Will continue per PT plan of care.    Goals:   Active       Ambulation/movement       Patient will ambulate at least 3 laps around the therapy gym (600 ft distance) in 6 minutes using least restrictive AD with supervision       Start:  04/25/25    Expected End:  10/30/25            Patient will ambulate with close normal gait pattern with least restrictive AD       Start:  04/25/25    Expected End:  10/30/25               Functional outcome       Patient will show a significant change in FOTO patient-reported outcome tool to demonstrate subjective improvement       Start:  04/25/25    Expected End:  10/30/25            Patient will demonstrate independence in home program for support of progression (Progressing)       Start:  04/25/25    Expected End:  10/30/25               Range of Motion       Patient will achieve left hip flexion  degrees or greater (Ongoing)       Start:  04/25/25    Expected End:  10/30/25             Patient will achieve left hip extension ROM 15 degrees or greater (Ongoing)       Start:  04/25/25    Expected End:  10/30/25            Patient will achieve left hip internal rotation ROM 30 degrees or greater in 90 degrees hip flexion (Ongoing)       Start:  04/25/25    Expected End:  10/30/25            Patient will achieve left hip external rotation ROM 30 degrees or greater in 90 degrees hip flexion (Ongoing)       Start:  04/25/25    Expected End:  10/30/25               Strength       Patient will achieve left hip flexion strength of 4+/5 (Ongoing)       Start:  04/25/25    Expected End:  10/30/25            Patient will achieve left hip extension strength of 4/5 (Ongoing)       Start:  04/25/25    Expected End:  10/30/25            Patient will achieve left hip abduction strength of 4/5 (Ongoing)       Start:  04/25/25    Expected End:  10/30/25            Patient will achieve left hip adduction strength of 4+/5       Start:  04/25/25    Expected End:  10/30/25            Patient will achieve left hip external rotation strength of 4/5 in 90 degrees hip flexion       Start:  04/25/25    Expected End:  10/30/25            Patient will achieve left hip internal rotation strength of 4/5 in 90 degrees hip flexion       Start:  04/25/25    Expected End:  10/30/25            Patient will achieve left knee flexion strength of 5/5       Start:  04/25/25    Expected End:  10/30/25            Patient will achieve left knee extension strength of 5/5       Start:  04/25/25    Expected End:  10/30/25               Transferring       Patient will perform at least 8 reps of sit to stand with supervision and no more than 1 UE support within 30 seconds to demonstrate improved functional activity tolerance and balance. (Ongoing)       Start:  04/25/25    Expected End:  10/30/25              Resolved       Pain       Patient will report pain of 1/10 demonstrating a reduction of overall pain (Met)       Start:  04/25/25     Expected End:  10/30/25    Resolved:  06/18/25             Dain Donovan PT

## 2025-05-30 ENCOUNTER — TELEPHONE (OUTPATIENT)
Dept: ORTHOPEDICS | Facility: CLINIC | Age: 75
End: 2025-05-30
Payer: COMMERCIAL

## 2025-05-30 NOTE — TELEPHONE ENCOUNTER
Spoke c pt. Confirmed appt time and location with Dr. Burton. Patient expressed understanding & was thankful.

## 2025-06-03 ENCOUNTER — CLINICAL SUPPORT (OUTPATIENT)
Dept: REHABILITATION | Facility: HOSPITAL | Age: 75
End: 2025-06-03
Payer: COMMERCIAL

## 2025-06-03 ENCOUNTER — OFFICE VISIT (OUTPATIENT)
Dept: ORTHOPEDICS | Facility: CLINIC | Age: 75
End: 2025-06-03
Payer: COMMERCIAL

## 2025-06-03 ENCOUNTER — HOSPITAL ENCOUNTER (OUTPATIENT)
Dept: RADIOLOGY | Facility: OTHER | Age: 75
Discharge: HOME | End: 2025-06-03
Attending: ORTHOPAEDIC SURGERY
Payer: COMMERCIAL

## 2025-06-03 DIAGNOSIS — R26.89 BALANCE PROBLEMS: ICD-10-CM

## 2025-06-03 DIAGNOSIS — G95.9 CERVICAL MYELOPATHY: Primary | ICD-10-CM

## 2025-06-03 DIAGNOSIS — R29.898 WEAKNESS OF BOTH LOWER EXTREMITIES: Primary | ICD-10-CM

## 2025-06-03 DIAGNOSIS — G56.03 CARPAL TUNNEL SYNDROME ON BOTH SIDES: ICD-10-CM

## 2025-06-03 DIAGNOSIS — Z74.09 DECREASED FUNCTIONAL MOBILITY AND ENDURANCE: ICD-10-CM

## 2025-06-03 PROCEDURE — 73130 X-RAY EXAM OF HAND: CPT | Mod: TC,50,FY

## 2025-06-03 PROCEDURE — 97110 THERAPEUTIC EXERCISES: CPT | Mod: PN

## 2025-06-03 PROCEDURE — 1101F PT FALLS ASSESS-DOCD LE1/YR: CPT | Mod: CPTII,S$GLB,, | Performed by: ORTHOPAEDIC SURGERY

## 2025-06-03 PROCEDURE — 1125F AMNT PAIN NOTED PAIN PRSNT: CPT | Mod: CPTII,S$GLB,, | Performed by: ORTHOPAEDIC SURGERY

## 2025-06-03 PROCEDURE — 97112 NEUROMUSCULAR REEDUCATION: CPT | Mod: PN

## 2025-06-03 PROCEDURE — 3044F HG A1C LEVEL LT 7.0%: CPT | Mod: CPTII,S$GLB,, | Performed by: ORTHOPAEDIC SURGERY

## 2025-06-03 PROCEDURE — 99999 PR PBB SHADOW E&M-EST. PATIENT-LVL III: CPT | Mod: PBBFAC,,, | Performed by: ORTHOPAEDIC SURGERY

## 2025-06-03 PROCEDURE — 3288F FALL RISK ASSESSMENT DOCD: CPT | Mod: CPTII,S$GLB,, | Performed by: ORTHOPAEDIC SURGERY

## 2025-06-03 PROCEDURE — 99204 OFFICE O/P NEW MOD 45 MIN: CPT | Mod: S$GLB,,, | Performed by: ORTHOPAEDIC SURGERY

## 2025-06-03 PROCEDURE — 1159F MED LIST DOCD IN RCRD: CPT | Mod: CPTII,S$GLB,, | Performed by: ORTHOPAEDIC SURGERY

## 2025-06-03 PROCEDURE — 3072F LOW RISK FOR RETINOPATHY: CPT | Mod: CPTII,S$GLB,, | Performed by: ORTHOPAEDIC SURGERY

## 2025-06-03 PROCEDURE — 73130 X-RAY EXAM OF HAND: CPT | Mod: 26,,, | Performed by: RADIOLOGY

## 2025-06-04 ENCOUNTER — PATIENT MESSAGE (OUTPATIENT)
Dept: ORTHOPEDICS | Facility: CLINIC | Age: 75
End: 2025-06-04
Payer: COMMERCIAL

## 2025-06-05 ENCOUNTER — CLINICAL SUPPORT (OUTPATIENT)
Dept: REHABILITATION | Facility: HOSPITAL | Age: 75
End: 2025-06-05
Payer: COMMERCIAL

## 2025-06-05 ENCOUNTER — DOCUMENTATION ONLY (OUTPATIENT)
Dept: REHABILITATION | Facility: HOSPITAL | Age: 75
End: 2025-06-05
Payer: COMMERCIAL

## 2025-06-05 DIAGNOSIS — R29.898 WEAKNESS OF BOTH LOWER EXTREMITIES: Primary | ICD-10-CM

## 2025-06-05 DIAGNOSIS — R26.89 BALANCE PROBLEMS: ICD-10-CM

## 2025-06-05 DIAGNOSIS — Z74.09 DECREASED FUNCTIONAL MOBILITY AND ENDURANCE: ICD-10-CM

## 2025-06-05 PROCEDURE — 97112 NEUROMUSCULAR REEDUCATION: CPT | Mod: PN,CQ

## 2025-06-05 PROCEDURE — 97110 THERAPEUTIC EXERCISES: CPT | Mod: PN,CQ

## 2025-06-05 NOTE — PROGRESS NOTES
Outpatient Rehab    Physical Therapy Visit    Patient Name: John Lemos  MRN: [id number]  YOB: 1950  Encounter Date: 6/5/2025    Therapy Diagnosis:   Encounter Diagnoses   Name Primary?    Weakness of both lower extremities Yes    Balance problems     Decreased functional mobility and endurance      Physician: Brenda Llamas NP    Physician Orders: Eval and Treat  Medical Diagnosis: Status post left hip replacement    Visit # / Visits Authorized:  10 / 20  Insurance Authorization Period: 4/25/2025 to 12/31/2025  Date of Evaluation: 4/23/2025  Plan of Care Certification: 4/25/2025 to 10/30/2025      PT/PTA: PTA   Number of PTA visits since last PT visit:1  Time In: 1000   Time Out: 1056  Total Time (in minutes): 56   Total Billable Time (in minutes): 56    FOTO: 1/1   Intake Score:  %  Survey Score 2:  %  Survey Score 3:  %    Precautions:     No hip flexion beyond 90 degrees, no hip internal rotation beyond neutral and no hip adduction beyond neutral for 6 weeks       Subjective   so far his hip doesn't bother him. it's more of his balance and gait..  Pain reported as 0/10.      Objective            Treatment:  Therapeutic Exercise  TE 2: Seated marches 4 lbs on R LE, 2 lbs on L LE 3x10 ea  TE 3: Seated LAQ +4 lbs, 3x10  TE 4: Standing hip abduction /c YTB 2x10  TE 5: Standing hip extension /c YTB 2x10 (noted L knee netta at times, CGA/Min Assist for recovery and for balance support)  TE 7: Sit to stand from 18 in hi-low or box 3x10 (/c CGA using gait belt)  TE 9: Supine hamstring stretch /c strap and min A 3x30 sec ea  Balance/Neuromuscular Re-Education  NMR 3: Gait on level surface 193ft x 3 laps /c SBQC (gait belt done for safety), CGA 15 minutes (mod ceuing for heel-toe, step-through pattern)    Time Entry(in minutes):  Neuromuscular Re-Education Time Entry: 15  Therapeutic Exercise Time Entry: 41    Assessment & Plan   Assessment: Patient ambulating with RW for safety. Noted patient  displayed legs weakness, hip weakness, and instability in balance. L knee netta at times in standing hip extension. Patient required CGA/Min assist with this exercise due to L knee netta. Patient remains at fall risks, therefore, continued to use gait belt throughout session especially in walking for safety and to prevent fall risks. Moderate cues for proper techniques in standing exercise. otherwise, will continue to work on his balance, gluteal muscles strengthening, and safe ambulation using assistive device.       The patient will continue to benefit from skilled outpatient physical therapy in order to address the deficits listed in the problem list on the initial evaluation, provide patient and family education, and maximize the patients level of independence in the home and community environments.     The patient's spiritual, cultural, and educational needs were considered, and the patient is agreeable to the plan of care and goals.           Plan: Continue to work on improving L hip strength, and balance.    Goals:   Active       Ambulation/movement       Patient will ambulate at least 3 laps around the therapy gym (600 ft distance) in 6 minutes using least restrictive AD with supervision       Start:  04/25/25    Expected End:  10/30/25            Patient will ambulate with close normal gait pattern with least restrictive AD       Start:  04/25/25    Expected End:  10/30/25               Functional outcome       Patient will show a significant change in FOTO patient-reported outcome tool to demonstrate subjective improvement       Start:  04/25/25    Expected End:  10/30/25            Patient will demonstrate independence in home program for support of progression       Start:  04/25/25    Expected End:  10/30/25               Pain       Patient will report pain of 1/10 demonstrating a reduction of overall pain       Start:  04/25/25    Expected End:  10/30/25               Range of Motion       Patient  will achieve left hip flexion  degrees or greater       Start:  04/25/25    Expected End:  10/30/25            Patient will achieve left hip extension ROM 15 degrees or greater       Start:  04/25/25    Expected End:  10/30/25            Patient will achieve left hip internal rotation ROM 30 degrees or greater in 90 degrees hip flexion       Start:  04/25/25    Expected End:  10/30/25            Patient will achieve left hip external rotation ROM 30 degrees or greater in 90 degrees hip flexion       Start:  04/25/25    Expected End:  10/30/25               Strength       Patient will achieve left hip flexion strength of 4+/5       Start:  04/25/25    Expected End:  10/30/25            Patient will achieve left hip extension strength of 4/5       Start:  04/25/25    Expected End:  10/30/25            Patient will achieve left hip abduction strength of 4/5       Start:  04/25/25    Expected End:  10/30/25            Patient will achieve left hip adduction strength of 4+/5       Start:  04/25/25    Expected End:  10/30/25            Patient will achieve left hip external rotation strength of 4/5 in 90 degrees hip flexion       Start:  04/25/25    Expected End:  10/30/25            Patient will achieve left hip internal rotation strength of 4/5 in 90 degrees hip flexion       Start:  04/25/25    Expected End:  10/30/25            Patient will achieve left knee flexion strength of 5/5       Start:  04/25/25    Expected End:  10/30/25            Patient will achieve left knee extension strength of 5/5       Start:  04/25/25    Expected End:  10/30/25               Transferring       Patient will perform at least 8 reps of sit to stand with supervision and no more than 1 UE support within 30 seconds to demonstrate improved functional activity tolerance and balance.       Start:  04/25/25    Expected End:  10/30/25                Madisyn Herring PTA

## 2025-06-09 ENCOUNTER — TELEPHONE (OUTPATIENT)
Dept: ADMINISTRATIVE | Facility: OTHER | Age: 75
End: 2025-06-09
Payer: COMMERCIAL

## 2025-06-09 ENCOUNTER — CLINICAL SUPPORT (OUTPATIENT)
Dept: REHABILITATION | Facility: HOSPITAL | Age: 75
End: 2025-06-09
Payer: COMMERCIAL

## 2025-06-09 DIAGNOSIS — R26.89 BALANCE PROBLEMS: ICD-10-CM

## 2025-06-09 DIAGNOSIS — R29.898 WEAKNESS OF BOTH LOWER EXTREMITIES: Primary | ICD-10-CM

## 2025-06-09 DIAGNOSIS — Z74.09 DECREASED FUNCTIONAL MOBILITY AND ENDURANCE: ICD-10-CM

## 2025-06-09 PROCEDURE — 97110 THERAPEUTIC EXERCISES: CPT | Mod: PN,CQ

## 2025-06-09 PROCEDURE — 97112 NEUROMUSCULAR REEDUCATION: CPT | Mod: PN,CQ

## 2025-06-09 NOTE — PROGRESS NOTES
Outpatient Rehab    Physical Therapy Visit    Patient Name: John Lemos  MRN: [id number]  YOB: 1950  Encounter Date: 6/9/2025    Therapy Diagnosis:   Encounter Diagnoses   Name Primary?    Weakness of both lower extremities Yes    Balance problems     Decreased functional mobility and endurance      Physician: Brenda Llamas NP    Physician Orders: Eval and Treat  Medical Diagnosis: Status post left hip replacement    Visit # / Visits Authorized:  10 / 20  Insurance Authorization Period: 4/25/2025 to 12/31/2025  Date of Evaluation: 4/23/2025  Plan of Care Certification: 4/25/2025 to 10/30/2025      PT/PTA: PTA   Number of PTA visits since last PT visit:2  Time In: 0900   Time Out: 0953  Total Time (in minutes): 53   Total Billable Time (in minutes): 53    FOTO: 1/1  Intake Score:  %  Survey Score 2:  %  Survey Score 3:  %    Precautions:     No hip flexion beyond 90 degrees, no hip internal rotation beyond neutral and no hip adduction beyond neutral for 6 weeks       Subjective   his hip feels fine. no pain right now..  Pain reported as 0/10.      Objective            Treatment:  Therapeutic Exercise  TE 2: Seated marches 4 lbs on R LE, 2 lbs on L LE 3x10 ea  TE 3: Seated LAQ +4 lbs, 3x10  TE 4: Standing hip abduction /c YTB 2x10  TE 5: Standing hip extension /c YTB 2x10 (increase L knee buckling, therefore, defer this exercise today.)  TE 7: Sit to stand from 18 in hi-low or box 3x10 (/c CGA using gait belt)  TE 9: Supine hamstring stretch /c strap and min A 3x30 sec ea  Balance/Neuromuscular Re-Education  NMR 3: Gait on level surface 193ft x 3 laps /c SBQC (gait belt done for safety), CGA 15 minutes (mod ceuing for heel-toe, step-through pattern)    Time Entry(in minutes):  Neuromuscular Re-Education Time Entry: 15  Therapeutic Exercise Time Entry: 38    Assessment & Plan   Assessment: Patient continues to ambulate with RW for safety and support. He showed increase L knee buckling today  specifically in standing exercises such as hip extension. Patient was unsafe requiring Min Assist for upright posture, therefore, defer the hip extension exercise due to patient was unsafe and to reduce fall risks in therapy clinic. Educated patient on fall risks factor. Noted patient demonstrates increase instability in walking today with SBQC, CGA by therapist. Patient stated that he feels off with his balance today. Patient continues to demonstrates lower extremity weakness, impaired balance, and decrease endurance. At this time, we will work on improving his symptoms/conditions to reduce fall risks at home and in community.       The patient will continue to benefit from skilled outpatient physical therapy in order to address the deficits listed in the problem list on the initial evaluation, provide patient and family education, and maximize the patients level of independence in the home and community environments.     The patient's spiritual, cultural, and educational needs were considered, and the patient is agreeable to the plan of care and goals.           Plan: Continue to work on improving L hip strength, and balance.    Goals:   Active       Ambulation/movement       Patient will ambulate at least 3 laps around the therapy gym (600 ft distance) in 6 minutes using least restrictive AD with supervision       Start:  04/25/25    Expected End:  10/30/25            Patient will ambulate with close normal gait pattern with least restrictive AD       Start:  04/25/25    Expected End:  10/30/25               Functional outcome       Patient will show a significant change in FOTO patient-reported outcome tool to demonstrate subjective improvement       Start:  04/25/25    Expected End:  10/30/25            Patient will demonstrate independence in home program for support of progression       Start:  04/25/25    Expected End:  10/30/25               Pain       Patient will report pain of 1/10 demonstrating a  reduction of overall pain       Start:  04/25/25    Expected End:  10/30/25               Range of Motion       Patient will achieve left hip flexion  degrees or greater       Start:  04/25/25    Expected End:  10/30/25            Patient will achieve left hip extension ROM 15 degrees or greater       Start:  04/25/25    Expected End:  10/30/25            Patient will achieve left hip internal rotation ROM 30 degrees or greater in 90 degrees hip flexion       Start:  04/25/25    Expected End:  10/30/25            Patient will achieve left hip external rotation ROM 30 degrees or greater in 90 degrees hip flexion       Start:  04/25/25    Expected End:  10/30/25               Strength       Patient will achieve left hip flexion strength of 4+/5       Start:  04/25/25    Expected End:  10/30/25            Patient will achieve left hip extension strength of 4/5       Start:  04/25/25    Expected End:  10/30/25            Patient will achieve left hip abduction strength of 4/5       Start:  04/25/25    Expected End:  10/30/25            Patient will achieve left hip adduction strength of 4+/5       Start:  04/25/25    Expected End:  10/30/25            Patient will achieve left hip external rotation strength of 4/5 in 90 degrees hip flexion       Start:  04/25/25    Expected End:  10/30/25            Patient will achieve left hip internal rotation strength of 4/5 in 90 degrees hip flexion       Start:  04/25/25    Expected End:  10/30/25            Patient will achieve left knee flexion strength of 5/5       Start:  04/25/25    Expected End:  10/30/25            Patient will achieve left knee extension strength of 5/5       Start:  04/25/25    Expected End:  10/30/25               Transferring       Patient will perform at least 8 reps of sit to stand with supervision and no more than 1 UE support within 30 seconds to demonstrate improved functional activity tolerance and balance.       Start:  04/25/25     Expected End:  10/30/25                Madisyn Herring, PTA

## 2025-06-10 NOTE — PROGRESS NOTES
DATE: 6/13/2025  PATIENT: John Lemos    Supervising Physician: Damián Geiger M.D.    CHIEF COMPLAINT: neck pain    HISTORY:  John Lemos is a 74 y.o. male here for initial evaluation of neck and bilateral arm pain (Neck - 0, Arm - 1, Hands - 5). The pain has been present for about 2 months after his hp surgery in April. The patient describes the pain as burning in his hands. The pain is worse with any movement and improved by nothing. There is associated numbness and tingling in his fingers. There is subjective weakness in the bilateral (L>R) upper extremities. He presents today using a walker that as gien to him post TAL. Prior treatments have included Lyrica and Tylenol, but no CHRIS or spine surgery.   The patient reports myelopathic symptoms such as handwriting changes or difficulty with buttons/coins/keys. Reports bowel/bladder dysfunction. He states he does not know when he has to urinate and will often void on himself while ambulating. This has started since his hip surgery in April. Denies perineal paresthesias.    PAST MEDICAL/SURGICAL HISTORY:  Past Medical History:   Diagnosis Date    Arthritis     Cataract     ou    Diabetes mellitus 7 yrs    lbs: 118 1 week ago    Diabetes mellitus type 2, controlled 9/8/2012    Eye injury     fb    Hyperlipidemia      Past Surgical History:   Procedure Laterality Date    COLONOSCOPY N/A 11/25/2019    Procedure: COLONOSCOPY;  Surgeon: Eddie Leslie MD;  Location: [facility] ENDO;  Service: Endoscopy;  Laterality: N/A;    TOTAL REPLACEMENT OF HIP JOINT USING COMPUTER-ASSISTED NAVIGATION Left 4/14/2025    Procedure: ARTHROPLASTY, HIP, TOTAL, USING COMPUTER-ASSISTED NAVIGATION;  Surgeon: Judson Steel MD;  Location: [facility] OR;  Service: Orthopedics;  Laterality: Left;  Yobany Church Notified- NC---T/S--done  RN PREOP 4/2/2025---H/P--INCOMPLETE---       Medications:  Medications Ordered Prior to Encounter[1]    Social History: Social  "History[2]    REVIEW OF SYSTEMS:  Constitution: Negative. Negative for chills, fever and night sweats.   Cardiovascular: Negative for chest pain and syncope.   Respiratory: Negative for cough and shortness of breath.   Gastrointestinal: See HPI. Negative for nausea/vomiting. Negative for abdominal pain.  Genitourinary: See HPI. Negative for discoloration or dysuria.  Skin: Negative for dry skin, itching and rash.   Hematologic/Lymphatic: Negative for bleeding problem. Does not bruise/bleed easily.   Musculoskeletal: Negative for falls and muscle weakness.   Neurological: See HPI. No seizures.   Endocrine: Negative for polydipsia, polyphagia and polyuria.   Allergic/Immunologic: Negative for hives and persistent infections.  Psychiatric/Behavioral: Negative for depression and insomnia.         EXAM:  Ht 5' 8" (1.727 m)   Wt 78.4 kg (172 lb 13.5 oz)   BMI 26.28 kg/m²     General: The patient is a 74 y.o. male in no apparent distress, the patient is oriented to person, place and time.  Psych: Normal mood and affect  HEENT: Vision grossly intact, hearing intact to the spoken word.  Lungs: Respirations unlabored.  Gait: antalgic station and gait, difficulty with toe or heel walk.   Skin: Cervical skin negative for rashes, lesions, hairy patches and surgical scars.  Range of motion: Cervical range of motion is acceptable. There is mild tenderness to palpation.  Spinal Balance: Global saggital and coronal spinal balance impaired, no significant for scoliosis and kyphosis.  Musculoskeletal: No pain with the range of motion of the bilateral shoulders and elbows. decreased bulk and contour of the bilateral hands.  Vascular: Bilateral hands warm and well perfused, radial pulses 2+ bilaterally.  Neurological: decreased strength and tone in all major motor groups in the bilateral upper and lower extremities. Normal sensation to light touch in the C5-T1 and L2-S1 dermatomes bilaterally.  Deep tendon reflexes symmetric 3+ in the " bilateral upper extremities.  Negative Inverted Radial Reflex and Positive Calvin's in the LUE. Negative Babinski bilaterally. No Clonus.     IMAGING:   Today I personally reviewed AP, Lat and Flex/Ex  upright C-spine films that demonstrate no significant degenerative changes    Cervical MRI shows severe stenosis at C3-4 and moderate stenosis from C4-6     Body mass index is 26.28 kg/m².    Hemoglobin A1C   Date Value Ref Range Status   12/18/2024 6.1 (H) 4.0 - 5.6 % Final     Comment:     ADA Screening Guidelines:  5.7-6.4%  Consistent with prediabetes  >or=6.5%  Consistent with diabetes    High levels of fetal hemoglobin interfere with the HbA1C  assay. Heterozygous hemoglobin variants (HbS, HgC, etc)do  not significantly interfere with this assay.   However, presence of multiple variants may affect accuracy.     05/18/2024 6.7 (H) 4.0 - 5.6 % Final     Comment:     ADA Screening Guidelines:  5.7-6.4%  Consistent with prediabetes  >or=6.5%  Consistent with diabetes    High levels of fetal hemoglobin interfere with the HbA1C  assay. Heterozygous hemoglobin variants (HbS, HgC, etc)do  not significantly interfere with this assay.   However, presence of multiple variants may affect accuracy.     10/21/2023 7.0 (H) 4.0 - 5.6 % Final     Comment:     ADA Screening Guidelines:  5.7-6.4%  Consistent with prediabetes  >or=6.5%  Consistent with diabetes    High levels of fetal hemoglobin interfere with the HbA1C  assay. Heterozygous hemoglobin variants (HbS, HgC, etc)do  not significantly interfere with this assay.   However, presence of multiple variants may affect accuracy.       Hemoglobin A1c   Date Value Ref Range Status   04/02/2025 6.7 (H) 4.0 - 5.6 % Final     Comment:     ADA Screening Guidelines:  5.7-6.4%  Consistent with prediabetes  >=6.5%  Consistent with diabetes    High levels of fetal hemoglobin interfere with the HbA1C  assay. Heterozygous hemoglobin variants (HbS, HgC, etc)do  not significantly interfere  with this assay.   However, presence of multiple variants may affect accuracy.           ASSESSMENT/PLAN:    John was seen today for neck pain and arm pain.    Diagnoses and all orders for this visit:    Cervical radiculopathy  -     pregabalin (LYRICA) 150 MG capsule; Take 1 capsule (150 mg total) by mouth 2 (two) times daily.  -     diclofenac (VOLTAREN) 75 MG EC tablet; Take 1 tablet (75 mg total) by mouth 3 (three) times daily as needed (pain).    Cervical myelopathy  -     Ambulatory referral/consult to [facility]  -     pregabalin (LYRICA) 150 MG capsule; Take 1 capsule (150 mg total) by mouth 2 (two) times daily.      Today we discussed at length all of the different treatment options including anti-inflammatories, acetaminophen, rest, ice, heat, physical therapy including strengthening and stretching exercises, home exercises, ROM, aerobic conditioning, aqua therapy, other modalities including ultrasound, massage, and dry needling, epidural steroid injections and finally surgical intervention.      I had a sit down discussion with the patient regarding cervical myelopathy. I discussed the known stepwise degeneration of cervical myelopathy. I discussed the risks and benefits of decompressive surgery, including the concept that surgery would be done to prevent further neurological injury/degeneration rather than to ameliorate any existing deficits.     He would like to see Dr. Geiger in the clinic before moving forward with surgery.  I have messaged to get the patient in ASAP             [1]   Current Outpatient Medications on File Prior to Visit   Medication Sig Dispense Refill    acetaminophen (TYLENOL) 500 MG tablet Take 2 tablets (1,000 mg total) by mouth every 8 (eight) hours as needed for Pain. 42 tablet 0    atorvastatin (LIPITOR) 40 MG tablet Take 1 tablet (40 mg total) by mouth once daily. 90 tablet 3    blood sugar diagnostic (FREESTYLE LITE STRIPS) Strp Inject 1 each as directed once daily. 100 each 3     docusate sodium (COLACE) 100 MG capsule Take 1 capsule (100 mg total) by mouth 2 (two) times daily. 30 capsule 0    DULoxetine (CYMBALTA) 60 MG capsule Take 1 capsule (60 mg total) by mouth once daily. 90 capsule 3    lancets (FREESTYLE LANCETS) 28 gauge Misc Inject 1 lancet as directed once daily. 100 each 3    losartan (COZAAR) 50 MG tablet Take 1 tablet (50 mg total) by mouth once daily. 90 tablet 1    metFORMIN (GLUCOPHAGE) 500 MG tablet TAKE 2 TABLETS BY MOUTH TWICE DAILY WITH BREAKFAST AND WITH SUPPER 360 tablet 1    oxyCODONE (ROXICODONE) 5 MG immediate release tablet Take 1-2 tablets (5-10 mg total) by mouth every 4 (four) hours as needed (pain). 40 tablet 0    tadalafiL (CIALIS) 5 MG tablet Take 1 tablet (5 mg total) by mouth once daily. 30 tablet 11    TRULICITY 0.75 mg/0.5 mL pen injector INJECT 0.75 MG INTO THE SKIN EVERY 7 DAYS 12 pen 1    [DISCONTINUED] diclofenac (VOLTAREN) 75 MG EC tablet Take 1 tablet (75 mg total) by mouth 2 (two) times daily. 60 tablet 0    [DISCONTINUED] pregabalin (LYRICA) 100 MG capsule Take 1 capsule (100 mg total) by mouth 2 (two) times daily. 60 capsule 2    aspirin (ECOTRIN) 81 MG EC tablet Take 1 tablet (81 mg total) by mouth 2 (two) times a day. 60 tablet 0     No current facility-administered medications on file prior to visit.   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     Employer: [employer]   Tobacco Use    Smoking status: Never    Smokeless tobacco: Never   Substance and Sexual Activity    Alcohol use: No    Drug use: No    Sexual activity: Not Currently     Social Drivers of Health     Financial Resource Strain: Low Risk  (4/14/2025)    Overall Financial Resource Strain (CARDIA)     Difficulty of Paying Living Expenses: Not very hard   Food Insecurity: No Food Insecurity (4/14/2025)    Hunger Vital Sign     Worried About  Running Out of Food in the Last Year: Never true     Ran Out of Food in the Last Year: Never true   Transportation Needs: No Transportation Needs (4/14/2025)    PRAPARE - Transportation     Lack of Transportation (Medical): No     Lack of Transportation (Non-Medical): No   Physical Activity: Insufficiently Active (3/17/2025)    Exercise Vital Sign     Days of Exercise per Week: 2 days     Minutes of Exercise per Session: 20 min   Stress: No Stress Concern Present (4/14/2025)    [employer] [place] [employer] - Occupational Stress Questionnaire     Feeling of Stress : Not at all   Housing Stability: Low Risk  (4/14/2025)    Housing Stability Vital Sign     Unable to Pay for Housing in the Last Year: No     Homeless in the Last Year: No

## 2025-06-11 NOTE — PROGRESS NOTES
Patient ID: John Lemos is a 74 y.o. male.    Chief Complaint: Pain and Numbness of the Right Hand and Pain and Numbness of the Left Hand    History of Present Illness    CHIEF COMPLAINT:  Bilateral hand numbness and tingling, left hand weakness and limited range of motion.    HPI:  Mr. Lemos presents for evaluation of hand and arm issues. He reports numbness and tingling in his fingers that wake him at night, with his hand appearing contracted and inability to close it. These symptoms began after an EMG test approximately a month ago. Prior to the EMG, he could bathe and wash himself, but afterwards he noticed altered sensation. The EMG test occurred just before April 12th, followed by hip replacement on April 14th.    He describes significant limitations in hand function, stating he cannot perform any tasks with his affected hand in the morning. He has difficulty making a fist, rotating his hand, and extending his arm. He demonstrates limited range of motion in his elbow and shoulder, unable to turn his elbow. He is concerned about muscle atrophy and weakness in his extremities.    A neurologist seen before his surgery suggested some type of nerve compression was causing problems with the rest of his body. He reports occasional neck pain, stating he experiences intermittent neck discomfort but can turn it. His neurologist informed him he had cervical misalignment.    He expresses frustration with his current condition, stating he is unable to perform any tasks with his affected arm. He cannot straighten his arm, rotate it, or use it for tasks like shaving.    He denies pain in the affected arm.    PREVIOUS TREATMENTS:  Mr. Lemos underwent an EMG test at [facility] in [place] shortly before April 12th. He reports significant worsening of hand function following this test.    SURGICAL HISTORY:  Mr. Lemos had a hip replacement surgery on April 14th.    IMAGING:  Mr. Lemos underwent an MRI of  the cervical spine.      ROS:  General: denies fever, denies chills, denies fatigue, denies weight gain, denies weight loss  Eyes: denies vision changes, denies redness, denies discharge  ENT: denies ear pain, denies nasal congestion, denies sore throat  Cardiovascular: denies chest pain, denies palpitations, denies lower extremity edema  Respiratory: denies cough, denies shortness of breath  Gastrointestinal: denies abdominal pain, denies nausea, denies vomiting, denies diarrhea, denies constipation, denies blood in stool  Genitourinary: denies dysuria, denies hematuria, denies frequency  Musculoskeletal: denies joint pain, denies muscle pain, reports muscle weakness, reports joint stiffness, reports limited movement, reports neck pain  Skin: denies rash, denies lesion  Neurological: denies headache, denies dizziness, reports numbness, reports tingling  Psychiatric: denies anxiety, denies depression, denies sleep difficulty         Physical Exam    Neurological: Positive Calvin on the left. Biceps clonus on the left. Biceps clonus on the right.         Assessment & Plan     Discussed carpal tunnel release, which involves releasing a ligament pressing on the nerve.   This would relieve nighttime symptoms on one side of the hand but may not improve other symptoms.   Referred to spine clinic for C spine and neurological evaluation.              No follow-ups on file.    This note was generated with the assistance of ambient listening technology. Verbal consent was obtained by the patient and accompanying visitor(s) for the recording of patient appointment to facilitate this note. I attest to having reviewed and edited the generated note for accuracy, though some syntax or spelling errors may persist. Please contact the author of this note for any clarification.

## 2025-06-12 ENCOUNTER — CLINICAL SUPPORT (OUTPATIENT)
Dept: REHABILITATION | Facility: HOSPITAL | Age: 75
End: 2025-06-12
Payer: COMMERCIAL

## 2025-06-12 DIAGNOSIS — R26.89 BALANCE PROBLEMS: ICD-10-CM

## 2025-06-12 DIAGNOSIS — R29.898 WEAKNESS OF BOTH LOWER EXTREMITIES: Primary | ICD-10-CM

## 2025-06-12 DIAGNOSIS — Z74.09 DECREASED FUNCTIONAL MOBILITY AND ENDURANCE: ICD-10-CM

## 2025-06-12 PROCEDURE — 97112 NEUROMUSCULAR REEDUCATION: CPT | Mod: PN

## 2025-06-12 PROCEDURE — 97110 THERAPEUTIC EXERCISES: CPT | Mod: PN

## 2025-06-12 NOTE — PROGRESS NOTES
"  Outpatient Rehab    Physical Therapy Visit    Patient Name: John Lemos  MRN: [id number]  YOB: 1950  Encounter Date: 6/12/2025    Therapy Diagnosis:   Encounter Diagnoses   Name Primary?    Weakness of both lower extremities Yes    Balance problems     Decreased functional mobility and endurance      Physician: Judson Steel MD    Physician Orders: Eval and Treat  Medical Diagnosis: Status post left hip replacement  Surgical Diagnosis: Not applicable for this Episode   Surgical Date: Not applicable for this Episode  Days Since Last Surgery: Not applicable for this Episode    Visit # / Visits Authorized:  16 / 20  Insurance Authorization Period: 4/25/2025 to 12/31/2025  Date of Evaluation: 4/23/2025  Plan of Care Certification: 4/25/2025 to 10/30/2025      PT/PTA:     Number of PTA visits since last PT visit:   Time In: 1000   Time Out: 1100  Total Time (in minutes): 60   Total Billable Time (in minutes):      FOTO:  Intake Score:  %  Survey Score 2:  %  Survey Score 3:  %    Precautions:       Subjective   Patient reports feeling ok, no hip pain. But continues to struggle to get rid of the RW because of his balance. He states that he is seeing seeing a neurologist soon..  Pain reported as 0/10.      Objective            Treatment:  Therapeutic Exercise  TE 7: Sit to stand from 18 in hi-low or box 3x10 (/c CGA using gait belt)  TE 8: Standing heel raises 3x10 (UE support on hi-low)  TE 9: Supine hamstring stretch /c strap 3x30 sec ea (pt was able to hold/pull his leg up without assistance)  TE 10: +Nustep 10 minutes  Balance/Neuromuscular Re-Education  NMR 1: Ball Squeeze 3x10x3"  NMR 5: Seated marches 4 lbs on R LE, 2 lbs on L LE 3x10 ea  NMR 6: Seated LAQ 4 lbs, 3x10x3"  NMR 7: Standing hip abduction /c YTB 3x10  NMR 8: Standing hip extension /c YTB 2x10  NMR 9: Standing hip Flex /c YTB 3x10 (Pt performed in Supine today)    Time Entry(in minutes):       Assessment & Plan   Assessment: No new " progressions made today as patient is challenged with the current level of difficulty. Cueing for proper form and muscle activation. No c/o increased discomfort with prescribed activities. He continues to demo decreased hip strength and balance. But slowly improving with PT services. Will progress pt as tolerated.  Evaluation/Treatment Tolerance: Patient tolerated treatment well    The patient will continue to benefit from skilled outpatient physical therapy in order to address the deficits listed in the problem list on the initial evaluation, provide patient and family education, and maximize the patients level of independence in the home and community environments.     The patient's spiritual, cultural, and educational needs were considered, and the patient is agreeable to the plan of care and goals.           Plan: Will continue per PT plan of care.    Goals:   Active       Ambulation/movement       Patient will ambulate at least 3 laps around the therapy gym (600 ft distance) in 6 minutes using least restrictive AD with supervision       Start:  04/25/25    Expected End:  10/30/25            Patient will ambulate with close normal gait pattern with least restrictive AD       Start:  04/25/25    Expected End:  10/30/25               Functional outcome       Patient will show a significant change in FOTO patient-reported outcome tool to demonstrate subjective improvement       Start:  04/25/25    Expected End:  10/30/25            Patient will demonstrate independence in home program for support of progression (Progressing)       Start:  04/25/25    Expected End:  10/30/25               Range of Motion       Patient will achieve left hip flexion  degrees or greater (Ongoing)       Start:  04/25/25    Expected End:  10/30/25            Patient will achieve left hip extension ROM 15 degrees or greater (Ongoing)       Start:  04/25/25    Expected End:  10/30/25            Patient will achieve left hip  internal rotation ROM 30 degrees or greater in 90 degrees hip flexion (Ongoing)       Start:  04/25/25    Expected End:  10/30/25            Patient will achieve left hip external rotation ROM 30 degrees or greater in 90 degrees hip flexion (Ongoing)       Start:  04/25/25    Expected End:  10/30/25               Strength       Patient will achieve left hip flexion strength of 4+/5 (Ongoing)       Start:  04/25/25    Expected End:  10/30/25            Patient will achieve left hip extension strength of 4/5 (Ongoing)       Start:  04/25/25    Expected End:  10/30/25            Patient will achieve left hip abduction strength of 4/5 (Ongoing)       Start:  04/25/25    Expected End:  10/30/25            Patient will achieve left hip adduction strength of 4+/5       Start:  04/25/25    Expected End:  10/30/25            Patient will achieve left hip external rotation strength of 4/5 in 90 degrees hip flexion       Start:  04/25/25    Expected End:  10/30/25            Patient will achieve left hip internal rotation strength of 4/5 in 90 degrees hip flexion       Start:  04/25/25    Expected End:  10/30/25            Patient will achieve left knee flexion strength of 5/5       Start:  04/25/25    Expected End:  10/30/25            Patient will achieve left knee extension strength of 5/5       Start:  04/25/25    Expected End:  10/30/25               Transferring       Patient will perform at least 8 reps of sit to stand with supervision and no more than 1 UE support within 30 seconds to demonstrate improved functional activity tolerance and balance. (Ongoing)       Start:  04/25/25    Expected End:  10/30/25              Resolved       Pain       Patient will report pain of 1/10 demonstrating a reduction of overall pain (Met)       Start:  04/25/25    Expected End:  10/30/25    Resolved:  06/18/25             Dain Donovan PT

## 2025-06-13 ENCOUNTER — OFFICE VISIT (OUTPATIENT)
Dept: FAMILY MEDICINE | Facility: CLINIC | Age: 75
End: 2025-06-13
Payer: COMMERCIAL

## 2025-06-13 ENCOUNTER — OFFICE VISIT (OUTPATIENT)
Dept: ORTHOPEDICS | Facility: CLINIC | Age: 75
End: 2025-06-13
Payer: COMMERCIAL

## 2025-06-13 VITALS — HEIGHT: 68 IN | WEIGHT: 172.81 LBS | BODY MASS INDEX: 26.19 KG/M2

## 2025-06-13 DIAGNOSIS — G95.9 CERVICAL MYELOPATHY: ICD-10-CM

## 2025-06-13 DIAGNOSIS — E53.8 VITAMIN B12 DEFICIENCY: ICD-10-CM

## 2025-06-13 DIAGNOSIS — E11.42 TYPE 2 DIABETES MELLITUS WITH DIABETIC POLYNEUROPATHY, WITHOUT LONG-TERM CURRENT USE OF INSULIN: ICD-10-CM

## 2025-06-13 DIAGNOSIS — E78.5 HYPERLIPIDEMIA, UNSPECIFIED HYPERLIPIDEMIA TYPE: ICD-10-CM

## 2025-06-13 DIAGNOSIS — M54.12 CERVICAL RADICULOPATHY: Primary | ICD-10-CM

## 2025-06-13 DIAGNOSIS — Z12.5 ENCOUNTER FOR PROSTATE CANCER SCREENING: ICD-10-CM

## 2025-06-13 DIAGNOSIS — R35.0 INCREASED URINARY FREQUENCY: ICD-10-CM

## 2025-06-13 DIAGNOSIS — M51.369 DEGENERATION OF INTERVERTEBRAL DISC OF LUMBAR REGION, UNSPECIFIED WHETHER PAIN PRESENT: ICD-10-CM

## 2025-06-13 PROCEDURE — 99999 PR PBB SHADOW E&M-EST. PATIENT-LVL III: CPT | Mod: PBBFAC,,, | Performed by: REGISTERED NURSE

## 2025-06-13 PROCEDURE — 1159F MED LIST DOCD IN RCRD: CPT | Mod: CPTII,95,, | Performed by: INTERNAL MEDICINE

## 2025-06-13 PROCEDURE — 4010F ACE/ARB THERAPY RXD/TAKEN: CPT | Mod: CPTII,95,, | Performed by: INTERNAL MEDICINE

## 2025-06-13 PROCEDURE — 98006 SYNCH AUDIO-VIDEO EST MOD 30: CPT | Mod: 95,,, | Performed by: INTERNAL MEDICINE

## 2025-06-13 PROCEDURE — 3072F LOW RISK FOR RETINOPATHY: CPT | Mod: CPTII,95,, | Performed by: INTERNAL MEDICINE

## 2025-06-13 PROCEDURE — G2211 COMPLEX E/M VISIT ADD ON: HCPCS | Mod: 95,,, | Performed by: INTERNAL MEDICINE

## 2025-06-13 PROCEDURE — 1101F PT FALLS ASSESS-DOCD LE1/YR: CPT | Mod: CPTII,95,, | Performed by: INTERNAL MEDICINE

## 2025-06-13 PROCEDURE — 3288F FALL RISK ASSESSMENT DOCD: CPT | Mod: CPTII,95,, | Performed by: INTERNAL MEDICINE

## 2025-06-13 PROCEDURE — 3044F HG A1C LEVEL LT 7.0%: CPT | Mod: CPTII,95,, | Performed by: INTERNAL MEDICINE

## 2025-06-13 RX ORDER — LOSARTAN POTASSIUM 50 MG/1
50 TABLET ORAL DAILY
Qty: 90 TABLET | Refills: 1 | Status: SHIPPED | OUTPATIENT
Start: 2025-06-13

## 2025-06-13 RX ORDER — DICLOFENAC SODIUM 75 MG/1
75 TABLET, DELAYED RELEASE ORAL 3 TIMES DAILY PRN
Qty: 90 TABLET | Refills: 0 | Status: SHIPPED | OUTPATIENT
Start: 2025-06-13

## 2025-06-13 RX ORDER — DICLOFENAC SODIUM 75 MG/1
75 TABLET, DELAYED RELEASE ORAL 3 TIMES DAILY PRN
Qty: 90 TABLET | Refills: 0 | Status: CANCELLED | OUTPATIENT
Start: 2025-06-13

## 2025-06-13 RX ORDER — PREGABALIN 150 MG/1
150 CAPSULE ORAL 2 TIMES DAILY
Qty: 60 CAPSULE | Refills: 5 | Status: CANCELLED | OUTPATIENT
Start: 2025-06-13 | End: 2025-12-12

## 2025-06-13 RX ORDER — DOCUSATE SODIUM 100 MG/1
100 CAPSULE, LIQUID FILLED ORAL 2 TIMES DAILY
Qty: 30 CAPSULE | Refills: 0 | Status: CANCELLED | OUTPATIENT
Start: 2025-06-13

## 2025-06-13 RX ORDER — METFORMIN HYDROCHLORIDE 500 MG/1
TABLET ORAL
Qty: 360 TABLET | Refills: 1 | Status: CANCELLED | OUTPATIENT
Start: 2025-06-13

## 2025-06-13 RX ORDER — DULOXETIN HYDROCHLORIDE 60 MG/1
60 CAPSULE, DELAYED RELEASE ORAL DAILY
Qty: 90 CAPSULE | Refills: 3 | Status: CANCELLED | OUTPATIENT
Start: 2025-06-13

## 2025-06-13 RX ORDER — ATORVASTATIN CALCIUM 40 MG/1
40 TABLET, FILM COATED ORAL DAILY
Qty: 90 TABLET | Refills: 3 | Status: CANCELLED | OUTPATIENT
Start: 2025-06-13

## 2025-06-13 RX ORDER — PREGABALIN 150 MG/1
150 CAPSULE ORAL 2 TIMES DAILY
Qty: 60 CAPSULE | Refills: 5 | Status: SHIPPED | OUTPATIENT
Start: 2025-06-13 | End: 2025-12-12

## 2025-06-13 RX ORDER — DULAGLUTIDE 0.75 MG/.5ML
0.75 INJECTION, SOLUTION SUBCUTANEOUS
Qty: 12 PEN | Refills: 1 | Status: CANCELLED | OUTPATIENT
Start: 2025-06-13

## 2025-06-13 NOTE — PROGRESS NOTES
No vital signs obtain due to pt not having necessary equipment  vs   The patient location is: [place]  The chief complaint leading to consultation is:   Chief Complaint   Patient presents with    Follow-up    Diabetes    Medication Refill     Visit type: audiovisual    Face to Face time with patient: 20 minutes  30 minutes of total time spent on the encounter, which includes face to face time and non-face to face time preparing to see the patient (eg, review of tests), Obtaining and/or reviewing separately obtained history, Documenting clinical information in the electronic or other health record, Independently interpreting results (not separately reported) and communicating results to the patient/family/caregiver, or Care coordination (not separately reported).       Each patient to whom he or she provides medical services by telemedicine is:  (1) informed of the relationship between the physician and patient and the respective role of any other health care provider with respect to management of the patient; and (2) notified that he or she may decline to receive medical services by telemedicine and may withdraw from such care at any time.    DM - doing well, no concerns with medication regimen    Cervical arthritis - with radiculopathy management per Orthopedics    S/p left hip replacement in April 2025 - doing well with rehab    Review of Systems   HENT:  Negative for hearing loss.    Eyes:  Negative for discharge.   Respiratory:  Negative for wheezing.    Cardiovascular:  Negative for chest pain and palpitations.   Gastrointestinal:  Negative for blood in stool, constipation, diarrhea and vomiting.   Genitourinary:  Negative for hematuria and urgency.   Musculoskeletal:  Positive for neck pain.   Neurological:  Positive for weakness. Negative for headaches.   Endo/Heme/Allergies:  Negative for polydipsia.             Physical Exam  Constitutional:       Appearance: Normal appearance.   Neurological:      Mental  Status: He is alert.       Cervical radiculopathy  Cervical myelopathy  Degeneration of intervertebral disc of lumbar region, unspecified whether pain present  Following with Orthopedics    Hyperlipidemia, unspecified hyperlipidemia type  On statin therapy as indicated   - Lipid Panel; Future    Type 2 diabetes mellitus with diabetic polyneuropathy, without long-term current use of insulin  A1c <7%  The current medical regimen is effective;  continue present plan and medications.  - Microalbumin/Creatinine Ratio, Urine; Future  - losartan (COZAAR) 50 MG tablet; Take 1 tablet (50 mg total) by mouth once daily.  Dispense: 90 tablet; Refill: 1  - Hemoglobin A1C; Future    Vitamin B12 deficiency  - Vitamin B12; Future    Increased urinary frequency  - Urinalysis, Reflex to Urine Culture Urine, Clean Catch; Future    Encounter for prostate cancer screening  The natural history of prostate cancer and ongoing controversy regarding screening and potential treatment outcomes of prostate cancer has been discussed with the patient. The meaning of a false positive PSA and a false negative PSA has been discussed. He indicates understanding of the limitations of this screening test and wishes to proceed with screening PSA testing.  - PSA, Screening; Future    F/u in 6 months    Visit today included increased complexity associated with the care of the episodic problems addressed and managing the longitudinal care of the patient due to the serious and/or complex managed problem(s) as per assessment/plan.     Chan Estrada MD  Internal Medicine-Pediatrics

## 2025-06-16 ENCOUNTER — CLINICAL SUPPORT (OUTPATIENT)
Dept: REHABILITATION | Facility: HOSPITAL | Age: 75
End: 2025-06-16
Payer: COMMERCIAL

## 2025-06-16 DIAGNOSIS — Z74.09 DECREASED FUNCTIONAL MOBILITY AND ENDURANCE: ICD-10-CM

## 2025-06-16 DIAGNOSIS — R26.89 BALANCE PROBLEMS: ICD-10-CM

## 2025-06-16 DIAGNOSIS — R29.898 WEAKNESS OF BOTH LOWER EXTREMITIES: Primary | ICD-10-CM

## 2025-06-16 PROCEDURE — 97112 NEUROMUSCULAR REEDUCATION: CPT | Mod: PN,CQ

## 2025-06-16 PROCEDURE — 97530 THERAPEUTIC ACTIVITIES: CPT | Mod: PN,CQ

## 2025-06-16 NOTE — PROGRESS NOTES
Outpatient Rehab    Physical Therapy Visit    Patient Name: John Lemos  MRN: [id number]  YOB: 1950  Encounter Date: 6/16/2025    Therapy Diagnosis:   Encounter Diagnoses   Name Primary?    Weakness of both lower extremities Yes    Balance problems     Decreased functional mobility and endurance      Physician: Brenda Llamas NP    Physician Orders: Eval and Treat  Medical Diagnosis: Status post left hip replacement  Surgical Diagnosis: Not applicable for this Episode   Surgical Date: Not applicable for this Episode  Days Since Last Surgery: Not applicable for this Episode    Visit # / Visits Authorized:  12 / 20  Insurance Authorization Period: 4/25/2025 to 12/31/2025  Date of Evaluation: 4/23/2025  Plan of Care Certification: 4/25/2025 to 10/30/2025      PT/PTA: PTA   Number of PTA visits since last PT visit:1  Time In: 1000   Time Out: 1055  Total Time (in minutes): 55   Total Billable Time (in minutes): 55    FOTO:  Intake Score:  %  Survey Score 2:  %  Survey Score 3:  %    Precautions:  Left Upper Extremity Weight-Bearing Status: Weight-bearing as tolerated  Left hip replacement surgery on April 14th; POW 9 weeks      Subjective   Patient reports Left Hip cont doing good without pain, just a little discomfort, wants to improve his balance and strength.  Pain reported as 0/10.      Objective            Treatment:  Therapeutic Exercise  TE 9: Supine hamstring stretch /c strap 3x30 sec ea (pt was able to hold/pull his leg up without assistance)  Balance/Neuromuscular Re-Education  NMR 4: Supine SAQ 3# 3x10  NMR 5: Seated marches 4 lbs on R LE, 2 lbs on L LE 3x10 ea  NMR 6: Seated LAQ +4 lbs, 3x10  NMR 7: Standing hip abduction /c YTB 2x10  NMR 8: Standing hip extension /c YTB 2x10 (noted L knee netta at times, CGA/Min Assist for recovery and for balance support)  Therapeutic Activity  TA 1: Nu-step 10 min level 1  TA 2: Sit to stand from 18 in hi-low or box 3x10 (/c CGA using gait belt)  TA  3: Gait on level surface 193ft x 3 laps /c SBQC (gait belt done for safety), CGA 15 minutes (mod cueing for heel-toe, step-through pattern)    Time Entry(in minutes):  Neuromuscular Re-Education Time Entry: 25  Therapeutic Activity Time Entry: 25  Therapeutic Exercise Time Entry: 5    Assessment & Plan   Assessment: Patient presents ambulating with RW for safety. Patient cont to display legs weakness, hip weakness, and instability in balance. L knee netta at times in standing hip ex. Patient required SBA/CGA with this exercise due to L knee netta. 1 LOB during 1 lap of ambulating with Quad cane required Min A to regain balance, cont with unsteadiness, cueing for slow gait pattern for better support if pt loose balance. Moderate cues for proper techniques in standing exercise. otherwise, will continue to work on his balance, gluteal muscles strengthening, and safe ambulation using assistive device. Will cont to progress per patient tolerance.  Evaluation/Treatment Tolerance: Patient tolerated treatment well    The patient will continue to benefit from skilled outpatient physical therapy in order to address the deficits listed in the problem list on the initial evaluation, provide patient and family education, and maximize the patients level of independence in the home and community environments.     The patient's spiritual, cultural, and educational needs were considered, and the patient is agreeable to the plan of care and goals.           Plan: Continue to work on improving L hip strength, and balance.    Goals:   Active       Ambulation/movement       Patient will ambulate at least 3 laps around the therapy gym (600 ft distance) in 6 minutes using least restrictive AD with supervision       Start:  04/25/25    Expected End:  10/30/25            Patient will ambulate with close normal gait pattern with least restrictive AD       Start:  04/25/25    Expected End:  10/30/25               Functional outcome        Patient will show a significant change in FOTO patient-reported outcome tool to demonstrate subjective improvement       Start:  04/25/25    Expected End:  10/30/25            Patient will demonstrate independence in home program for support of progression       Start:  04/25/25    Expected End:  10/30/25               Pain       Patient will report pain of 1/10 demonstrating a reduction of overall pain       Start:  04/25/25    Expected End:  10/30/25               Range of Motion       Patient will achieve left hip flexion  degrees or greater       Start:  04/25/25    Expected End:  10/30/25            Patient will achieve left hip extension ROM 15 degrees or greater       Start:  04/25/25    Expected End:  10/30/25            Patient will achieve left hip internal rotation ROM 30 degrees or greater in 90 degrees hip flexion       Start:  04/25/25    Expected End:  10/30/25            Patient will achieve left hip external rotation ROM 30 degrees or greater in 90 degrees hip flexion       Start:  04/25/25    Expected End:  10/30/25               Strength       Patient will achieve left hip flexion strength of 4+/5       Start:  04/25/25    Expected End:  10/30/25            Patient will achieve left hip extension strength of 4/5       Start:  04/25/25    Expected End:  10/30/25            Patient will achieve left hip abduction strength of 4/5       Start:  04/25/25    Expected End:  10/30/25            Patient will achieve left hip adduction strength of 4+/5       Start:  04/25/25    Expected End:  10/30/25            Patient will achieve left hip external rotation strength of 4/5 in 90 degrees hip flexion       Start:  04/25/25    Expected End:  10/30/25            Patient will achieve left hip internal rotation strength of 4/5 in 90 degrees hip flexion       Start:  04/25/25    Expected End:  10/30/25            Patient will achieve left knee flexion strength of 5/5       Start:  04/25/25    Expected  End:  10/30/25            Patient will achieve left knee extension strength of 5/5       Start:  04/25/25    Expected End:  10/30/25               Transferring       Patient will perform at least 8 reps of sit to stand with supervision and no more than 1 UE support within 30 seconds to demonstrate improved functional activity tolerance and balance.       Start:  04/25/25    Expected End:  10/30/25                Jose Luis To, PTA

## 2025-06-17 ENCOUNTER — CLINICAL SUPPORT (OUTPATIENT)
Dept: REHABILITATION | Facility: HOSPITAL | Age: 75
End: 2025-06-17
Payer: COMMERCIAL

## 2025-06-17 DIAGNOSIS — R20.8 DECREASED SENSATION: Primary | ICD-10-CM

## 2025-06-17 DIAGNOSIS — R29.898 DECREASED GRIP STRENGTH OF LEFT HAND: ICD-10-CM

## 2025-06-17 DIAGNOSIS — Z74.1 SELF-CARE DEFICIT IN DRESSING: ICD-10-CM

## 2025-06-17 PROCEDURE — 97165 OT EVAL LOW COMPLEX 30 MIN: CPT | Mod: PN

## 2025-06-17 PROCEDURE — 97110 THERAPEUTIC EXERCISES: CPT | Mod: PN

## 2025-06-17 NOTE — PROGRESS NOTES
Outpatient Rehab    Occupational Therapy Evaluation    Patient Name: John Lemos  MRN: [id number]  YOB: 1950  Encounter Date: 6/17/2025    Therapy Diagnosis:   Encounter Diagnoses   Name Primary?    Decreased sensation Yes    Decreased  strength of left hand     Self-care deficit in dressing      Physician: Brenda Llamas NP    Physician Orders: Eval and Treat  Medical Diagnosis: Carpal tunnel syndrome on both sides  Surgical Diagnosis: Not applicable for this Episode   Surgical Date: Not applicable for this Episode  Days Since Last Surgery: Not applicable for this Episode    Visit # / Visits Authorized: 1 / 1  Insurance Authorization Period: 5/23/2025 to 5/23/2026  Date of Evaluation: 6/17/2025  Plan of Care Certification: 6/17/2025 to 8/12/25     Time In: 1600   Time Out: 1653  Total Time (in minutes): 53   Total Billable Time (in minutes): 53    Intake Outcome Measure for FOTO Survey    Therapist reviewed FOTO scores for John Lemos on 6/17/2025.   FOTO report - see Media section or FOTO account episode details.     Intake Score: 72.5%    Precautions:   Poor sensation left hand    Subjective   Activities of Daily Living      General Prior Level of Function Comments: Able to use my hands.  General Current Level of Function Comments: Difficulty with use of both of my hands.           Pain     Patient reports a current pain level of 5/10. Pain at best is reported as 5/10. Pain at worst is reported as 10/10.   Location: bilateral hand entire palm  Pain Qualities: Other (Comment)  Other Pain Qualities: numb and tingling in all digits both hands         Employment  Employment Status: Employed full-time   [employer]       Past Medical History/Physical Systems Review:   John Lemos  has a past medical history of Arthritis, Cataract, Diabetes mellitus, Diabetes mellitus type 2, controlled, Eye injury, and Hyperlipidemia.    John Lemos  has a past surgical history  that includes Colonoscopy (N/A, 11/25/2019) and Total replacement of hip joint using computer-assisted navigation (Left, 4/14/2025).    John has a current medication list which includes the following prescription(s): acetaminophen, aspirin, atorvastatin, blood sugar diagnostic, diclofenac, docusate sodium, duloxetine, lancets, losartan, metformin, oxycodone, pregabalin, tadalafil, and trulicity.    Review of patient's allergies indicates:   Allergen Reactions    Sulfa (sulfonamide antibiotics) Swelling        Objective      Upper Extremity Monofilament Tests   Right Hand Left Hand   Thumb   3.61: Diminished light touch     Index 4.31: Diminished protective sensation   3.61: Diminished light touch     Middle 4.31: Diminished protective sensation   3.61: Diminished light touch     Ring 4.31: Diminished protective sensation   3.61: Diminished light touch     Little 4.31: Diminished protective sensation   3.61: Diminished light touch     Additional Details                   Wrist Range of Motion  Right Wrist   Active (deg) Passive (deg) Pain Comment   Flexion 55         Extension 60         Radial Deviation 0         Ulnar Deviation 45           Left Wrist   Active (deg) Passive (deg) Pain Comment   Flexion 30         Extension 70         Radial Deviation 0         Ulnar Deviation 45                     Adequate AROM: Right Hand and Left Hand  Left Thumb unable to oppose to small finger.              Forearm Strength   Right Strength Right Pain Left Strength Left  Pain   Pronation 4-   4-     Supination 4-   4-         Wrist Strength - Planes of Motion   Right Strength Right Pain Left Strength Left  Pain   Flexion 3-   4     Extension 3-   3+     Radial Deviation 3+   3+     Ulnar Deviation (C8) 4-   3       Right  Strength  Right Hand Dynamometer Position: 2  Elbow Position Forearm Position Trial 1 (lbs) Trial 2  (lbs) Trial 3  (lbs) Average  (lbs) Pain   Flexed Neutral 51 46 47 48         Left  Strength  Left  Hand Dynamometer Position: 2  Elbow Position Forearm Position Trial 1 (lbs) Trial 2 (lbs) Trial 3 (lbs) Average (lbs) Pain   Flexed Neutral 19 19 19 19         Right Pinch Strength   Trial 1 (lbs) Trial 2 (lbs) Trial 3 (lbs) Average (lbs) Pain   Lateral (Key Pinch) 16 14 15 15     Three Point (Three Jaw David) 13 11 11 11.67     Two Point (Tip to Tip)                 Left Pinch Strength   Trial 1 (lbs) Trial 2 (lbs) Trial 3 (lbs) Average (lbs) Pain   Lateral (Key Pinch) 7 7 5 6.33     Three Point (Three Jaw David) 6 5 4 5     Two Point (Tip to Tip)                        Wrist/Hand Special Tests  Hand Coordination Special Tests  Right 9-Hole Peg Test (sec): 69  Left 9-Hole Peg Test (sec): 219       Coordination  Right 9-Hole Peg Test (sec): 69  Left 9-Hole Peg Test (sec): 219                Treatment:  Therapeutic Exercise  TE 1: performance of all home exercises x 10 min    Time Entry(in minutes):       Assessment & Plan   Assessment  John presents with a condition of Low complexity.   Presentation of Symptoms: Stable  Will Comorbidities Impact Care: No       ADL Limitations : Feeding, Dressing                    Response Details: John struggles with left hand greater then right hand with fine motor and strength tasks.  He is right hand dominant but the left is significantly weaker and less coordinated then right hand.   Patient Goal for Therapy (OT): Better use of his hands.  Prognosis: Fair  Prognosis Details: John is in the process of a surgery to correct some of his issues creating hand difficulties. This should help with his hand issues.  Assessment Details: Weakness and coordination issues can be addressed with exercises that will be able to be continued after his procedure.  He expresses engagement in therapy to help with his symptoms.     Plan  From an occupational therapy perspective, the patient would benefit from: Skilled Rehab Services                              Plan details: Patient will attend  therapy to 1 x week to engage in strengthening and coordination activities and exercises x 8 weeks.          The patient's spiritual, cultural, and educational needs were considered, and the patient is agreeable to the plan of care and goals.     Education  Education was done with Patient. The patient's learning style includes Demonstration, Pictures/video, and Reading. The patient Demonstrates understanding and Verbalizes understanding.                 Goals:   Active       Short term goals        Short term goals to be met by 7/15/25    Patient to be IND with HEP and modalities for pain/edema managment.  Increase left  strength to 23 lbs. to improve functional grasp for ADLs/work/leisure activities.   Increase left key pinch to 7.5 psi's to increase IND with button and FM Coordination.  Increase left 3pt pinch to 6 to improve manipulation tasks.         Long term goals       Start:  06/17/25    Expected End:  08/12/25       Long term goals to be met by 8/12/25    Patient will improve his left hand 9 hole peg test by 30 seconds to 194 seconds for fine motor coordination.  Increase left  strength to 26 lbs. to increase functional use of left hand.             DOMINIQUE Portillo

## 2025-06-17 NOTE — PATIENT INSTRUCTIONS
"Complete the following exercises with 10 repetitions each, 2 x/day.       AROM: Isolated MCP Flexion / Extension ("Wave")   Bend only your large, bottom knuckles. Hold 3 seconds. Keep the tips of your fingers straight. Straighten fingers.    AROM: Isolated IPJ Flexion / Extension ("Hook")  Bend only your middle and end knuckles. Hold 3 seconds.   Straighten your fingers.     AROM: MCP and PIP Flexion / Extension ("Straight Fist")  Bend your bottom and middle knuckles, keeping the tips of your fingers straight. Try to touch the pads of your fingers on your palm. Hold 3 seconds. Straighten your fingers.       AROM: Composite Flexion / Extension ("Full Fist")  Bend every joint in your hand into a fist. Hold 3 seconds. Straighten your fingers.     AROM: Composte Extension ("Finger Lifts")  Lift your finger off of the table one at a time. Hold 3 seconds. Relax your finger.    AROM: Abduction / Adduction  With hand flat on table, spread all fingers apart, then bring them together as close as possible.    Copyright © I. All rights reserved.   DOMINIQUE Portillo  "

## 2025-06-17 NOTE — PROGRESS NOTES
Outpatient Rehab    Occupational Therapy Evaluation    Patient Name: John Lemos  MRN: [id number]  YOB: 1950  Encounter Date: 6/17/2025    Therapy Diagnosis:   Encounter Diagnoses   Name Primary?    Decreased sensation Yes    Decreased  strength of left hand     Self-care deficit in dressing      Physician: Brenda Llamas NP    Physician Orders: Eval and Treat  Medical Diagnosis: Carpal tunnel syndrome on both sides  Surgical Diagnosis: Not applicable for this Episode   Surgical Date: Not applicable for this Episode  Days Since Last Surgery: Not applicable for this Episode    Visit # / Visits Authorized: 1 / 1  Insurance Authorization Period: 5/23/2025 to 5/23/2026  Date of Evaluation: 6/17/2025  Plan of Care Certification: 6/17/2025 to 8/12/25     Time In: 1600   Time Out: 1653  Total Time (in minutes): 53   Total Billable Time (in minutes): 53    Intake Outcome Measure for FOTO Survey    Therapist reviewed FOTO scores for John Lemos on 6/17/2025.   FOTO report - see Media section or FOTO account episode details.     Intake Score: 72.5%    Precautions:       Subjective   Activities of Daily Living      General Prior Level of Function Comments: Able to use my hands.  General Current Level of Function Comments: Difficulty with use of both of my hands.           Pain     Patient reports a current pain level of 5/10. Pain at best is reported as 5/10. Pain at worst is reported as 10/10.   Location: bilateral hand entire palm  Pain Qualities: Other (Comment)  Other Pain Qualities: numb and tingling in all digits both hands         Employment  Employment Status: Employed full-time   [employer]       Past Medical History/Physical Systems Review:   John Lemos  has a past medical history of Arthritis, Cataract, Diabetes mellitus, Diabetes mellitus type 2, controlled, Eye injury, and Hyperlipidemia.    John Lemos  has a past surgical history that includes Colonoscopy  (N/A, 11/25/2019) and Total replacement of hip joint using computer-assisted navigation (Left, 4/14/2025).    John has a current medication list which includes the following prescription(s): acetaminophen, aspirin, atorvastatin, blood sugar diagnostic, diclofenac, docusate sodium, duloxetine, lancets, losartan, metformin, oxycodone, pregabalin, tadalafil, and trulicity.    Review of patient's allergies indicates:   Allergen Reactions    Sulfa (sulfonamide antibiotics) Swelling        Objective      Upper Extremity Monofilament Tests   Right Hand Left Hand   Thumb   3.61: Diminished light touch     Index 4.31: Diminished protective sensation   3.61: Diminished light touch     Middle 4.31: Diminished protective sensation   3.61: Diminished light touch     Ring 4.31: Diminished protective sensation   3.61: Diminished light touch     Little 4.31: Diminished protective sensation   3.61: Diminished light touch     Additional Details                   Wrist Range of Motion  Right Wrist   Active (deg) Passive (deg) Pain Comment   Flexion 55         Extension 60         Radial Deviation 0         Ulnar Deviation 45           Left Wrist   Active (deg) Passive (deg) Pain Comment   Flexion 30         Extension 70         Radial Deviation 0         Ulnar Deviation 45                     Adequate AROM: Right Hand and Left Hand  Left Thumb unable to oppose to small finger.              Forearm Strength   Right Strength Right Pain Left Strength Left  Pain   Pronation 4-   4-     Supination 4-   4-         Wrist Strength - Planes of Motion   Right Strength Right Pain Left Strength Left  Pain   Flexion 3-   4     Extension 3-   3+     Radial Deviation 3+   3+     Ulnar Deviation (C8) 4-   3       Right  Strength  Right Hand Dynamometer Position: 2  Elbow Position Forearm Position Trial 1 (lbs) Trial 2  (lbs) Trial 3  (lbs) Average  (lbs) Pain   Flexed Neutral 51 46 47 48         Left  Strength  Left Hand Dynamometer  Position: 2  Elbow Position Forearm Position Trial 1 (lbs) Trial 2 (lbs) Trial 3 (lbs) Average (lbs) Pain   Flexed Neutral 19 19 19 19         Right Pinch Strength   Trial 1 (lbs) Trial 2 (lbs) Trial 3 (lbs) Average (lbs) Pain   Lateral (Key Pinch) 16 14 15 15     Three Point (Three Jaw David) 13 11 11 11.67     Two Point (Tip to Tip)                 Left Pinch Strength   Trial 1 (lbs) Trial 2 (lbs) Trial 3 (lbs) Average (lbs) Pain   Lateral (Key Pinch) 7 7 5 6.33     Three Point (Three Jaw David) 6 5 4 5     Two Point (Tip to Tip)                        Wrist/Hand Special Tests  Hand Coordination Special Tests  Right 9-Hole Peg Test (sec): 69  Left 9-Hole Peg Test (sec): 219       Coordination  Right 9-Hole Peg Test (sec): 69  Left 9-Hole Peg Test (sec): 219                Treatment:  Therapeutic Exercise  TE 1: performance of all home exercises x 10 min    Time Entry(in minutes):       Assessment & Plan   Assessment  John presents with a condition of Low complexity.   Presentation of Symptoms: Stable  Will Comorbidities Impact Care: No       ADL Limitations : Feeding, Dressing                    Response Details: John struggles with left hand greater then right hand with fine motor and strength tasks.  He is right hand dominant but the left is significantly weaker and less coordinated then right hand.   Patient Goal for Therapy (OT): Better use of his hands.  Prognosis: Fair  Prognosis Details: John is in the process of a surgery to correct some of his issues creating hand difficulties. This should help with his hand issues.  Assessment Details: Weakness and coordination issues can be addressed with exercises that will be able to be continued after his procedure.  He expresses engagement in therapy to help with his symptoms.     Plan  From an occupational therapy perspective, the patient would benefit from: Skilled Rehab Services                              Plan details: Patient will attend therapy to 1 x week  to engage in strengthening and coordination activities and exercises x 8 weeks.          The patient's spiritual, cultural, and educational needs were considered, and the patient is agreeable to the plan of care and goals.     Education  Education was done with Patient. The patient's learning style includes Demonstration, Pictures/video, and Reading. The patient Demonstrates understanding and Verbalizes understanding.                 Goals:   Active       Short term goals        Short term goals to be met by 7/15/25    Patient to be IND with HEP and modalities for pain/edema managment.  Increase left  strength to 23 lbs. to improve functional grasp for ADLs/work/leisure activities.   Increase left key pinch to 7.5 psi's to increase IND with button and FM Coordination.  Increase left 3pt pinch to 6 to improve manipulation tasks.         Long term goals       Start:  06/17/25    Expected End:  08/12/25       Long term goals to be met by 8/12/25    Patient will improve his left hand 9 hole peg test by 30 seconds to 194 seconds for fine motor coordination.  Increase left  strength to 26 lbs. to increase functional use of left hand.             DOMINIQUE Portillo

## 2025-06-18 ENCOUNTER — CLINICAL SUPPORT (OUTPATIENT)
Dept: REHABILITATION | Facility: HOSPITAL | Age: 75
End: 2025-06-18
Payer: COMMERCIAL

## 2025-06-18 DIAGNOSIS — R29.898 WEAKNESS OF BOTH LOWER EXTREMITIES: Primary | ICD-10-CM

## 2025-06-18 DIAGNOSIS — R26.89 BALANCE PROBLEMS: ICD-10-CM

## 2025-06-18 DIAGNOSIS — Z74.09 DECREASED FUNCTIONAL MOBILITY AND ENDURANCE: ICD-10-CM

## 2025-06-18 PROCEDURE — 97112 NEUROMUSCULAR REEDUCATION: CPT | Mod: PN

## 2025-06-18 PROCEDURE — 97530 THERAPEUTIC ACTIVITIES: CPT | Mod: PN

## 2025-06-18 NOTE — PROGRESS NOTES
Outpatient Rehab    Physical Therapy Progress Note    Patient Name: John Lemos  MRN: [id number]  YOB: 1950  Encounter Date: 6/18/2025    Therapy Diagnosis:   Encounter Diagnoses   Name Primary?    Weakness of both lower extremities Yes    Balance problems     Decreased functional mobility and endurance      Physician: Brenda Llamas NP    Physician Orders: Eval and Treat  Medical Diagnosis: Status post left hip replacement  Surgical Diagnosis: Not applicable for this Episode   Surgical Date: Not applicable for this Episode  Days Since Last Surgery: Not applicable for this Episode    Visit # / Visits Authorized:  13 / 20  Insurance Authorization Period: 4/25/2025 to 12/31/2025  Date of Evaluation: 4/23/2025  Plan of Care Certification: 4/25/2025 to 10/30/2025      PT/PTA:     Number of PTA visits since last PT visit:   Time In: 0945   Time Out: 1040  Total Time (in minutes): 55   Total Billable Time (in minutes): 53    FOTO:  Intake Score:  %  Survey Score 2:  %  Survey Score 3:  %    Precautions:       Subjective   Pt states he continues to improve with therapy.  repots consult with MD next week for possible neck surgery..         Objective       Hip Range of Motion   Left Hip   Active (deg) Passive (deg) Pain   Flexion 95       Extension 10       ABduction 30       ADduction         External Rotation 90/90         External Rotation Prone         Internal Rotation 90/90         Internal Rotation Prone                         Fall Risk  Functional mobility test results suggest the patient is: At Risk for Falls  Sit to Stand Testing      The patient completed 7 repetitions of a sit to stand transfer in 30 seconds. with CARMEN assist SBA              Treatment:  Therapeutic Exercise  TE 9: Supine hamstring stretch /c strap 3x30 sec ea (pt was able to hold/pull his leg up without assistance)  Balance/Neuromuscular Re-Education  NMR 4: Supine SAQ 3# 3x10  NMR 5: Seated marches 4 lbs on R LE, 2 lbs on L  LE 3x10 ea  NMR 6: Seated LAQ +4 lbs, 3x10  NMR 7: Standing hip abduction /c YTB 2x10  NMR 8: Standing hip extension /c YTB 2x10 (noted L knee netta at times, CGA/Min Assist for recovery and for balance support)  Therapeutic Activity  TA 1: Nu-step 10 min level 1  TA 2: Sit to stand from 18 in hi-low or box 3x10 (/c CGA using gait belt)  TA 3: gait on lap around gym with SBQC CGA/SBA    Time Entry(in minutes):  Neuromuscular Re-Education Time Entry: 25  Therapeutic Activity Time Entry: 25    Assessment & Plan   Assessment: Pt presents ambulating with a FWW, flexed posture. Decreased stance on left LE.  Progress LE strengthening and balance activities with good performance. Improved sit to stand to 7 sec with single UE assist. No c/o increased discomfort with prescribed activities.  Good response to exercise progression.    Evaluation/Treatment Tolerance: Patient tolerated treatment well    The patient will continue to benefit from skilled outpatient physical therapy in order to address the deficits listed in the problem list on the initial evaluation, provide patient and family education, and maximize the patients level of independence in the home and community environments.     The patient's spiritual, cultural, and educational needs were considered, and the patient is agreeable to the plan of care and goals.     Education  Education was done with Patient. The patient's learning style includes Demonstration. The patient Demonstrates understanding.                 Plan: Will continue per PT plan of care.    Goals:   Active       Ambulation/movement       Patient will ambulate at least 3 laps around the therapy gym (600 ft distance) in 6 minutes using least restrictive AD with supervision       Start:  04/25/25    Expected End:  10/30/25            Patient will ambulate with close normal gait pattern with least restrictive AD       Start:  04/25/25    Expected End:  10/30/25               Functional outcome        Patient will show a significant change in FOTO patient-reported outcome tool to demonstrate subjective improvement       Start:  04/25/25    Expected End:  10/30/25            Patient will demonstrate independence in home program for support of progression (Progressing)       Start:  04/25/25    Expected End:  10/30/25               Range of Motion       Patient will achieve left hip flexion  degrees or greater (Ongoing)       Start:  04/25/25    Expected End:  10/30/25            Patient will achieve left hip extension ROM 15 degrees or greater (Ongoing)       Start:  04/25/25    Expected End:  10/30/25            Patient will achieve left hip internal rotation ROM 30 degrees or greater in 90 degrees hip flexion (Ongoing)       Start:  04/25/25    Expected End:  10/30/25            Patient will achieve left hip external rotation ROM 30 degrees or greater in 90 degrees hip flexion (Ongoing)       Start:  04/25/25    Expected End:  10/30/25               Strength       Patient will achieve left hip flexion strength of 4+/5 (Ongoing)       Start:  04/25/25    Expected End:  10/30/25            Patient will achieve left hip extension strength of 4/5 (Ongoing)       Start:  04/25/25    Expected End:  10/30/25            Patient will achieve left hip abduction strength of 4/5 (Ongoing)       Start:  04/25/25    Expected End:  10/30/25            Patient will achieve left hip adduction strength of 4+/5       Start:  04/25/25    Expected End:  10/30/25            Patient will achieve left hip external rotation strength of 4/5 in 90 degrees hip flexion       Start:  04/25/25    Expected End:  10/30/25            Patient will achieve left hip internal rotation strength of 4/5 in 90 degrees hip flexion       Start:  04/25/25    Expected End:  10/30/25            Patient will achieve left knee flexion strength of 5/5       Start:  04/25/25    Expected End:  10/30/25            Patient will achieve left knee extension  strength of 5/5       Start:  04/25/25    Expected End:  10/30/25               Transferring       Patient will perform at least 8 reps of sit to stand with supervision and no more than 1 UE support within 30 seconds to demonstrate improved functional activity tolerance and balance. (Ongoing)       Start:  04/25/25    Expected End:  10/30/25              Resolved       Pain       Patient will report pain of 1/10 demonstrating a reduction of overall pain (Met)       Start:  04/25/25    Expected End:  10/30/25    Resolved:  06/18/25             Shai Warner, PT

## 2025-06-20 ENCOUNTER — PATIENT OUTREACH (OUTPATIENT)
Dept: ADMINISTRATIVE | Facility: HOSPITAL | Age: 75
End: 2025-06-20
Payer: COMMERCIAL

## 2025-06-21 ENCOUNTER — RESULTS FOLLOW-UP (OUTPATIENT)
Dept: FAMILY MEDICINE | Facility: CLINIC | Age: 75
End: 2025-06-21

## 2025-06-21 ENCOUNTER — APPOINTMENT (OUTPATIENT)
Dept: LAB | Facility: HOSPITAL | Age: 75
End: 2025-06-21
Attending: INTERNAL MEDICINE
Payer: COMMERCIAL

## 2025-06-23 ENCOUNTER — PATIENT MESSAGE (OUTPATIENT)
Dept: NEUROLOGY | Facility: CLINIC | Age: 75
End: 2025-06-23
Payer: COMMERCIAL

## 2025-06-23 ENCOUNTER — CLINICAL SUPPORT (OUTPATIENT)
Dept: REHABILITATION | Facility: HOSPITAL | Age: 75
End: 2025-06-23
Payer: COMMERCIAL

## 2025-06-23 DIAGNOSIS — M54.12 CERVICAL RADICULOPATHY: Primary | ICD-10-CM

## 2025-06-23 DIAGNOSIS — R26.89 BALANCE PROBLEMS: ICD-10-CM

## 2025-06-23 DIAGNOSIS — R29.898 WEAKNESS OF BOTH LOWER EXTREMITIES: Primary | ICD-10-CM

## 2025-06-23 DIAGNOSIS — Z74.09 DECREASED FUNCTIONAL MOBILITY AND ENDURANCE: ICD-10-CM

## 2025-06-23 PROCEDURE — 97530 THERAPEUTIC ACTIVITIES: CPT | Mod: PN,CQ

## 2025-06-23 PROCEDURE — 97112 NEUROMUSCULAR REEDUCATION: CPT | Mod: PN,CQ

## 2025-06-23 NOTE — PROGRESS NOTES
"  Outpatient Rehab    Physical Therapy Visit    Patient Name: John Lemos  MRN: [id number]  YOB: 1950  Encounter Date: 6/23/2025    Therapy Diagnosis:   Encounter Diagnoses   Name Primary?    Weakness of both lower extremities Yes    Balance problems     Decreased functional mobility and endurance      Physician: Brenda Llamas NP    Physician Orders: Eval and Treat  Medical Diagnosis: Status post left hip replacement  Surgical Diagnosis: Not applicable for this Episode   Surgical Date: Not applicable for this Episode  Days Since Last Surgery: Not applicable for this Episode    Visit # / Visits Authorized:  14 / 20  Insurance Authorization Period: 4/25/2025 to 12/31/2025  Date of Evaluation: 4/23/2025  Plan of Care Certification: 4/25/2025 to 10/30/2025      PT/PTA: PTA   Number of PTA visits since last PT visit:1  Time In: 0900   Time Out: 0955  Total Time (in minutes): 55   Total Billable Time (in minutes): 55    FOTO:  Intake Score:  %  Survey Score 2:  %  Survey Score 3:  %    Precautions:  Left Lower Extremity Weight-Bearing Status: Weight-bearing as tolerated  Left hip replacement surgery on April 14th; POW10 weeks      Subjective   Patient reports feeling ok, no hip pain just discomfort.  Pain reported as 5/10.      Objective            Treatment:  Balance/Neuromuscular Re-Education  NMR 2: Bridges RTB 3x10  NMR 3: SL Clamshell RTB 3x10x3"  NMR 5: Seated marches 4 lbs on R LE, 2 lbs on L LE 3x10 ea  NMR 6: Seated LAQ 4 lbs, 3x10x3"  NMR 7: Standing hip abduction /c YTB 3x10 (noted L knee netta at times, CGA/Min Assist for recovery and for balance support)  NMR 8: Standing hip extension /c YTB 2x10  NMR 9: Standing hip Flex /c YTB 3x10  Therapeutic Activity  TA 1: Nu-step 10 min level 2  TA 2: Sit to stand from 20 in hi-low or box 3x10 (/c CGA using gait belt)  TA 3: gait on lap around gym with SBQC CGA/SBA  TA 4: Butt taps mini squat 24" H&L mat holding on RW 3x10 (Asistance to hold on RW " for support)    Time Entry(in minutes):  Neuromuscular Re-Education Time Entry: 30  Therapeutic Activity Time Entry: 25    Assessment & Plan   Assessment: Pt presents ambulating with a FWW, flexed posture, decreased stance on left LE.  Progress LE strengthening and balance activities with good performance. Added Bridges and SL Clamshell this visit, he tolerated well. No c/o increased discomfort with prescribed activities.  Good response to exercise progression.    Evaluation/Treatment Tolerance: Patient tolerated treatment well    The patient will continue to benefit from skilled outpatient physical therapy in order to address the deficits listed in the problem list on the initial evaluation, provide patient and family education, and maximize the patients level of independence in the home and community environments.     The patient's spiritual, cultural, and educational needs were considered, and the patient is agreeable to the plan of care and goals.           Plan: Will continue per PT plan of care.    Goals:   Active       Ambulation/movement       Patient will ambulate at least 3 laps around the therapy gym (600 ft distance) in 6 minutes using least restrictive AD with supervision       Start:  04/25/25    Expected End:  10/30/25            Patient will ambulate with close normal gait pattern with least restrictive AD       Start:  04/25/25    Expected End:  10/30/25               Functional outcome       Patient will show a significant change in FOTO patient-reported outcome tool to demonstrate subjective improvement       Start:  04/25/25    Expected End:  10/30/25            Patient will demonstrate independence in home program for support of progression (Progressing)       Start:  04/25/25    Expected End:  10/30/25               Range of Motion       Patient will achieve left hip flexion  degrees or greater (Ongoing)       Start:  04/25/25    Expected End:  10/30/25            Patient will achieve  left hip extension ROM 15 degrees or greater (Ongoing)       Start:  04/25/25    Expected End:  10/30/25            Patient will achieve left hip internal rotation ROM 30 degrees or greater in 90 degrees hip flexion (Ongoing)       Start:  04/25/25    Expected End:  10/30/25            Patient will achieve left hip external rotation ROM 30 degrees or greater in 90 degrees hip flexion (Ongoing)       Start:  04/25/25    Expected End:  10/30/25               Strength       Patient will achieve left hip flexion strength of 4+/5 (Ongoing)       Start:  04/25/25    Expected End:  10/30/25            Patient will achieve left hip extension strength of 4/5 (Ongoing)       Start:  04/25/25    Expected End:  10/30/25            Patient will achieve left hip abduction strength of 4/5 (Ongoing)       Start:  04/25/25    Expected End:  10/30/25            Patient will achieve left hip adduction strength of 4+/5       Start:  04/25/25    Expected End:  10/30/25            Patient will achieve left hip external rotation strength of 4/5 in 90 degrees hip flexion       Start:  04/25/25    Expected End:  10/30/25            Patient will achieve left hip internal rotation strength of 4/5 in 90 degrees hip flexion       Start:  04/25/25    Expected End:  10/30/25            Patient will achieve left knee flexion strength of 5/5       Start:  04/25/25    Expected End:  10/30/25            Patient will achieve left knee extension strength of 5/5       Start:  04/25/25    Expected End:  10/30/25               Transferring       Patient will perform at least 8 reps of sit to stand with supervision and no more than 1 UE support within 30 seconds to demonstrate improved functional activity tolerance and balance. (Ongoing)       Start:  04/25/25    Expected End:  10/30/25              Resolved       Pain       Patient will report pain of 1/10 demonstrating a reduction of overall pain (Met)       Start:  04/25/25    Expected End:  10/30/25     Resolved:  06/18/25             Jose Luis Faustin, PTA

## 2025-06-25 ENCOUNTER — CLINICAL SUPPORT (OUTPATIENT)
Dept: REHABILITATION | Facility: HOSPITAL | Age: 75
End: 2025-06-25
Payer: COMMERCIAL

## 2025-06-25 ENCOUNTER — DOCUMENT SCAN (OUTPATIENT)
Dept: HOME HEALTH SERVICES | Facility: HOSPITAL | Age: 75
End: 2025-06-25
Payer: COMMERCIAL

## 2025-06-25 ENCOUNTER — HOSPITAL ENCOUNTER (OUTPATIENT)
Dept: RADIOLOGY | Facility: HOSPITAL | Age: 75
Discharge: HOME | End: 2025-06-25
Attending: ORTHOPAEDIC SURGERY
Payer: COMMERCIAL

## 2025-06-25 ENCOUNTER — OFFICE VISIT (OUTPATIENT)
Dept: ORTHOPEDICS | Facility: CLINIC | Age: 75
End: 2025-06-25
Payer: COMMERCIAL

## 2025-06-25 VITALS — BODY MASS INDEX: 26.19 KG/M2 | WEIGHT: 172.81 LBS | HEIGHT: 68 IN

## 2025-06-25 DIAGNOSIS — M50.30 DDD (DEGENERATIVE DISC DISEASE), CERVICAL: ICD-10-CM

## 2025-06-25 DIAGNOSIS — Z74.09 DECREASED FUNCTIONAL MOBILITY AND ENDURANCE: ICD-10-CM

## 2025-06-25 DIAGNOSIS — R29.898 WEAKNESS OF BOTH LOWER EXTREMITIES: Primary | ICD-10-CM

## 2025-06-25 DIAGNOSIS — M48.02 STENOSIS OF CERVICAL SPINE WITH MYELOPATHY: ICD-10-CM

## 2025-06-25 DIAGNOSIS — M47.812 CERVICAL SPONDYLOSIS: Primary | ICD-10-CM

## 2025-06-25 DIAGNOSIS — M54.12 CERVICAL RADICULOPATHY: ICD-10-CM

## 2025-06-25 DIAGNOSIS — G95.89 MYELOMALACIA OF CERVICAL CORD: ICD-10-CM

## 2025-06-25 DIAGNOSIS — M48.02 SPINAL STENOSIS, CERVICAL REGION: ICD-10-CM

## 2025-06-25 DIAGNOSIS — R26.89 BALANCE PROBLEMS: ICD-10-CM

## 2025-06-25 DIAGNOSIS — G99.2 STENOSIS OF CERVICAL SPINE WITH MYELOPATHY: ICD-10-CM

## 2025-06-25 PROCEDURE — 97112 NEUROMUSCULAR REEDUCATION: CPT | Mod: PN

## 2025-06-25 PROCEDURE — 97530 THERAPEUTIC ACTIVITIES: CPT | Mod: PN

## 2025-06-25 PROCEDURE — 99999 PR PBB SHADOW E&M-EST. PATIENT-LVL V: CPT | Mod: PBBFAC,,, | Performed by: ORTHOPAEDIC SURGERY

## 2025-06-25 PROCEDURE — 72040 X-RAY EXAM NECK SPINE 2-3 VW: CPT | Mod: TC

## 2025-06-25 PROCEDURE — 72040 X-RAY EXAM NECK SPINE 2-3 VW: CPT | Mod: 26,,, | Performed by: RADIOLOGY

## 2025-06-25 NOTE — PROGRESS NOTES
"  Outpatient Rehab    Physical Therapy Visit    Patient Name: John Lemos  MRN: [id number]  YOB: 1950  Encounter Date: 6/25/2025    Therapy Diagnosis:   Encounter Diagnoses   Name Primary?    Weakness of both lower extremities Yes    Balance problems     Decreased functional mobility and endurance      Physician: Brenda Llamas NP    Physician Orders: Eval and Treat  Medical Diagnosis: Status post left hip replacement  Surgical Diagnosis: Not applicable for this Episode   Surgical Date: Not applicable for this Episode  Days Since Last Surgery: Not applicable for this Episode    Visit # / Visits Authorized:  16 / 20  Insurance Authorization Period: 4/25/2025 to 12/31/2025  Date of Evaluation: 4/23/2025  Plan of Care Certification: 4/25/2025 to 10/30/2025      PT/PTA:     Number of PTA visits since last PT visit:   Time In: 1000   Time Out: 1100  Total Time (in minutes): 60   Total Billable Time (in minutes):      FOTO:  Intake Score:  %  Survey Score 2:  %  Survey Score 3:  %    Precautions:       Subjective   Patient reports continued compliance with his HEP. He states that he has started OT for his hands and it's been helping..  Pain reported as 0/10.      Objective            Treatment:  Balance/Neuromuscular Re-Education  NMR 2: Bridges +GTB 3x10  NMR 3: SL Clamshell +GTB 3x10x3"  NMR 5: Seated marches +5 lbs on R LE 3x10 ea  NMR 6: Seated LAQ +5 lbs, 3x10x3"  NMR 7: Standing hip abduction /c YTB 3x10  NMR 8: Standing hip extension /c YTB +3x10  NMR 9: Standing hip Flex /c YTB 3x10  Therapeutic Activity  TA 1: Nu-step 10 min level +3  TA 2: Sit to stand from 20 in hi-low or box 3x10 (/c CGA using gait belt)  TA 3: gait on lap around gym with SBQC CGA/SBA  TA 5: +seated heel raises 10 lbs 3x10  TA 6: +standing heel raises 3x10    Time Entry(in minutes):  Neuromuscular Re-Education Time Entry: 40  Therapeutic Activity Time Entry: 20    Assessment & Plan   Assessment: Patient presents to PT with " no symptom irritability. However, he continues to require the FWW for ambulation due to persistent balance issue. Progressed hip strengthening and added seated and standing heel raises today with noted good performance and tolerance. Cueing for proper form and muscle activation. No c/o increased discomfort with prescribed activities. He continues to demo decreased hip strength and balance. But slowly improving with PT services. Will progress pt as tolerated.  Evaluation/Treatment Tolerance: Patient tolerated treatment well    The patient will continue to benefit from skilled outpatient physical therapy in order to address the deficits listed in the problem list on the initial evaluation, provide patient and family education, and maximize the patients level of independence in the home and community environments.     The patient's spiritual, cultural, and educational needs were considered, and the patient is agreeable to the plan of care and goals.           Plan: Improve BLE strength and balance.    Goals:   Active       Ambulation/movement       Patient will ambulate at least 3 laps around the therapy gym (600 ft distance) in 6 minutes using least restrictive AD with supervision       Start:  04/25/25    Expected End:  10/30/25            Patient will ambulate with close normal gait pattern with least restrictive AD       Start:  04/25/25    Expected End:  10/30/25               Functional outcome       Patient will show a significant change in FOTO patient-reported outcome tool to demonstrate subjective improvement       Start:  04/25/25    Expected End:  10/30/25            Patient will demonstrate independence in home program for support of progression (Progressing)       Start:  04/25/25    Expected End:  10/30/25               Range of Motion       Patient will achieve left hip flexion  degrees or greater (Ongoing)       Start:  04/25/25    Expected End:  10/30/25            Patient will achieve left  hip extension ROM 15 degrees or greater (Ongoing)       Start:  04/25/25    Expected End:  10/30/25            Patient will achieve left hip internal rotation ROM 30 degrees or greater in 90 degrees hip flexion (Ongoing)       Start:  04/25/25    Expected End:  10/30/25            Patient will achieve left hip external rotation ROM 30 degrees or greater in 90 degrees hip flexion (Ongoing)       Start:  04/25/25    Expected End:  10/30/25               Strength       Patient will achieve left hip flexion strength of 4+/5 (Ongoing)       Start:  04/25/25    Expected End:  10/30/25            Patient will achieve left hip extension strength of 4/5 (Ongoing)       Start:  04/25/25    Expected End:  10/30/25            Patient will achieve left hip abduction strength of 4/5 (Ongoing)       Start:  04/25/25    Expected End:  10/30/25            Patient will achieve left hip adduction strength of 4+/5       Start:  04/25/25    Expected End:  10/30/25            Patient will achieve left hip external rotation strength of 4/5 in 90 degrees hip flexion       Start:  04/25/25    Expected End:  10/30/25            Patient will achieve left hip internal rotation strength of 4/5 in 90 degrees hip flexion       Start:  04/25/25    Expected End:  10/30/25            Patient will achieve left knee flexion strength of 5/5       Start:  04/25/25    Expected End:  10/30/25            Patient will achieve left knee extension strength of 5/5       Start:  04/25/25    Expected End:  10/30/25               Transferring       Patient will perform at least 8 reps of sit to stand with supervision and no more than 1 UE support within 30 seconds to demonstrate improved functional activity tolerance and balance. (Ongoing)       Start:  04/25/25    Expected End:  10/30/25              Resolved       Pain       Patient will report pain of 1/10 demonstrating a reduction of overall pain (Met)       Start:  04/25/25    Expected End:  10/30/25     Resolved:  06/18/25             Dain Donovan PT

## 2025-06-25 NOTE — PROGRESS NOTES
DATE: 6/27/2025  PATIENT: John Lemos    Attending Physician: Damián Geiger M.D.    CHIEF COMPLAINT: neck pain and hand clumsiness    HISTORY:  John Lemos is a 74 y.o. male w/ a hx of L TAL presents for initial evaluation of neck pain (Neck - 1). The pain has been present for years. The patient describes the pain as dull but it does not go down arms. The pain is worse with activity and improved by rest. There is BUE associated numbness and tingling. There is BUE subjective weakness. Prior treatments have included OTC meds, PT, but no CHRIS or surgery. Patient is miserable and wants surgical intervention.     The Patient endorses myelopathic symptoms such as handwriting changes or difficulty with buttons/coins/keys. He has trouble with walking/balance. He uses a walker for ambulation. Denies perineal paresthesias, bowel/bladder dysfunction.    The patient does not smoke or endorse IVDU. He has DM (HA1C of 6.6). The patient is not on any blood thinners and does not take chronic narcotics. He works as a special .    PAST MEDICAL/SURGICAL HISTORY:  Past Medical History:   Diagnosis Date    Arthritis     Cataract     ou    Diabetes mellitus 7 yrs    lbs: 118 1 week ago    Diabetes mellitus type 2, controlled 9/8/2012    Eye injury     fb    Hyperlipidemia      Past Surgical History:   Procedure Laterality Date    COLONOSCOPY N/A 11/25/2019    Procedure: COLONOSCOPY;  Surgeon: Eddie Leslie MD;  Location: [facility] ENDO;  Service: Endoscopy;  Laterality: N/A;    TOTAL REPLACEMENT OF HIP JOINT USING COMPUTER-ASSISTED NAVIGATION Left 4/14/2025    Procedure: ARTHROPLASTY, HIP, TOTAL, USING COMPUTER-ASSISTED NAVIGATION;  Surgeon: Judson Steel MD;  Location: [facility] OR;  Service: Orthopedics;  Laterality: Left;  Yobany Church Notified- NC---T/S--done  RN PREOP 4/2/2025---H/P--INCOMPLETE---       Current Medications: Current Medications[1]    Social History: Social History[2]    REVIEW OF  "SYSTEMS:  Constitution: Negative. Negative for chills, fever and night sweats.   Cardiovascular: Negative for chest pain and syncope.   Respiratory: Negative for cough and shortness of breath.   Gastrointestinal: See HPI. Negative for nausea/vomiting. Negative for abdominal pain.  Genitourinary: See HPI. Negative for discoloration or dysuria.  Skin: Negative for dry skin, itching and rash.   Hematologic/Lymphatic: negative for bleeding/clotting disorders.   Musculoskeletal: Negative for falls and muscle weakness.   Neurological: See HPI. no history of seizures. no history of cranial surgery or shunts.  Endocrine: Negative for polydipsia, polyphagia and polyuria.   Allergic/Immunologic: Negative for hives and persistent infections.  Psychiatric/Behavioral: Negative for depression and insomnia.         EXAM:  Ht 5' 8" (1.727 m)   Wt 78.4 kg (172 lb 13.5 oz)   BMI 26.28 kg/m²     General: The patient is a 74 y.o. male in no apparent distress, the patient is orientatied to person, place and time.  Psych: Normal mood and affect  HEENT: Vision grossly intact, hearing intact to the spoken word.  Lungs: Respirations unlabored.  Gait: Normal station and gait, no difficulty with toe or heel walk.   Skin: Cervical skin negative for rashes, lesions, hairy patches and surgical scars.  Range of motion: Cervical range of motion is acceptable. There is no tenderness to palpation.  Spinal Balance: Global saggital and coronal spinal balance acceptable, no significant for scoliosis and kyphosis.  Musculoskeletal: No pain with the range of motion of the bilateral shoulders and elbows. Normal bulk and contour of the bilateral hands.  Vascular: Bilateral hands warm and well perfused, Radial pulses 2+ bilaterally.  Neurological: Normal strength and tone in all major motor groups in the bilateral upper and lower extremities except for 4/5 in L EE, EF and IO. Normal sensation to light touch in the C5-T1 and L2-S1 dermatomes " bilaterally.  Deep tendon reflexes 3+ in the LUE  Negative Inverted Radial Reflex and Calvin's bilaterally. Negative Babinski bilaterally.     IMAGING:   Today I independently reviewed the following images and my interpretations are as follows:    AP, Lat and Flex/Ex  upright C-spine films demonstrate spondylosis and DDD.    MRI cervical showed C3-4 mod-severe central stenosis and myelomalacia.     Body mass index is 26.28 kg/m².  Hemoglobin A1C   Date Value Ref Range Status   12/18/2024 6.1 (H) 4.0 - 5.6 % Final     Comment:     ADA Screening Guidelines:  5.7-6.4%  Consistent with prediabetes  >or=6.5%  Consistent with diabetes    High levels of fetal hemoglobin interfere with the HbA1C  assay. Heterozygous hemoglobin variants (HbS, HgC, etc)do  not significantly interfere with this assay.   However, presence of multiple variants may affect accuracy.     05/18/2024 6.7 (H) 4.0 - 5.6 % Final     Comment:     ADA Screening Guidelines:  5.7-6.4%  Consistent with prediabetes  >or=6.5%  Consistent with diabetes    High levels of fetal hemoglobin interfere with the HbA1C  assay. Heterozygous hemoglobin variants (HbS, HgC, etc)do  not significantly interfere with this assay.   However, presence of multiple variants may affect accuracy.     10/21/2023 7.0 (H) 4.0 - 5.6 % Final     Comment:     ADA Screening Guidelines:  5.7-6.4%  Consistent with prediabetes  >or=6.5%  Consistent with diabetes    High levels of fetal hemoglobin interfere with the HbA1C  assay. Heterozygous hemoglobin variants (HbS, HgC, etc)do  not significantly interfere with this assay.   However, presence of multiple variants may affect accuracy.       Hemoglobin A1c   Date Value Ref Range Status   06/21/2025 6.6 (H) 4.0 - 5.6 % Final     Comment:     ADA Screening Guidelines:  5.7-6.4%  Consistent with prediabetes  >=6.5%  Consistent with diabetes    High levels of fetal hemoglobin interfere with the HbA1C  assay. Heterozygous hemoglobin variants (HbS,  HgC, etc)do  not significantly interfere with this assay.   However, presence of multiple variants may affect accuracy.   04/02/2025 6.7 (H) 4.0 - 5.6 % Final     Comment:     ADA Screening Guidelines:  5.7-6.4%  Consistent with prediabetes  >=6.5%  Consistent with diabetes    High levels of fetal hemoglobin interfere with the HbA1C  assay. Heterozygous hemoglobin variants (HbS, HgC, etc)do  not significantly interfere with this assay.   However, presence of multiple variants may affect accuracy.       ASSESSMENT/PLAN:  Cervical spondylosis  -     Case Request Operating Room: DISCECTOMY, SPINE, CERVICAL, ANTERIOR, W/ ARTHRODESIS  C3-4 ACDF  SPINEWAVE  SNS: VOCAL CORDS/MOTORS/SSEP    DDD (degenerative disc disease), cervical    Stenosis of cervical spine with myelopathy    Myelomalacia of cervical cord    Spinal stenosis, cervical region  -     CT Cervical Spine Without Contrast; Future; Expected date: 06/25/2025      No follow-ups on file.    Patient has cervical spondylosis and stenosis with myelopathy and myelomalacia. I discussed the natural history of their diagnoses as well as surgical and nonsurgical treatment options. I educated the patient on the importance of core/back strengthening, correct posture, bending/lifting ergonomics, and low-impact aerobic exercises (walking, elliptical, and aquatherapy). Continue medications. Patient has failed conservative management and is a candidate for C3-4 ACDF. I ordered CT cervical for preop planning. He will need preop clearance.    I had a sit down discussion with the patient regarding cervical myelopathy. I discussed the known stepwise degeneration of cervical myelopathy. I discussed the risks and benefits of decompressive surgery, including the concept that surgery would be done to prevent further neurological injury/degeneration rather than to ameliorate any existing deficits.     I had a sit down discussion with the patient regarding the above surgery. We  specifically discussed the risks, benefits, and alternatives to surgery. We discussed the surgical procedure including the skin incision, nerve decompression, bone fusion, allograft and/or iliac crest bone graft, and surgical implants including plates and interbody spacers as indicated: they understand the risks include but are not limited to death, paralysis, blindness, bleeding, infection, damage to arteries, veins and nerves, tracheal injury, esophageal injury, vertebral artery injury, dysphagia, spinal fluid leak, continued or worsening pain, no improvement in symptoms, non-union, and the possible need for more surgery in the future, as well as the possibility other unforseen and unknown complications. We talked about expected hospital stay and recovery period. All questions were answered; they understand and wish to proceed.     Damián Geiger MD  Orthopaedic Spine Surgeon  Department of Orthopaedic Surgery  [phone]           [1]   Current Outpatient Medications:     acetaminophen (TYLENOL) 500 MG tablet, Take 2 tablets (1,000 mg total) by mouth every 8 (eight) hours as needed for Pain., Disp: 42 tablet, Rfl: 0    atorvastatin (LIPITOR) 40 MG tablet, Take 1 tablet (40 mg total) by mouth once daily., Disp: 90 tablet, Rfl: 3    blood sugar diagnostic (FREESTYLE LITE STRIPS) Strp, Inject 1 each as directed once daily., Disp: 100 each, Rfl: 3    diclofenac (VOLTAREN) 75 MG EC tablet, Take 1 tablet (75 mg total) by mouth 3 (three) times daily as needed (pain)., Disp: 90 tablet, Rfl: 0    docusate sodium (COLACE) 100 MG capsule, Take 1 capsule (100 mg total) by mouth 2 (two) times daily., Disp: 30 capsule, Rfl: 0    DULoxetine (CYMBALTA) 60 MG capsule, Take 1 capsule (60 mg total) by mouth once daily., Disp: 90 capsule, Rfl: 3    lancets (FREESTYLE LANCETS) 28 gauge Misc, Inject 1 lancet as directed once daily., Disp: 100 each, Rfl: 3    losartan (COZAAR) 50 MG tablet, Take 1 tablet (50 mg total) by mouth once  daily., Disp: 90 tablet, Rfl: 1    metFORMIN (GLUCOPHAGE) 500 MG tablet, TAKE 2 TABLETS BY MOUTH TWICE DAILY WITH BREAKFAST AND WITH SUPPER, Disp: 360 tablet, Rfl: 1    pregabalin (LYRICA) 150 MG capsule, Take 1 capsule (150 mg total) by mouth 2 (two) times daily., Disp: 60 capsule, Rfl: 5    tadalafiL (CIALIS) 5 MG tablet, Take 1 tablet (5 mg total) by mouth once daily., Disp: 30 tablet, Rfl: 11    TRULICITY 0.75 mg/0.5 mL pen injector, INJECT 0.75 MG INTO THE SKIN EVERY 7 DAYS (Patient taking differently: Inject 0.75 mg into the skin every 7 days. MONDAYS), Disp: 12 pen , Rfl: 1    acetaminophen (TYLENOL) 500 MG tablet, Take 1 tablet (500 mg total) by mouth 3 (three) times daily., Disp: 90 tablet, Rfl: 0    b complex vitamins capsule, Take 1 capsule by mouth once daily., Disp: , Rfl:     celecoxib (CELEBREX) 100 MG capsule, Take 1 capsule (100 mg total) by mouth 2 (two) times daily., Disp: 28 capsule, Rfl: 0    methocarbamoL (ROBAXIN) 500 MG Tab, Take 2 tablets (1,000 mg total) by mouth 3 (three) times daily., Disp: 90 tablet, Rfl: 0    oxyCODONE (ROXICODONE) 5 MG immediate release tablet, Take 1 tablet (5 mg total) by mouth every 8 (eight) hours as needed for Pain (for breakthrough pain)., Disp: 21 tablet, Rfl: 0  [2]   Social History  Socioeconomic History    Marital status:    Occupational History    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     Employer: [employer]   Tobacco Use    Smoking status: Never     Passive exposure: Never    Smokeless tobacco: Never   Substance and Sexual Activity    Alcohol use: No    Drug use: No    Sexual activity: Not Currently     Social Drivers of Health     Financial Resource Strain: Low Risk  (4/14/2025)    Overall Financial Resource Strain (CARDIA)     Difficulty of Paying Living Expenses: Not very hard   Food Insecurity: No Food Insecurity (4/14/2025)    Hunger Vital Sign     Worried About Running Out of Food in the Last Year:  Never true     Ran Out of Food in the Last Year: Never true   Transportation Needs: No Transportation Needs (4/14/2025)    PRAPARE - Transportation     Lack of Transportation (Medical): No     Lack of Transportation (Non-Medical): No   Physical Activity: Insufficiently Active (3/17/2025)    Exercise Vital Sign     Days of Exercise per Week: 2 days     Minutes of Exercise per Session: 20 min   Stress: No Stress Concern Present (4/14/2025)    [employer] [employer] - Occupational Stress Questionnaire     Feeling of Stress : Not at all   Housing Stability: Low Risk  (4/14/2025)    Housing Stability Vital Sign     Unable to Pay for Housing in the Last Year: No     Homeless in the Last Year: No

## 2025-06-25 NOTE — H&P (VIEW-ONLY)
DATE: 6/27/2025  PATIENT: John Lemos    Attending Physician: Damián Geiger M.D.    CHIEF COMPLAINT: neck pain and hand clumsiness    HISTORY:  John Lemos is a 74 y.o. male w/ a hx of L TAL presents for initial evaluation of neck pain (Neck - 1). The pain has been present for years. The patient describes the pain as dull but it does not go down arms. The pain is worse with activity and improved by rest. There is BUE associated numbness and tingling. There is BUE subjective weakness. Prior treatments have included OTC meds, PT, but no CHRIS or surgery. Patient is miserable and wants surgical intervention.     The Patient endorses myelopathic symptoms such as handwriting changes or difficulty with buttons/coins/keys. He has trouble with walking/balance. He uses a walker for ambulation. Denies perineal paresthesias, bowel/bladder dysfunction.    The patient does not smoke or endorse IVDU. He has DM (HA1C of 6.6). The patient is not on any blood thinners and does not take chronic narcotics. He works as a special .    PAST MEDICAL/SURGICAL HISTORY:  Past Medical History:   Diagnosis Date    Arthritis     Cataract     ou    Diabetes mellitus 7 yrs    lbs: 118 1 week ago    Diabetes mellitus type 2, controlled 9/8/2012    Eye injury     fb    Hyperlipidemia      Past Surgical History:   Procedure Laterality Date    COLONOSCOPY N/A 11/25/2019    Procedure: COLONOSCOPY;  Surgeon: Eddie Leslie MD;  Location: [facility] ENDO;  Service: Endoscopy;  Laterality: N/A;    TOTAL REPLACEMENT OF HIP JOINT USING COMPUTER-ASSISTED NAVIGATION Left 4/14/2025    Procedure: ARTHROPLASTY, HIP, TOTAL, USING COMPUTER-ASSISTED NAVIGATION;  Surgeon: Judson Steel MD;  Location: [facility] OR;  Service: Orthopedics;  Laterality: Left;  Yobany Church Notified- NC---T/S--done  RN PREOP 4/2/2025---H/P--INCOMPLETE---       Current Medications: Current Medications[1]    Social History: Social History[2]    REVIEW OF  "SYSTEMS:  Constitution: Negative. Negative for chills, fever and night sweats.   Cardiovascular: Negative for chest pain and syncope.   Respiratory: Negative for cough and shortness of breath.   Gastrointestinal: See HPI. Negative for nausea/vomiting. Negative for abdominal pain.  Genitourinary: See HPI. Negative for discoloration or dysuria.  Skin: Negative for dry skin, itching and rash.   Hematologic/Lymphatic: negative for bleeding/clotting disorders.   Musculoskeletal: Negative for falls and muscle weakness.   Neurological: See HPI. no history of seizures. no history of cranial surgery or shunts.  Endocrine: Negative for polydipsia, polyphagia and polyuria.   Allergic/Immunologic: Negative for hives and persistent infections.  Psychiatric/Behavioral: Negative for depression and insomnia.         EXAM:  Ht 5' 8" (1.727 m)   Wt 78.4 kg (172 lb 13.5 oz)   BMI 26.28 kg/m²     General: The patient is a 74 y.o. male in no apparent distress, the patient is orientatied to person, place and time.  Psych: Normal mood and affect  HEENT: Vision grossly intact, hearing intact to the spoken word.  Lungs: Respirations unlabored.  Gait: Normal station and gait, no difficulty with toe or heel walk.   Skin: Cervical skin negative for rashes, lesions, hairy patches and surgical scars.  Range of motion: Cervical range of motion is acceptable. There is no tenderness to palpation.  Spinal Balance: Global saggital and coronal spinal balance acceptable, no significant for scoliosis and kyphosis.  Musculoskeletal: No pain with the range of motion of the bilateral shoulders and elbows. Normal bulk and contour of the bilateral hands.  Vascular: Bilateral hands warm and well perfused, Radial pulses 2+ bilaterally.  Neurological: Normal strength and tone in all major motor groups in the bilateral upper and lower extremities except for 4/5 in L EE, EF and IO. Normal sensation to light touch in the C5-T1 and L2-S1 dermatomes " bilaterally.  Deep tendon reflexes 3+ in the LUE  Negative Inverted Radial Reflex and Calvin's bilaterally. Negative Babinski bilaterally.     IMAGING:   Today I independently reviewed the following images and my interpretations are as follows:    AP, Lat and Flex/Ex  upright C-spine films demonstrate spondylosis and DDD.    MRI cervical showed C3-4 mod-severe central stenosis and myelomalacia.     Body mass index is 26.28 kg/m².  Hemoglobin A1C   Date Value Ref Range Status   12/18/2024 6.1 (H) 4.0 - 5.6 % Final     Comment:     ADA Screening Guidelines:  5.7-6.4%  Consistent with prediabetes  >or=6.5%  Consistent with diabetes    High levels of fetal hemoglobin interfere with the HbA1C  assay. Heterozygous hemoglobin variants (HbS, HgC, etc)do  not significantly interfere with this assay.   However, presence of multiple variants may affect accuracy.     05/18/2024 6.7 (H) 4.0 - 5.6 % Final     Comment:     ADA Screening Guidelines:  5.7-6.4%  Consistent with prediabetes  >or=6.5%  Consistent with diabetes    High levels of fetal hemoglobin interfere with the HbA1C  assay. Heterozygous hemoglobin variants (HbS, HgC, etc)do  not significantly interfere with this assay.   However, presence of multiple variants may affect accuracy.     10/21/2023 7.0 (H) 4.0 - 5.6 % Final     Comment:     ADA Screening Guidelines:  5.7-6.4%  Consistent with prediabetes  >or=6.5%  Consistent with diabetes    High levels of fetal hemoglobin interfere with the HbA1C  assay. Heterozygous hemoglobin variants (HbS, HgC, etc)do  not significantly interfere with this assay.   However, presence of multiple variants may affect accuracy.       Hemoglobin A1c   Date Value Ref Range Status   06/21/2025 6.6 (H) 4.0 - 5.6 % Final     Comment:     ADA Screening Guidelines:  5.7-6.4%  Consistent with prediabetes  >=6.5%  Consistent with diabetes    High levels of fetal hemoglobin interfere with the HbA1C  assay. Heterozygous hemoglobin variants (HbS,  HgC, etc)do  not significantly interfere with this assay.   However, presence of multiple variants may affect accuracy.   04/02/2025 6.7 (H) 4.0 - 5.6 % Final     Comment:     ADA Screening Guidelines:  5.7-6.4%  Consistent with prediabetes  >=6.5%  Consistent with diabetes    High levels of fetal hemoglobin interfere with the HbA1C  assay. Heterozygous hemoglobin variants (HbS, HgC, etc)do  not significantly interfere with this assay.   However, presence of multiple variants may affect accuracy.       ASSESSMENT/PLAN:  Cervical spondylosis  -     Case Request Operating Room: DISCECTOMY, SPINE, CERVICAL, ANTERIOR, W/ ARTHRODESIS  C3-4 ACDF  SPINEWAVE  SNS: VOCAL CORDS/MOTORS/SSEP    DDD (degenerative disc disease), cervical    Stenosis of cervical spine with myelopathy    Myelomalacia of cervical cord    Spinal stenosis, cervical region  -     CT Cervical Spine Without Contrast; Future; Expected date: 06/25/2025      No follow-ups on file.    Patient has cervical spondylosis and stenosis with myelopathy and myelomalacia. I discussed the natural history of their diagnoses as well as surgical and nonsurgical treatment options. I educated the patient on the importance of core/back strengthening, correct posture, bending/lifting ergonomics, and low-impact aerobic exercises (walking, elliptical, and aquatherapy). Continue medications. Patient has failed conservative management and is a candidate for C3-4 ACDF. I ordered CT cervical for preop planning. He will need preop clearance.    I had a sit down discussion with the patient regarding cervical myelopathy. I discussed the known stepwise degeneration of cervical myelopathy. I discussed the risks and benefits of decompressive surgery, including the concept that surgery would be done to prevent further neurological injury/degeneration rather than to ameliorate any existing deficits.     I had a sit down discussion with the patient regarding the above surgery. We  specifically discussed the risks, benefits, and alternatives to surgery. We discussed the surgical procedure including the skin incision, nerve decompression, bone fusion, allograft and/or iliac crest bone graft, and surgical implants including plates and interbody spacers as indicated: they understand the risks include but are not limited to death, paralysis, blindness, bleeding, infection, damage to arteries, veins and nerves, tracheal injury, esophageal injury, vertebral artery injury, dysphagia, spinal fluid leak, continued or worsening pain, no improvement in symptoms, non-union, and the possible need for more surgery in the future, as well as the possibility other unforseen and unknown complications. We talked about expected hospital stay and recovery period. All questions were answered; they understand and wish to proceed.     Damián Geiger MD  Orthopaedic Spine Surgeon  Department of Orthopaedic Surgery  [phone]           [1]   Current Outpatient Medications:     acetaminophen (TYLENOL) 500 MG tablet, Take 2 tablets (1,000 mg total) by mouth every 8 (eight) hours as needed for Pain., Disp: 42 tablet, Rfl: 0    atorvastatin (LIPITOR) 40 MG tablet, Take 1 tablet (40 mg total) by mouth once daily., Disp: 90 tablet, Rfl: 3    blood sugar diagnostic (FREESTYLE LITE STRIPS) Strp, Inject 1 each as directed once daily., Disp: 100 each, Rfl: 3    diclofenac (VOLTAREN) 75 MG EC tablet, Take 1 tablet (75 mg total) by mouth 3 (three) times daily as needed (pain)., Disp: 90 tablet, Rfl: 0    docusate sodium (COLACE) 100 MG capsule, Take 1 capsule (100 mg total) by mouth 2 (two) times daily., Disp: 30 capsule, Rfl: 0    DULoxetine (CYMBALTA) 60 MG capsule, Take 1 capsule (60 mg total) by mouth once daily., Disp: 90 capsule, Rfl: 3    lancets (FREESTYLE LANCETS) 28 gauge Misc, Inject 1 lancet as directed once daily., Disp: 100 each, Rfl: 3    losartan (COZAAR) 50 MG tablet, Take 1 tablet (50 mg total) by mouth once  daily., Disp: 90 tablet, Rfl: 1    metFORMIN (GLUCOPHAGE) 500 MG tablet, TAKE 2 TABLETS BY MOUTH TWICE DAILY WITH BREAKFAST AND WITH SUPPER, Disp: 360 tablet, Rfl: 1    pregabalin (LYRICA) 150 MG capsule, Take 1 capsule (150 mg total) by mouth 2 (two) times daily., Disp: 60 capsule, Rfl: 5    tadalafiL (CIALIS) 5 MG tablet, Take 1 tablet (5 mg total) by mouth once daily., Disp: 30 tablet, Rfl: 11    TRULICITY 0.75 mg/0.5 mL pen injector, INJECT 0.75 MG INTO THE SKIN EVERY 7 DAYS (Patient taking differently: Inject 0.75 mg into the skin every 7 days. MONDAYS), Disp: 12 pen , Rfl: 1    acetaminophen (TYLENOL) 500 MG tablet, Take 1 tablet (500 mg total) by mouth 3 (three) times daily., Disp: 90 tablet, Rfl: 0    b complex vitamins capsule, Take 1 capsule by mouth once daily., Disp: , Rfl:     celecoxib (CELEBREX) 100 MG capsule, Take 1 capsule (100 mg total) by mouth 2 (two) times daily., Disp: 28 capsule, Rfl: 0    methocarbamoL (ROBAXIN) 500 MG Tab, Take 2 tablets (1,000 mg total) by mouth 3 (three) times daily., Disp: 90 tablet, Rfl: 0    oxyCODONE (ROXICODONE) 5 MG immediate release tablet, Take 1 tablet (5 mg total) by mouth every 8 (eight) hours as needed for Pain (for breakthrough pain)., Disp: 21 tablet, Rfl: 0  [2]   Social History  Socioeconomic History    Marital status:    Occupational History    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     Employer: [employer]   Tobacco Use    Smoking status: Never     Passive exposure: Never    Smokeless tobacco: Never   Substance and Sexual Activity    Alcohol use: No    Drug use: No    Sexual activity: Not Currently     Social Drivers of Health     Financial Resource Strain: Low Risk  (4/14/2025)    Overall Financial Resource Strain (CARDIA)     Difficulty of Paying Living Expenses: Not very hard   Food Insecurity: No Food Insecurity (4/14/2025)    Hunger Vital Sign     Worried About Running Out of Food in the Last Year:  Never true     Ran Out of Food in the Last Year: Never true   Transportation Needs: No Transportation Needs (4/14/2025)    PRAPARE - Transportation     Lack of Transportation (Medical): No     Lack of Transportation (Non-Medical): No   Physical Activity: Insufficiently Active (3/17/2025)    Exercise Vital Sign     Days of Exercise per Week: 2 days     Minutes of Exercise per Session: 20 min   Stress: No Stress Concern Present (4/14/2025)    [employer] - Occupational Stress Questionnaire     Feeling of Stress : Not at all   Housing Stability: Low Risk  (4/14/2025)    Housing Stability Vital Sign     Unable to Pay for Housing in the Last Year: No     Homeless in the Last Year: No

## 2025-06-26 ENCOUNTER — TELEPHONE (OUTPATIENT)
Dept: FAMILY MEDICINE | Facility: CLINIC | Age: 75
End: 2025-06-26
Payer: COMMERCIAL

## 2025-06-26 ENCOUNTER — EXTERNAL HOME HEALTH (OUTPATIENT)
Dept: HOME HEALTH SERVICES | Facility: HOSPITAL | Age: 75
End: 2025-06-26
Payer: COMMERCIAL

## 2025-06-26 DIAGNOSIS — Z01.818 PREOPERATIVE TESTING: Primary | ICD-10-CM

## 2025-06-26 RX ORDER — VITAMIN B COMPLEX
1 CAPSULE ORAL DAILY
COMMUNITY

## 2025-06-26 RX ORDER — ACETAMINOPHEN 500 MG
500 TABLET ORAL 3 TIMES DAILY
Qty: 90 TABLET | Refills: 0 | Status: SHIPPED | OUTPATIENT
Start: 2025-06-26

## 2025-06-26 RX ORDER — METHOCARBAMOL 500 MG/1
1000 TABLET, FILM COATED ORAL 3 TIMES DAILY
Qty: 90 TABLET | Refills: 0 | Status: SHIPPED | OUTPATIENT
Start: 2025-06-26

## 2025-06-26 RX ORDER — CELECOXIB 100 MG/1
100 CAPSULE ORAL 2 TIMES DAILY
Qty: 28 CAPSULE | Refills: 0 | Status: SHIPPED | OUTPATIENT
Start: 2025-06-26

## 2025-06-26 RX ORDER — OXYCODONE HYDROCHLORIDE 5 MG/1
5 TABLET ORAL EVERY 8 HOURS PRN
Qty: 21 TABLET | Refills: 0 | Status: SHIPPED | OUTPATIENT
Start: 2025-06-26

## 2025-06-26 NOTE — ANESTHESIA PAT ROS NOTE
06/26/2025  John Lemos is a 74 y.o., male.      Pre-op Assessment          Review of Systems  Anesthesia Hx:  No problems with previous Anesthesia             Denies Family Hx of Anesthesia complications.    Denies Personal Hx of Anesthesia complications.                    Social:  Non-Smoker, No Alcohol Use       Hematology/Oncology:  Hematology Normal   Oncology Normal                                   EENT/Dental:  EENT/Dental Normal           Cardiovascular:            Denies Angina.     hyperlipidemia       Functional Capacity unable to determine, ABLE TO CIMB ONE FLIGHT OF STAIRS LIMITED, RECENTLY HAD HIP REPLACEMNT                         Pulmonary:  Pulmonary Normal                       Renal/:  Renal/ Normal                 Hepatic/GI:     GERD (PATIENT DENIED GERD)    Taking GLP-1 Agonists Instructed to Hold for 7 Days           Musculoskeletal:   Musculoskeletal General/Symptoms:  Functional capacity is ambulatory with walker.   Joint Disease:  Arthritis OF KNEES AND NECK PER PATIENT     Cervical Spine Disorder, Cervical Disc Disease CERVICAL SPONDYLOSIS    Neurological:   Neuro Symptoms of pain OF NECK AND BUE                           Endocrine:   Diabetes, Type 2 Diabetes   , controlled by non-insulin injectables, oral hypoglycemics. Typical AM glucose range: 116 , most recent HgA1c value was 6.6 on 6/21/2025.                         Anesthesia Assessment: Preoperative EQUATION    Planned Procedure: Procedure(s) (LRB):  DISCECTOMY, SPINE, CERVICAL, ANTERIOR, W/ ARTHRODESIS C3-4 ACDF SPINEWAVE SNS: VOCAL CORDS/MOTORS/SSEP (N/A)  Requested Anesthesia Type:General  Surgeon: Damián Geiger MD  Service: Orthopedics  Known or anticipated Date of Surgery:7/7/2025    Surgeon notes: reviewed    Electronic QUestionnaire Assessment completed via nurse interview with patient.        Triage  considerations:     The patient has no apparent active cardiac condition (No unstable coronary Syndrome such as severe unstable angina or recent [<1 month] myocardial infarction, decompensated CHF, severe valvular   disease or significant arrhythmia)    Previous anesthesia records:GETA and No problems  4/14/2025 ARTHROPLASTY, HIP, TOTAL, USING COMPUTER-ASSISTED NAVIGATION (Left: Hip)   Airway:  Mallampati: II   Mouth Opening: Normal  TM Distance: Normal  Airway Present Prior to Hospital Arrival?: No Placement Date: 04/14/25 Placement Time: 1335 , created via procedure documentation Method of Intubation: Video Laryngoscopy Inserted by: CRNA Airway Device: Endotracheal Tube Mask Ventilation: Easy - oral Intubated: Postinduction Blade: Beverly #3 , X3 Blade Airway Device Size: 7.5 Style: Cuffed Cuffed: Cuffed Cuff Inflation: Minimal occlusive pressure Placement Verified By: Capnometry;Auscultation;ETT Condensation Grade: Grade I Complicating Factors: Anterior larynx;Overbite Findings Post-Intubation: Bilateral breath sounds;Atraumatic/Condition of teeth unchanged;Positive EtCO2 Depth of Insertion (cm): 23 Secured at: Lips Complications: None Breath Sounds: Equal Bilateral Insertion attempts (enter comment if more than 2 attempts): 1 Removal Date: 04/14/25 Removal Time: 1523     Last PCP note: within 1 month , within [facility]   Subspecialty notes: Neurology, Ortho, Urology, OPTOMETRY    Other important co-morbidities: DM2, HLD, and CERVICAL SPONDYLOSIS      Tests already available:  Available tests,  within 1 month , 3-6 months ago , within [facility] . 6/25/2025 XRAY CERVICAL SPINE F&E ONLY, 6/21/2025 UA, HGA1C, 4/2/2025 EKG, 3/3/2025 MRI CERVICAL SPINE W/O CONTRAST, 2/10/2025 XRAY CERVICAL SPINE 2 OR 3 VIEWS            Instructions given. (See in Nurse's note)    Optimization:  Anesthesia Preop Clinic Assessment  - not Indicated for this surgery    Medical Opinion Indicated           Plan:    Testing:  BMP, Hematology  Profile, PT/INR, and T&S      Consultation:Patient's PCP for re-evaluation     Patient  has previously scheduled Medical Appointment:6/27 OT, 6/30 PT, CT SPINE, 7/1 OT    Navigation: Tests Scheduled. TBD             Consults scheduled.TBD             Results will be tracked by Preop Clinic.  6/30 Labs resulted and noted by Dr. Александр Son.  7/3 Medical clearance given by Mila Masters NP on 7/3: pt is optimized for surgery from a primary care standpoint and is at low risk for gene-operative CV event.     Radha Melendrez RN BSN

## 2025-06-26 NOTE — TELEPHONE ENCOUNTER
Copied from CRM #[id number]. Topic: Appointments - Appointment Scheduling  >> Jun 26, 2025  1:43 PM Livia wrote:  Type: Patient Call Back    Who called:self    What is the request in detail: has to have a surgery clearance appt scheduled before his procedure on July 7    Can the clinic reply by [facility]?no    Would the patient rather a call back or a response via [facility]? call    Best call back [phone]    Additional Information:

## 2025-06-26 NOTE — TELEPHONE ENCOUNTER
Copied from CRM #[id number]. Topic: Appointments - Appointment Scheduling  >> Jun 26, 2025  1:43 PM Livia wrote:  Type: Patient Call Back    Who called:self    What is the request in detail: has to have a surgery clearance appt scheduled before his procedure on July 7    Can the clinic reply by [facility]?no    Would the patient rather a call back or a response via [facility]? call    Best call back number:.[phone]    Additional Information:  >> Jun 26, 2025  2:43 PM Nurse Teja wrote:    ----- Message -----  From: Livia Keen  Sent: 6/26/2025   1:44 PM CDT  To: Sean Giles Staff

## 2025-06-26 NOTE — PRE-PROCEDURE INSTRUCTIONS
Patient stated has not had any problem with anesthesia in the past. Will need medical clearance from your PCP, Dr. Chan Estrada. He will make an appt. Will need labs. Our  will call to set up these appts.         Preop instructions given. Hold aspirin, aspirin containing products, nsaids( Aleve, Advil, Motrin, Ibuprofen, Naprosyn, Naproxen, Voltaren, Diclofenac, Mobic, Meloxicam, Celebrex, Celecoxib), vitamins and supplements one week prior to surgery.     May take Tylenol. Hold Trulicity at least 7 days prior to your surgery.    Medication instructions given:    Disp Refills Start End   acetaminophen (TYLENOL) 500 MG tablet 42 tablet 0 4/14/2025 --   Sig: Take 2 tablets (1,000 mg total) by mouth every 8 (eight) hours as needed for Pain.   Route: Radha Beal RN 6/26/2025  9:55 AM  TAKE IF NEEDED            atorvastatin (LIPITOR) 40 MG tablet 90 tablet 3 12/12/2024 --   Sig: Take 1 tablet (40 mg total) by mouth once daily.   Route: Radha Beal RN 6/26/2025  9:57 AM  TAKE IN AM OF SURGERY.              b complex vitamins capsule -- --  --   Sig: Take 1 capsule by mouth once daily.   Class: Historical Med   Route: Radha Beal RN 6/26/2025  9:59 AM  HOLD ONE WEEK PRIOR TO SURGERY.            blood sugar diagnostic (FREESTYLE LITE STRIPS) Strp 100 each 3 12/5/2016 --   Sig: Inject 1 each as directed once daily.   Route: Injection   Renewals    Renewal provider: Xiomy Ventura MD      diclofenac (VOLTAREN) 75 MG EC tablet 90 tablet 0 6/13/2025 --   Sig: Take 1 tablet (75 mg total) by mouth 3 (three) times daily as needed (pain).   Route: Radha Beal RN 6/26/2025  9:56 AM  HOLD ONE WEEK PRIOR TO SURGERY.            docusate sodium (COLACE) 100 MG capsule 30 capsule 0 4/14/2025 --   Sig: Take 1 capsule (100 mg total) by mouth 2 (two) times daily.   Route: Radha Beal RN 6/26/2025  9:57 AM  HOLD IN AM OF SURGERY.            DULoxetine (CYMBALTA)  60 MG capsule 90 capsule 3 11/8/2024 --   Sig: Take 1 capsule (60 mg total) by mouth once daily.   Route: Oral       Radha Melendrez RN 6/26/2025  9:57 AM  TAKE IN AM OF SURGERY.              lancets (FREESTYLE LANCETS) 28 gauge Misc 100 each 3 12/5/2016 --   Sig: Inject 1 lancet as directed once daily.   Route: Injection   Renewals    Renewal provider: Xiomy Ventura MD      losartan (COZAAR) 50 MG tablet 90 tablet 1 6/13/2025 --   Sig: Take 1 tablet (50 mg total) by mouth once daily.   Route: Oral       Radha Melendrez RN 6/26/2025  9:58 AM  HOLD IN AM OF SURGERY.            metFORMIN (GLUCOPHAGE) 500 MG tablet 360 tablet 1 4/26/2025 --   Sig: TAKE 2 TABLETS BY MOUTH TWICE DAILY WITH BREAKFAST AND WITH SUPPER       Radha Melendrez RN 6/26/2025  9:58 AM  HOLD IN PM BEFORE SURGERY. HOLD IN AM OF SURGERY.            oxyCODONE (ROXICODONE) 5 MG immediate release tablet 40 tablet 0 4/14/2025 --   Sig: Take 1-2 tablets (5-10 mg total) by mouth every 4 (four) hours as needed (pain).   Route: Oral       Radha Melendrez RN 6/26/2025  9:58 AM  TAKE IF NEEDED            pregabalin (LYRICA) 150 MG capsule 60 capsule 5 6/13/2025 12/12/2025   Sig: Take 1 capsule (150 mg total) by mouth 2 (two) times daily.   Route: Oral       Radha Melendrez RN 6/26/2025  9:58 AM  TAKE IN AM OF SURGERY.              tadalafiL (CIALIS) 5 MG tablet 30 tablet 11 9/24/2024 9/24/2025   Sig: Take 1 tablet (5 mg total) by mouth once daily.   Route: Oral   No prior authorization was found for this prescription.   Found prior authorization for another prescription for the same medication: Approved       Radha Melendrez RN 6/26/2025  9:59 AM  HOLD IN AM OF SURGERY.            TRULICITY 0.75 mg/0.5 mL pen injector 12 pen 1 2/25/2025 --   Sig: INJECT 0.75 MG INTO THE SKIN EVERY 7 DAYS   Route: Subcutaneous   Prior authorization: Closed - Prior Authorization not required for patient/medication       Radha Melendrez RN 6/26/2025  9:56 AM  HOLD ONE WEEK PRIOR TO  SURGERY.            Your surgery has been scheduled for:__Monday 7/7/2025________________________________________  [facility]) [phone]  You should report to:  __X__[facility], [facility], [facility]  ____[facility], located on the [place] side of the first floor of the           [facility] ([phone])  ____[facility], located on the [facility] side of the            Second floor of the [facility] ([phone])  ____3rd Floor [facility] located on the [facility] side of the [facility] [phone]  Please Note   Tell your doctor if you take Aspirin, products containing Aspirin, herbal medications  or blood thinners, such as Coumadin, Ticlid, or Plavix.  (Consult your provider regarding holding or stopping before surgery).  Arrange for someone to drive you home following surgery.  You will not be allowed to leave the surgical facility alone or drive yourself home following sedation and anesthesia.  Before Surgery  Stop taking all vitamins/ herbal medications 14days prior to surgery  No Motrin/Advil (Ibuprofen) 7 days before surgery  No Aleve (Naproxen) 7 days before surgery  Stop Taking Asprin, products containing Asprin ___7__days before surgery  Stop taking blood thinners_______days before surgery  No Goody's/BC  Powder 7 days before surgery  Refrain from drinking alcoholic beverages for 24hours before and after surgery  Stop or limit smoking _________days before surgery( nonsmoker)  You may take Tylenol for pain  Night before Surgery  Nothing to eat or drink after midnight. May have clear liquids ( water or apple juice) up until 2 hours before your arrival time.   Take a shower or bath (shower is recommended).  Bathe with Hibiclens soap or an antibacterial soap from the neck down.  If not supplied by your surgeon, hibiclens soap will need to be purchased over the counter in pharmacy.   Rinse soap off thoroughly.  Shampoo your hair with your regular shampoo  The Day of Surgery  NOTHING TO  DRINK 2 hours before arrival time. If you are told to take medication on the morning of surgery, it may be taken with a sip of water.   Take another bath or shower with hibiclens or any antibacterial soap, to reduce the chance of infection.  Take heart and blood pressure medications with a small sip of water, as advised by the perioperative team.  Do not take fluid pills  You may brush your teeth and rinse your mouth, but do not swall any additional water.   Do not apply perfumes, powder, body lotions or deodorant on the day of surgery.  Nail polish should be removed.  Do not wear makeup or moisturizer  Wear comfortable clothes, such as a button front shirt and loose fitting pants.  Leave all jewelry, including body piercings, and valuables at home.    Bring any devices you will neeed after surgery such as crutches or canes.  If you have sleep apnea, please bring your CPAP machine  In the event that your physical condition changes including the onset of a cold or respiratory illness, or if you have to delay or cancel your surgery, please notify your surgeon.   Directions given to the surgery center. Also sent preop and medication instructions to [facility] portal. Verbalizes understanding.

## 2025-06-26 NOTE — TELEPHONE ENCOUNTER
----- Message from Chan Estrada MD sent at 6/26/2025  6:43 AM CDT -----  Regarding: FW: preop clearance  Certainly, Dr Geiger!     Kt Arazte - can you schedule this pt (John Lemos MRN [id number]) with Mila scanlon available for preop clearance? Thank you!  ----- Message -----  From: Damián Geiger MD  Sent: 6/25/2025   3:55 PM CDT  To: Chan Estrada MD; Birdie Crawford,#  Subject: preop clearance                                  Dr. Estrada, we have this mutual pt that we are planning ACDF on 7/7/25. Could you see him for preop clearance? Thanks!

## 2025-06-27 ENCOUNTER — LAB VISIT (OUTPATIENT)
Dept: LAB | Facility: HOSPITAL | Age: 75
End: 2025-06-27
Attending: ANESTHESIOLOGY
Payer: COMMERCIAL

## 2025-06-27 ENCOUNTER — CLINICAL SUPPORT (OUTPATIENT)
Dept: REHABILITATION | Facility: HOSPITAL | Age: 75
End: 2025-06-27
Payer: COMMERCIAL

## 2025-06-27 DIAGNOSIS — Z01.818 PREOPERATIVE TESTING: ICD-10-CM

## 2025-06-27 DIAGNOSIS — R20.8 DECREASED SENSATION: Primary | ICD-10-CM

## 2025-06-27 DIAGNOSIS — Z74.1 SELF-CARE DEFICIT IN DRESSING: ICD-10-CM

## 2025-06-27 DIAGNOSIS — R29.898 DECREASED GRIP STRENGTH OF LEFT HAND: ICD-10-CM

## 2025-06-27 LAB
ANION GAP (OHS): 9 MMOL/L (ref 8–16)
BUN SERPL-MCNC: 11 MG/DL (ref 8–23)
CALCIUM SERPL-MCNC: 9.4 MG/DL (ref 8.7–10.5)
CHLORIDE SERPL-SCNC: 102 MMOL/L (ref 95–110)
CO2 SERPL-SCNC: 26 MMOL/L (ref 23–29)
CREAT SERPL-MCNC: 0.8 MG/DL (ref 0.5–1.4)
ERYTHROCYTE [DISTWIDTH] IN BLOOD BY AUTOMATED COUNT: 13 % (ref 11.5–14.5)
GFR SERPLBLD CREATININE-BSD FMLA CKD-EPI: >60 ML/MIN/1.73/M2
GLUCOSE SERPL-MCNC: 71 MG/DL (ref 70–110)
HCT VFR BLD AUTO: 42.1 % (ref 40–54)
HGB BLD-MCNC: 12.8 GM/DL (ref 14–18)
INDIRECT COOMBS: NORMAL
INR PPP: 1 (ref 0.8–1.2)
MCH RBC QN AUTO: 28.6 PG (ref 27–31)
MCHC RBC AUTO-ENTMCNC: 30.4 G/DL (ref 32–36)
MCV RBC AUTO: 94 FL (ref 82–98)
PLATELET # BLD AUTO: 283 K/UL (ref 150–450)
PMV BLD AUTO: 10.5 FL (ref 9.2–12.9)
POTASSIUM SERPL-SCNC: 3.8 MMOL/L (ref 3.5–5.1)
PROTHROMBIN TIME: 10.8 SECONDS (ref 9–12.5)
RBC # BLD AUTO: 4.48 M/UL (ref 4.6–6.2)
RH BLD: NORMAL
SODIUM SERPL-SCNC: 137 MMOL/L (ref 136–145)
SPECIMEN OUTDATE: NORMAL
WBC # BLD AUTO: 5 K/UL (ref 3.9–12.7)

## 2025-06-27 PROCEDURE — 86850 RBC ANTIBODY SCREEN: CPT | Performed by: ANESTHESIOLOGY

## 2025-06-27 PROCEDURE — 36415 COLL VENOUS BLD VENIPUNCTURE: CPT | Mod: PO

## 2025-06-27 PROCEDURE — 97022 WHIRLPOOL THERAPY: CPT | Mod: PN

## 2025-06-27 PROCEDURE — 97110 THERAPEUTIC EXERCISES: CPT | Mod: PN

## 2025-06-27 PROCEDURE — 85610 PROTHROMBIN TIME: CPT

## 2025-06-27 PROCEDURE — 85027 COMPLETE CBC AUTOMATED: CPT

## 2025-06-27 PROCEDURE — 80048 BASIC METABOLIC PNL TOTAL CA: CPT

## 2025-06-27 NOTE — PROGRESS NOTES
Outpatient Rehab    Occupational Therapy Visit    Patient Name: John Lemos  MRN: [id number]  YOB: 1950  Encounter Date: 6/27/2025    Therapy Diagnosis:   Encounter Diagnoses   Name Primary?    Decreased sensation Yes    Decreased  strength of left hand     Self-care deficit in dressing      Physician: Brenda Llamas NP    Physician Orders: Eval and Treat  Medical Diagnosis: Carpal tunnel syndrome on both sides  Surgical Diagnosis: Not applicable for this Episode   Surgical Date: Not applicable for this Episode  Days Since Last Surgery: Not applicable for this Episode    Visit # / Visits Authorized: 1 / 10  Insurance Authorization Period: 6/12/2025 to 12/31/2025  Date of Evaluation: 6/17/2025  Plan of Care Certification:  6/17/2025 to 8/12/25      Time In: 1026   Time Out: 1106  Total Time (in minutes): 40   Total Billable Time (in minutes): 40    FOTO:  Intake Score:  %  Survey Score 2:  %  Survey Score 3:  %    Precautions:       Subjective   I am still having numbness and tingling in my hands..  Pain reported as 4/10.      Objective            Treatment:  Therapeutic Exercise  TE 1: ultra gripper setting #2 x 4 min  TE 2: rotation of sphere in hand  TE 3: placement of round top pegs in board with left hand x 10 reps  TE 4: 3pt pinch yellow clip x 3 min  Modalities  Fluidotherapy: Patient received fluidotherapy to  hand(s) for 8 minutes to increase blood flow, circulation, desensitization, sensory re-education and for pain management.    Time Entry(in minutes):  Therapeutic Exercise Time Entry: 32    Assessment & Plan   Assessment: John has difficulty orienting his left hand to tasks as his forearm rotation is poor.  He was engaged in performing all tasks presented.  Fine motor tasks are a challenge.       The patient will continue to benefit from skilled outpatient occupational therapy in order to address the deficits listed in the problem list on the initial evaluation, provide patient  and family education, and maximize the patients level of independence in the home and community environments.     The patient's spiritual, cultural, and educational needs were considered, and the patient is agreeable to the plan of care and goals.           Plan: John to attend occupational therapy 1 x week until 8/12/25 to improve use of  and manipulation with his hands.     Goals:   Active       Short term goals        Short term goals to be met by 7/15/25    Patient to be IND with HEP and modalities for pain/edema managment.  Increase left  strength to 23 lbs. to improve functional grasp for ADLs/work/leisure activities.   Increase left key pinch to 7.5 psi's to increase IND with button and FM Coordination.  Increase left 3pt pinch to 6 to improve manipulation tasks.         Long term goals       Start:  06/17/25    Expected End:  08/12/25       Long term goals to be met by 8/12/25    Patient will improve his left hand 9 hole peg test by 30 seconds to 194 seconds for fine motor coordination.  Increase left  strength to 26 lbs. to increase functional use of left hand.             DOMINIQUE Portillo

## 2025-06-30 ENCOUNTER — CLINICAL SUPPORT (OUTPATIENT)
Dept: REHABILITATION | Facility: HOSPITAL | Age: 75
End: 2025-06-30
Payer: COMMERCIAL

## 2025-06-30 ENCOUNTER — HOSPITAL ENCOUNTER (OUTPATIENT)
Dept: RADIOLOGY | Facility: HOSPITAL | Age: 75
Discharge: HOME | End: 2025-06-30
Attending: ORTHOPAEDIC SURGERY
Payer: COMMERCIAL

## 2025-06-30 DIAGNOSIS — M48.02 SPINAL STENOSIS, CERVICAL REGION: ICD-10-CM

## 2025-06-30 DIAGNOSIS — Z74.09 DECREASED FUNCTIONAL MOBILITY AND ENDURANCE: ICD-10-CM

## 2025-06-30 DIAGNOSIS — R26.89 BALANCE PROBLEMS: ICD-10-CM

## 2025-06-30 DIAGNOSIS — R29.898 WEAKNESS OF BOTH LOWER EXTREMITIES: Primary | ICD-10-CM

## 2025-06-30 PROCEDURE — 72125 CT NECK SPINE W/O DYE: CPT | Mod: TC

## 2025-06-30 PROCEDURE — 97530 THERAPEUTIC ACTIVITIES: CPT | Mod: PN,CQ

## 2025-06-30 PROCEDURE — 72125 CT NECK SPINE W/O DYE: CPT | Mod: 26,,, | Performed by: RADIOLOGY

## 2025-06-30 PROCEDURE — 97112 NEUROMUSCULAR REEDUCATION: CPT | Mod: PN,CQ

## 2025-06-30 NOTE — PROGRESS NOTES
"  Outpatient Rehab    Physical Therapy Visit    Patient Name: John Lemos  MRN: [id number]  YOB: 1950  Encounter Date: 6/30/2025    Therapy Diagnosis:   Encounter Diagnoses   Name Primary?    Weakness of both lower extremities Yes    Balance problems     Decreased functional mobility and endurance      Physician: Judson Steel MD    Physician Orders: Eval and Treat  Medical Diagnosis: Status post left hip replacement  Surgical Diagnosis: Not applicable for this Episode   Surgical Date: Not applicable for this Episode  Days Since Last Surgery: Not applicable for this Episode    Visit # / Visits Authorized:  16 / 20  Insurance Authorization Period: 4/25/2025 to 12/31/2025  Date of Evaluation: 4/23/2025  Plan of Care Certification: 4/25/2025 to 10/30/2025      PT/PTA: PTA   Number of PTA visits since last PT visit:1  Time In: 0855   Time Out: 0950  Total Time (in minutes): 55   Total Billable Time (in minutes): 55    FOTO:  Intake Score:  %  Survey Score 2:  %  Survey Score 3:  %    Precautions:  Left Lower Extremity Weight-Bearing Status: Weight-bearing as tolerated  Left hip replacement surgery on April 14th; POW 11 weeks      Subjective   Patient reports feeling ok, no hip pain just discomfort.  Pain reported as 4/10.      Objective            Treatment:  Balance/Neuromuscular Re-Education  NMR 2: Bridges RTB 3x10/Ball Squeeze 3x10x3"  NMR 3: SL Clamshell RTB 3x10x3"  NMR 5: Seated marches 4 lbs on R LE, 2 lbs on L LE 3x10 ea  NMR 6: Seated LAQ 4 lbs, 3x10x3"  NMR 7: Standing hip abduction /c YTB 3x10 (noted L knee netta at times, CGA/Min Assist for recovery and for balance support) (Pt performed in SL  today)  NMR 9: Standing hip Flex /c YTB 3x10 (Pt performed in Supine today)  Therapeutic Activity  TA 1: Nu-step 10 min level +3  TA 2: Sit to stand from 20 in hi-low or box 3x10 (/c CGA using gait belt)  TA 3: gait on lap around gym with SBQC CGA/SBA  TA 4: Butt taps mini squat 24" H&L mat " holding on RW 3x10 (Asistance to hold on RW for support)    Time Entry(in minutes):  Neuromuscular Re-Education Time Entry: 30  Therapeutic Activity Time Entry: 25    Assessment & Plan   Assessment: Pt presents ambulating with a FWW, flexed posture, decreased stance on left LE. Cueing for proper form and muscle activation. Progress LE strengthening and balance activities with good performance. No c/o increased discomfort with prescribed activities. Good response to exercise progression. Patient will have procedure for his cervical done next Monday and will have to cancel a few Therapy visit next week and maybe the week after.   Evaluation/Treatment Tolerance: Patient tolerated treatment well    The patient will continue to benefit from skilled outpatient physical therapy in order to address the deficits listed in the problem list on the initial evaluation, provide patient and family education, and maximize the patients level of independence in the home and community environments.     The patient's spiritual, cultural, and educational needs were considered, and the patient is agreeable to the plan of care and goals.           Plan: Will continue per PT plan of care.    Goals:   Active       Ambulation/movement       Patient will ambulate at least 3 laps around the therapy gym (600 ft distance) in 6 minutes using least restrictive AD with supervision       Start:  04/25/25    Expected End:  10/30/25            Patient will ambulate with close normal gait pattern with least restrictive AD       Start:  04/25/25    Expected End:  10/30/25               Functional outcome       Patient will show a significant change in FOTO patient-reported outcome tool to demonstrate subjective improvement       Start:  04/25/25    Expected End:  10/30/25            Patient will demonstrate independence in home program for support of progression (Progressing)       Start:  04/25/25    Expected End:  10/30/25               Range of  Motion       Patient will achieve left hip flexion  degrees or greater (Ongoing)       Start:  04/25/25    Expected End:  10/30/25            Patient will achieve left hip extension ROM 15 degrees or greater (Ongoing)       Start:  04/25/25    Expected End:  10/30/25            Patient will achieve left hip internal rotation ROM 30 degrees or greater in 90 degrees hip flexion (Ongoing)       Start:  04/25/25    Expected End:  10/30/25            Patient will achieve left hip external rotation ROM 30 degrees or greater in 90 degrees hip flexion (Ongoing)       Start:  04/25/25    Expected End:  10/30/25               Strength       Patient will achieve left hip flexion strength of 4+/5 (Ongoing)       Start:  04/25/25    Expected End:  10/30/25            Patient will achieve left hip extension strength of 4/5 (Ongoing)       Start:  04/25/25    Expected End:  10/30/25            Patient will achieve left hip abduction strength of 4/5 (Ongoing)       Start:  04/25/25    Expected End:  10/30/25            Patient will achieve left hip adduction strength of 4+/5       Start:  04/25/25    Expected End:  10/30/25            Patient will achieve left hip external rotation strength of 4/5 in 90 degrees hip flexion       Start:  04/25/25    Expected End:  10/30/25            Patient will achieve left hip internal rotation strength of 4/5 in 90 degrees hip flexion       Start:  04/25/25    Expected End:  10/30/25            Patient will achieve left knee flexion strength of 5/5       Start:  04/25/25    Expected End:  10/30/25            Patient will achieve left knee extension strength of 5/5       Start:  04/25/25    Expected End:  10/30/25               Transferring       Patient will perform at least 8 reps of sit to stand with supervision and no more than 1 UE support within 30 seconds to demonstrate improved functional activity tolerance and balance. (Ongoing)       Start:  04/25/25    Expected End:  10/30/25               Resolved       Pain       Patient will report pain of 1/10 demonstrating a reduction of overall pain (Met)       Start:  04/25/25    Expected End:  10/30/25    Resolved:  06/18/25             Jose Luis To PTA

## 2025-07-01 ENCOUNTER — OFFICE VISIT (OUTPATIENT)
Dept: FAMILY MEDICINE | Facility: CLINIC | Age: 75
End: 2025-07-01
Payer: COMMERCIAL

## 2025-07-01 ENCOUNTER — CLINICAL SUPPORT (OUTPATIENT)
Dept: REHABILITATION | Facility: HOSPITAL | Age: 75
End: 2025-07-01
Payer: COMMERCIAL

## 2025-07-01 VITALS
OXYGEN SATURATION: 99 % | BODY MASS INDEX: 25.83 KG/M2 | SYSTOLIC BLOOD PRESSURE: 116 MMHG | TEMPERATURE: 98 F | DIASTOLIC BLOOD PRESSURE: 70 MMHG | HEART RATE: 65 BPM | HEIGHT: 68 IN | WEIGHT: 170.44 LBS

## 2025-07-01 DIAGNOSIS — R20.8 DECREASED SENSATION: ICD-10-CM

## 2025-07-01 DIAGNOSIS — M54.12 CERVICAL RADICULOPATHY: ICD-10-CM

## 2025-07-01 DIAGNOSIS — R29.898 DECREASED GRIP STRENGTH OF LEFT HAND: Primary | ICD-10-CM

## 2025-07-01 DIAGNOSIS — Z74.1 SELF-CARE DEFICIT IN DRESSING: ICD-10-CM

## 2025-07-01 DIAGNOSIS — Z01.818 PRE-OP EXAMINATION: Primary | ICD-10-CM

## 2025-07-01 LAB
OHS QRS DURATION: 78 MS
OHS QTC CALCULATION: 416 MS

## 2025-07-01 PROCEDURE — 99213 OFFICE O/P EST LOW 20 MIN: CPT | Mod: S$GLB,,,

## 2025-07-01 PROCEDURE — 3044F HG A1C LEVEL LT 7.0%: CPT | Mod: CPTII,S$GLB,,

## 2025-07-01 PROCEDURE — 1126F AMNT PAIN NOTED NONE PRSNT: CPT | Mod: CPTII,S$GLB,,

## 2025-07-01 PROCEDURE — 1159F MED LIST DOCD IN RCRD: CPT | Mod: CPTII,S$GLB,,

## 2025-07-01 PROCEDURE — 3066F NEPHROPATHY DOC TX: CPT | Mod: CPTII,S$GLB,,

## 2025-07-01 PROCEDURE — 1101F PT FALLS ASSESS-DOCD LE1/YR: CPT | Mod: CPTII,S$GLB,,

## 2025-07-01 PROCEDURE — G2211 COMPLEX E/M VISIT ADD ON: HCPCS | Mod: S$GLB,,,

## 2025-07-01 PROCEDURE — 3074F SYST BP LT 130 MM HG: CPT | Mod: CPTII,S$GLB,,

## 2025-07-01 PROCEDURE — 4010F ACE/ARB THERAPY RXD/TAKEN: CPT | Mod: CPTII,S$GLB,,

## 2025-07-01 PROCEDURE — 3078F DIAST BP <80 MM HG: CPT | Mod: CPTII,S$GLB,,

## 2025-07-01 PROCEDURE — 97022 WHIRLPOOL THERAPY: CPT | Mod: PN

## 2025-07-01 PROCEDURE — 3061F NEG MICROALBUMINURIA REV: CPT | Mod: CPTII,S$GLB,,

## 2025-07-01 PROCEDURE — 1160F RVW MEDS BY RX/DR IN RCRD: CPT | Mod: CPTII,S$GLB,,

## 2025-07-01 PROCEDURE — 99999 PR PBB SHADOW E&M-EST. PATIENT-LVL V: CPT | Mod: PBBFAC,,,

## 2025-07-01 PROCEDURE — 93010 ELECTROCARDIOGRAM REPORT: CPT | Mod: S$GLB,,, | Performed by: INTERNAL MEDICINE

## 2025-07-01 PROCEDURE — 3072F LOW RISK FOR RETINOPATHY: CPT | Mod: CPTII,S$GLB,,

## 2025-07-01 PROCEDURE — 3008F BODY MASS INDEX DOCD: CPT | Mod: CPTII,S$GLB,,

## 2025-07-01 PROCEDURE — 93005 ELECTROCARDIOGRAM TRACING: CPT | Mod: S$GLB,,,

## 2025-07-01 PROCEDURE — 3288F FALL RISK ASSESSMENT DOCD: CPT | Mod: CPTII,S$GLB,,

## 2025-07-01 PROCEDURE — 97110 THERAPEUTIC EXERCISES: CPT | Mod: PN

## 2025-07-01 NOTE — PROGRESS NOTES
Patient ID: John Lemos is a 74 y.o. male.    Chief Complaint: Pre-op clearance.      John Lemos is in the office pre-op clearance. PCP Dr. Estrada with last visit in this clinic on 6/13/25.     This patient is new to me.  Procedure to be performed: cervical discectomy with Dr. Geiger on 7/7/25.   Pt states that he has had no limitations in activities.   he has had prior surgery without any perioperative complications.     Patient is not a smoker.  Does not take any blood-thinning medications.  is able to walk  blocks without getting CP/SOB/CHINO.  No personal Hx of cardiac diseases, except for hyperlipidemia and hypertension.   Denies F/C/N/V/palpitations/claudication.  Denies weakness/tingling/numbness/vertigo/unsteadiness/changes in mental status/blackouts.    Preop Assessment    Pulmonary risk factors: Denies asthma, RUPERTO, current smoker  Systemic risk factors: Denies Diabetes Mellitus Type 2, has hypertension    Revised Cardiac Risk Index for Pre-Operative Risk = 0.5%    Preoperative Mortality Predictor () Score = 6 points/ 0.6%    The 10-year ASCVD risk score (Noa JORGE, et al., 2019) is: 26.5%    Values used to calculate the score:      Age: 74 years      Sex: Male      Is Non- : Yes      Diabetic: Yes      Tobacco smoker: No      Systolic Blood Pressure: 116 mmHg      Is BP treated: Yes      HDL Cholesterol: 62 mg/dL      Total Cholesterol: 138 mg/dL    ROS, Vitals reviewed.   Patient has acceptable exercise capacity as demonstrated in the office today.    Able to achieve 4 METs. RSI Class III (6.6% risk).    Advised healthy diet/exercise/weight loss in anticipation of surgery    Hold NSAIDs for 5 days prior to surgery  Hold Meloxicam 10 days prior to surgery   Hold Ibuprofen 1 day prior to surgery     EKG today showed NSR, septal infarct age undetermined. Denies any chest pain, shortness of breath.     Patient has no hx of nor symptoms suggestive of myocardial ischemia, heart  failure, arrhythmia and CVA.     No pre-op labs required by surgeon, but surgeon may order any tests at his/her discretion    Labs reviewed:  Creatinine is below 2 mg/dl.      pt is optimized for surgery from a primary care standpoint and is at low risk for gene-operative CV event.      HPI    Past Medical History:   Diagnosis Date    Arthritis     Cataract     ou    Diabetes mellitus 7 yrs    lbs: 118 1 week ago    Diabetes mellitus type 2, controlled 9/8/2012    Eye injury     fb    Hyperlipidemia           Current Medications[1]    The 10-year ASCVD risk score (Noa JORGE, et al., 2019) is: 26.5%    Values used to calculate the score:      Age: 74 years      Sex: Male      Is Non- : Yes      Diabetic: Yes      Tobacco smoker: No      Systolic Blood Pressure: 116 mmHg      Is BP treated: Yes      HDL Cholesterol: 62 mg/dL      Total Cholesterol: 138 mg/dL     Wt Readings from Last 3 Encounters:   07/01/25 77.3 kg (170 lb 6.7 oz)   06/25/25 78.4 kg (172 lb 13.5 oz)   06/13/25 78.4 kg (172 lb 13.5 oz)     Temp Readings from Last 3 Encounters:   07/01/25 97.8 °F (36.6 °C) (Oral)   04/28/25 98.3 °F (36.8 °C) (Oral)   04/15/25 98 °F (36.7 °C) (Oral)     BP Readings from Last 3 Encounters:   07/01/25 116/70   05/07/25 134/69   04/28/25 137/66     Pulse Readings from Last 3 Encounters:   07/01/25 65   05/07/25 83   04/28/25 86     Resp Readings from Last 3 Encounters:   04/28/25 16   04/15/25 20   04/02/25 16     PF Readings from Last 3 Encounters:   No data found for PF     SpO2 Readings from Last 3 Encounters:   07/01/25 99%   04/28/25 98%   04/15/25 (!) 94%        Lab Results   Component Value Date    HGBA1C 6.6 (H) 06/21/2025    HGBA1C 6.7 (H) 04/02/2025    HGBA1C 6.1 (H) 12/18/2024     Lab Results   Component Value Date    LDLCALC 64.2 06/21/2025    CREATININE 0.8 06/27/2025       Review of Systems   Constitutional:  Negative for fever.   HENT:  Negative for trouble swallowing.    Respiratory:   Negative for shortness of breath.    Cardiovascular:  Negative for chest pain.   Gastrointestinal:  Negative for blood in stool.   Musculoskeletal:  Positive for back pain.         Objective:      Physical Exam  Vitals reviewed.   Constitutional:       General: He is not in acute distress.     Appearance: Normal appearance. He is not ill-appearing.   HENT:      Head: Normocephalic and atraumatic.   Cardiovascular:      Rate and Rhythm: Normal rate and regular rhythm.      Pulses: Normal pulses.      Heart sounds: Normal heart sounds. No murmur heard.  Pulmonary:      Effort: Pulmonary effort is normal. No respiratory distress.      Breath sounds: Normal breath sounds. No wheezing.   Musculoskeletal:         General: Normal range of motion.      Cervical back: Normal range of motion and neck supple. Tenderness present. No rigidity.   Lymphadenopathy:      Cervical: No cervical adenopathy.   Skin:     General: Skin is warm and dry.      Capillary Refill: Capillary refill takes less than 2 seconds.   Neurological:      General: No focal deficit present.      Mental Status: He is alert and oriented to person, place, and time.   Psychiatric:         Mood and Affect: Mood normal.         Behavior: Behavior normal.           Screening recommendations appropriate to age and health status were reviewed.    Pre-op examination    Patient able to meet 4 METs, has acceptable exercise capacity in clinic today. EKG shows NSR, septal infarct, age undetermined. Denies chest pain, shortness of breath, dizziness.  Recent labs reviewed and stable. Patient medically optimized for surgery at this time.     -     IN OFFICE EKG 12-LEAD (to Muse)    Cervical radiculopathy    Stable, upcoming cervical discectomy, managed by Dr. Geiger.        RCRI risk factors include: no known RCRI risk factors. As such, per RCRI the risk of cardiac death, nonfatal myocardial infarction, or nonfatal cardiac arrest is 5.4% and the risk of myocardial infarction,  pulmonary edema, ventricular fibrillation, primary cardiac arrest, or complete heart block is 3.6%.  Overall this patient can be considered low risk for this low risk procedure. No further cardiac testing is recommended at this time.     Patient denies any symptoms (as per HPI) concerning for undiagnosed lung disease including RUPERTO. Would not recommend obtaining chest X-ray, sleep study, or PFTs at this time. Patient is a non-smoker. We discussed the benefits of early mobilization and deep breathing after surgery.      Screened patient for alcohol misuse, use of illicit drugs, and personal or family history of anesthetic complications or bleeding diathesis and no substantial concerns were identified.     All current medications were reviewed and at this time no changes to medications are recommended prior to surgery.     I recommend use of standard pre-op and post-op precautions for this patient. In my opinion, he is medically optimized for this procedure, and can proceed without further evaluation.     MONTY Gray         [1]   Current Outpatient Medications:     acetaminophen (TYLENOL) 500 MG tablet, Take 2 tablets (1,000 mg total) by mouth every 8 (eight) hours as needed for Pain., Disp: 42 tablet, Rfl: 0    acetaminophen (TYLENOL) 500 MG tablet, Take 1 tablet (500 mg total) by mouth 3 (three) times daily., Disp: 90 tablet, Rfl: 0    atorvastatin (LIPITOR) 40 MG tablet, Take 1 tablet (40 mg total) by mouth once daily., Disp: 90 tablet, Rfl: 3    b complex vitamins capsule, Take 1 capsule by mouth once daily., Disp: , Rfl:     blood sugar diagnostic (FREESTYLE LITE STRIPS) Strp, Inject 1 each as directed once daily., Disp: 100 each, Rfl: 3    celecoxib (CELEBREX) 100 MG capsule, Take 1 capsule (100 mg total) by mouth 2 (two) times daily., Disp: 28 capsule, Rfl: 0    diclofenac (VOLTAREN) 75 MG EC tablet, Take 1 tablet (75 mg total) by mouth 3 (three) times daily as needed (pain)., Disp: 90 tablet, Rfl: 0     docusate sodium (COLACE) 100 MG capsule, Take 1 capsule (100 mg total) by mouth 2 (two) times daily., Disp: 30 capsule, Rfl: 0    DULoxetine (CYMBALTA) 60 MG capsule, Take 1 capsule (60 mg total) by mouth once daily., Disp: 90 capsule, Rfl: 3    lancets (FREESTYLE LANCETS) 28 gauge Misc, Inject 1 lancet as directed once daily., Disp: 100 each, Rfl: 3    losartan (COZAAR) 50 MG tablet, Take 1 tablet (50 mg total) by mouth once daily., Disp: 90 tablet, Rfl: 1    metFORMIN (GLUCOPHAGE) 500 MG tablet, TAKE 2 TABLETS BY MOUTH TWICE DAILY WITH BREAKFAST AND WITH SUPPER, Disp: 360 tablet, Rfl: 1    methocarbamoL (ROBAXIN) 500 MG Tab, Take 2 tablets (1,000 mg total) by mouth 3 (three) times daily., Disp: 90 tablet, Rfl: 0    oxyCODONE (ROXICODONE) 5 MG immediate release tablet, Take 1 tablet (5 mg total) by mouth every 8 (eight) hours as needed for Pain (for breakthrough pain)., Disp: 21 tablet, Rfl: 0    pregabalin (LYRICA) 150 MG capsule, Take 1 capsule (150 mg total) by mouth 2 (two) times daily., Disp: 60 capsule, Rfl: 5    tadalafiL (CIALIS) 5 MG tablet, Take 1 tablet (5 mg total) by mouth once daily., Disp: 30 tablet, Rfl: 11    TRULICITY 0.75 mg/0.5 mL pen injector, INJECT 0.75 MG INTO THE SKIN EVERY 7 DAYS (Patient taking differently: Inject 0.75 mg into the skin every 7 days. MONDAYS), Disp: 12 pen , Rfl: 1

## 2025-07-01 NOTE — PROGRESS NOTES
Outpatient Rehab    Occupational Therapy Visit    Patient Name: John Lemos  MRN: [id number]  YOB: 1950  Encounter Date: 7/1/2025    Therapy Diagnosis:   Encounter Diagnoses   Name Primary?    Decreased  strength of left hand Yes    Decreased sensation     Self-care deficit in dressing      Physician: Brenda Llamas NP    Physician Orders: Eval and Treat  Medical Diagnosis: Carpal tunnel syndrome on both sides  Surgical Diagnosis: Not applicable for this Episode   Surgical Date: Not applicable for this Episode  Days Since Last Surgery: Not applicable for this Episode    Visit # / Visits Authorized: 2 / 10  Insurance Authorization Period: 6/12/2025 to 12/31/2025  Date of Evaluation: 6/17/2025  Plan of Care Certification:       Time In: 1600   Time Out: 1645  Total Time (in minutes): 45   Total Billable Time (in minutes):      FOTO:  Intake Score:  %  Survey Score 2:  %  Survey Score 3:  %    Precautions:       Subjective   My hands are really hurting because I had to stop my pain medicine before my surgery..  Pain reported as 8/10.      Objective            Treatment:  Therapeutic Exercise  TE 1: bilateral hands x 3 min  TE 2: red clip placement of HiQ pegs in board right hand and left hand  Modalities  Fluidotherapy: Patient received fluidotherapy to each hand(s) for 8 minutes to increase blood flow, circulation, desensitization, sensory re-education and for pain management.    Time Entry(in minutes):  TE 30   Fluidotherapy x 2-16 min    Assessment & Plan   Assessment: John with increased time to perform fine motor tasks in clinic but completed with each hand.  Left hand performed with slightly more time but completed.       The patient will continue to benefit from skilled outpatient occupational therapy in order to address the deficits listed in the problem list on the initial evaluation, provide patient and family education, and maximize the patients level of independence in the home  and community environments.     The patient's spiritual, cultural, and educational needs were considered, and the patient is agreeable to the plan of care and goals.           Plan:      Goals:   Active       Short term goals        Short term goals to be met by 7/15/25    Patient to be IND with HEP and modalities for pain/edema managment.  Increase left  strength to 23 lbs. to improve functional grasp for ADLs/work/leisure activities.   Increase left key pinch to 7.5 psi's to increase IND with button and FM Coordination.  Increase left 3pt pinch to 6 to improve manipulation tasks.         Long term goals       Start:  06/17/25    Expected End:  08/12/25       Long term goals to be met by 8/12/25    Patient will improve his left hand 9 hole peg test by 30 seconds to 194 seconds for fine motor coordination.  Increase left  strength to 26 lbs. to increase functional use of left hand.             DOMINIQUE Portillo

## 2025-07-03 ENCOUNTER — TELEPHONE (OUTPATIENT)
Dept: ORTHOPEDICS | Facility: CLINIC | Age: 75
End: 2025-07-03
Payer: COMMERCIAL

## 2025-07-03 ENCOUNTER — PATIENT MESSAGE (OUTPATIENT)
Dept: ORTHOPEDICS | Facility: CLINIC | Age: 75
End: 2025-07-03
Payer: COMMERCIAL

## 2025-07-03 ENCOUNTER — RESULTS FOLLOW-UP (OUTPATIENT)
Dept: FAMILY MEDICINE | Facility: CLINIC | Age: 75
End: 2025-07-03

## 2025-07-03 ENCOUNTER — ANESTHESIA EVENT (OUTPATIENT)
Dept: SURGERY | Facility: HOSPITAL | Age: 75
End: 2025-07-03
Payer: COMMERCIAL

## 2025-07-07 ENCOUNTER — HOSPITAL ENCOUNTER (OUTPATIENT)
Facility: HOSPITAL | Age: 75
Discharge: HOME | End: 2025-07-08
Attending: ORTHOPAEDIC SURGERY | Admitting: ORTHOPAEDIC SURGERY
Payer: COMMERCIAL

## 2025-07-07 ENCOUNTER — ANESTHESIA (OUTPATIENT)
Dept: SURGERY | Facility: HOSPITAL | Age: 75
End: 2025-07-07
Payer: COMMERCIAL

## 2025-07-07 DIAGNOSIS — G95.9 CERVICAL MYELOPATHY WITH CERVICAL RADICULOPATHY: ICD-10-CM

## 2025-07-07 DIAGNOSIS — Z98.1 S/P CERVICAL SPINAL FUSION: Primary | ICD-10-CM

## 2025-07-07 DIAGNOSIS — M54.12 CERVICAL MYELOPATHY WITH CERVICAL RADICULOPATHY: ICD-10-CM

## 2025-07-07 DIAGNOSIS — G95.9 CERVICAL MYELOPATHY: Primary | ICD-10-CM

## 2025-07-07 LAB
INDIRECT COOMBS: NORMAL
POCT GLUCOSE: 144 MG/DL (ref 70–110)
POCT GLUCOSE: 91 MG/DL (ref 70–110)
RH BLD: NORMAL
SPECIMEN OUTDATE: NORMAL

## 2025-07-07 PROCEDURE — 82962 GLUCOSE BLOOD TEST: CPT | Performed by: ORTHOPAEDIC SURGERY

## 2025-07-07 PROCEDURE — 22853 INSJ BIOMECHANICAL DEVICE: CPT | Mod: AS,,, | Performed by: ORTHOPAEDIC SURGERY

## 2025-07-07 PROCEDURE — 25000003 PHARM REV CODE 250

## 2025-07-07 PROCEDURE — C1769 GUIDE WIRE: HCPCS | Performed by: ORTHOPAEDIC SURGERY

## 2025-07-07 PROCEDURE — 94799 UNLISTED PULMONARY SVC/PX: CPT

## 2025-07-07 PROCEDURE — 25000003 PHARM REV CODE 250: Performed by: ORTHOPAEDIC SURGERY

## 2025-07-07 PROCEDURE — 63600175 PHARM REV CODE 636 W HCPCS: Performed by: ORTHOPAEDIC SURGERY

## 2025-07-07 PROCEDURE — 36415 COLL VENOUS BLD VENIPUNCTURE: CPT | Performed by: ORTHOPAEDIC SURGERY

## 2025-07-07 PROCEDURE — 37000008 HC ANESTHESIA 1ST 15 MINUTES: Performed by: ORTHOPAEDIC SURGERY

## 2025-07-07 PROCEDURE — C1729 CATH, DRAINAGE: HCPCS | Performed by: ORTHOPAEDIC SURGERY

## 2025-07-07 PROCEDURE — 94761 N-INVAS EAR/PLS OXIMETRY MLT: CPT

## 2025-07-07 PROCEDURE — 71000033 HC RECOVERY, INTIAL HOUR: Performed by: ORTHOPAEDIC SURGERY

## 2025-07-07 PROCEDURE — 63600175 PHARM REV CODE 636 W HCPCS: Performed by: NURSE ANESTHETIST, CERTIFIED REGISTERED

## 2025-07-07 PROCEDURE — 37000009 HC ANESTHESIA EA ADD 15 MINS: Performed by: ORTHOPAEDIC SURGERY

## 2025-07-07 PROCEDURE — 86850 RBC ANTIBODY SCREEN: CPT | Performed by: ORTHOPAEDIC SURGERY

## 2025-07-07 PROCEDURE — 25000003 PHARM REV CODE 250: Performed by: NURSE ANESTHETIST, CERTIFIED REGISTERED

## 2025-07-07 PROCEDURE — C1713 ANCHOR/SCREW BN/BN,TIS/BN: HCPCS | Performed by: ORTHOPAEDIC SURGERY

## 2025-07-07 PROCEDURE — 36000711: Performed by: ORTHOPAEDIC SURGERY

## 2025-07-07 PROCEDURE — 22551 ARTHRD ANT NTRBDY CERVICAL: CPT | Mod: 79,,, | Performed by: ORTHOPAEDIC SURGERY

## 2025-07-07 PROCEDURE — 22845 INSERT SPINE FIXATION DEVICE: CPT | Mod: XU,,, | Performed by: ORTHOPAEDIC SURGERY

## 2025-07-07 PROCEDURE — 36000710: Performed by: ORTHOPAEDIC SURGERY

## 2025-07-07 PROCEDURE — 99900035 HC TECH TIME PER 15 MIN (STAT)

## 2025-07-07 PROCEDURE — 27800903 OPTIME MED/SURG SUP & DEVICES OTHER IMPLANTS: Performed by: ORTHOPAEDIC SURGERY

## 2025-07-07 PROCEDURE — 20930 SP BONE ALGRFT MORSEL ADD-ON: CPT | Mod: ,,, | Performed by: ORTHOPAEDIC SURGERY

## 2025-07-07 PROCEDURE — 63600175 PHARM REV CODE 636 W HCPCS: Performed by: STUDENT IN AN ORGANIZED HEALTH CARE EDUCATION/TRAINING PROGRAM

## 2025-07-07 PROCEDURE — 20936 SP BONE AGRFT LOCAL ADD-ON: CPT | Mod: ,,, | Performed by: ORTHOPAEDIC SURGERY

## 2025-07-07 PROCEDURE — 22853 INSJ BIOMECHANICAL DEVICE: CPT | Mod: ,,, | Performed by: ORTHOPAEDIC SURGERY

## 2025-07-07 PROCEDURE — 27201423 OPTIME MED/SURG SUP & DEVICES STERILE SUPPLY: Performed by: ORTHOPAEDIC SURGERY

## 2025-07-07 PROCEDURE — 71000039 HC RECOVERY, EACH ADD'L HOUR: Performed by: ORTHOPAEDIC SURGERY

## 2025-07-07 PROCEDURE — 25000003 PHARM REV CODE 250: Performed by: STUDENT IN AN ORGANIZED HEALTH CARE EDUCATION/TRAINING PROGRAM

## 2025-07-07 PROCEDURE — 22551 ARTHRD ANT NTRBDY CERVICAL: CPT | Mod: AS,79,, | Performed by: ORTHOPAEDIC SURGERY

## 2025-07-07 PROCEDURE — 22845 INSERT SPINE FIXATION DEVICE: CPT | Mod: AS,XU,, | Performed by: ORTHOPAEDIC SURGERY

## 2025-07-07 DEVICE — SPACER ACCENT 6 DEG 12X14X7MM: Type: IMPLANTABLE DEVICE | Site: SPINE CERVICAL | Status: FUNCTIONAL

## 2025-07-07 DEVICE — PUTTY OSTEOSELECT IN SYR 1CC: Type: IMPLANTABLE DEVICE | Site: SPINE CERVICAL | Status: FUNCTIONAL

## 2025-07-07 DEVICE — IMPLANTABLE DEVICE: Type: IMPLANTABLE DEVICE | Site: SPINE CERVICAL | Status: FUNCTIONAL

## 2025-07-07 DEVICE — SCREW DEFENDER CERV 18X4MM: Type: IMPLANTABLE DEVICE | Site: SPINE CERVICAL | Status: FUNCTIONAL

## 2025-07-07 RX ORDER — LIDOCAINE HYDROCHLORIDE 20 MG/ML
INJECTION INTRAVENOUS
Status: DISCONTINUED | OUTPATIENT
Start: 2025-07-07 | End: 2025-07-07

## 2025-07-07 RX ORDER — HYDROMORPHONE HYDROCHLORIDE 1 MG/ML
0.2 INJECTION, SOLUTION INTRAMUSCULAR; INTRAVENOUS; SUBCUTANEOUS EVERY 5 MIN PRN
Status: DISCONTINUED | OUTPATIENT
Start: 2025-07-07 | End: 2025-07-07

## 2025-07-07 RX ORDER — IBUPROFEN 200 MG
24 TABLET ORAL
Status: DISCONTINUED | OUTPATIENT
Start: 2025-07-07 | End: 2025-07-08 | Stop reason: HOSPADM

## 2025-07-07 RX ORDER — CEFAZOLIN SODIUM 1 G/3ML
INJECTION, POWDER, FOR SOLUTION INTRAMUSCULAR; INTRAVENOUS
Status: DISCONTINUED | OUTPATIENT
Start: 2025-07-07 | End: 2025-07-07

## 2025-07-07 RX ORDER — POLYETHYLENE GLYCOL 3350 17 G/17G
17 POWDER, FOR SOLUTION ORAL DAILY
Status: DISCONTINUED | OUTPATIENT
Start: 2025-07-07 | End: 2025-07-08 | Stop reason: HOSPADM

## 2025-07-07 RX ORDER — ACETAMINOPHEN 500 MG
1000 TABLET ORAL
Status: COMPLETED | OUTPATIENT
Start: 2025-07-07 | End: 2025-07-07

## 2025-07-07 RX ORDER — MIDAZOLAM HYDROCHLORIDE 1 MG/ML
INJECTION, SOLUTION INTRAMUSCULAR; INTRAVENOUS
Status: DISCONTINUED | OUTPATIENT
Start: 2025-07-07 | End: 2025-07-07

## 2025-07-07 RX ORDER — ACETAMINOPHEN 325 MG/1
650 TABLET ORAL EVERY 4 HOURS PRN
Status: DISCONTINUED | OUTPATIENT
Start: 2025-07-07 | End: 2025-07-08 | Stop reason: HOSPADM

## 2025-07-07 RX ORDER — DEXAMETHASONE SODIUM PHOSPHATE 4 MG/ML
INJECTION, SOLUTION INTRA-ARTICULAR; INTRALESIONAL; INTRAMUSCULAR; INTRAVENOUS; SOFT TISSUE
Status: DISCONTINUED | OUTPATIENT
Start: 2025-07-07 | End: 2025-07-07

## 2025-07-07 RX ORDER — ATORVASTATIN CALCIUM 20 MG/1
40 TABLET, FILM COATED ORAL DAILY
Status: DISCONTINUED | OUTPATIENT
Start: 2025-07-07 | End: 2025-07-08 | Stop reason: HOSPADM

## 2025-07-07 RX ORDER — ONDANSETRON HYDROCHLORIDE 2 MG/ML
INJECTION, SOLUTION INTRAVENOUS
Status: DISCONTINUED | OUTPATIENT
Start: 2025-07-07 | End: 2025-07-07

## 2025-07-07 RX ORDER — MUPIROCIN 20 MG/G
OINTMENT TOPICAL
Status: DISCONTINUED | OUTPATIENT
Start: 2025-07-07 | End: 2025-07-07

## 2025-07-07 RX ORDER — HALOPERIDOL LACTATE 5 MG/ML
0.5 INJECTION, SOLUTION INTRAMUSCULAR EVERY 10 MIN PRN
Status: DISCONTINUED | OUTPATIENT
Start: 2025-07-07 | End: 2025-07-07

## 2025-07-07 RX ORDER — FENTANYL CITRATE 50 UG/ML
INJECTION, SOLUTION INTRAMUSCULAR; INTRAVENOUS
Status: DISCONTINUED | OUTPATIENT
Start: 2025-07-07 | End: 2025-07-07

## 2025-07-07 RX ORDER — GLUCAGON 1 MG
1 KIT INJECTION
Status: DISCONTINUED | OUTPATIENT
Start: 2025-07-07 | End: 2025-07-07

## 2025-07-07 RX ORDER — FENTANYL CITRATE 50 UG/ML
25 INJECTION, SOLUTION INTRAMUSCULAR; INTRAVENOUS EVERY 5 MIN PRN
Status: COMPLETED | OUTPATIENT
Start: 2025-07-07 | End: 2025-07-07

## 2025-07-07 RX ORDER — AMOXICILLIN 250 MG
1 CAPSULE ORAL 2 TIMES DAILY
Status: DISCONTINUED | OUTPATIENT
Start: 2025-07-07 | End: 2025-07-08 | Stop reason: HOSPADM

## 2025-07-07 RX ORDER — ONDANSETRON HYDROCHLORIDE 2 MG/ML
4 INJECTION, SOLUTION INTRAVENOUS DAILY PRN
Status: DISCONTINUED | OUTPATIENT
Start: 2025-07-07 | End: 2025-07-07

## 2025-07-07 RX ORDER — METHOCARBAMOL 500 MG/1
1000 TABLET, FILM COATED ORAL ONCE
Status: COMPLETED | OUTPATIENT
Start: 2025-07-07 | End: 2025-07-07

## 2025-07-07 RX ORDER — IBUPROFEN 200 MG
16 TABLET ORAL
Status: DISCONTINUED | OUTPATIENT
Start: 2025-07-07 | End: 2025-07-08 | Stop reason: HOSPADM

## 2025-07-07 RX ORDER — OXYCODONE HYDROCHLORIDE 10 MG/1
10 TABLET ORAL EVERY 4 HOURS PRN
Status: DISCONTINUED | OUTPATIENT
Start: 2025-07-07 | End: 2025-07-08 | Stop reason: HOSPADM

## 2025-07-07 RX ORDER — LOSARTAN POTASSIUM 25 MG/1
50 TABLET ORAL DAILY
Status: DISCONTINUED | OUTPATIENT
Start: 2025-07-07 | End: 2025-07-08 | Stop reason: HOSPADM

## 2025-07-07 RX ORDER — OXYCODONE HYDROCHLORIDE 5 MG/1
5 TABLET ORAL EVERY 4 HOURS PRN
Status: DISCONTINUED | OUTPATIENT
Start: 2025-07-07 | End: 2025-07-08 | Stop reason: HOSPADM

## 2025-07-07 RX ORDER — SODIUM CHLORIDE 9 MG/ML
INJECTION, SOLUTION INTRAVENOUS CONTINUOUS
Status: DISCONTINUED | OUTPATIENT
Start: 2025-07-07 | End: 2025-07-07

## 2025-07-07 RX ORDER — ELECTROLYTES/DEXTROSE
400 SOLUTION, ORAL ORAL
Status: DISCONTINUED | OUTPATIENT
Start: 2025-07-07 | End: 2025-07-08 | Stop reason: HOSPADM

## 2025-07-07 RX ORDER — FAMOTIDINE 10 MG/ML
INJECTION, SOLUTION INTRAVENOUS
Status: DISCONTINUED | OUTPATIENT
Start: 2025-07-07 | End: 2025-07-07

## 2025-07-07 RX ORDER — FAMOTIDINE 20 MG/1
20 TABLET, FILM COATED ORAL 2 TIMES DAILY
Status: DISCONTINUED | OUTPATIENT
Start: 2025-07-07 | End: 2025-07-08 | Stop reason: HOSPADM

## 2025-07-07 RX ORDER — LABETALOL HYDROCHLORIDE 5 MG/ML
5 INJECTION, SOLUTION INTRAVENOUS ONCE
Status: COMPLETED | OUTPATIENT
Start: 2025-07-07 | End: 2025-07-07

## 2025-07-07 RX ORDER — PREGABALIN 150 MG/1
150 CAPSULE ORAL 2 TIMES DAILY
Status: DISCONTINUED | OUTPATIENT
Start: 2025-07-07 | End: 2025-07-08 | Stop reason: HOSPADM

## 2025-07-07 RX ORDER — SUCCINYLCHOLINE CHLORIDE 20 MG/ML
INJECTION INTRAMUSCULAR; INTRAVENOUS
Status: DISCONTINUED | OUTPATIENT
Start: 2025-07-07 | End: 2025-07-07

## 2025-07-07 RX ORDER — PROPOFOL 10 MG/ML
VIAL (ML) INTRAVENOUS CONTINUOUS PRN
Status: DISCONTINUED | OUTPATIENT
Start: 2025-07-07 | End: 2025-07-07

## 2025-07-07 RX ORDER — ONDANSETRON HYDROCHLORIDE 2 MG/ML
4 INJECTION, SOLUTION INTRAVENOUS EVERY 12 HOURS PRN
Status: DISCONTINUED | OUTPATIENT
Start: 2025-07-07 | End: 2025-07-08 | Stop reason: HOSPADM

## 2025-07-07 RX ORDER — DULOXETIN HYDROCHLORIDE 30 MG/1
60 CAPSULE, DELAYED RELEASE ORAL DAILY
Status: DISCONTINUED | OUTPATIENT
Start: 2025-07-07 | End: 2025-07-08 | Stop reason: HOSPADM

## 2025-07-07 RX ORDER — GLUCAGON 1 MG
1 KIT INJECTION
Status: DISCONTINUED | OUTPATIENT
Start: 2025-07-07 | End: 2025-07-08 | Stop reason: HOSPADM

## 2025-07-07 RX ORDER — ONDANSETRON 8 MG/1
8 TABLET, ORALLY DISINTEGRATING ORAL EVERY 8 HOURS PRN
Status: DISCONTINUED | OUTPATIENT
Start: 2025-07-07 | End: 2025-07-08 | Stop reason: HOSPADM

## 2025-07-07 RX ORDER — MUPIROCIN 20 MG/G
OINTMENT TOPICAL 2 TIMES DAILY
Status: DISCONTINUED | OUTPATIENT
Start: 2025-07-07 | End: 2025-07-08 | Stop reason: HOSPADM

## 2025-07-07 RX ORDER — CEFAZOLIN 2 G/1
2 INJECTION, POWDER, FOR SOLUTION INTRAMUSCULAR; INTRAVENOUS ONCE
Status: DISCONTINUED | OUTPATIENT
Start: 2025-07-07 | End: 2025-07-07

## 2025-07-07 RX ORDER — CEFAZOLIN 2 G/1
2 INJECTION, POWDER, FOR SOLUTION INTRAMUSCULAR; INTRAVENOUS
Status: COMPLETED | OUTPATIENT
Start: 2025-07-07 | End: 2025-07-08

## 2025-07-07 RX ORDER — PROPOFOL 10 MG/ML
VIAL (ML) INTRAVENOUS
Status: DISCONTINUED | OUTPATIENT
Start: 2025-07-07 | End: 2025-07-07

## 2025-07-07 RX ORDER — KETAMINE HCL IN 0.9 % NACL 50 MG/5 ML
SYRINGE (ML) INTRAVENOUS
Status: DISCONTINUED | OUTPATIENT
Start: 2025-07-07 | End: 2025-07-07

## 2025-07-07 RX ORDER — OXYCODONE HYDROCHLORIDE 5 MG/1
5 TABLET ORAL
Status: DISCONTINUED | OUTPATIENT
Start: 2025-07-07 | End: 2025-07-07

## 2025-07-07 RX ORDER — INSULIN ASPART 100 [IU]/ML
0-5 INJECTION, SOLUTION INTRAVENOUS; SUBCUTANEOUS
Status: DISCONTINUED | OUTPATIENT
Start: 2025-07-07 | End: 2025-07-08 | Stop reason: HOSPADM

## 2025-07-07 RX ORDER — CEFAZOLIN 2 G/1
2 INJECTION, POWDER, FOR SOLUTION INTRAMUSCULAR; INTRAVENOUS
Status: DISCONTINUED | OUTPATIENT
Start: 2025-07-07 | End: 2025-07-07

## 2025-07-07 RX ORDER — LABETALOL HYDROCHLORIDE 5 MG/ML
10 INJECTION, SOLUTION INTRAVENOUS ONCE
Status: COMPLETED | OUTPATIENT
Start: 2025-07-07 | End: 2025-07-07

## 2025-07-07 RX ADMIN — FENTANYL CITRATE 25 MCG: 50 INJECTION INTRAMUSCULAR; INTRAVENOUS at 11:07

## 2025-07-07 RX ADMIN — LOSARTAN POTASSIUM 50 MG: 25 TABLET, FILM COATED ORAL at 12:07

## 2025-07-07 RX ADMIN — PREGABALIN 150 MG: 150 CAPSULE ORAL at 08:07

## 2025-07-07 RX ADMIN — HYDROMORPHONE HYDROCHLORIDE 0.2 MG: 1 INJECTION, SOLUTION INTRAMUSCULAR; INTRAVENOUS; SUBCUTANEOUS at 11:07

## 2025-07-07 RX ADMIN — FENTANYL CITRATE 100 MCG: 50 INJECTION, SOLUTION INTRAMUSCULAR; INTRAVENOUS at 09:07

## 2025-07-07 RX ADMIN — SODIUM CHLORIDE 0.1 MCG/KG/MIN: 9 INJECTION, SOLUTION INTRAVENOUS at 09:07

## 2025-07-07 RX ADMIN — SODIUM CHLORIDE: 0.9 INJECTION, SOLUTION INTRAVENOUS at 09:07

## 2025-07-07 RX ADMIN — HALOPERIDOL LACTATE 0.5 MG: 5 INJECTION, SOLUTION INTRAMUSCULAR at 11:07

## 2025-07-07 RX ADMIN — Medication 15 MG: at 09:07

## 2025-07-07 RX ADMIN — FAMOTIDINE 20 MG: 10 INJECTION, SOLUTION INTRAVENOUS at 09:07

## 2025-07-07 RX ADMIN — PROPOFOL 130 MG: 10 INJECTION, EMULSION INTRAVENOUS at 09:07

## 2025-07-07 RX ADMIN — LABETALOL HYDROCHLORIDE 5 MG: 5 INJECTION INTRAVENOUS at 12:07

## 2025-07-07 RX ADMIN — SENNOSIDES AND DOCUSATE SODIUM 1 TABLET: 50; 8.6 TABLET ORAL at 08:07

## 2025-07-07 RX ADMIN — DEXAMETHASONE SODIUM PHOSPHATE 12 MG: 4 INJECTION, SOLUTION INTRAMUSCULAR; INTRAVENOUS at 09:07

## 2025-07-07 RX ADMIN — METHOCARBAMOL 1000 MG: 500 TABLET ORAL at 08:07

## 2025-07-07 RX ADMIN — POLYETHYLENE GLYCOL 3350 17 G: 17 POWDER, FOR SOLUTION ORAL at 12:07

## 2025-07-07 RX ADMIN — SODIUM CHLORIDE, SODIUM GLUCONATE, SODIUM ACETATE, POTASSIUM CHLORIDE, MAGNESIUM CHLORIDE, SODIUM PHOSPHATE, DIBASIC, AND POTASSIUM PHOSPHATE: .53; .5; .37; .037; .03; .012; .00082 INJECTION, SOLUTION INTRAVENOUS at 10:07

## 2025-07-07 RX ADMIN — MUPIROCIN: 20 OINTMENT TOPICAL at 08:07

## 2025-07-07 RX ADMIN — ACETAMINOPHEN 1000 MG: 500 TABLET ORAL at 08:07

## 2025-07-07 RX ADMIN — SENNOSIDES AND DOCUSATE SODIUM 1 TABLET: 50; 8.6 TABLET ORAL at 12:07

## 2025-07-07 RX ADMIN — Medication 400 ML: at 11:07

## 2025-07-07 RX ADMIN — ONDANSETRON 4 MG: 2 INJECTION INTRAMUSCULAR; INTRAVENOUS at 09:07

## 2025-07-07 RX ADMIN — MIDAZOLAM HYDROCHLORIDE 1 MG: 2 INJECTION, SOLUTION INTRAMUSCULAR; INTRAVENOUS at 09:07

## 2025-07-07 RX ADMIN — FAMOTIDINE 20 MG: 20 TABLET, FILM COATED ORAL at 08:07

## 2025-07-07 RX ADMIN — CEFAZOLIN 2 G: 2 INJECTION, POWDER, FOR SOLUTION INTRAMUSCULAR; INTRAVENOUS at 06:07

## 2025-07-07 RX ADMIN — SUCCINYLCHOLINE CHLORIDE 100 MG: 20 INJECTION, SOLUTION INTRAMUSCULAR; INTRAVENOUS at 09:07

## 2025-07-07 RX ADMIN — ACETAMINOPHEN 650 MG: 325 TABLET ORAL at 09:07

## 2025-07-07 RX ADMIN — LIDOCAINE HYDROCHLORIDE 100 MG: 20 INJECTION INTRAVENOUS at 09:07

## 2025-07-07 RX ADMIN — PROPOFOL 150 MCG/KG/MIN: 10 INJECTION, EMULSION INTRAVENOUS at 09:07

## 2025-07-07 RX ADMIN — LABETALOL HYDROCHLORIDE 10 MG: 5 INJECTION INTRAVENOUS at 12:07

## 2025-07-07 RX ADMIN — PREGABALIN 150 MG: 150 CAPSULE ORAL at 12:07

## 2025-07-07 RX ADMIN — CEFAZOLIN 2 G: 330 INJECTION, POWDER, FOR SOLUTION INTRAMUSCULAR; INTRAVENOUS at 10:07

## 2025-07-07 NOTE — PLAN OF CARE
Pre op complete. Pt resting comfortably. Call light in reach. Family at bedside. Belongings placed in locker. Walker at bedside. Need anesthesia consent, site christine, update, surg consent and blood consent.

## 2025-07-07 NOTE — OPERATIVE NOTE ADDENDUM
Certification of Assistant at Surgery       Surgery Date: 7/7/2025     Participating Surgeons:  Surgeons and Role:     * Damián Geiger MD - Primary    Procedures:  Procedure(s) (LRB):  DISCECTOMY, SPINE, CERVICAL, ANTERIOR, W/ ARTHRODESIS C3-4 ACDF SPINEWAVE SNS: VOCAL CORDS/MOTORS/SSEP (N/A)    Assistant Surgeon's Certification of Necessity:  I understand that section 1842 (b) (6) (d) of the Social Security Act generally prohibits Medicare Part B reasonable charge payment for the services of assistants at surgery in teaching hospitals when qualified residents are available to furnish such services. I certify that the services for which payment is claimed were medically necessary, and that no qualified resident was available to perform the services. I further understand that these services are subject to post-payment review by the Medicare carrier.      Birdie Crawford PA-C    07/07/2025  11:28 AM

## 2025-07-07 NOTE — ANESTHESIA POSTPROCEDURE EVALUATION
Anesthesia Post Evaluation    Patient: John Lemos    Procedure(s) Performed: Procedure(s) (LRB):  DISCECTOMY, SPINE, CERVICAL, ANTERIOR, W/ ARTHRODESIS C3-4 ACDF SPINEWAVE SNS: VOCAL CORDS/MOTORS/SSEP (N/A)    Final Anesthesia Type: general      Patient location during evaluation: PACU  Patient participation: Yes- Able to Participate  Level of consciousness: awake and alert  Post-procedure vital signs: reviewed and stable  Pain management: adequate  Airway patency: patent  RUPERTO mitigation strategies: Multimodal analgesia  PONV status at discharge: No PONV  Anesthetic complications: no      Cardiovascular status: blood pressure returned to baseline and hemodynamically stable  Respiratory status: unassisted  Hydration status: euvolemic  Follow-up not needed.              Vitals Value Taken Time   /82 07/07/25 13:30   Temp 36.3 °C (97.4 °F) 07/07/25 13:30   Pulse 79 07/07/25 13:18   Resp 16 07/07/25 13:30   SpO2 96 % 07/07/25 13:30   Vitals shown include unfiled device data.      Event Time   Out of Recovery 13:30:00         Pain/Kory Score: Pain Rating Prior to Med Admin: 9 (7/7/2025 11:54 AM)  Pain Rating Post Med Admin: 0 (7/7/2025 12:36 PM)  Kory Score: 10 (7/7/2025 12:36 PM)

## 2025-07-07 NOTE — NURSING TRANSFER
Nursing Transfer Note      7/7/2025   1:31 PM    Nurse giving handoff:Sebastian Alva RN  Nurse receiving handoff:Jyoti Barnes RN    Reason patient is being transferred: Overnight    Transfer From: PACU    Transfer via bed    Transfer with belongings and walker    Transported by Sebastian Alva RN

## 2025-07-07 NOTE — H&P
DATE: 7/7/2025  PATIENT: John Lemos    Attending Physician: Damián Geiger M.D.    CHIEF COMPLAINT: neck pain and hand clumsiness    HISTORY:  John Lemos is a 74 y.o. male w/ a hx of L TAL presents for initial evaluation of neck pain (Neck - 1). The pain has been present for years. The patient describes the pain as dull but it does not go down arms. The pain is worse with activity and improved by rest. There is BUE associated numbness and tingling. There is BUE subjective weakness. Prior treatments have included OTC meds, PT, but no CHRIS or surgery. Patient is miserable and wants surgical intervention.     The Patient endorses myelopathic symptoms such as handwriting changes or difficulty with buttons/coins/keys. He has trouble with walking/balance. He uses a walker for ambulation. Denies perineal paresthesias, bowel/bladder dysfunction.    The patient does not smoke or endorse IVDU. He has DM (HA1C of 6.6). The patient is not on any blood thinners and does not take chronic narcotics. He works as a special .    PAST MEDICAL/SURGICAL HISTORY:  Past Medical History:   Diagnosis Date    Arthritis     Cataract     ou    Diabetes mellitus 7 yrs    lbs: 118 1 week ago    Diabetes mellitus type 2, controlled 9/8/2012    Eye injury     fb    Hyperlipidemia      Past Surgical History:   Procedure Laterality Date    COLONOSCOPY N/A 11/25/2019    Procedure: COLONOSCOPY;  Surgeon: Eddie Leslie MD;  Location: [facility] ENDO;  Service: Endoscopy;  Laterality: N/A;    TOTAL REPLACEMENT OF HIP JOINT USING COMPUTER-ASSISTED NAVIGATION Left 4/14/2025    Procedure: ARTHROPLASTY, HIP, TOTAL, USING COMPUTER-ASSISTED NAVIGATION;  Surgeon: Judson Steel MD;  Location: [facility] OR;  Service: Orthopedics;  Laterality: Left;  Yobany Church Notified- NC---T/S--done  RN PREOP 4/2/2025---H/P--INCOMPLETE---       Current Medications: Current Medications[1]    Social History: Social History[2]    REVIEW OF  SYSTEMS:  Constitution: Negative. Negative for chills, fever and night sweats.   Cardiovascular: Negative for chest pain and syncope.   Respiratory: Negative for cough and shortness of breath.   Gastrointestinal: See HPI. Negative for nausea/vomiting. Negative for abdominal pain.  Genitourinary: See HPI. Negative for discoloration or dysuria.  Skin: Negative for dry skin, itching and rash.   Hematologic/Lymphatic: negative for bleeding/clotting disorders.   Musculoskeletal: Negative for falls and muscle weakness.   Neurological: See HPI. no history of seizures. no history of cranial surgery or shunts.  Endocrine: Negative for polydipsia, polyphagia and polyuria.   Allergic/Immunologic: Negative for hives and persistent infections.  Psychiatric/Behavioral: Negative for depression and insomnia.         EXAM:  There were no vitals taken for this visit.    General: The patient is a 74 y.o. male in no apparent distress, the patient is orientatied to person, place and time.  Psych: Normal mood and affect  HEENT: Vision grossly intact, hearing intact to the spoken word.  Lungs: Respirations unlabored.  Gait: Normal station and gait, no difficulty with toe or heel walk.   Skin: Cervical skin negative for rashes, lesions, hairy patches and surgical scars.  Range of motion: Cervical range of motion is acceptable. There is no tenderness to palpation.  Spinal Balance: Global saggital and coronal spinal balance acceptable, no significant for scoliosis and kyphosis.  Musculoskeletal: No pain with the range of motion of the bilateral shoulders and elbows. Normal bulk and contour of the bilateral hands.  Vascular: Bilateral hands warm and well perfused, Radial pulses 2+ bilaterally.  Neurological: Normal strength and tone in all major motor groups in the bilateral upper and lower extremities except for 4/5 in L EE, EF and IO. Normal sensation to light touch in the C5-T1 and L2-S1 dermatomes bilaterally.  Deep tendon reflexes 3+ in  the LUE  Negative Inverted Radial Reflex and Calvin's bilaterally. Negative Babinski bilaterally.     IMAGING:   Today I independently reviewed the following images and my interpretations are as follows:    AP, Lat and Flex/Ex  upright C-spine films demonstrate spondylosis and DDD.    MRI cervical showed C3-4 mod-severe central stenosis and myelomalacia.     There is no height or weight on file to calculate BMI.  Hemoglobin A1C   Date Value Ref Range Status   12/18/2024 6.1 (H) 4.0 - 5.6 % Final     Comment:     ADA Screening Guidelines:  5.7-6.4%  Consistent with prediabetes  >or=6.5%  Consistent with diabetes    High levels of fetal hemoglobin interfere with the HbA1C  assay. Heterozygous hemoglobin variants (HbS, HgC, etc)do  not significantly interfere with this assay.   However, presence of multiple variants may affect accuracy.     05/18/2024 6.7 (H) 4.0 - 5.6 % Final     Comment:     ADA Screening Guidelines:  5.7-6.4%  Consistent with prediabetes  >or=6.5%  Consistent with diabetes    High levels of fetal hemoglobin interfere with the HbA1C  assay. Heterozygous hemoglobin variants (HbS, HgC, etc)do  not significantly interfere with this assay.   However, presence of multiple variants may affect accuracy.     10/21/2023 7.0 (H) 4.0 - 5.6 % Final     Comment:     ADA Screening Guidelines:  5.7-6.4%  Consistent with prediabetes  >or=6.5%  Consistent with diabetes    High levels of fetal hemoglobin interfere with the HbA1C  assay. Heterozygous hemoglobin variants (HbS, HgC, etc)do  not significantly interfere with this assay.   However, presence of multiple variants may affect accuracy.       Hemoglobin A1c   Date Value Ref Range Status   06/21/2025 6.6 (H) 4.0 - 5.6 % Final     Comment:     ADA Screening Guidelines:  5.7-6.4%  Consistent with prediabetes  >=6.5%  Consistent with diabetes    High levels of fetal hemoglobin interfere with the HbA1C  assay. Heterozygous hemoglobin variants (HbS, HgC, etc)do  not  significantly interfere with this assay.   However, presence of multiple variants may affect accuracy.   04/02/2025 6.7 (H) 4.0 - 5.6 % Final     Comment:     ADA Screening Guidelines:  5.7-6.4%  Consistent with prediabetes  >=6.5%  Consistent with diabetes    High levels of fetal hemoglobin interfere with the HbA1C  assay. Heterozygous hemoglobin variants (HbS, HgC, etc)do  not significantly interfere with this assay.   However, presence of multiple variants may affect accuracy.       ASSESSMENT/PLAN:    Will proceed with C3-4 ACDF.       [1] No current facility-administered medications for this encounter.    Current Outpatient Medications:     acetaminophen (TYLENOL) 500 MG tablet, Take 2 tablets (1,000 mg total) by mouth every 8 (eight) hours as needed for Pain., Disp: 42 tablet, Rfl: 0    acetaminophen (TYLENOL) 500 MG tablet, Take 1 tablet (500 mg total) by mouth 3 (three) times daily., Disp: 90 tablet, Rfl: 0    atorvastatin (LIPITOR) 40 MG tablet, Take 1 tablet (40 mg total) by mouth once daily., Disp: 90 tablet, Rfl: 3    b complex vitamins capsule, Take 1 capsule by mouth once daily., Disp: , Rfl:     blood sugar diagnostic (FREESTYLE LITE STRIPS) Strp, Inject 1 each as directed once daily., Disp: 100 each, Rfl: 3    celecoxib (CELEBREX) 100 MG capsule, Take 1 capsule (100 mg total) by mouth 2 (two) times daily., Disp: 28 capsule, Rfl: 0    diclofenac (VOLTAREN) 75 MG EC tablet, Take 1 tablet (75 mg total) by mouth 3 (three) times daily as needed (pain)., Disp: 90 tablet, Rfl: 0    docusate sodium (COLACE) 100 MG capsule, Take 1 capsule (100 mg total) by mouth 2 (two) times daily., Disp: 30 capsule, Rfl: 0    DULoxetine (CYMBALTA) 60 MG capsule, Take 1 capsule (60 mg total) by mouth once daily., Disp: 90 capsule, Rfl: 3    lancets (FREESTYLE LANCETS) 28 gauge Misc, Inject 1 lancet as directed once daily., Disp: 100 each, Rfl: 3    losartan (COZAAR) 50 MG tablet, Take 1 tablet (50 mg total) by mouth once  daily., Disp: 90 tablet, Rfl: 1    metFORMIN (GLUCOPHAGE) 500 MG tablet, TAKE 2 TABLETS BY MOUTH TWICE DAILY WITH BREAKFAST AND WITH SUPPER, Disp: 360 tablet, Rfl: 1    methocarbamoL (ROBAXIN) 500 MG Tab, Take 2 tablets (1,000 mg total) by mouth 3 (three) times daily., Disp: 90 tablet, Rfl: 0    oxyCODONE (ROXICODONE) 5 MG immediate release tablet, Take 1 tablet (5 mg total) by mouth every 8 (eight) hours as needed for Pain (for breakthrough pain)., Disp: 21 tablet, Rfl: 0    pregabalin (LYRICA) 150 MG capsule, Take 1 capsule (150 mg total) by mouth 2 (two) times daily., Disp: 60 capsule, Rfl: 5    tadalafiL (CIALIS) 5 MG tablet, Take 1 tablet (5 mg total) by mouth once daily., Disp: 30 tablet, Rfl: 11    TRULICITY 0.75 mg/0.5 mL pen injector, INJECT 0.75 MG INTO THE SKIN EVERY 7 DAYS (Patient taking differently: Inject 0.75 mg into the skin every 7 days. 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     Employer: [employer]   Tobacco Use    Smoking status: Never     Passive exposure: Never    Smokeless tobacco: Never   Substance and Sexual Activity    Alcohol use: No    Drug use: No    Sexual activity: Not Currently     Social Drivers of Health     Financial Resource Strain: Low Risk  (4/14/2025)    Overall Financial Resource Strain (CARDIA)     Difficulty of Paying Living Expenses: Not very hard   Food Insecurity: No Food Insecurity (4/14/2025)    Hunger Vital Sign     Worried About Running Out of Food in the Last Year: Never true     Ran Out of Food in the Last Year: Never true   Transportation Needs: No Transportation Needs (4/14/2025)    PRAPARE - Transportation     Lack of Transportation (Medical): No     Lack of Transportation (Non-Medical): No   Physical Activity: Insufficiently Active (3/17/2025)    Exercise Vital Sign     Days of Exercise  per Week: 2 days     Minutes of Exercise per Session: 20 min   Stress: No Stress Concern Present (4/14/2025)    [place] [place] [employer] - Occupational Stress Questionnaire     Feeling of Stress : Not at all   Housing Stability: Low Risk  (4/14/2025)    Housing Stability Vital Sign     Unable to Pay for Housing in the Last Year: No     Homeless in the Last Year: No

## 2025-07-07 NOTE — PLAN OF CARE
Problem: Adult Inpatient Plan of Care  Goal: Plan of Care Review  Outcome: Progressing  Flowsheets (Taken 7/7/2025 1359)  Plan of Care Reviewed With:   patient   spouse  Goal: Patient-Specific Goal (Individualized)  Outcome: Progressing  Flowsheets (Taken 7/7/2025 1359)  Individualized Care Needs: pain management  Goal: Absence of Hospital-Acquired Illness or Injury  Outcome: Progressing  Goal: Optimal Comfort and Wellbeing  Outcome: Progressing  Goal: Readiness for Transition of Care  Outcome: Progressing     Problem: Diabetes Comorbidity  Goal: Blood Glucose Level Within Targeted Range  Outcome: Progressing  Intervention: Monitor and Manage Glycemia  Flowsheets (Taken 7/7/2025 1359)  Glycemic Management: blood glucose monitored     Problem: Infection  Goal: Absence of Infection Signs and Symptoms  Outcome: Progressing     Problem: Wound  Goal: Optimal Coping  Outcome: Progressing  Goal: Optimal Functional Ability  Outcome: Progressing  Goal: Absence of Infection Signs and Symptoms  Outcome: Progressing  Goal: Improved Oral Intake  Outcome: Progressing  Goal: Optimal Pain Control and Function  Outcome: Progressing  Goal: Skin Health and Integrity  Outcome: Progressing  Goal: Optimal Wound Healing  Outcome: Progressing     Problem: Comorbidity Management  Goal: Blood Pressure in Desired Range  Outcome: Progressing  Intervention: Maintain Blood Pressure Management  Flowsheets (Taken 7/7/2025 1359)  Medication Review/Management:   high-risk medications identified   medications reviewed     Problem: Spinal Surgery  Goal: Optimal Coping with Surgery  Outcome: Progressing  Goal: Absence of Bleeding  Outcome: Progressing  Intervention: Monitor and Manage Bleeding  Flowsheets (Taken 7/7/2025 1356)  Bleeding Management: dressing monitored  Goal: Effective Bowel Elimination  Outcome: Progressing  Goal: Fluid and Electrolyte Balance  Outcome: Progressing  Goal: Optimal Functional Ability  Outcome: Progressing  Goal: Absence  of Infection Signs and Symptoms  Outcome: Progressing  Goal: Optimal Neurologic Function  Outcome: Progressing  Goal: Anesthesia/Sedation Recovery  Outcome: Progressing  Goal: Optimal Pain Control and Function  Outcome: Progressing  Goal: Nausea and Vomiting Relief  Outcome: Progressing  Intervention: Prevent or Manage Nausea and Vomiting  Flowsheets (Taken 7/7/2025 135)  Nausea/Vomiting Interventions:   stimuli minimized   nausea triggers minimized  Goal: Effective Urinary Elimination  Outcome: Progressing  Goal: Effective Oxygenation and Ventilation  Outcome: Progressing     Problem: Fall Injury Risk  Goal: Absence of Fall and Fall-Related Injury  Outcome: Progressing  Intervention: Identify and Manage Contributors  Flowsheets (Taken 7/7/2025 1356)  Medication Review/Management:   high-risk medications identified   medications reviewed  Intervention: Promote Injury-Free Environment  Flowsheets (Taken 7/7/2025 1359)  Safety Promotion/Fall Prevention:   assistive device/personal item within reach   Fall Risk reviewed with patient/family   family expresses understanding of fall risk and prevention   high risk medications identified   medications reviewed   lighting adjusted   instructed to call staff for mobility   nonskid shoes/socks when out of bed   patient expresses understanding of fall risk and prevention   side rails raised x 2   Supervised toileting - stay within arms reach       Plan of care reviewed with patient, verbalized understanding. Medications reviewed and given as ordered. Rounding for safety and patient care per policy. Safety precautions maintained. Call light within reach, bed wheels locked, bed in lowest position, side rails up x2, patient instructed to call for assistance, DME at bedside.

## 2025-07-07 NOTE — HOSPITAL COURSE
Please see the preoperative H&P and other available documentation for full details related to history prior to this admission.  Briefly, John Lemos is a 74 y.o. male who was admitted following scheduled elective surgery for Stenosis of cervical spine with myelopathy. They underwent the following procedures: C3/4 ACDF    Following a complete preoperative discussion of the risks and benefits of surgery with signed informed consent, the patient was taken to the operating room on 7/7/2025 and underwent the above stated procedures. The patient tolerated surgery well  and there were no complications. Please see the operative report for full intraoperative findings and details. Postoperatively, the patient did well  and was transferred from the PACU to the  floor in stable condition where they had a stable and uncomplicated  hospital course. Labs and vital signs remained stable and appropriate throughout course. Diet was advanced as tolerated and the patient's pain was controlled on oral pain medications without problem. Ambulating without issue. Voiding without issue with adequate urine output. Passing gas and stool. Incision site is clean, dry, and intact.    Currently, the patient is doing well and is stable and appropriate for discharge home at this time. Patient will follow up in clinic

## 2025-07-07 NOTE — OP NOTE
DATE OF PROCEDURE: 7/7/2025.     SURGEON: Damián Geiger M.D.     FIRST ASSISTANT: Birdie Crawford PA-C, no resident was available.     PREOPERATIVE DIAGNOSIS: Cervical stenosis C3-4 with myelopathy and myelomalacia.     POSTOPERATIVE DIAGNOSIS: Same     PROCEDURES PERFORMED:  1. Anterior cervical diskectomy and instrumented spinal fusion, including decompression of neurological elements, C3-4.  2. Application of carbon fiber reinforced polyetheretherketone titanium intervertebral spacer to C3-4 disk space.  3. Anterior instrumentation, C3-4.  4. Cadaveric and local bone grafting.     ANESTHESIA: General endotracheal anesthesia.     ESTIMATED BLOOD LOSS: 5 mL.     IMPLANTS: SpineWave  C3-4: 7mm height PEEK cage  2 level plate  18-20mm screws    SPECIMENS: None.     FINDINGS: severe C3-4 stenosis.     DRAINS: One deep.     COMPLICATIONS: None.     SPONGE AND NEEDLE COUNT: Correct x2.     NEUROLOGICAL MONITORING: Motor evoked potentials, somatosensory evoked potential, free-running EMG, and vocal fold monitoring.  There were no changes to preincisional MEP and SSEP baselines.  There was no significant EMG activity.     REASON FOR OPERATION AND BRIEF HISTORY AND PHYSICAL: John Lemos is a 74 y.o. male with cervical stenosis C3-4 with myelopathy and myelomalacia. They have failed conservative management and are here for surgery today.      DESCRIPTION OF INFORMED CONSENT: Please see my last clinic note for a full description of the informed consent process that I had with the patient.     DESCRIPTION OF PROCEDURE: The patient was met in the preoperative area where their right-sided neck was marked as the operative site. Subsequently, they were brought to the Operating Room where they were placed under general endotracheal anesthesia. Sequential compression devices were placed in the patient's bilateral calves and run throughout the case. A Mae catheter was not placed. At this point, a shoulder roll was placed  and the patient's neck was gently hyperextended. The neck was stacked in multiple stack sheets as well as a gel roll. At this point, the patient's anterior cervical spine was prepped and draped in a normal sterile fashion.     A full timeout was then called, identifying the patient, the procedural site and levels, the availability of all instruments and implants, and no specific nursing, surgical, anesthetic, or neurological monitoring concerns.  All present were empowered to identify any concerns at any time. Finally, it was safe to proceed with surgery and the patient was given weight-appropriate dose of Ancef by the Anesthesia Service as well as 8 mg of Decadron.     I then made a transverse incision centered over the patient's anterior cervical spine and carried dissection down through skin and subcutaneous tissues using a combination of sharp dissection and electrocautery where necessary. The platysma was identified and transected in line with the skin incision and subplatysmal flaps were elevated. At this point, I performed a standard G[place] Ventura approach tothe anterior cervical spine. Any large bridging veins or arteries were tied and ligated.  Small veins were coagulated with bipolar electrocautery. At this point, I  visualized the anterior cervical spine. Peanut dissection allowed me to visualize the vertebral body. I placed a needle in what I took to be the C3-4 disc and took a lateral radiograph and thus confirmed my level. I then finalized my exposure. At the conclusion of my exposure, I could see from the inferior half of the C3 vertebrae to the superior half of the C4 vertebra  and the disk space from uncinate process to uncinate process.     I then performed a subtotal C3-4 diskectomy under loupe magnification using a disk knife as well as straight and angled curettes, Kerrison punches, and pituitary rongeurs.     Under microscopic visualization, I drilled down the posterior aspect of the disk  space with a high-speed drill to reveal the posterior longitudinal ligament. I used a 4-0 forward-angle curette to enter the median raphe of the ligament, followed by #1 and #2 Kerrison punches to resect the posterior longitudinal ligament. At the end of my neurological decompression, I could see dura from uncinate process to uncinate process. The spinal cord and nerves were decompressed. Epidural hemostasis was finalized with patties and FloSeal. The wound was then thoroughly irrigated. The disk space was sized for a size 7mm height spacer and this was filled with 1 mL of DBX putty as well as locally harvested bone and gently impacted into place. An anterior plate was then applied in the standard fashion, spanning C3-4.     AP and lateral radiographs were taken, demonstrating correct level as well as adequate positionof all implants. The wound was then thoroughly irrigated. The plate screw capture mechanism was then locked with the torque wrench. A deep 10-[place] ARACELI drain was placed. The platysma was closed with 3-0 Vicryl, followed by 4-0 Monocryl for the skin. A 2-0 silk suture was used to secure down the drain. A soft dressing was placed. A cervical collar was placed. The patient was subsequently transferred to his hospital bed, awoken, and transferred to the Recovery Room in stable condition.    Damián Geiger MD  Orthopaedic Spine Surgeon  Department of Orthopaedic Surgery  [phone]

## 2025-07-07 NOTE — NURSING
Patient arrived to unit AAOx3, In hospital bed from PACU. VSS and IV saline locked. Wife, Daisy, at bedside upon patient arrival to unit. Dressing to anterior neck, CDI. ARACELI drain to bulb suction. See assessment. Patient oriented to room. Bed in lowest position, side rails up x2, call light within reach, patient instructed to call for assistance, verbalized understanding. Will continue to monitor.     Nurses Note -- 4 Eyes      7/7/2025   1330 PM      Skin assessed during: Admit      [x] No Altered Skin Integrity Present    []Prevention Measures Documented      [] Yes- Altered Skin Integrity Present or Discovered   [] LDA Added if Not in Epic (Describe Wound)   [] New Altered Skin Integrity was Present on Admit and Documented in LDA   [] Wound Image Taken    Wound Care Consulted? No    Attending Nurse:  RICARDO Montes     Second RN/Staff Member:   RICARDO Morales

## 2025-07-07 NOTE — TRANSFER OF CARE
"Anesthesia Transfer of Care Note    Patient: John Lemos    Procedure(s) Performed: Procedure(s) (LRB):  DISCECTOMY, SPINE, CERVICAL, ANTERIOR, W/ ARTHRODESIS C3-4 ACDF SPINEWAVE SNS: VOCAL CORDS/MOTORS/SSEP (N/A)    Patient location: PACU    Anesthesia Type: general    Transport from OR: Transported from OR on 100% O2 by closed face mask with adequate spontaneous ventilation    Post pain: adequate analgesia    Post assessment: no apparent anesthetic complications and tolerated procedure well    Post vital signs: stable    Level of consciousness: sedated and responds to stimulation    Nausea/Vomiting: no nausea/vomiting    Complications: none    Transfer of care protocol was followed    Last vitals: Visit Vitals  /65 (BP Location: Right arm, Patient Position: Lying)   Pulse 67   Temp 36.5 °C (97.7 °F) (Oral)   Resp 19   Ht 5' 8" (1.727 m)   Wt 77.1 kg (170 lb)   SpO2 100%   BMI 25.85 kg/m²     "

## 2025-07-07 NOTE — ANESTHESIA PREPROCEDURE EVALUATION
"                                                                                                             2025  Pre-operative evaluation for Procedure(s) (LRB):  DISCECTOMY, SPINE, CERVICAL, ANTERIOR, W/ ARTHRODESIS C3-4 ACDF SPINEWAVE SNS: VOCAL CORDS/MOTORS/SSEP (N/A)    John Lemos is a 74 y.o. male     Problem List[1]    Review of patient's allergies indicates:   Allergen Reactions    Sulfa (sulfonamide antibiotics) Swelling       Medications Ordered Prior to Encounter[2]    Past Surgical History:   Procedure Laterality Date    COLONOSCOPY N/A 2019    Procedure: COLONOSCOPY;  Surgeon: Eddie Leslie MD;  Location: [facility] ENDO;  Service: Endoscopy;  Laterality: N/A;    TOTAL REPLACEMENT OF HIP JOINT USING COMPUTER-ASSISTED NAVIGATION Left 2025    Procedure: ARTHROPLASTY, HIP, TOTAL, USING COMPUTER-ASSISTED NAVIGATION;  Surgeon: Judson Steel MD;  Location: [facility] OR;  Service: Orthopedics;  Laterality: Left;  Yobany Aparicio and Ivanna Notified- NC---T/S--done  RN PREOP 2025---H/P--INCOMPLETE---       Social History[3]      CBC: No results for input(s): "WBC", "RBC", "HGB", "HCT", "PLT", "MCV", "MCH", "MCHC" in the last 72 hours.    CMP: No results for input(s): "NA", "K", "CL", "CO2", "BUN", "CREATININE", "GLU", "MG", "PHOS", "CALCIUM", "ALBUMIN", "PROT", "ALKPHOS", "ALT", "AST", "BILITOT" in the last 72 hours.    INR  No results for input(s): "PT", "INR", "PROTIME", "APTT" in the last 72 hours.        Diagnostic Studies:      EKD Echo:  No results found for this or any previous visit.       Pre-op Assessment    I have reviewed the Patient Summary Reports.     I have reviewed the Nursing Notes. I have reviewed the NPO Status.   I have reviewed the Medications.     Review of Systems  Hepatic/GI:     GERD                Musculoskeletal:  Arthritis               Endocrine:  Diabetes               Physical Exam  General: Well nourished and " Cooperative    Airway:  Mallampati: II   Mouth Opening: Normal  TM Distance: Normal  Tongue: Normal  Neck ROM: Normal ROM    Chest/Lungs:  Clear to auscultation, Normal Respiratory Rate    Heart:  Rate: Normal  Rhythm: Regular Rhythm  Sounds: Normal        Anesthesia Plan  Type of Anesthesia, risks & benefits discussed:    Anesthesia Type: Gen ETT  Intra-op Monitoring Plan: Standard ASA Monitors  Post Op Pain Control Plan: multimodal analgesia and IV/PO Opioids PRN  Induction:  IV  Airway Plan: Direct and Video, Post-Induction  Informed Consent: Informed consent signed with the Patient and all parties understand the risks and agree with anesthesia plan.  All questions answered.   ASA Score: 2    Ready For Surgery From Anesthesia Perspective.     .           [1]   Patient Active Problem List  Diagnosis    GERD (gastroesophageal reflux disease)    DDD (degenerative disc disease), lumbar    ED (erectile dysfunction)    Dyslipidemia    Neuropathy    Type 2 diabetes mellitus with diabetic polyneuropathy    Primary osteoarthritis of right knee    Nuclear sclerosis of both eyes    Refractive error    Weakness of both lower extremities    Balance problems    Decreased functional mobility and endurance    Decreased sensation    Decreased  strength of left hand    Self-care deficit in dressing   [2]   No current facility-administered medications on file prior to encounter.     Current Outpatient Medications on File Prior to Encounter   Medication Sig Dispense Refill    acetaminophen (TYLENOL) 500 MG tablet Take 2 tablets (1,000 mg total) by mouth every 8 (eight) hours as needed for Pain. 42 tablet 0    atorvastatin (LIPITOR) 40 MG tablet Take 1 tablet (40 mg total) by mouth once daily. 90 tablet 3    docusate sodium (COLACE) 100 MG capsule Take 1 capsule (100 mg total) by mouth 2 (two) times daily. 30 capsule 0    DULoxetine (CYMBALTA) 60 MG capsule Take 1 capsule (60 mg total) by mouth once daily. 90 capsule 3     losartan (COZAAR) 50 MG tablet Take 1 tablet (50 mg total) by mouth once daily. 90 tablet 1    metFORMIN (GLUCOPHAGE) 500 MG tablet TAKE 2 TABLETS BY MOUTH TWICE DAILY WITH BREAKFAST AND WITH SUPPER 360 tablet 1    pregabalin (LYRICA) 150 MG capsule Take 1 capsule (150 mg total) by mouth 2 (two) times daily. 60 capsule 5    tadalafiL (CIALIS) 5 MG tablet Take 1 tablet (5 mg total) by mouth once daily. 30 tablet 11    b complex vitamins capsule Take 1 capsule by mouth once daily.      blood sugar diagnostic (FREESTYLE LITE STRIPS) Strp Inject 1 each as directed once daily. 100 each 3    diclofenac (VOLTAREN) 75 MG EC tablet Take 1 tablet (75 mg total) by mouth 3 (three) times daily as needed (pain). 90 tablet 0    lancets (FREESTYLE LANCETS) 28 gauge Misc Inject 1 lancet as directed once daily. 100 each 3    TRULICITY 0.75 mg/0.5 mL pen injector INJECT 0.75 MG INTO THE SKIN EVERY 7 DAYS (Patient taking differently: Inject 0.75 mg into the skin every 7 days. 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     Employer: [employer]   Tobacco Use    Smoking status: Never     Passive exposure: Never    Smokeless tobacco: Never   Vaping Use    Vaping status: Never Used   Substance and Sexual Activity    Alcohol use: No    Drug use: No    Sexual activity: Not Currently     Social Drivers of Health     Financial Resource Strain: Low Risk  (4/14/2025)    Overall Financial Resource Strain (CARDIA)     Difficulty of Paying Living Expenses: Not very hard   Food Insecurity: No Food Insecurity (4/14/2025)    Hunger Vital Sign     Worried About Running Out of Food in the Last Year: Never true     Ran Out of Food in the Last Year: Never true   Transportation Needs: No Transportation Needs (4/14/2025)    PRAPARE - Transportation     Lack of Transportation (Medical): No     Lack of  Transportation (Non-Medical): No   Physical Activity: Insufficiently Active (3/17/2025)    Exercise Vital Sign     Days of Exercise per Week: 2 days     Minutes of Exercise per Session: 20 min   Stress: No Stress Concern Present (4/14/2025)    [employer] - Occupational Stress Questionnaire     Feeling of Stress : Not at all   Housing Stability: Low Risk  (4/14/2025)    Housing Stability Vital Sign     Unable to Pay for Housing in the Last Year: No     Homeless in the Last Year: No

## 2025-07-07 NOTE — DISCHARGE INSTRUCTIONS
DR. BATSHEVA RODRIGUEZ'S POSTOPERATIVE INSTRUCTIONS -   ANTERIOR CERVICAL DISKECTOMY AND FUSION       Antibiotics: You do not need additional antibiotics at home.    NSAIDs: Please refrain from taking ibuprofen (Advil), naproxen (Aleve), and other non steroidal anti-inflammatory medications other than the Celebrex that will be prescribed to you after surgery.    Wound Care: You may remove your dressing and shower 5 days after surgery. Until then please keep your wound clean and dry. Sponge baths are acceptable. Do not go in a pool or hot tub until seen in clinic. Please leave the small steri-strips covering your wound in place until they fall off naturally (2 weeks). You may notice clear suture ends hanging from the sides of your incision after the steri-strips are removed, it is ok to clip these with scissors.    Cervical Collar: If given a collar after surgery you should wear it at all times. The only times it may be removed are for eating and showering.    Pain: We will use a multimodal approach for pain management after your surgery.  You will be given a prescription for pain medicine when you are discharged from the hospital.  You will also be given prescriptions for Robaxin (a muscle relaxer), Gabapentin, Celebrex and Tylenol.  Please note: you will only be given ONE prescription for narcotics when you are discharged from the hospital.  This medication is for breakthrough pain only. This medication will not be refilled.  The other medications given to you may be refilled if needed.      Difficulty Swallowing: This is very common in the postoperative period. You may find it easier to eat a pureed diet for the first 1-2 weeks after surgery. Ice chips and menthol cough drops may also be soothing.    Difficulty Breathing: This is uncommon after surgery and may represent dangerous swelling in your neck. If you experience difficulty breathing please call 911 immediately.    Infection: Signs of infection include increasing  wound drainage and redness around the wound, as well as a temperature over 101.5 degrees. It is unnecessary to take your temperature on a routine basis. Please call the above number if you are concerned about an infection.    Driving and Work: It is ok to return to driving and work as long as you are not taking narcotic pain medications or wearing your cervical collar. Please do not lift over 5 pounds or participate in exercise or sports until cleared by Dr. Geiger.    Deep Venous Thrombosis (Blood Clots): Symptoms include swelling in the legs and shortness of breath. Please call the office or proceed to the nearest emergency room if you have any of these symptoms.    Physical Therapy: The best physical therapy immediately after surgery is walking. Please try to walk as much as possible.    Follow-up: You will be scheduled for a follow-up appointment in 4 weeks with either Dr. Geiger or his physician assistant, Birdie Crawford PA-C.    Questions: During business hours please call [phone] for routine questions. For after hours questions please call [phone] and ask to speak with the Orthopaedic resident on call.    Disability: If you submitted short term disability paperwork for us to complete and would like to check the status, please call the Disability Department at [phone].  You may fax any necessary paperwork to [phone].

## 2025-07-07 NOTE — ANESTHESIA PROCEDURE NOTES
Intubation    Date/Time: 7/7/2025 9:44 AM    Performed by: Cierra Cooper CRNA  Authorized by: Kamlesh Schulz MD    Intubation:     Induction:  Intravenous    Intubated:  Postinduction    Mask Ventilation:  Easy mask    Attempts:  1    Attempted By:  CRNA    Method of Intubation:  Video laryngoscopy    Blade:  Beverly 3    Laryngeal View Grade: Grade I - full view of cords      Difficult Airway Encountered?: No      Complications:  None    Airway Device:  EMG ETT (NIMS)    Airway Device Size:  7.0    Style/Cuff Inflation:  Cuffed (inflated to minimal occlusive pressure)    Inflation Amount (mL):  6    Tube secured:  24    Secured at:  The lips    Placement Verified By:  Capnometry    Complicating Factors:  None    Findings Post-Intubation:  BS equal bilateral and atraumatic/condition of teeth unchanged

## 2025-07-08 VITALS
HEART RATE: 84 BPM | RESPIRATION RATE: 16 BRPM | WEIGHT: 170 LBS | DIASTOLIC BLOOD PRESSURE: 72 MMHG | HEIGHT: 68 IN | BODY MASS INDEX: 25.76 KG/M2 | TEMPERATURE: 98 F | SYSTOLIC BLOOD PRESSURE: 143 MMHG | OXYGEN SATURATION: 97 %

## 2025-07-08 PROBLEM — M48.02 STENOSIS OF CERVICAL SPINE WITH MYELOPATHY: Status: ACTIVE | Noted: 2025-07-08

## 2025-07-08 PROBLEM — G99.2 STENOSIS OF CERVICAL SPINE WITH MYELOPATHY: Status: ACTIVE | Noted: 2025-07-08

## 2025-07-08 LAB
BUN SERPL-MCNC: 13 MG/DL (ref 6–30)
CHLORIDE SERPL-SCNC: 104 MMOL/L (ref 95–110)
CREAT SERPL-MCNC: 0.8 MG/DL (ref 0.5–1.4)
GLUCOSE SERPL-MCNC: 111 MG/DL (ref 70–110)
HCT VFR BLD CALC: 40 %PCV (ref 36–54)
POC IONIZED CALCIUM: 1.22 MMOL/L (ref 1.06–1.42)
POC TCO2 (MEASURED): 24 MMOL/L (ref 23–29)
POCT GLUCOSE: 101 MG/DL (ref 70–110)
POCT GLUCOSE: 109 MG/DL (ref 70–110)
POCT GLUCOSE: 175 MG/DL (ref 70–110)
POTASSIUM BLD-SCNC: 3.7 MMOL/L (ref 3.5–5.1)
SAMPLE: ABNORMAL
SODIUM BLD-SCNC: 141 MMOL/L (ref 136–145)

## 2025-07-08 PROCEDURE — 25000003 PHARM REV CODE 250: Performed by: STUDENT IN AN ORGANIZED HEALTH CARE EDUCATION/TRAINING PROGRAM

## 2025-07-08 PROCEDURE — 97165 OT EVAL LOW COMPLEX 30 MIN: CPT

## 2025-07-08 PROCEDURE — 25000003 PHARM REV CODE 250: Performed by: ORTHOPAEDIC SURGERY

## 2025-07-08 PROCEDURE — 94761 N-INVAS EAR/PLS OXIMETRY MLT: CPT

## 2025-07-08 PROCEDURE — 63600175 PHARM REV CODE 636 W HCPCS: Performed by: ORTHOPAEDIC SURGERY

## 2025-07-08 PROCEDURE — 97161 PT EVAL LOW COMPLEX 20 MIN: CPT

## 2025-07-08 PROCEDURE — 99900035 HC TECH TIME PER 15 MIN (STAT)

## 2025-07-08 PROCEDURE — 97535 SELF CARE MNGMENT TRAINING: CPT

## 2025-07-08 PROCEDURE — 97530 THERAPEUTIC ACTIVITIES: CPT

## 2025-07-08 RX ADMIN — Medication 400 ML: at 07:07

## 2025-07-08 RX ADMIN — CEFAZOLIN 2 G: 2 INJECTION, POWDER, FOR SOLUTION INTRAMUSCULAR; INTRAVENOUS at 03:07

## 2025-07-08 RX ADMIN — DULOXETINE 60 MG: 30 CAPSULE, DELAYED RELEASE ORAL at 08:07

## 2025-07-08 RX ADMIN — POLYETHYLENE GLYCOL 3350 17 G: 17 POWDER, FOR SOLUTION ORAL at 08:07

## 2025-07-08 RX ADMIN — LOSARTAN POTASSIUM 50 MG: 25 TABLET, FILM COATED ORAL at 08:07

## 2025-07-08 RX ADMIN — FAMOTIDINE 20 MG: 20 TABLET, FILM COATED ORAL at 08:07

## 2025-07-08 RX ADMIN — PREGABALIN 150 MG: 150 CAPSULE ORAL at 08:07

## 2025-07-08 RX ADMIN — ATORVASTATIN CALCIUM 40 MG: 20 TABLET, FILM COATED ORAL at 08:07

## 2025-07-08 RX ADMIN — SENNOSIDES AND DOCUSATE SODIUM 1 TABLET: 50; 8.6 TABLET ORAL at 08:07

## 2025-07-08 RX ADMIN — Medication 400 ML: at 03:07

## 2025-07-08 RX ADMIN — MUPIROCIN: 20 OINTMENT TOPICAL at 08:07

## 2025-07-08 NOTE — NURSING
Has met unit/department guidelines for discharge from each phase of the post procedure continuum. Patient discharged.  Instructions, placed in dc folder and Prescriptions given. IV removed, tolerated well, w/ catheter intact, no redness or swelling to area. Dressing to anterior neck remains CDI. Patient verbalized understanding instructions.  AAOx3, VSS, NADN, no complaints of pain noted at this time.  Wheelchair to private vehicle in care of son.  All personal belongings sent with pt.

## 2025-07-08 NOTE — PLAN OF CARE
Problem: Physical Therapy  Goal: Physical Therapy Goal  Description: Goals to be met by: 7/8/2025     Pt met goals at [facility] to demonstrate safe level of mobility for d/c home with his wife to assist PRN.and home walking program.       Outcome: Progressing

## 2025-07-08 NOTE — SUBJECTIVE & OBJECTIVE
"Principal Problem:Stenosis of cervical spine with myelopathy    Principal Orthopedic Problem: As above, s/p C3/4 ACDF on 07/07    Interval History: Pt seen and examined at bedside. JOVANA SMALLS. Reported adequately controlled pain. Reports no new symptoms. Denies fevers or chill. Endorsed bilat radicular pain but improving  Vitals:    07/08/25 0405   BP:    Pulse: 72   Resp: 16   Temp:          Review of patient's allergies indicates:   Allergen Reactions    Sulfa (sulfonamide antibiotics) Swelling       Current Facility-Administered Medications   Medication    acetaminophen tablet 650 mg    atorvastatin tablet 40 mg    dextrose 50% injection 12.5 g    dextrose 50% injection 25 g    DULoxetine DR capsule 60 mg    electrolytes-dextrose (Pedialyte) oral solution 400 mL    famotidine tablet 20 mg    glucagon (human recombinant) injection 1 mg    glucose chewable tablet 16 g    glucose chewable tablet 24 g    insulin aspart U-100 pen 0-5 Units    losartan tablet 50 mg    mupirocin 2 % ointment    ondansetron disintegrating tablet 8 mg    ondansetron injection 4 mg    oxyCODONE immediate release tablet 5 mg    oxyCODONE immediate release tablet Tab 10 mg    polyethylene glycol packet 17 g    pregabalin capsule 150 mg    senna-docusate 8.6-50 mg per tablet 1 tablet     Objective:     Vital Signs (Most Recent):  Temp: 97.1 °F (36.2 °C) (07/08/25 0306)  Pulse: 72 (07/08/25 0405)  Resp: 16 (07/08/25 0405)  BP: (!) 157/72 (07/08/25 0306)  SpO2: 97 % (07/08/25 0405) Vital Signs (24h Range):  Temp:  [97.1 °F (36.2 °C)-99.4 °F (37.4 °C)] 97.1 °F (36.2 °C)  Pulse:  [] 72  Resp:  [12-25] 16  SpO2:  [96 %-100 %] 97 %  BP: (128-220)/() 157/72     Weight: 77.1 kg (170 lb)  Height: 5' 8" (172.7 cm)  Body mass index is 25.85 kg/m².      Intake/Output Summary (Last 24 hours) at 7/8/2025 0657  Last data filed at 7/8/2025 0627  Gross per 24 hour   Intake 1500 ml   Output 2147 ml   Net -647 ml        Ortho/SPM Exam   PHYSICAL " EXAM:  Awake/Alert/Oriented x3, No acute distress, Afebrile, Vital signs stable  Normocephalic, atraumatic  Palpable distal pulses  Good inspiratory effort with unlaboured breathing    Motor            RIGHT  LEFT  Deltoid(C5)        5/5    5/5  Biceps(C5)         5/5    5/5  Extensor Carpi Radialis Longus(C6)    5/5    5/5   Triceps(C7)       5/5    5/5     Flexor Carpi Radialis(C7)     5/5    5/5  Flexor Digitorum Superficialis(C8)    5/5    5/5  Interossei(T1)       5/5    5/5     Sensation (a=absent, i=impaired, n=normal)       RIGHT  LEFT  C5 dermatome       n     n  C6 dermatome       n     n  C7 dermatome       n     n  C8 dermatome       n     n  T1 dermatome        n     n     Significant Labs: All pertinent labs within the past 24 hours have been reviewed.    Significant Imaging: I have reviewed and interpreted all pertinent imaging results/findings.

## 2025-07-08 NOTE — PROGRESS NOTES
[facility])  Orthopedics  Progress Note    Patient Name: John Lemos  MRN: [id number]  Admission Date: 7/7/2025  Hospital Length of Stay: 0 days  Attending Provider: Damián Geiger MD  Primary Care Provider: Chan Estrada MD  Follow-up For: Procedure(s) (LRB):  DISCECTOMY, SPINE, CERVICAL, ANTERIOR, W/ ARTHRODESIS C3-4 ACDF SPINEWAVE SNS: VOCAL CORDS/MOTORS/SSEP (N/A)    Post-Operative Day: 1 Day Post-Op  Subjective:     Principal Problem:Stenosis of cervical spine with myelopathy    Principal Orthopedic Problem: As above, s/p C3/4 ACDF on 07/07    Interval History: Pt seen and examined at bedside. VSS.  NAEON. Reported adequately controlled pain. Reports no new symptoms. Denies fevers or chill. Endorsed bilat radicular pain but improving  Vitals:    07/08/25 0405   BP:    Pulse: 72   Resp: 16   Temp:          Review of patient's allergies indicates:   Allergen Reactions    Sulfa (sulfonamide antibiotics) Swelling       Current Facility-Administered Medications   Medication    acetaminophen tablet 650 mg    atorvastatin tablet 40 mg    dextrose 50% injection 12.5 g    dextrose 50% injection 25 g    DULoxetine DR capsule 60 mg    electrolytes-dextrose (Pedialyte) oral solution 400 mL    famotidine tablet 20 mg    glucagon (human recombinant) injection 1 mg    glucose chewable tablet 16 g    glucose chewable tablet 24 g    insulin aspart U-100 pen 0-5 Units    losartan tablet 50 mg    mupirocin 2 % ointment    ondansetron disintegrating tablet 8 mg    ondansetron injection 4 mg    oxyCODONE immediate release tablet 5 mg    oxyCODONE immediate release tablet Tab 10 mg    polyethylene glycol packet 17 g    pregabalin capsule 150 mg    senna-docusate 8.6-50 mg per tablet 1 tablet     Objective:     Vital Signs (Most Recent):  Temp: 97.1 °F (36.2 °C) (07/08/25 0306)  Pulse: 72 (07/08/25 0405)  Resp: 16 (07/08/25 0405)  BP: (!) 157/72 (07/08/25 0306)  SpO2: 97 % (07/08/25 0405) Vital Signs (24h  "Range):  Temp:  [97.1 °F (36.2 °C)-99.4 °F (37.4 °C)] 97.1 °F (36.2 °C)  Pulse:  [] 72  Resp:  [12-25] 16  SpO2:  [96 %-100 %] 97 %  BP: (128-220)/() 157/72     Weight: 77.1 kg (170 lb)  Height: 5' 8" (172.7 cm)  Body mass index is 25.85 kg/m².      Intake/Output Summary (Last 24 hours) at 7/8/2025 0657  Last data filed at 7/8/2025 0627  Gross per 24 hour   Intake 1500 ml   Output 2147 ml   Net -647 ml        Ortho/SPM Exam   PHYSICAL EXAM:  Awake/Alert/Oriented x3, No acute distress, Afebrile, Vital signs stable  Normocephalic, atraumatic  Palpable distal pulses  Good inspiratory effort with unlaboured breathing    Motor            RIGHT  LEFT  Deltoid(C5)        5/5    5/5  Biceps(C5)         5/5    5/5  Extensor Carpi Radialis Longus(C6)    5/5    5/5   Triceps(C7)       5/5    5/5     Flexor Carpi Radialis(C7)     5/5    5/5  Flexor Digitorum Superficialis(C8)    5/5    5/5  Interossei(T1)       5/5    5/5     Sensation (a=absent, i=impaired, n=normal)       RIGHT  LEFT  C5 dermatome       n     n  C6 dermatome       n     n  C7 dermatome       n     n  C8 dermatome       n     n  T1 dermatome        n     n     Significant Labs: All pertinent labs within the past 24 hours have been reviewed.    Significant Imaging: I have reviewed and interpreted all pertinent imaging results/findings.  Assessment/Plan:     * Stenosis of cervical spine with myelopathy  John Lemos is a 74 y.o. male, who is now s/p C3/4 ACDF on 07/07.    Surgical dressing C/D/I  Pain control: multimodal  PT/OT: WBAT BLE, spine precautions. C-collar in place  DVT PPx:  FCDs at all times when not ambulating  Abx: postop Ancef x 24hrs   Drain: X1  Nursing: Incentive spirometry, Monitor and record drain output each shift     » Dispo: stable      Output by Drain (mL) 07/06/25 0701 - 07/06/25 1900 07/06/25 1901 - 07/07/25 0700 07/07/25 0701 - 07/07/25 1900 07/07/25 1901 - 07/08/25 0654        Closed/Suction Drain 07/07/25 Tube - 1 " Right Neck Bulb 10 Fr.   37 10                Abbe Sumner MD  Orthopedics  [facility]

## 2025-07-08 NOTE — PLAN OF CARE
Problem: Adult Inpatient Plan of Care  Goal: Plan of Care Review  Outcome: Adequate for Care Transition  Flowsheets (Taken 7/8/2025 1445)  Plan of Care Reviewed With: patient  Goal: Patient-Specific Goal (Individualized)  Outcome: Adequate for Care Transition  Flowsheets (Taken 7/8/2025 1445)  Individualized Care Needs: manage pain  Goal: Absence of Hospital-Acquired Illness or Injury  Outcome: Adequate for Care Transition  Goal: Optimal Comfort and Wellbeing  Outcome: Adequate for Care Transition  Goal: Readiness for Transition of Care  Outcome: Adequate for Care Transition     Problem: Diabetes Comorbidity  Goal: Blood Glucose Level Within Targeted Range  Outcome: Adequate for Care Transition  Intervention: Monitor and Manage Glycemia  Flowsheets (Taken 7/8/2025 1445)  Glycemic Management: blood glucose monitored     Problem: Infection  Goal: Absence of Infection Signs and Symptoms  Outcome: Adequate for Care Transition     Problem: Wound  Goal: Optimal Coping  Outcome: Adequate for Care Transition  Goal: Optimal Functional Ability  Outcome: Adequate for Care Transition  Goal: Absence of Infection Signs and Symptoms  Outcome: Adequate for Care Transition  Goal: Improved Oral Intake  Outcome: Adequate for Care Transition  Goal: Optimal Pain Control and Function  Outcome: Adequate for Care Transition  Goal: Skin Health and Integrity  Outcome: Adequate for Care Transition  Goal: Optimal Wound Healing  Outcome: Adequate for Care Transition     Problem: Comorbidity Management  Goal: Blood Pressure in Desired Range  Outcome: Adequate for Care Transition  Intervention: Maintain Blood Pressure Management  Flowsheets (Taken 7/8/2025 1445)  Medication Review/Management:   high-risk medications identified   medications reviewed     Problem: Spinal Surgery  Goal: Optimal Coping with Surgery  Outcome: Adequate for Care Transition  Goal: Absence of Bleeding  Outcome: Adequate for Care Transition  Intervention: Monitor and  Manage Bleeding  Flowsheets (Taken 7/8/2025 1445)  Bleeding Management: dressing monitored  Goal: Effective Bowel Elimination  Outcome: Adequate for Care Transition  Goal: Fluid and Electrolyte Balance  Outcome: Adequate for Care Transition  Goal: Optimal Functional Ability  Outcome: Adequate for Care Transition  Goal: Absence of Infection Signs and Symptoms  Outcome: Adequate for Care Transition  Goal: Optimal Neurologic Function  Outcome: Adequate for Care Transition  Goal: Anesthesia/Sedation Recovery  Outcome: Adequate for Care Transition  Goal: Optimal Pain Control and Function  Outcome: Adequate for Care Transition  Goal: Nausea and Vomiting Relief  Outcome: Adequate for Care Transition  Intervention: Prevent or Manage Nausea and Vomiting  Flowsheets (Taken 7/8/2025 1445)  Nausea/Vomiting Interventions:   stimuli minimized   nausea triggers minimized  Goal: Effective Urinary Elimination  Outcome: Adequate for Care Transition  Goal: Effective Oxygenation and Ventilation  Outcome: Adequate for Care Transition     Problem: Fall Injury Risk  Goal: Absence of Fall and Fall-Related Injury  Outcome: Adequate for Care Transition  Intervention: Identify and Manage Contributors  Flowsheets (Taken 7/8/2025 1445)  Medication Review/Management:   high-risk medications identified   medications reviewed  Intervention: Promote Injury-Free Environment  Flowsheets (Taken 7/8/2025 1445)  Safety Promotion/Fall Prevention:   assistive device/personal item within reach   Fall Risk reviewed with patient/family   family expresses understanding of fall risk and prevention   medications reviewed   lighting adjusted   instructed to call staff for mobility   nonskid shoes/socks when out of bed   patient expresses understanding of fall risk and prevention   side rails raised x 2   Supervised toileting - stay within arms reach       Plan of care reviewed with patient, verbalized understanding. Medications reviewed and given as ordered.  Rounding for safety and patient care per policy. Safety precautions maintained. Call light within reach, bed wheels locked, bed in lowest position, side rails up x2, patient instructed to call for assistance, DME at bedside.

## 2025-07-08 NOTE — ASSESSMENT & PLAN NOTE
John Lemos is a 74 y.o. male, who is now s/p C3/4 ACDF on 07/07.    Surgical dressing C/D/I  Pain control: multimodal  PT/OT: WBAT BLE, spine precautions. C-collar in place  DVT PPx:  FCDs at all times when not ambulating  Abx: postop Ancef x 24hrs   Drain: X1  Nursing: Incentive spirometry, Monitor and record drain output each shift     » Dispo: stable      Output by Drain (mL) 07/06/25 0701 - 07/06/25 1900 07/06/25 1901 - 07/07/25 0700 07/07/25 0701 - 07/07/25 1900 07/07/25 1901 - 07/08/25 0654        Closed/Suction Drain 07/07/25 Tube - 1 Right Neck Bulb 10 Fr.   37 10

## 2025-07-08 NOTE — PT/OT/SLP EVAL
"Occupational Therapy   Evaluation and Discharge Note    Name: John Lemos  MRN: [id number]  Admitting Diagnosis: Stenosis of cervical spine with myelopathy  Recent Surgery: Procedure(s) (LRB):  DISCECTOMY, SPINE, CERVICAL, ANTERIOR, W/ ARTHRODESIS C3-4 ACDF SPINEWAVE SNS: VOCAL CORDS/MOTORS/SSEP (N/A) 1 Day Post-Op    Recommendations:     Discharge Recommendations: Low Intensity Therapy  Discharge Equipment Recommendations: shower chair  Barriers to discharge:  None    Assessment:     John Lemos is a 74 y.o. male with a medical diagnosis of Stenosis of cervical spine with myelopathy. Pt was willing to participate, tolerated session well overall. He was able to ambulate to bathroom to perform toileting and hygiene tasks before returning to chair to perform dressing tasks. He demonstrated good safety awareness c/ RW and good activity tolerance c/ ADLs assessed. At this time, patient is functioning at appropriate independence levels with adequate support at home to be discharged from acute OT services at this time.     Pt will have support from family upon d/c.    Plan:     During this hospitalization, patient does not require further acute OT services.  Please re-consult if situation changes.    Plan of Care Reviewed with: patient    Subjective     Chief Complaint: L arm pain from prior sx  Patient/Family Comments/goals: "I was going to outpatient therapy before I came here."    Occupational Profile:  Living Environment: 2SH, 1 MAURICE through garage, bed and bath downstairs, WIS, standard toilet; lives c/ wife, 2 children and 1 grandchild  Previous level of function: indep prior to April '25, drives  Roles and Routines: retired, father, grandfather,   Equipment Used at home: walker, rolling, bedside commode, cane, straight, hip kit  Assistance upon Discharge: family    Pain/Comfort:  Pain Rating 1:  (not rated)  Location - Side 1: Left  Location - Orientation 1: generalized  Location 1: arm  Pain Rating " Post-Intervention 1:  (not rated)    Patients cultural, spiritual, Anglican conflicts given the current situation: no    Objective:     Communicated with: RN prior to session.  Patient found up in chair with ARACELI drain, cervical collar upon OT entry to room.    General Precautions: Standard, fall  Orthopedic Precautions: spinal precautions  Braces: Cervical collar  Respiratory Status: Room air     Occupational Performance:    Bed Mobility:    Pt started and finished session in chair    Functional Mobility/Transfers:  Patient completed Sit <> Stand Transfer with modified independence  with  rolling walker   Patient completed Toilet Transfer Step Transfer technique with modified independence with  rolling walker  Chair t/f: Mod I c/ RW  Functional Mobility: from chair to bathroom back to chair; Mod I c/ RW; no LOB    Activities of Daily Living:  Grooming: modified independence washed hands standing at sink  Upper Body Dressing: setup/SPV; pt donned shirt seated in chair    Lower Body Dressing: setup/SPV; pt donned underwear and shorts seated in chair    Toileting: modified independence performed on standard toilet, pt urinated in seated position    Cognitive/Visual Perceptual:  Cognitive/Psychosocial Skills:     -       Oriented to: Person, Place, Time, and Situation   -       Follows Commands/attention:Follows multistep  commands  -       Safety awareness/insight to disability: intact     Physical Exam:  Balance:    -       standing balance at sink: Mod I ~ 3 min  Upper Extremity Range of Motion:     -       Right Upper Extremity: WFL  -       Left Upper Extremity: Deficits: shoulder flexion, elbow extension, forearm supination; pt report deficits from prior sx in April '25  Upper Extremity Strength:    -       Right Upper Extremity: grossly 5/5  -       Left Upper Extremity: grossly 3+/5   Strength:    -       Right Upper Extremity: WFL  -       Left Upper Extremity: diminished  Fine Motor Coordination:    -        Intact  Left hand thumb/finger opposition skills and Right hand thumb/finger opposition skills  Light deficits in bilateral hands with numbness/sensation    AMPAC 6 Click ADL:  AMPAC Total Score: 23    Treatment & Education:  Pt edu on role of OT, POC, safety when performing self care tasks , benefit of performing OOB activity, and safety when performing functional transfers and functional mobility.  - White board updated  - Self care tasks completed-- as noted above    - pt educated on spinal precautions  - pt educated on how to don/doff cervical collar    Patient left up in chair with all lines intact, call button in reach, and RN notified    History:     Past Medical History:   Diagnosis Date    Arthritis     Cataract     ou    Diabetes mellitus 7 yrs    lbs: 118 1 week ago    Diabetes mellitus type 2, controlled 9/8/2012    Eye injury     fb    Hyperlipidemia          Past Surgical History:   Procedure Laterality Date    ANTERIOR CERVICAL DISCECTOMY W/ FUSION N/A 7/7/2025    Procedure: DISCECTOMY, SPINE, CERVICAL, ANTERIOR, W/ ARTHRODESIS C3-4 ACDF SPINEWAVE SNS: VOCAL CORDS/MOTORS/SSEP;  Surgeon: Damián Geiger MD;  Location: [facility] OR;  Service: Orthopedics;  Laterality: N/A;    COLONOSCOPY N/A 11/25/2019    Procedure: COLONOSCOPY;  Surgeon: Eddie Leslie MD;  Location: [facility] ENDO;  Service: Endoscopy;  Laterality: N/A;    TOTAL REPLACEMENT OF HIP JOINT USING COMPUTER-ASSISTED NAVIGATION Left 4/14/2025    Procedure: ARTHROPLASTY, HIP, TOTAL, USING COMPUTER-ASSISTED NAVIGATION;  Surgeon: Judson Steel MD;  Location: [facility] OR;  Service: Orthopedics;  Laterality: Left;  Yobany Church Notified- NC---T/S--done  RN PREOP 4/2/2025---H/P--INCOMPLETE---       Time Tracking:     OT Date of Treatment: 07/08/25  OT Start Time: 0944  OT Stop Time: 1011  OT Total Time (min): 27 min    Billable Minutes:Evaluation 8  Self Care/Home Management 19 7/8/2025

## 2025-07-08 NOTE — PT/OT/SLP EVAL
Physical Therapy Evaluation and Treatment and Discharge Summary    Patient Name: John Lemos   MRN: [id number]  Recent Surgery: Procedure(s) (LRB):  DISCECTOMY, SPINE, CERVICAL, ANTERIOR, W/ ARTHRODESIS C3-4 ACDF SPINEWAVE SNS: VOCAL CORDS/MOTORS/SSEP (N/A) 1 Day Post-Op    Recommendations:     Discharge Recommendations: No Therapy Indicated   Discharge Equipment Recommendations: walker, rolling, shower chair   Barriers to discharge: None    Assessment:     John Lemos is a 74 y.o. male admitted with a medical diagnosis of Stenosis of cervical spine with myelopathy. He presents with the following impairments/functional limitations: impaired endurance, gait instability, pain, impaired fine motor, orthopedic precautions. Pt presents s/p ACDF C3-4 with expected post-op deficits.  Pt did really well with PT today and was able to amb 300' with RW and Supervision .   He had no LOB and no dizziness during mobility.  He demonstrated good understanding of all ed provided, including spinal precautions and C collar management.  Pt is ready for d/c home today from PT standpoint with his wife  to assist PRN and  will benefit from home walking program to maximize  functional recovery, strength and ROM.      Rehab Prognosis: Good; patient would benefit from home walking program to address these deficits and reach maximum level of function.    Plan:     During this hospitalization, patient to be d/c home today after PT with his wife to assist PRN and home walking program to address the above listed problems via therapeutic activities, gait training (home walking program)    Plan of Care Expires: 08/07/25    Subjective     Chief Complaint: Pt reports his L elbow pain has been since his L TAL sx.   Patient Comments/Goals: to go home today  Pain/Comfort:  Pain Rating 1: 3/10  Location - Side 1: Left  Location 1: elbow  Pain Addressed 1: Distraction  Pain Rating Post-Intervention 1: 3/10    Social History:  Living Environment:  Patient lives with their wife ,  2 adult sons and 5 yr old granddtr in a 2 story home with number of outside stair(s): entry only, number of inside stair(s): full flight with 2 rails but set wide apart, bed/bath on 1st floor, can live on 1st floor, and walk-in shower  Prior Level of Function: Prior to admission, patient ambulated community distances using rolling walker, used SC for stairs at home when he did them but seldom did the stairs.  Equipment Used at Home: walker, rolling, cane, straight  DME owned (not currently used): rolling walker, single point cane, and bedside commode  Assistance Upon Discharge: his wife and adult sons    Objective:     Communicated with RN prior to session. Patient found up in chair with ARACELI drain, cervical collar, peripheral IV upon PT entry to room.    General Precautions: Standard, fall   Orthopedic Precautions: spinal precautions   Braces: Aspen collar    Respiratory Status: Room air    Exams:  RLE ROM: WNL  RLE Strength: WNL  LLE ROM: WFL  LLE Strength: WFL  Cognitive: Patient is oriented to Person, Place, Time, Situation  Fine Motor Coordination:    -       Intact  RLE heel shin and Rapid alternating ankle DF/PF  -       Impaired  LLE heel shin unable to perform due to recent L TAL  Gross Motor Coordination: WFL  Postural Exam: Patient presented with the following abnormalities:    -       Rounded shoulders  -       Forward head  Sensation:    -       Intact    Functional Mobility:  Gait belt applied - Yes  Bed Mobility  Supine to Sit: stand by assistance for LE management, trunk management, and log rolling technique  Sit to Supine: stand by assistance for LE management, trunk management, and log rolling technique  Transfers  Sit to Stand: stand by assistance with rolling walker and with cues for hand placement  Toilet Transfer: supervision with rolling walker and with cues for body mechanics and spine precautions using urinal while standing in the bathroom.  Gait  Patient  ambulated 300' with rolling walker and supervision. Patient required cues for upright posture to increase independence and safety. Patient required cues ~ 25% of the time.  Gait Deviations:  steady gait, decreased step length, decreased rio, and step-through gait pattern      Balance  Sitting: GOOD: Maintains balance through MODERATE excursions of active trunk movement  Standing: GOOD: Needs SUPERVISION only during gait and able to self right with moderate LOB    Therapeutic Activities and Exercises:  Patient educated on role of acute care PT and PT POC, safety while in hospital including calling nurse for mobility, and call light usage.  Educated about importance of OOB mobility and remaining up in chair most of the day.  PT ed pt on spinal precautions,( including avoidance of bending, lifting, and twisting), log rolling, posture and body mechanics during mobility and pt verbalized good understanding back to PT.  Pt with good recall of spinal precautions.  Pt issued written handout.   PT ed pt on C collar management, placement and proper way to adjust collar, and he verbalized good understanding back to PT.   PT reviewed and demonstrated car transfers with pt and answered all questions.  Pt verbalized good understanding back to PT.   PT ed pt on home safety, home walking program, and he verbalized good understanding back to PT.   PT answered all questions for pt within PT scope of practice.     AM-PAC 6 CLICK MOBILITY  Total Score:18    Patient left up in chair with all lines intact, call button in reach, and RN notified.    GOALS:   Multidisciplinary Problems       Physical Therapy Goals          Problem: Physical Therapy    Goal Priority Disciplines Outcome Interventions   Physical Therapy Goal     PT, PT/OT Progressing    Description: Goals to be met by: 7/8/2025     Pt met goals at [facility] to demonstrate safe level of mobility for d/c home with his wife to assist PRN.and home walking program.                             DME Justifications:  No DME recommended requiring DME justifications due to pt has necessary DME.    History:     Past Medical History:   Diagnosis Date    Arthritis     Cataract     ou    Diabetes mellitus 7 yrs    lbs: 118 1 week ago    Diabetes mellitus type 2, controlled 9/8/2012    Eye injury     fb    Hyperlipidemia        Past Surgical History:   Procedure Laterality Date    ANTERIOR CERVICAL DISCECTOMY W/ FUSION N/A 7/7/2025    Procedure: DISCECTOMY, SPINE, CERVICAL, ANTERIOR, W/ ARTHRODESIS C3-4 ACDF SPINEWAVE SNS: VOCAL CORDS/MOTORS/SSEP;  Surgeon: Damián Geiger MD;  Location: [facility] OR;  Service: Orthopedics;  Laterality: N/A;    COLONOSCOPY N/A 11/25/2019    Procedure: COLONOSCOPY;  Surgeon: Eddie Leslie MD;  Location: [facility] ENDO;  Service: Endoscopy;  Laterality: N/A;    TOTAL REPLACEMENT OF HIP JOINT USING COMPUTER-ASSISTED NAVIGATION Left 4/14/2025    Procedure: ARTHROPLASTY, HIP, TOTAL, USING COMPUTER-ASSISTED NAVIGATION;  Surgeon: Judson Steel MD;  Location: [facility] OR;  Service: Orthopedics;  Laterality: Left;  Yobany Church Notified- [place]---T/S--done  RN PREOP 4/2/2025---H/P--INCOMPLETE---       Time Tracking:     PT Received On: 07/08/25  PT Start Time: 1032  PT Stop Time: 1058  PT Total Time (min): 26 min     Billable Minutes: Evaluation 10 and Therapeutic Activity 16    7/8/2025

## 2025-07-09 NOTE — PLAN OF CARE
[facility]  Discharge Final Note    Primary Care Provider: Chan Estrada MD    Expected Discharge Date: 7/8/2025    Final Discharge Note (most recent)       Final Note - 07/09/25 1115          Final Note    Assessment Type Final Discharge Note     Anticipated Discharge Disposition Home or Self Care     What phone number can be called within the next 1-3 days to see how you are doing after discharge? --   [phone]                    Important Message from Medicare           Future Appointments   Date Time Provider Department Center   7/15/2025  3:00 PM To, Jose Luis, PTA [facility] [place] [facility]   7/15/2025  4:00 PM SergeiRonel alberts LOTR [facility] [place] [facility]   7/17/2025 10:00 AM Isidore, Esthere, PT [facility] [place] [facility]   7/22/2025  3:00 PM To, Jose Luis, PTA [facility] [place] [facility]   7/22/2025  4:00 PM Ronel Mai LOTR [facility] [place] [facility]   7/24/2025 11:00 AM Isidore, Esthere, PT [facility] [place] [facility]   7/29/2025  3:00 PM To, Jose Luis, PTA [facility] [place] [facility]   7/29/2025  4:00 PM Ronel Mai LOTR [facility] [place] [facility]   8/1/2025 11:00 AM Isidore, Esthere, PT [facility] [place] [facility]   8/4/2025 10:30 AM [facility]   8/4/2025 11:30 AM Isis Ellis PA-C [facility] SP[facility] [address]   8/5/2025  9:00 AM Kim Blair PA-C [facility] ORTHO [place] [facility]   8/5/2025  4:00 PM Ronel Mai LOTR [facility] [place] [facility]   8/12/2025  4:00 PM Ronel Mai LOTR [facility] [place] [facility]   10/7/2025  2:00 PM [facility]   10/7/2025  3:00 PM Isis Ellis PA-C [facility] SPINE [address]   12/12/2025 11:00 AM Chan Estrada MD [facility]     Patient discharged home to care of family on 7/8/25.    Elaine Davidson RNCM  Case Management  [facility]  [phone]

## 2025-07-09 NOTE — DISCHARGE SUMMARY
[facility])  Orthopedics  Discharge Summary      Patient Name: John Lemos  MRN: [id number]  Admission Date: 7/7/2025  Hospital Length of Stay: 0 days  Discharge Date and Time: 7/8/2025  2:45 PM  Attending Physician: No att. providers found   Discharging Provider: Abbe Sumner MD  Primary Care Provider: Chan Estrada MD    HPI:   No notes on file    Procedure(s) (LRB):  DISCECTOMY, SPINE, CERVICAL, ANTERIOR, W/ ARTHRODESIS C3-4 ACDF SPINEWAVE SNS: VOCAL CORDS/MOTORS/SSEP (N/A)      Hospital Course:  Please see the preoperative H&P and other available documentation for full details related to history prior to this admission.  Briefly, John Lemos is a 74 y.o. male who was admitted following scheduled elective surgery for Stenosis of cervical spine with myelopathy. They underwent the following procedures: C3/4 ACDF    Following a complete preoperative discussion of the risks and benefits of surgery with signed informed consent, the patient was taken to the operating room on 7/7/2025 and underwent the above stated procedures. The patient tolerated surgery well  and there were no complications. Please see the operative report for full intraoperative findings and details. Postoperatively, the patient did well  and was transferred from the PACU to the  floor in stable condition where they had a stable and uncomplicated  hospital course. Labs and vital signs remained stable and appropriate throughout course. Diet was advanced as tolerated and the patient's pain was controlled on oral pain medications without problem. Ambulating without issue. Voiding without issue with adequate urine output. Passing gas and stool. Incision site is clean, dry, and intact.    Currently, the patient is doing well and is stable and appropriate for discharge home at this time. Patient will follow up in clinic       Goals of Care Treatment Preferences:             Significant Diagnostic Studies: No pertinent  studies.    Pending Diagnostic Studies:       None          Final Active Diagnoses:    Diagnosis Date Noted POA    PRINCIPAL PROBLEM:  Stenosis of cervical spine with myelopathy [M48.02, G99.2] 07/08/2025 Yes      Problems Resolved During this Admission:      Discharged Condition: good    Disposition: Home or Self Care    Follow Up:    Patient Instructions:   No discharge procedures on file.  Medications:  Reconciled Home Medications:      Medication List        START taking these medications      celecoxib 100 MG capsule  Commonly known as: CeleBREX  Take 1 capsule (100 mg total) by mouth 2 (two) times daily.     methocarbamoL 500 MG Tab  Commonly known as: Robaxin  Take 2 tablets (1,000 mg total) by mouth 3 (three) times daily.     oxyCODONE 5 MG immediate release tablet  Commonly known as: ROXICODONE  Take 1 tablet (5 mg total) by mouth every 8 (eight) hours as needed for Pain (for breakthrough pain).            CHANGE how you take these medications      * acetaminophen 500 MG tablet  Commonly known as: TYLENOL  Take 2 tablets (1,000 mg total) by mouth every 8 (eight) hours as needed for Pain.  What changed: Another medication with the same name was added. Make sure you understand how and when to take each.     * acetaminophen 500 MG tablet  Commonly known as: TYLENOL  Take 1 tablet (500 mg total) by mouth 3 (three) times daily.  What changed: You were already taking a medication with the same name, and this prescription was added. Make sure you understand how and when to take each.           * This list has 2 medication(s) that are the same as other medications prescribed for you. Read the directions carefully, and ask your doctor or other care provider to review them with you.                CONTINUE taking these medications      atorvastatin 40 MG tablet  Commonly known as: LIPITOR  Take 1 tablet (40 mg total) by mouth once daily.     b complex vitamins capsule  Take 1 capsule by mouth once daily.     blood  sugar diagnostic Strp  Commonly known as: FREESTYLE LITE STRIPS  Inject 1 each as directed once daily.     diclofenac 75 MG EC tablet  Commonly known as: VOLTAREN  Take 1 tablet (75 mg total) by mouth 3 (three) times daily as needed (pain).     docusate sodium 100 MG capsule  Commonly known as: COLACE  Take 1 capsule (100 mg total) by mouth 2 (two) times daily.     DULoxetine 60 MG capsule  Commonly known as: CYMBALTA  Take 1 capsule (60 mg total) by mouth once daily.     lancets 28 gauge Misc  Commonly known as: FREESTYLE LANCETS  Inject 1 lancet as directed once daily.     losartan 50 MG tablet  Commonly known as: COZAAR  Take 1 tablet (50 mg total) by mouth once daily.     metFORMIN 500 MG tablet  Commonly known as: GLUCOPHAGE  TAKE 2 TABLETS BY MOUTH TWICE DAILY WITH BREAKFAST AND WITH SUPPER     pregabalin 150 MG capsule  Commonly known as: LYRICA  Take 1 capsule (150 mg total) by mouth 2 (two) times daily.     tadalafiL 5 MG tablet  Commonly known as: CIALIS  Take 1 tablet (5 mg total) by mouth once daily.     TRULICITY 0.75 mg/0.5 mL pen injector  Generic drug: dulaglutide  INJECT 0.75 MG INTO THE SKIN EVERY 7 DAYS              Abbe Sumner MD  Orthopedics  [facility])

## 2025-07-14 ENCOUNTER — PATIENT MESSAGE (OUTPATIENT)
Dept: ORTHOPEDICS | Facility: CLINIC | Age: 75
End: 2025-07-14
Payer: COMMERCIAL

## 2025-07-22 DIAGNOSIS — M54.12 CERVICAL RADICULOPATHY: ICD-10-CM

## 2025-07-22 DIAGNOSIS — M51.369 DDD (DEGENERATIVE DISC DISEASE), LUMBAR: ICD-10-CM

## 2025-07-22 DIAGNOSIS — E11.42 TYPE 2 DIABETES MELLITUS WITH DIABETIC POLYNEUROPATHY, WITHOUT LONG-TERM CURRENT USE OF INSULIN: ICD-10-CM

## 2025-07-22 RX ORDER — DICLOFENAC SODIUM 75 MG/1
75 TABLET, DELAYED RELEASE ORAL 3 TIMES DAILY PRN
Qty: 90 TABLET | Refills: 0 | Status: SHIPPED | OUTPATIENT
Start: 2025-07-22

## 2025-07-22 RX ORDER — LOSARTAN POTASSIUM 50 MG/1
50 TABLET ORAL DAILY
Qty: 90 TABLET | Refills: 1 | Status: SHIPPED | OUTPATIENT
Start: 2025-07-22

## 2025-07-22 RX ORDER — DULOXETIN HYDROCHLORIDE 60 MG/1
60 CAPSULE, DELAYED RELEASE ORAL DAILY
Qty: 90 CAPSULE | Refills: 3 | Status: SHIPPED | OUTPATIENT
Start: 2025-07-22

## 2025-07-22 NOTE — TELEPHONE ENCOUNTER
No care due was identified.  [facility] Embedded Care Due Messages. Reference number: [id number].   7/22/2025 3:43:00 PM CDT

## 2025-07-23 ENCOUNTER — PATIENT MESSAGE (OUTPATIENT)
Dept: ORTHOPEDICS | Facility: CLINIC | Age: 75
End: 2025-07-23
Payer: COMMERCIAL

## 2025-07-28 ENCOUNTER — TELEPHONE (OUTPATIENT)
Dept: ORTHOPEDICS | Facility: CLINIC | Age: 75
End: 2025-07-28
Payer: COMMERCIAL

## 2025-07-28 ENCOUNTER — TELEPHONE (OUTPATIENT)
Dept: PHARMACY | Facility: CLINIC | Age: 75
End: 2025-07-28
Payer: COMMERCIAL

## 2025-07-28 NOTE — TELEPHONE ENCOUNTER
[facility]/Population Health Chart Review & Patient Outreach Details For Medication Adherence Project    Reason for Outreach Encounter: 3rd Party payor non-compliance report ([employer], [employer], C, etc)  2.  Patient Outreach Method: Reviewed Patient Chart  3.   Medication in question: losartan    LAST FILLED: 6/13/25 for 90 day supply  Hypertension Medications              losartan (COZAAR) 50 MG tablet Take 1 tablet (50 mg total) by mouth once daily.              4.  Reviewed and or Updates Made To: Patient Chart  5. Outreach Outcomes and/or actions taken: Patient filled medication and is on track to be adherent

## 2025-07-28 NOTE — TELEPHONE ENCOUNTER
Called patient to confirm appointment next week with Isis. Patient verbally confirmed appointment.

## 2025-07-31 ENCOUNTER — OFFICE VISIT (OUTPATIENT)
Dept: FAMILY MEDICINE | Facility: CLINIC | Age: 75
End: 2025-07-31
Payer: COMMERCIAL

## 2025-07-31 ENCOUNTER — HOSPITAL ENCOUNTER (OUTPATIENT)
Dept: RADIOLOGY | Facility: HOSPITAL | Age: 75
Discharge: HOME | End: 2025-07-31
Payer: COMMERCIAL

## 2025-07-31 VITALS
HEIGHT: 68 IN | SYSTOLIC BLOOD PRESSURE: 132 MMHG | TEMPERATURE: 98 F | BODY MASS INDEX: 25.56 KG/M2 | OXYGEN SATURATION: 97 % | DIASTOLIC BLOOD PRESSURE: 88 MMHG | HEART RATE: 83 BPM | WEIGHT: 168.63 LBS

## 2025-07-31 DIAGNOSIS — M25.552 LEFT HIP PAIN: ICD-10-CM

## 2025-07-31 DIAGNOSIS — M79.602 LEFT ARM PAIN: Primary | ICD-10-CM

## 2025-07-31 DIAGNOSIS — I10 ESSENTIAL HYPERTENSION: ICD-10-CM

## 2025-07-31 DIAGNOSIS — M79.602 LEFT ARM PAIN: ICD-10-CM

## 2025-07-31 DIAGNOSIS — M54.12 CERVICAL RADICULOPATHY: ICD-10-CM

## 2025-07-31 PROCEDURE — 3008F BODY MASS INDEX DOCD: CPT | Mod: CPTII,S$GLB,,

## 2025-07-31 PROCEDURE — 99213 OFFICE O/P EST LOW 20 MIN: CPT | Mod: S$GLB,,,

## 2025-07-31 PROCEDURE — 3288F FALL RISK ASSESSMENT DOCD: CPT | Mod: CPTII,S$GLB,,

## 2025-07-31 PROCEDURE — 3044F HG A1C LEVEL LT 7.0%: CPT | Mod: CPTII,S$GLB,,

## 2025-07-31 PROCEDURE — 3079F DIAST BP 80-89 MM HG: CPT | Mod: CPTII,S$GLB,,

## 2025-07-31 PROCEDURE — 1126F AMNT PAIN NOTED NONE PRSNT: CPT | Mod: CPTII,S$GLB,,

## 2025-07-31 PROCEDURE — 73060 X-RAY EXAM OF HUMERUS: CPT | Mod: TC,PO,LT

## 2025-07-31 PROCEDURE — 3075F SYST BP GE 130 - 139MM HG: CPT | Mod: CPTII,S$GLB,,

## 2025-07-31 PROCEDURE — 73030 X-RAY EXAM OF SHOULDER: CPT | Mod: 26,LT,, | Performed by: RADIOLOGY

## 2025-07-31 PROCEDURE — 4010F ACE/ARB THERAPY RXD/TAKEN: CPT | Mod: CPTII,S$GLB,,

## 2025-07-31 PROCEDURE — 3066F NEPHROPATHY DOC TX: CPT | Mod: CPTII,S$GLB,,

## 2025-07-31 PROCEDURE — 3061F NEG MICROALBUMINURIA REV: CPT | Mod: CPTII,S$GLB,,

## 2025-07-31 PROCEDURE — 73030 X-RAY EXAM OF SHOULDER: CPT | Mod: TC,PO,LT

## 2025-07-31 PROCEDURE — 99999 PR PBB SHADOW E&M-EST. PATIENT-LVL V: CPT | Mod: PBBFAC,,,

## 2025-07-31 PROCEDURE — 3072F LOW RISK FOR RETINOPATHY: CPT | Mod: CPTII,S$GLB,,

## 2025-07-31 PROCEDURE — 1159F MED LIST DOCD IN RCRD: CPT | Mod: CPTII,S$GLB,,

## 2025-07-31 PROCEDURE — 73060 X-RAY EXAM OF HUMERUS: CPT | Mod: 26,LT,, | Performed by: RADIOLOGY

## 2025-07-31 PROCEDURE — 1101F PT FALLS ASSESS-DOCD LE1/YR: CPT | Mod: CPTII,S$GLB,,

## 2025-07-31 NOTE — PROGRESS NOTES
HPI     Chief Complaint:  Chief Complaint   Patient presents with    Shoulder Pain       John Lemos is a 75 y.o. male with multiple medical diagnoses as listed in the medical history and problem list that presents for   Chief Complaint   Patient presents with    Shoulder Pain    .     Patient is not known to me with his last appointment in this department on 7/1/2025.     Pt presents for shoulder pain.  Shoulder Pain   The pain is present in the left shoulder and left arm. This is a new problem. The current episode started more than 1 month ago. There has been no history of extremity trauma. Associated symptoms include a limited range of motion. He has tried cold for the symptoms.   Had hip surgery in April. Has not had full ROM in left arm since surgery. Also had neck surgery in July.   Seen in ED on 4/28. Ruled out any stroke.    Assessment & Plan     Problem List Items Addressed This Visit    None  Visit Diagnoses         Left arm pain    -  Primary  Left arm and shoulder with pain and decreased ROM since hip surgery on April. Will order x-rays today. Encouraged patient to reach out to orthopedics given recent surgeries.    Relevant Orders    X-Ray Shoulder 2 or More Views Left (Completed)    X-Ray Humerus 2 View Left (Completed)      Cervical radiculopathy      S/P cervical spinal fusion on 7/7. Followed by orthopedics. The current medical regimen is effective;  continue present plan and medications.        Essential hypertension      BP today in clinic 132/88.  Managed with Losartan.  The current medical regimen is effective;  continue present plan and medications.          Left hip pain      S/P hip surgery on 4/14. Followed by orthopedics. The current medical regimen is effective;  continue present plan and medications.                --------------------------------------------      Health Maintenance:  Health Maintenance         Date Due Completion Date    Foot Exam 12/26/2024 12/26/2023    Override  "on 11/11/2021: Done    Override on 8/20/2020: Done    Override on 7/15/2019: Done    Override on 1/16/2018: Done    Override on 11/25/2015: Done    Influenza Vaccine (1) 09/01/2025 9/17/2024    Override on 11/25/2015: Done    Diabetic Eye Exam 11/29/2025 11/29/2024    Hemoglobin A1c 12/21/2025 6/21/2025    Override on 11/25/2015: Done    TETANUS VACCINE 02/19/2026 2/19/2016    Diabetes Urine Screening 06/21/2026 6/21/2025    Lipid Panel 06/21/2026 6/21/2025    Low Dose Statin 07/31/2026 7/31/2025    Colorectal Cancer Screening 11/25/2029 11/25/2019            Health maintenance reviewed    Follow Up:  Follow up in about 5 months (around 12/31/2025).    Exam     Review of Systems:  (as noted above)  Review of Systems    Physical Exam:   Physical Exam  Constitutional:       General: He is not in acute distress.     Appearance: Normal appearance. He is not ill-appearing, toxic-appearing or diaphoretic.      Comments: Ambulates with cane   Neck:      Comments: Neck brace in place  Cardiovascular:      Rate and Rhythm: Normal rate.   Pulmonary:      Effort: Pulmonary effort is normal.   Musculoskeletal:      Left shoulder: Tenderness present. No bony tenderness. Decreased range of motion. Normal strength.   Neurological:      Mental Status: He is alert.       Vitals:    07/31/25 1459   BP: 132/88   BP Location: Right arm   Patient Position: Sitting   Pulse: 83   Temp: 98 °F (36.7 °C)   TempSrc: Oral   SpO2: 97%   Weight: 76.5 kg (168 lb 10.4 oz)   Height: 5' 8" (1.727 m)      Body mass index is 25.64 kg/m².        History     Past Medical History:  Past Medical History:   Diagnosis Date    Arthritis     Cataract     ou    Diabetes mellitus 7 yrs    lbs: 118 1 week ago    Diabetes mellitus type 2, controlled 9/8/2012    Eye injury     fb    Hyperlipidemia        Past Surgical History:  Past Surgical History:   Procedure Laterality Date    ANTERIOR CERVICAL DISCECTOMY W/ FUSION N/A 7/7/2025    Procedure: DISCECTOMY, SPINE, " CERVICAL, ANTERIOR, W/ ARTHRODESIS C3-4 ACDF SPINEWAVE SNS: VOCAL CORDS/MOTORS/SSEP;  Surgeon: Damián Geiger MD;  Location: [facility] OR;  Service: Orthopedics;  Laterality: N/A;    COLONOSCOPY N/A 11/25/2019    Procedure: COLONOSCOPY;  Surgeon: Eddie Leslie MD;  Location: [facility] ENDO;  Service: Endoscopy;  Laterality: N/A;    TOTAL REPLACEMENT OF HIP JOINT USING COMPUTER-ASSISTED NAVIGATION Left 4/14/2025    Procedure: ARTHROPLASTY, HIP, TOTAL, USING COMPUTER-ASSISTED NAVIGATION;  Surgeon: Judson Steel MD;  Location: [facility] OR;  Service: Orthopedics;  Laterality: Left;  [place]  Yobany [employer] Notified- NC---T/S--done  RN PREOP 4/2/2025---H/P--INCOMPLETE---       Social History:  Social History[1]    Family History:  Family History   Problem Relation Name Age of Onset    Diabetes Mother      Cancer Mother      Hypertension Mother      Diabetes Father      Cancer Father      No Known Problems Sister      No Known Problems Brother      No Known Problems Maternal Aunt      No Known Problems Maternal Uncle      No Known Problems Paternal Aunt      No Known Problems Paternal Uncle      No Known Problems Maternal Grandmother      No Known Problems Maternal Grandfather      No Known Problems Paternal Grandmother      No Known Problems Paternal Grandfather      Amblyopia Neg Hx      Blindness Neg Hx      Cataracts Neg Hx      Glaucoma Neg Hx      Macular degeneration Neg Hx      Retinal detachment Neg Hx      Strabismus Neg Hx      Stroke Neg Hx      Thyroid disease Neg Hx         Allergies and Medications: (updated and reviewed)  Review of patient's allergies indicates:   Allergen Reactions    Sulfa (sulfonamide antibiotics) Swelling     Current Medications[2]    Patient Care Team:  Chan Estrada MD as PCP - General (Internal Medicine)  Chan Estrada MD as Diabetes Digital Medicine Responsible Provider (Internal Medicine)  Alessandra Sarabia, PharmD as Diabetes Digital Medicine Clinician (Pharmacist)  Sean  Chan SEPULVEDA MD as Consulting Physician (Internal Medicine)         - The patient is given an After Visit Summary that lists all medications with directions, allergies, education, orders placed during this encounter and follow-up instructions.      - I have reviewed the patient's medical information including past medical, family, and social history sections including the medications and allergies.      - We discussed the patient's current medications.     This note was created by combination of typed  and MModal dictation.  Transcription errors may be present.  If there are any questions, please contact me.       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 [employer]     Employer: [employer]   Tobacco Use    Smoking status: Never     Passive exposure: Never    Smokeless tobacco: Never   Vaping Use    Vaping status: Never Used   Substance and Sexual Activity    Alcohol use: No    Drug use: No    Sexual activity: Not Currently     Social Drivers of Health     Financial Resource Strain: Patient Declined (7/7/2025)    Overall Financial Resource Strain (CARDIA)     Difficulty of Paying Living Expenses: Patient declined   Food Insecurity: Patient Declined (7/7/2025)    Hunger Vital Sign     Worried About Running Out of Food in the Last Year: Patient declined     Ran Out of Food in the Last Year: Patient declined   Transportation Needs: Patient Declined (7/7/2025)    PRAPARE - Transportation     Lack of Transportation (Medical): Patient declined     Lack of Transportation (Non-Medical): Patient declined   Physical Activity: Insufficiently Active (3/17/2025)    Exercise Vital Sign     Days of Exercise per Week: 2 days     Minutes of Exercise per Session: 20 min   Stress: Patient Declined (7/7/2025)    [employer] [place] [employer] -  Occupational Stress Questionnaire     Feeling of Stress : Patient declined   Housing Stability: Patient Declined (7/7/2025)    Housing Stability Vital Sign     Unable to Pay for Housing in the Last Year: Patient declined     Homeless in the Last Year: Patient declined   [2]   Current Outpatient Medications   Medication Sig Dispense Refill    acetaminophen (TYLENOL) 500 MG tablet Take 2 tablets (1,000 mg total) by mouth every 8 (eight) hours as needed for Pain. 42 tablet 0    acetaminophen (TYLENOL) 500 MG tablet Take 1 tablet (500 mg total) by mouth 3 (three) times daily. 90 tablet 0    atorvastatin (LIPITOR) 40 MG tablet Take 1 tablet (40 mg total) by mouth once daily. 90 tablet 3    b complex vitamins capsule Take 1 capsule by mouth once daily.      blood sugar diagnostic (FREESTYLE LITE STRIPS) Strp Inject 1 each as directed once daily. 100 each 3    celecoxib (CELEBREX) 100 MG capsule Take 1 capsule (100 mg total) by mouth 2 (two) times daily. 28 capsule 0    diclofenac (VOLTAREN) 75 MG EC tablet Take 1 tablet (75 mg total) by mouth 3 (three) times daily as needed (pain). 90 tablet 0    docusate sodium (COLACE) 100 MG capsule Take 1 capsule (100 mg total) by mouth 2 (two) times daily. 30 capsule 0    DULoxetine (CYMBALTA) 60 MG capsule Take 1 capsule (60 mg total) by mouth once daily. 90 capsule 3    lancets (FREESTYLE LANCETS) 28 gauge Misc Inject 1 lancet as directed once daily. 100 each 3    losartan (COZAAR) 50 MG tablet Take 1 tablet (50 mg total) by mouth once daily. 90 tablet 1    metFORMIN (GLUCOPHAGE) 500 MG tablet TAKE 2 TABLETS BY MOUTH TWICE DAILY WITH BREAKFAST AND WITH SUPPER 360 tablet 1    methocarbamoL (ROBAXIN) 500 MG Tab Take 2 tablets (1,000 mg total) by mouth 3 (three) times daily. 90 tablet 0    oxyCODONE (ROXICODONE) 5 MG immediate release tablet Take 1 tablet (5 mg total) by mouth every 8 (eight) hours as needed for Pain (for breakthrough pain). 21 tablet 0    pregabalin (LYRICA) 150 MG  capsule Take 1 capsule (150 mg total) by mouth 2 (two) times daily. 60 capsule 5    tadalafiL (CIALIS) 5 MG tablet Take 1 tablet (5 mg total) by mouth once daily. 30 tablet 11    TRULICITY 0.75 mg/0.5 mL pen injector INJECT 0.75 MG INTO THE SKIN EVERY 7 DAYS (Patient taking differently: Inject 0.75 mg into the skin every 7 days. MONDAYS) 12 pen 1     No current facility-administered medications for this visit.

## 2025-08-01 ENCOUNTER — OFFICE VISIT (OUTPATIENT)
Dept: ORTHOPEDICS | Facility: CLINIC | Age: 75
End: 2025-08-01
Payer: COMMERCIAL

## 2025-08-01 VITALS — HEIGHT: 68 IN | WEIGHT: 168.63 LBS | BODY MASS INDEX: 25.56 KG/M2

## 2025-08-01 DIAGNOSIS — Z96.642 STATUS POST LEFT HIP REPLACEMENT: ICD-10-CM

## 2025-08-01 DIAGNOSIS — Z98.1 S/P CERVICAL SPINAL FUSION: ICD-10-CM

## 2025-08-01 DIAGNOSIS — M25.512 ACUTE PAIN OF LEFT SHOULDER: Primary | ICD-10-CM

## 2025-08-01 PROCEDURE — 99999 PR PBB SHADOW E&M-EST. PATIENT-LVL IV: CPT | Mod: PBBFAC,,,

## 2025-08-01 NOTE — PROGRESS NOTES
[facility], New Problem Visit: Shoulder     PRIMARY CARE PHYSICIAN: Chan Estrada MD   REFERRING PROVIDER: No referring provider defined for this encounter.     ASSESSMENT & PLAN:    Impression:  S/p L TAL (DOS 4/14/25)   Left shoulder pain  upper extremity weakness and paresthesias   S/p cervical fusion (7/7/25)    Non operative care:    John Lemos has physical exam evidence of above and wishes to pursue an non-operative care. The patient and I had a thorough discussion today. We discussed the working diagnosis as well as several other potential alternative diagnoses. Treatment options were discussed, both conservative and surgical. Conservative treatment options would include things such as activity modifications, workplace modifications, a period of rest, oral vs topical OTC and prescription anti-inflammatory medications, physical therapy versus HEP, splinting/bracing, immobilization, corticosteroid injections, and others. I am recommending the following:   Referral to formal PT for shoulder - plan to begin once cleared by spine team   Continue current pain medication regimen as directed by spine team  From a hip standpoint, patient is cleared to return to work. Work note provided today.   Follow up in 12 weeks or sooner if needed in regards to left shoulder.   Follow up 1 year post-operatively with Dr Steel with left hip radiographs   Call in the interim with any questions     All questions were answered and patient verbalized understanding of the plan.  JACQUES Blair PA-C  [facility]     The patient has been ordered:  PT    CONSULTS:   none    ACTIVE PROBLEM LIST  Problem List[1]        SUBJECTIVE    CHIEF COMPLAINT: Shoulder Pain    Initial Visit 8/1/2025 HPI:   John Lemos is a 75 y.o. right hand dominant male who presents to clinic for intermittent left shoulder pain and upper extremity weakness. The patient denies known ROB, but the pain first started 4 months ago  and is becoming progressively worse.  Pain is located over (points to) anterior and lateral shoulder . He additionally reports tingling throughout his upper extremity, which has improved after recent neck procedure. Also endorses subjective weakness diffusely of his upper extremity. Unable to fully actively supinate. The pain is aggravated by elevation, lifting, reaching behind back, pushing out of chair.  Associated symptoms include upper arm paresthesias, weakness. The pain is affecting ADLs and limiting desired level of activity. There is not a history of previous surgery to the shoulder.       He has been seen by multiple orthopedic providers regarding bilateral upper extremity weakness/numbness/tingling. 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 He is s/p C3/4 ACDF on 07/07/2025. He has not been seen for follow up yet and has not been able to participate in PT/OT since this surgery. He was previously going to OT primarily for CTS and PT for his hip. He continues to do hip HEP at home.    Of note he is also s/p L TAL 4/14/2025. He was scheduled to see me for his 3 month post-operative visit next week. He is doing really well from a hip standpoint. Still ambulating with a cane. Denies any hip pain. Cat any functional difficulties in regards to his hip. He is requesting to be released back to work from a hip standpoint.     Occupation: Special      John Lemos has no additional complaints.        REVIEW OF SYSTEMS:  PAIN ASSESSMENT:  See HPI.  MUSCULOSKELETAL: See HPI.      PAST MEDICAL HISTORY   has a past medical history of Arthritis, Cataract, Diabetes mellitus (7 yrs), Diabetes mellitus type 2, controlled (9/8/2012), Eye injury, and Hyperlipidemia.     PAST SURGICAL HISTORY   has a past surgical history that includes Colonoscopy (N/A, 11/25/2019); Total replacement of hip joint using computer-assisted navigation (Left, 4/14/2025);  "and Anterior cervical discectomy w/ fusion (N/A, 7/7/2025).     FAMILY HISTORY  family history includes Cancer in his father and mother; Diabetes in his father and mother; Hypertension in his mother; No Known Problems in his brother, maternal aunt, maternal grandfather, maternal grandmother, maternal uncle, paternal aunt, paternal grandfather, paternal grandmother, paternal uncle, and sister.     SOCIAL HISTORY   reports that he has never smoked. He has never been exposed to tobacco smoke. He has never used smokeless tobacco. He reports that he does not drink alcohol and does not use drugs.     ALLERGIES:   Review of patient's allergies indicates:   Allergen Reactions    Sulfa (sulfonamide antibiotics) Swelling        MEDICATIONS:   Medications Ordered Prior to Encounter[2]       PHYSICAL EXAM   height is 5' 8" (1.727 m) and weight is 76.5 kg (168 lb 10.4 oz).     All other systems deferred.  GENERAL:  No acute distress  HABITUS: Normal  GAIT: Coordinated  SKIN: Hip incision well healed     Hip Exam  Full painless ROM of the hip     SHOULDER EXAM:    Shoulder Range of Motion    Right     Left   (Active/Passive)       Forward Elevation     150/165             140/165  External rotation (arm at side)  30/30             20/20   Internal rotation behind the back  L5             SI     Range of motion is mildly painful     Acromioclavicular joint is tender  Crossbody test: positive    Neer's positive  Hawkin's positive    Grace's positive  Drop arm negative  Belly press negative      Cuff Strength     Right     Left   Supraspinatus        5/5    4/5  Infraspinatus     5/5    5/5  Subscapularis     5/5    5/5    Deltoid testing            5/5    5/5    Navarro's test positive  Speeds positive  Mild TTP over bicipital groove     Elbow examination demonstrates no tenderness to palpation and has normal range of motion.     ltsi C5-T1  + epl, io, fds, fdp   2+ RP         DATA:  Diagnostic tests reviewed for today's visit:     3 " views of the left shoulder:  positive for degenerative changes of the AC joint. The humeral head is well centered on the AP and axillary views. There is mild degenerative change of the glenohumeral joint. No acute changes or fracture.            [1]   Patient Active Problem List  Diagnosis    GERD (gastroesophageal reflux disease)    DDD (degenerative disc disease), lumbar    ED (erectile dysfunction)    Dyslipidemia    Neuropathy    Type 2 diabetes mellitus with diabetic polyneuropathy    Primary osteoarthritis of right knee    Nuclear sclerosis of both eyes    Refractive error    Weakness of both lower extremities    Balance problems    Decreased functional mobility and endurance    Decreased sensation    Decreased  strength of left hand    Self-care deficit in dressing    Stenosis of cervical spine with myelopathy   [2]   Current Outpatient Medications on File Prior to Visit   Medication Sig Dispense Refill    acetaminophen (TYLENOL) 500 MG tablet Take 2 tablets (1,000 mg total) by mouth every 8 (eight) hours as needed for Pain. 42 tablet 0    acetaminophen (TYLENOL) 500 MG tablet Take 1 tablet (500 mg total) by mouth 3 (three) times daily. 90 tablet 0    atorvastatin (LIPITOR) 40 MG tablet Take 1 tablet (40 mg total) by mouth once daily. 90 tablet 3    b complex vitamins capsule Take 1 capsule by mouth once daily.      blood sugar diagnostic (FREESTYLE LITE STRIPS) Strp Inject 1 each as directed once daily. 100 each 3    celecoxib (CELEBREX) 100 MG capsule Take 1 capsule (100 mg total) by mouth 2 (two) times daily. 28 capsule 0    diclofenac (VOLTAREN) 75 MG EC tablet Take 1 tablet (75 mg total) by mouth 3 (three) times daily as needed (pain). 90 tablet 0    docusate sodium (COLACE) 100 MG capsule Take 1 capsule (100 mg total) by mouth 2 (two) times daily. 30 capsule 0    DULoxetine (CYMBALTA) 60 MG capsule Take 1 capsule (60 mg total) by mouth once daily. 90 capsule 3    lancets (FREESTYLE LANCETS) 28 gauge  Misc Inject 1 lancet as directed once daily. 100 each 3    losartan (COZAAR) 50 MG tablet Take 1 tablet (50 mg total) by mouth once daily. 90 tablet 1    metFORMIN (GLUCOPHAGE) 500 MG tablet TAKE 2 TABLETS BY MOUTH TWICE DAILY WITH BREAKFAST AND WITH SUPPER 360 tablet 1    methocarbamoL (ROBAXIN) 500 MG Tab Take 2 tablets (1,000 mg total) by mouth 3 (three) times daily. 90 tablet 0    oxyCODONE (ROXICODONE) 5 MG immediate release tablet Take 1 tablet (5 mg total) by mouth every 8 (eight) hours as needed for Pain (for breakthrough pain). 21 tablet 0    pregabalin (LYRICA) 150 MG capsule Take 1 capsule (150 mg total) by mouth 2 (two) times daily. 60 capsule 5    tadalafiL (CIALIS) 5 MG tablet Take 1 tablet (5 mg total) by mouth once daily. 30 tablet 11    TRULICITY 0.75 mg/0.5 mL pen injector INJECT 0.75 MG INTO THE SKIN EVERY 7 DAYS (Patient taking differently: Inject 0.75 mg into the skin every 7 days. MONDAYS) 12 pen 1     No current facility-administered medications on file prior to visit.

## 2025-08-04 ENCOUNTER — OFFICE VISIT (OUTPATIENT)
Dept: ORTHOPEDICS | Facility: CLINIC | Age: 75
End: 2025-08-04
Payer: COMMERCIAL

## 2025-08-04 ENCOUNTER — HOSPITAL ENCOUNTER (OUTPATIENT)
Dept: RADIOLOGY | Facility: HOSPITAL | Age: 75
Discharge: HOME | End: 2025-08-04
Attending: ORTHOPAEDIC SURGERY
Payer: COMMERCIAL

## 2025-08-04 VITALS — HEIGHT: 68 IN | WEIGHT: 169 LBS | BODY MASS INDEX: 25.61 KG/M2

## 2025-08-04 DIAGNOSIS — Z98.1 S/P CERVICAL SPINAL FUSION: Primary | ICD-10-CM

## 2025-08-04 DIAGNOSIS — Z98.1 S/P CERVICAL SPINAL FUSION: ICD-10-CM

## 2025-08-04 PROCEDURE — 99024 POSTOP FOLLOW-UP VISIT: CPT | Mod: S$GLB,,, | Performed by: PHYSICIAN ASSISTANT

## 2025-08-04 PROCEDURE — 3044F HG A1C LEVEL LT 7.0%: CPT | Mod: CPTII,S$GLB,, | Performed by: PHYSICIAN ASSISTANT

## 2025-08-04 PROCEDURE — 3061F NEG MICROALBUMINURIA REV: CPT | Mod: CPTII,S$GLB,, | Performed by: PHYSICIAN ASSISTANT

## 2025-08-04 PROCEDURE — 72040 X-RAY EXAM NECK SPINE 2-3 VW: CPT | Mod: TC

## 2025-08-04 PROCEDURE — 1101F PT FALLS ASSESS-DOCD LE1/YR: CPT | Mod: CPTII,S$GLB,, | Performed by: PHYSICIAN ASSISTANT

## 2025-08-04 PROCEDURE — 3288F FALL RISK ASSESSMENT DOCD: CPT | Mod: CPTII,S$GLB,, | Performed by: PHYSICIAN ASSISTANT

## 2025-08-04 PROCEDURE — 4010F ACE/ARB THERAPY RXD/TAKEN: CPT | Mod: CPTII,S$GLB,, | Performed by: PHYSICIAN ASSISTANT

## 2025-08-04 PROCEDURE — 3066F NEPHROPATHY DOC TX: CPT | Mod: CPTII,S$GLB,, | Performed by: PHYSICIAN ASSISTANT

## 2025-08-04 PROCEDURE — 72040 X-RAY EXAM NECK SPINE 2-3 VW: CPT | Mod: 26,,, | Performed by: RADIOLOGY

## 2025-08-04 PROCEDURE — 99999 PR PBB SHADOW E&M-EST. PATIENT-LVL III: CPT | Mod: PBBFAC,,, | Performed by: PHYSICIAN ASSISTANT

## 2025-08-04 PROCEDURE — 1125F AMNT PAIN NOTED PAIN PRSNT: CPT | Mod: CPTII,S$GLB,, | Performed by: PHYSICIAN ASSISTANT

## 2025-08-04 PROCEDURE — 1159F MED LIST DOCD IN RCRD: CPT | Mod: CPTII,S$GLB,, | Performed by: PHYSICIAN ASSISTANT

## 2025-08-04 PROCEDURE — 3072F LOW RISK FOR RETINOPATHY: CPT | Mod: CPTII,S$GLB,, | Performed by: PHYSICIAN ASSISTANT

## 2025-08-04 NOTE — LETTER
August 4, 2025    John Lemos  [address] [facility] [place] [address]             [id number]  [address]  [place] [address]  Phone: [phone] To whom it may concern,    John Lemos was seen in the Orthopedic clinic 8/4/25.  He is able to return to work 8/5 with the following restrictions: no lifting over 10 lbs.       If you have any questions or concerns, please don't hesitate to call.    Sincerely,    Isis Ellis PA-C

## 2025-08-04 NOTE — PROGRESS NOTES
Date: 08/04/2025    Supervising Physician: Damián Geiger M.D.    Date of Surgery: 7/7/2025    Procedure: C3/4 ACDF    History: John Lemos is seen today for follow-up following the above listed procedure. Overall the patient is doing well but today notes his pain is significantly improved.  He notes his walking is improved as well.   Pain is well controlled with current pain medication.  he denies fever, chills, and sweats since the time of the surgery.       Exam: Post op dressing taken down.  Incision is healing well, clean, dry and intact.   There is no sign of infection. Neuro exam is stable. No signs of DVT.    Radiographs: imaging today shows hardware in place, no evidence of failure.     Assessment/Plan: 4 weeks post op.    Doing well postoperatively. No lifting over 10 lbs.     I will plan to see the patient back for the next postop visit in 2 months with imaging.     Thank you for the opportunity to participate in this patient's care. Please give me a call if there are any concerns or questions.

## 2025-08-05 ENCOUNTER — CLINICAL SUPPORT (OUTPATIENT)
Dept: REHABILITATION | Facility: HOSPITAL | Age: 75
End: 2025-08-05
Payer: COMMERCIAL

## 2025-08-05 DIAGNOSIS — Z74.1 SELF-CARE DEFICIT IN DRESSING: ICD-10-CM

## 2025-08-05 DIAGNOSIS — R20.8 DECREASED SENSATION: Primary | ICD-10-CM

## 2025-08-05 DIAGNOSIS — R29.898 DECREASED GRIP STRENGTH OF LEFT HAND: ICD-10-CM

## 2025-08-05 PROCEDURE — 97110 THERAPEUTIC EXERCISES: CPT | Mod: PN

## 2025-08-05 NOTE — PROGRESS NOTES
Outpatient Rehab    Occupational Therapy Progress Note/ Plan of Care    Patient Name: John Lemos  MRN: [id number]  YOB: 1950  Encounter Date: 8/5/2025    Therapy Diagnosis:   Encounter Diagnoses   Name Primary?    Decreased sensation Yes    Decreased  strength of left hand     Self-care deficit in dressing      Physician: Brenda Llamas NP    Physician Orders: Eval and Treat  Medical Diagnosis: Carpal tunnel syndrome on both sides  Surgical Diagnosis: Not applicable for this Episode   Surgical Date: Not applicable for this Episode  Days Since Last Surgery: Not applicable for this Episode    Visit # / Visits Authorized: 3 / 10  Insurance Authorization Period: 6/12/2025 to 12/31/2025  Date of Evaluation: 6/17/2025  Plan of Care Certification:   Progress Note Date Range: 6/12/25 to 8/5/2025     Time In: 1606   Time Out: 1645  Total Time (in minutes): 39   Total Billable Time (in minutes):      FOTO:  Intake Score (%): 72.5  Survey Score 2 (%): Not applicable for this Episode  Survey Score 3 (%): Not applicable for this Episode    Precautions:       Subjective      Pain reported as 2/10.      Objective      Upper Extremity Monofilament Tests   Right Hand Left Hand   Thumb 2.83: Normal 3.61: Diminished light touch     Index 2.83: Normal   3.61: Diminished light touch     Middle 2.83: Normal   3.61: Diminished light touch     Ring 2.83: Normal   3.61: Diminished light touch     Little 2.83: Normal   3.61: Diminished light touch     Additional Details                   Wrist Range of Motion  Right Wrist   Active (deg) Passive (deg) Pain Comment   Flexion 60         Extension 80         Radial Deviation 10         Ulnar Deviation 50           Left Wrist   Active (deg) Passive (deg) Pain Comment   Flexion 45         Extension 75         Radial Deviation 5         Ulnar Deviation 40                         Forearm Strength   Right Strength Right Pain Left Strength Left  Pain   Pronation     4      Supination     4         Wrist Strength - Planes of Motion   Right Strength Right Pain Left Strength Left  Pain   Flexion 3         Extension 3+         Radial Deviation 4-   4-     Ulnar Deviation (C8) 4+   4-       Right  Strength  Right Hand Dynamometer Position: 2  Elbow Position Forearm Position Trial 1 (lbs) Trial 2  (lbs) Trial 3  (lbs) Average  (lbs) Pain   Flexed Neutral 76 71 65 70.67         Left  Strength  Left Hand Dynamometer Position: 2  Elbow Position Forearm Position Trial 1 (lbs) Trial 2 (lbs) Trial 3 (lbs) Average (lbs) Pain   Flexed Neutral 32 33 34 33         Right Pinch Strength   Trial 1 (lbs) Trial 2 (lbs) Trial 3 (lbs) Average (lbs) Pain   Lateral (Key Pinch) 17 18 17 17.33     Three Point (Three Jaw David) 16 17 17 16.67     Two Point (Tip to Tip)                 Left Pinch Strength   Trial 1 (lbs) Trial 2 (lbs) Trial 3 (lbs) Average (lbs) Pain   Lateral (Key Pinch) 10 9 9 9.33     Three Point (Three Jaw David) 8 8 8 8     Two Point (Tip to Tip)                           Treatment:  Therapeutic Exercise  TE 1: ergo gripper right hand 1 yellow and red band 2 hooks x 3 min  TE 2: ergo gripper left hand 1 red and yellos on 1 hook x 3 min  TE 3: red therabar frowns and smiles x 10 reps each    Time Entry(in minutes):    TE 39 min     Assessment & Plan   Assessment:     John is demonstrating improvement in both hands active range of motion and strength of  and pinch.  He is experiencing less pain in both of his hands today.    The patient will continue to benefit from skilled outpatient occupational therapy in order to address the deficits listed in the problem list on the initial evaluation, provide patient and family education, and maximize the patients level of independence in the home and community environments.     The patient's spiritual, cultural, and educational needs were considered, and the patient is agreeable to the plan of care and goals.           Plan:   John to  attend occupational therapy 1 x week until 8/12/25 to improve use of  and manipulation with his hands.     Goals:   Active       Short term goals        Short term goals to be met by 7/15/25    Patient to be IND with HEP and modalities for pain/edema management -in progress  Increase left  strength to 23 lbs. to improve functional grasp for ADLs/work/leisure activities.- met 08/05/2025   Increase left key pinch to 7.5 psi's to increase IND with button and FM Coordination-met 08/05/2025   Increase left 3pt pinch to 6 to improve manipulation tasks-met 08/05/2025          Long term goals       Start:  06/17/25    Expected End:  08/12/25       Long term goals to be met by 8/12/25    Patient will improve his left hand 9 hole peg test by 30 seconds to 194 seconds for fine motor coordination.  Increase left  strength to 26 lbs. to increase functional use of left hand.             DOMINIQUE Portillo

## 2025-08-07 ENCOUNTER — PATIENT MESSAGE (OUTPATIENT)
Dept: ORTHOPEDICS | Facility: CLINIC | Age: 75
End: 2025-08-07
Payer: COMMERCIAL

## 2025-08-11 DIAGNOSIS — Z96.642 STATUS POST LEFT HIP REPLACEMENT: Primary | ICD-10-CM

## 2025-08-17 ENCOUNTER — TELEPHONE (OUTPATIENT)
Dept: PHARMACY | Facility: CLINIC | Age: 75
End: 2025-08-17
Payer: COMMERCIAL

## 2025-08-19 ENCOUNTER — CLINICAL SUPPORT (OUTPATIENT)
Dept: REHABILITATION | Facility: HOSPITAL | Age: 75
End: 2025-08-19
Payer: COMMERCIAL

## 2025-08-19 DIAGNOSIS — R29.898 DECREASED GRIP STRENGTH OF LEFT HAND: ICD-10-CM

## 2025-08-19 DIAGNOSIS — Z74.1 SELF-CARE DEFICIT IN DRESSING: ICD-10-CM

## 2025-08-19 DIAGNOSIS — R20.8 DECREASED SENSATION: Primary | ICD-10-CM

## 2025-08-19 PROCEDURE — 97110 THERAPEUTIC EXERCISES: CPT | Mod: PN

## 2025-08-19 PROCEDURE — 97022 WHIRLPOOL THERAPY: CPT | Mod: PN

## 2025-08-21 ENCOUNTER — DOCUMENTATION ONLY (OUTPATIENT)
Dept: REHABILITATION | Facility: HOSPITAL | Age: 75
End: 2025-08-21
Payer: COMMERCIAL

## 2025-09-04 ENCOUNTER — CLINICAL SUPPORT (OUTPATIENT)
Dept: REHABILITATION | Facility: HOSPITAL | Age: 75
End: 2025-09-04
Payer: COMMERCIAL

## 2025-09-04 DIAGNOSIS — R53.1 WEAKNESS: Primary | ICD-10-CM

## 2025-09-04 DIAGNOSIS — M25.512 ACUTE PAIN OF LEFT SHOULDER: ICD-10-CM

## 2025-09-04 DIAGNOSIS — Z96.642 STATUS POST LEFT HIP REPLACEMENT: ICD-10-CM

## 2025-09-04 DIAGNOSIS — M25.612 DECREASED ROM OF LEFT SHOULDER: ICD-10-CM

## 2025-09-04 PROCEDURE — 97110 THERAPEUTIC EXERCISES: CPT | Mod: PN

## 2025-09-04 PROCEDURE — 97161 PT EVAL LOW COMPLEX 20 MIN: CPT | Mod: PN

## 2025-09-04 PROCEDURE — 97140 MANUAL THERAPY 1/> REGIONS: CPT | Mod: PN

## (undated) DEVICE — NDL SPINAL 18GX3.5 SPINOCAN

## (undated) DEVICE — SUT STRATAFIX 1 PDS CT-1

## (undated) DEVICE — HOOD T7 W/ PEEL AWAY LENS

## (undated) DEVICE — PULSAVAC ZIMMER

## (undated) DEVICE — DRAPE STERI INSTRUMENT 1018

## (undated) DEVICE — DRAPE C-ARM ELAS CLIP 42X120IN

## (undated) DEVICE — COVER CAMERA OPERATING ROOM

## (undated) DEVICE — SPONGE COTTON TRAY 4X4IN

## (undated) DEVICE — BNDG COFLEX FOAM LF2 ST 6X5YD

## (undated) DEVICE — DRAPE TOP 53X102IN

## (undated) DEVICE — DRAPE C-ARMOR EQUIPMENT COVER

## (undated) DEVICE — SUT VICRYL PLUS 2-0 CT1 18

## (undated) DEVICE — SUT MCRYL PLUS 4-0 PS2 27IN

## (undated) DEVICE — SYS CLSR DERMABOND PRINEO 22CM

## (undated) DEVICE — GAUZE SPONGE PEANUT STRL

## (undated) DEVICE — DRAPE THREE-QUARTER 53X77IN

## (undated) DEVICE — RESTRAINT LIMB HOLDER ADJ FOAM

## (undated) DEVICE — SUT STRATAFIX PGAPCL 3 FS-1

## (undated) DEVICE — Device

## (undated) DEVICE — SYS SURGIPHOR STRL IRRG

## (undated) DEVICE — DRAPE STERI-DRAPE 1000 17X11IN

## (undated) DEVICE — SYR IRRIGATION BULB STER 60ML

## (undated) DEVICE — GLOVE SENSICARE PI GRN 6.5

## (undated) DEVICE — COVER PROXIMA MAYO STAND

## (undated) DEVICE — BLADE SAGITTAL 18 X 1.27 X 90M

## (undated) DEVICE — CHLORAPREP 3ML APPLICATOR TINT

## (undated) DEVICE — DRAPE SURG W/TWL 17 5/8X23

## (undated) DEVICE — DRAIN CHANNEL ROUND 10FR

## (undated) DEVICE — DRAPE T THYROID STERILE

## (undated) DEVICE — PIN DISTRACTION DISP 14MM
Type: IMPLANTABLE DEVICE | Site: NECK | Status: NON-FUNCTIONAL
Removed: 2025-07-07

## (undated) DEVICE — KIT DRAPE RIO ONE PIECE W/POCK

## (undated) DEVICE — BURR RND FLUT SFT TOUCH 2.0MM

## (undated) DEVICE — MARKER FN REG DUAL UTIL RULER

## (undated) DEVICE — COVER OVERHEAD SURG LT BLUE

## (undated) DEVICE — SUT SILK 2-0 BLK BR PS-2 18

## (undated) DEVICE — SYR ONLY LUER LOCK 20CC

## (undated) DEVICE — SOL NACL IRR 3000ML

## (undated) DEVICE — GLOVE GAMMEX 7 PF STERILE

## (undated) DEVICE — SEE MEDLINE ITEM 156905

## (undated) DEVICE — DRAPE U SPLIT SHEET 54X76IN

## (undated) DEVICE — DRESSING TRANS 4X4 TEGADERM

## (undated) DEVICE — ALCOHOL 70% ETHYL 16OZ.

## (undated) DEVICE — DRAPE HIP PCH 112X137X89IN

## (undated) DEVICE — GLOVE GAMMEX SURG LF PI SZ 7.5

## (undated) DEVICE — GLOVE SENSICARE PI MICRO 6.5

## (undated) DEVICE — SUT VICRYL 3-0 27 SH

## (undated) DEVICE — SUT ETHIBOND EXCEL 5-0 V-40

## (undated) DEVICE — KIT IRR SUCTION HND PIECE

## (undated) DEVICE — ELECTRODE REM PLYHSV RETURN 9

## (undated) DEVICE — NDL ANES SPINAL 18X3.5ST 18G

## (undated) DEVICE — GLOVE SENSICARE PI GRN 8

## (undated) DEVICE — DRESSING MEPILEX AG 8X20IN

## (undated) DEVICE — BUR BONE CUT MICRO TPS 3X3.8MM

## (undated) DEVICE — ELECTRODE PENCIL W/ROCKER NDL

## (undated) DEVICE — KIT SURGIFLO HEMOSTATIC MATRIX

## (undated) DEVICE — ADHESIVE DERMABOND ADVANCED

## (undated) DEVICE — TUBE EMG NIM 7.0MM TRIVANTAGE

## (undated) DEVICE — DRESSING COVER AQUACEL AG SURG

## (undated) DEVICE — KIT TRACKING VIZADISC HIP

## (undated) DEVICE — ALCOHOL 70% ANTISEPTIC ISO 4OZ

## (undated) DEVICE — COVER LIGHT HANDLE 80/CA

## (undated) DEVICE — ADHESIVE MASTISOL VIAL 48/BX

## (undated) DEVICE — GLOVE SENSICARE PI MICRO 7.5

## (undated) DEVICE — APPLIER CLIP LIAGCLIP 9.375IN

## (undated) DEVICE — CANISTER SUCTION RIGID 2000CC